# Patient Record
Sex: FEMALE | Race: WHITE | NOT HISPANIC OR LATINO | Employment: OTHER | URBAN - NONMETROPOLITAN AREA
[De-identification: names, ages, dates, MRNs, and addresses within clinical notes are randomized per-mention and may not be internally consistent; named-entity substitution may affect disease eponyms.]

---

## 2020-08-20 DIAGNOSIS — M54.16 LUMBAR RADICULOPATHY: Primary | ICD-10-CM

## 2020-08-25 ENCOUNTER — HOSPITAL ENCOUNTER (OUTPATIENT)
Dept: RADIOLOGY | Facility: HOSPITAL | Age: 85
Discharge: HOME | End: 2020-08-25
Attending: PAIN MEDICINE
Payer: MEDICARE

## 2020-08-25 DIAGNOSIS — M54.16 LUMBAR RADICULOPATHY: ICD-10-CM

## 2020-08-25 PROCEDURE — A9503 TC99M MEDRONATE: HCPCS

## 2022-03-01 ENCOUNTER — HOSPITAL ENCOUNTER (OUTPATIENT)
Facility: HOSPITAL | Age: 87
Discharge: NURSING, REHAB OR LONG-TERM CARE | End: 2022-03-04
Attending: STUDENT IN AN ORGANIZED HEALTH CARE EDUCATION/TRAINING PROGRAM | Admitting: EMERGENCY MEDICINE
Payer: MEDICARE

## 2022-03-01 DIAGNOSIS — R79.89 ELEVATED TROPONIN: ICD-10-CM

## 2022-03-01 DIAGNOSIS — R52 PAIN: ICD-10-CM

## 2022-03-01 DIAGNOSIS — I16.0 HYPERTENSIVE URGENCY: Primary | ICD-10-CM

## 2022-03-01 DIAGNOSIS — R00.0 TACHYCARDIA: ICD-10-CM

## 2022-03-01 LAB
ALBUMIN SERPL BCP-MCNC: 3.1 G/DL (ref 3.5–5.2)
ALP SERPL-CCNC: 114 U/L (ref 55–135)
ALT SERPL W/O P-5'-P-CCNC: 21 U/L (ref 10–44)
ANION GAP SERPL CALC-SCNC: 6 MMOL/L (ref 8–16)
AST SERPL-CCNC: 18 U/L (ref 10–40)
BACTERIA #/AREA URNS HPF: ABNORMAL /HPF
BASOPHILS # BLD AUTO: 0.05 K/UL (ref 0–0.2)
BASOPHILS NFR BLD: 0.3 % (ref 0–1.9)
BILIRUB SERPL-MCNC: 1.1 MG/DL (ref 0.1–1)
BILIRUB UR QL STRIP: NEGATIVE
BUN SERPL-MCNC: 28 MG/DL (ref 8–23)
CALCIUM SERPL-MCNC: 10.1 MG/DL (ref 8.7–10.5)
CHLORIDE SERPL-SCNC: 101 MMOL/L (ref 95–110)
CLARITY UR: ABNORMAL
CO2 SERPL-SCNC: 32 MMOL/L (ref 23–29)
COLOR UR: YELLOW
CREAT SERPL-MCNC: 1.2 MG/DL (ref 0.5–1.4)
CTP QC/QA: YES
DIFFERENTIAL METHOD: ABNORMAL
EOSINOPHIL # BLD AUTO: 0 K/UL (ref 0–0.5)
EOSINOPHIL NFR BLD: 0.1 % (ref 0–8)
ERYTHROCYTE [DISTWIDTH] IN BLOOD BY AUTOMATED COUNT: 12.9 % (ref 11.5–14.5)
EST. GFR  (AFRICAN AMERICAN): 46 ML/MIN/1.73 M^2
EST. GFR  (NON AFRICAN AMERICAN): 39.9 ML/MIN/1.73 M^2
GLUCOSE SERPL-MCNC: 133 MG/DL (ref 70–110)
GLUCOSE UR QL STRIP: NEGATIVE
HCT VFR BLD AUTO: 46 % (ref 37–48.5)
HGB BLD-MCNC: 14.8 G/DL (ref 12–16)
HGB UR QL STRIP: NEGATIVE
HYALINE CASTS #/AREA URNS LPF: 2 /LPF
IMM GRANULOCYTES # BLD AUTO: 0.08 K/UL (ref 0–0.04)
IMM GRANULOCYTES NFR BLD AUTO: 0.5 % (ref 0–0.5)
KETONES UR QL STRIP: NEGATIVE
LEUKOCYTE ESTERASE UR QL STRIP: NEGATIVE
LYMPHOCYTES # BLD AUTO: 1 K/UL (ref 1–4.8)
LYMPHOCYTES NFR BLD: 6.6 % (ref 18–48)
MCH RBC QN AUTO: 29.6 PG (ref 27–31)
MCHC RBC AUTO-ENTMCNC: 32.2 G/DL (ref 32–36)
MCV RBC AUTO: 92 FL (ref 82–98)
MICROSCOPIC COMMENT: ABNORMAL
MONOCYTES # BLD AUTO: 0.9 K/UL (ref 0.3–1)
MONOCYTES NFR BLD: 5.8 % (ref 4–15)
NEUTROPHILS # BLD AUTO: 12.9 K/UL (ref 1.8–7.7)
NEUTROPHILS NFR BLD: 86.7 % (ref 38–73)
NITRITE UR QL STRIP: POSITIVE
NRBC BLD-RTO: 0 /100 WBC
NT-PROBNP SERPL-MCNC: 1830 PG/ML (ref 5–1800)
NT-PROBNP SERPL-MCNC: 2142 PG/ML (ref 5–1800)
PH UR STRIP: 7 [PH] (ref 5–8)
PLATELET # BLD AUTO: 427 K/UL (ref 150–450)
PMV BLD AUTO: 9.4 FL (ref 9.2–12.9)
POTASSIUM SERPL-SCNC: 3.7 MMOL/L (ref 3.5–5.1)
PROT SERPL-MCNC: 7.9 G/DL (ref 6–8.4)
PROT UR QL STRIP: ABNORMAL
RBC # BLD AUTO: 5 M/UL (ref 4–5.4)
RBC #/AREA URNS HPF: 1 /HPF (ref 0–4)
SARS-COV-2 RDRP RESP QL NAA+PROBE: NEGATIVE
SODIUM SERPL-SCNC: 139 MMOL/L (ref 136–145)
SP GR UR STRIP: 1.01 (ref 1–1.03)
SQUAMOUS #/AREA URNS HPF: 2 /HPF
TROPONIN I SERPL DL<=0.01 NG/ML-MCNC: 117.3 PG/ML (ref 0–60)
TROPONIN I SERPL DL<=0.01 NG/ML-MCNC: 120.7 PG/ML (ref 0–60)
TSH SERPL DL<=0.005 MIU/L-ACNC: 1.78 UIU/ML (ref 0.4–4)
URN SPEC COLLECT METH UR: ABNORMAL
UROBILINOGEN UR STRIP-ACNC: 1 EU/DL
WBC # BLD AUTO: 14.86 K/UL (ref 3.9–12.7)
WBC #/AREA URNS HPF: 3 /HPF (ref 0–5)

## 2022-03-01 PROCEDURE — 36415 COLL VENOUS BLD VENIPUNCTURE: CPT | Performed by: STUDENT IN AN ORGANIZED HEALTH CARE EDUCATION/TRAINING PROGRAM

## 2022-03-01 PROCEDURE — 25000003 PHARM REV CODE 250: Performed by: STUDENT IN AN ORGANIZED HEALTH CARE EDUCATION/TRAINING PROGRAM

## 2022-03-01 PROCEDURE — 80053 COMPREHEN METABOLIC PANEL: CPT | Performed by: STUDENT IN AN ORGANIZED HEALTH CARE EDUCATION/TRAINING PROGRAM

## 2022-03-01 PROCEDURE — P9612 CATHETERIZE FOR URINE SPEC: HCPCS

## 2022-03-01 PROCEDURE — 93010 EKG 12-LEAD: ICD-10-PCS | Mod: 76,,, | Performed by: INTERNAL MEDICINE

## 2022-03-01 PROCEDURE — 83880 ASSAY OF NATRIURETIC PEPTIDE: CPT | Mod: 91 | Performed by: STUDENT IN AN ORGANIZED HEALTH CARE EDUCATION/TRAINING PROGRAM

## 2022-03-01 PROCEDURE — 84443 ASSAY THYROID STIM HORMONE: CPT | Performed by: STUDENT IN AN ORGANIZED HEALTH CARE EDUCATION/TRAINING PROGRAM

## 2022-03-01 PROCEDURE — 96361 HYDRATE IV INFUSION ADD-ON: CPT

## 2022-03-01 PROCEDURE — 93005 ELECTROCARDIOGRAM TRACING: CPT

## 2022-03-01 PROCEDURE — 25000003 PHARM REV CODE 250: Performed by: INTERNAL MEDICINE

## 2022-03-01 PROCEDURE — G0378 HOSPITAL OBSERVATION PER HR: HCPCS

## 2022-03-01 PROCEDURE — 81000 URINALYSIS NONAUTO W/SCOPE: CPT | Performed by: STUDENT IN AN ORGANIZED HEALTH CARE EDUCATION/TRAINING PROGRAM

## 2022-03-01 PROCEDURE — 93010 ELECTROCARDIOGRAM REPORT: CPT | Mod: ,,, | Performed by: INTERNAL MEDICINE

## 2022-03-01 PROCEDURE — 85025 COMPLETE CBC W/AUTO DIFF WBC: CPT | Performed by: STUDENT IN AN ORGANIZED HEALTH CARE EDUCATION/TRAINING PROGRAM

## 2022-03-01 PROCEDURE — 96365 THER/PROPH/DIAG IV INF INIT: CPT

## 2022-03-01 PROCEDURE — 96375 TX/PRO/DX INJ NEW DRUG ADDON: CPT

## 2022-03-01 PROCEDURE — U0002 COVID-19 LAB TEST NON-CDC: HCPCS | Performed by: STUDENT IN AN ORGANIZED HEALTH CARE EDUCATION/TRAINING PROGRAM

## 2022-03-01 PROCEDURE — 63600175 PHARM REV CODE 636 W HCPCS: Performed by: STUDENT IN AN ORGANIZED HEALTH CARE EDUCATION/TRAINING PROGRAM

## 2022-03-01 PROCEDURE — 96376 TX/PRO/DX INJ SAME DRUG ADON: CPT

## 2022-03-01 PROCEDURE — 99291 CRITICAL CARE FIRST HOUR: CPT | Mod: 25

## 2022-03-01 PROCEDURE — 84484 ASSAY OF TROPONIN QUANT: CPT | Mod: 91 | Performed by: STUDENT IN AN ORGANIZED HEALTH CARE EDUCATION/TRAINING PROGRAM

## 2022-03-01 RX ORDER — LABETALOL HCL 20 MG/4 ML
20 SYRINGE (ML) INTRAVENOUS EVERY 4 HOURS PRN
Status: DISCONTINUED | OUTPATIENT
Start: 2022-03-01 | End: 2022-03-02

## 2022-03-01 RX ORDER — ACETAMINOPHEN 325 MG/1
650 TABLET ORAL EVERY 8 HOURS PRN
Status: DISCONTINUED | OUTPATIENT
Start: 2022-03-01 | End: 2022-03-04 | Stop reason: HOSPADM

## 2022-03-01 RX ORDER — ALPRAZOLAM 0.5 MG/1
0.75 TABLET ORAL NIGHTLY
Status: ON HOLD | COMMUNITY
End: 2022-07-05 | Stop reason: HOSPADM

## 2022-03-01 RX ORDER — ASPIRIN 325 MG
325 TABLET ORAL
Status: COMPLETED | OUTPATIENT
Start: 2022-03-01 | End: 2022-03-01

## 2022-03-01 RX ORDER — LABETALOL HCL 20 MG/4 ML
20 SYRINGE (ML) INTRAVENOUS
Status: COMPLETED | OUTPATIENT
Start: 2022-03-01 | End: 2022-03-01

## 2022-03-01 RX ORDER — MORPHINE SULFATE 2 MG/ML
1 INJECTION, SOLUTION INTRAMUSCULAR; INTRAVENOUS EVERY 4 HOURS PRN
Status: DISCONTINUED | OUTPATIENT
Start: 2022-03-01 | End: 2022-03-02

## 2022-03-01 RX ORDER — MORPHINE SULFATE 2 MG/ML
1 INJECTION, SOLUTION INTRAMUSCULAR; INTRAVENOUS
Status: COMPLETED | OUTPATIENT
Start: 2022-03-01 | End: 2022-03-01

## 2022-03-01 RX ORDER — ONDANSETRON 2 MG/ML
4 INJECTION INTRAMUSCULAR; INTRAVENOUS EVERY 8 HOURS PRN
Status: DISCONTINUED | OUTPATIENT
Start: 2022-03-01 | End: 2022-03-04 | Stop reason: HOSPADM

## 2022-03-01 RX ORDER — KETOROLAC TROMETHAMINE 30 MG/ML
15 INJECTION, SOLUTION INTRAMUSCULAR; INTRAVENOUS
Status: COMPLETED | OUTPATIENT
Start: 2022-03-01 | End: 2022-03-01

## 2022-03-01 RX ORDER — FAMOTIDINE 10 MG/ML
20 INJECTION INTRAVENOUS DAILY
Status: DISCONTINUED | OUTPATIENT
Start: 2022-03-02 | End: 2022-03-02

## 2022-03-01 RX ORDER — TALC
6 POWDER (GRAM) TOPICAL NIGHTLY PRN
Status: DISCONTINUED | OUTPATIENT
Start: 2022-03-01 | End: 2022-03-04 | Stop reason: HOSPADM

## 2022-03-01 RX ORDER — EZETIMIBE 10 MG/1
10 TABLET ORAL DAILY
COMMUNITY
End: 2022-07-25 | Stop reason: SDUPTHER

## 2022-03-01 RX ORDER — SODIUM CHLORIDE 0.9 % (FLUSH) 0.9 %
10 SYRINGE (ML) INJECTION
Status: DISCONTINUED | OUTPATIENT
Start: 2022-03-01 | End: 2022-03-04 | Stop reason: HOSPADM

## 2022-03-01 RX ORDER — ACETAMINOPHEN 325 MG/1
650 TABLET ORAL
Status: COMPLETED | OUTPATIENT
Start: 2022-03-01 | End: 2022-03-01

## 2022-03-01 RX ORDER — HYDROCHLOROTHIAZIDE 25 MG/1
50 TABLET ORAL DAILY
COMMUNITY
End: 2022-03-15

## 2022-03-01 RX ADMIN — ASPIRIN 325 MG ORAL TABLET 325 MG: 325 PILL ORAL at 01:03

## 2022-03-01 RX ADMIN — MORPHINE SULFATE 1 MG: 2 INJECTION, SOLUTION INTRAMUSCULAR; INTRAVENOUS at 05:03

## 2022-03-01 RX ADMIN — ACETAMINOPHEN 650 MG: 325 TABLET ORAL at 12:03

## 2022-03-01 RX ADMIN — SODIUM CHLORIDE, SODIUM LACTATE, POTASSIUM CHLORIDE, AND CALCIUM CHLORIDE 1000 ML: .6; .31; .03; .02 INJECTION, SOLUTION INTRAVENOUS at 12:03

## 2022-03-01 RX ADMIN — MORPHINE SULFATE 1 MG: 2 INJECTION, SOLUTION INTRAMUSCULAR; INTRAVENOUS at 01:03

## 2022-03-01 RX ADMIN — CEFTRIAXONE 1 G: 1 INJECTION, SOLUTION INTRAVENOUS at 04:03

## 2022-03-01 RX ADMIN — LABETALOL HYDROCHLORIDE 20 MG: 5 INJECTION, SOLUTION INTRAVENOUS at 01:03

## 2022-03-01 RX ADMIN — ALPRAZOLAM 0.75 MG: 0.5 TABLET ORAL at 08:03

## 2022-03-01 RX ADMIN — LABETALOL HYDROCHLORIDE 20 MG: 5 INJECTION, SOLUTION INTRAVENOUS at 05:03

## 2022-03-01 RX ADMIN — KETOROLAC TROMETHAMINE 15 MG: 30 INJECTION, SOLUTION INTRAMUSCULAR at 12:03

## 2022-03-01 NOTE — ED PROVIDER NOTES
Encounter Date: 3/1/2022       History     Chief Complaint   Patient presents with    Back Pain     Pt fell last week.  Pt sat on toilet this morning and started with right buttocks pain.  Pt states pain does NOT radiate.     89-year-old female with history of dvt on xerelto and full dose aspirin, hypertension and high cholesterol presents with right buttocks pain.  Patient said that she fell last week on to her that right side and has been having episodic pain in that same area.  Patient said however, pain was much worse this morning when patient was trying to get off the toilet.  Denies any new falls, chest pain, fever, vomiting, diarrhea, shortness of breath        Review of patient's allergies indicates:  No Known Allergies  Past Medical History:   Diagnosis Date    High cholesterol      Past Surgical History:   Procedure Laterality Date    HYSTERECTOMY       History reviewed. No pertinent family history.  Social History     Tobacco Use    Smoking status: Never Smoker    Smokeless tobacco: Never Used   Substance Use Topics    Alcohol use: Never     Review of Systems   Constitutional: Negative.    HENT: Negative.    Respiratory: Negative.    Cardiovascular: Negative.    Gastrointestinal: Negative.    Genitourinary: Negative.    Musculoskeletal: Positive for back pain and gait problem.   Skin: Negative.    Psychiatric/Behavioral: Negative.    All other systems reviewed and are negative.      Physical Exam     Initial Vitals [03/01/22 1107]   BP Pulse Resp Temp SpO2   (!) 199/96 (!) 122 20 98.1 °F (36.7 °C) (!) 92 %      MAP       --         Physical Exam    Nursing note and vitals reviewed.  Constitutional: Vital signs are normal. She appears well-developed and well-nourished.   HENT:   Head: Normocephalic and atraumatic.   Eyes: Conjunctivae and lids are normal.   Neck: Trachea normal. Neck supple.   Cardiovascular: Regular rhythm, normal heart sounds, intact distal pulses and normal pulses.   No murmur  heard.  Pulmonary/Chest: Breath sounds normal. No respiratory distress.   Abdominal: Abdomen is soft. Bowel sounds are normal.   Musculoskeletal:      Cervical back: Neck supple.      Comments: Tenderness to palpation of right sacral area without any obvious deformity or bruising.  Full range of motion of hip and knee bilaterally     Neurological: She is alert and oriented to person, place, and time. She has normal strength. No cranial nerve deficit or sensory deficit.   Skin: Skin is warm. Capillary refill takes less than 2 seconds.   Psychiatric: She has a normal mood and affect. Her speech is normal. Thought content normal.         ED Course   Critical Care    Date/Time: 3/1/2022 3:15 PM  Performed by: Benjamin Phillips MD  Authorized by: Benjamin Phillips MD   Direct patient critical care time: 10 minutes  Additional history critical care time: 5 minutes  Ordering / reviewing critical care time: 5 minutes  Documentation critical care time: 5 minutes  Other critical care time: 5 minutes  Total critical care time (exclusive of procedural time) : 30 minutes  Critical care time was exclusive of separately billable procedures and treating other patients.  Critical care was necessary to treat or prevent imminent or life-threatening deterioration of the following conditions: cardiac failure.  Critical care was time spent personally by me on the following activities: examination of patient, evaluation of patient's response to treatment, obtaining history from patient or surrogate, ordering and performing treatments and interventions, ordering and review of laboratory studies, ordering and review of radiographic studies, pulse oximetry and re-evaluation of patient's condition.        Labs Reviewed   CBC W/ AUTO DIFFERENTIAL - Abnormal; Notable for the following components:       Result Value    WBC 14.86 (*)     Gran # (ANC) 12.9 (*)     Immature Grans (Abs) 0.08 (*)     Gran % 86.7 (*)     Lymph % 6.6 (*)     All other  components within normal limits   COMPREHENSIVE METABOLIC PANEL - Abnormal; Notable for the following components:    CO2 32 (*)     Glucose 133 (*)     BUN 28 (*)     Albumin 3.1 (*)     Total Bilirubin 1.1 (*)     Anion Gap 6 (*)     eGFR if  46.0 (*)     eGFR if non  39.9 (*)     All other components within normal limits   TROPONIN I HIGH SENSITIVITY - Abnormal; Notable for the following components:    Troponin I High Sensitivity 117.3 (*)     All other components within normal limits   NT-PRO NATRIURETIC PEPTIDE - Abnormal; Notable for the following components:    NT-proBNP 2142 (*)     All other components within normal limits   URINALYSIS, REFLEX TO URINE CULTURE - Abnormal; Notable for the following components:    Appearance, UA Cloudy (*)     Protein, UA Trace (*)     Nitrite, UA Positive (*)     All other components within normal limits    Narrative:     Preferred Collection Type->Urine, Clean Catch  Specimen Source->Urine   URINALYSIS MICROSCOPIC - Abnormal; Notable for the following components:    Bacteria Few (*)     Hyaline Casts, UA 2 (*)     All other components within normal limits    Narrative:     Preferred Collection Type->Urine, Clean Catch  Specimen Source->Urine   TROPONIN I HIGH SENSITIVITY - Abnormal; Notable for the following components:    Troponin I High Sensitivity 120.7 (*)     All other components within normal limits   NT-PRO NATRIURETIC PEPTIDE - Abnormal; Notable for the following components:    NT-proBNP 1830 (*)     All other components within normal limits   TSH   TROPONIN I HIGH SENSITIVITY   SARS-COV-2 RDRP GENE    Narrative:     This test utilizes isothermal nucleic acid amplification   technology to detect the SARS-CoV-2 RdRp nucleic acid segment.   The analytical sensitivity (limit of detection) is 125 genome   equivalents/mL.   A POSITIVE result implies infection with the SARS-CoV-2 virus;   the patient is presumed to be contagious.     A  NEGATIVE result means that SARS-CoV-2 nucleic acids are not   present above the limit of detection. A NEGATIVE result should be   treated as presumptive. It does not rule out the possibility of   COVID-19 and should not be the sole basis for treatment decisions.   If COVID-19 is strongly suspected based on clinical and exposure   history, re-testing using an alternate molecular assay should be   considered.   This test is only for use under the Food and Drug   Administration s Emergency Use Authorization (EUA).   Commercial kits are provided by [employer].   Performance characteristics of the EUA have been independently   verified by [facility] Department of   Pathology and Laboratory Medicine.   _________________________________________________________________   The authorized Fact Sheet for Healthcare Providers and the authorized Fact   Sheet for Patients of the ID NOW COVID-19 are available on the FDA   website:     https://www.fda.gov/media/916380/download  https://www.fda.gov/media/002368/download             EKG Readings: (Independently Interpreted)   Initial Reading: No STEMI. Rhythm: Sinus Tachycardia. Ectopy: No Ectopy. Axis: Normal.   Other EKG Interpretations: Nsr. Normal axis no stemi       Imaging Results          CT Pelvis Without Contrast (Final result)  Result time 03/01/22 14:58:41    Final result by Mario Worthington MD (03/01/22 14:58:41)                 Impression:      Nondisplaced fracture lower sacrum.      Electronically signed by: Mario Worthington MD  Date:    03/01/2022  Time:    14:58             Narrative:    EXAMINATION:  CT PELVIS WITHOUT CONTRAST    CLINICAL HISTORY:  Pelvis pain, stress fracture suspected, neg xray;    TECHNIQUE:  Axial CT images were obtained. Iterative reconstruction technique was used.  CT/cardiac nuclear exam/s in prior 12 months: 0.    FINDINGS:  Nondisplaced fracture of the S4 segment of the sacrum.    No other fractures.                                X-Ray Sacrum And Coccyx (Final result)  Result time 03/01/22 12:05:22    Final result by Mario Worthington MD (03/01/22 12:05:22)                 Impression:      No fracture.      Electronically signed by: Mario Worthington MD  Date:    03/01/2022  Time:    12:05             Narrative:    EXAMINATION:  XR SACRUM AND COCCYX    CLINICAL HISTORY:  Trauma, right buttock pain    COMPARISON:  None.    FINDINGS:  Three views of sacrum and coccyx demonstrate no fracture or dislocation.  No focal soft tissue abnormality.                               X-Ray Chest 1 View (Final result)  Result time 03/01/22 12:04:03    Final result by Mario Worthington MD (03/01/22 12:04:03)                 Impression:      No evidence of cardiopulmonary disease.      Electronically signed by: Mario Worthington MD  Date:    03/01/2022  Time:    12:04             Narrative:    EXAMINATION:  XR CHEST 1 VIEW    CLINICAL HISTORY:  Pain, unspecified    COMPARISON:  Portable chest 01/17/2020.    FINDINGS:  Frontal chest radiograph demonstrates clear lungs.  Mild chronic elevation right hemidiaphragm.  No pleural fluid.  Cardiomediastinal silhouette is unremarkable.  No osseous abnormality.                                 Medications   iohexoL (OMNIPAQUE 350) injection 100 mL (has no administration in time range)   sodium chloride 0.9% flush 10 mL (has no administration in time range)   melatonin tablet 6 mg (has no administration in time range)   acetaminophen tablet 650 mg (has no administration in time range)   morphine injection 1 mg (has no administration in time range)   famotidine (PF) injection 20 mg (has no administration in time range)   ondansetron injection 4 mg (has no administration in time range)   acetaminophen tablet 650 mg (has no administration in time range)   labetalol 20 mg/4 mL (5 mg/mL) IV syring (has no administration in time range)   cefTRIAXone (ROCEPHIN) 1 g/50 mL D5W IVPB (has no administration in time  range)   acetaminophen tablet 650 mg (650 mg Oral Given 3/1/22 1214)   ketorolac injection 15 mg (15 mg Intramuscular Given 3/1/22 1214)   lactated ringers bolus 1,000 mL (0 mLs Intravenous Stopped 3/1/22 1320)   labetalol 20 mg/4 mL (5 mg/mL) IV syring (20 mg Intravenous Given 3/1/22 1304)   aspirin tablet 325 mg (325 mg Oral Given 3/1/22 1329)   morphine injection 1 mg (1 mg Intravenous Given 3/1/22 1329)     Medical Decision Making:   Initial Assessment:   89-year-old female with history of hypertension and high cholesterol presents with right buttocks pain.  Patient tachycardic and saturating 92% on room air hypertensive.  Will treat pain and re-evaluate vitals.  Patient denies any shortness of breath or other symptoms.  Will get screening labs. reeval   Clinical Tests:   Lab Tests: Ordered and Reviewed  The following lab test(s) were unremarkable: CBC, CMP, Troponin, BNP and Urinalysis  Radiological Study: Ordered and Reviewed  Medical Tests: Reviewed and Ordered             ED Course as of 03/01/22 1646   Tue Mar 01, 2022   1319 Oxygen fluctuates from %.  Patient already on blood thinner and aspirin full dose daily.  Patient denies any chest pain or shortness of breath.  Elevated troponin and BNP.  Demand?  Will repeat in 2 hours [HD]   1515 Patient feels better.  Blood pressure and heart rate improved.  Will admit patient for better control blood pressure.  Troponin chronic versus demand.  Aspirin given will repeat in 6 hours.  Cardiac monitor [HD]      ED Course User Index  [HD] Benjamin Phillips MD             Clinical Impression:   Final diagnoses:  [R52] Pain  [R00.0] Tachycardia  [I16.0] Hypertensive urgency (Primary)  [R77.8] Elevated troponin          ED Disposition Condition    Observation               Benjamin Phillips MD  03/01/22 1646

## 2022-03-01 NOTE — ED NOTES
Client states the medication we gave her for pain has not helped much. MD was notified ands has ordered more imaging to include pelvis.

## 2022-03-01 NOTE — Clinical Note
Diagnosis: Hypertensive urgency [534787]   Admitting Provider:: LUIGI SON [[id number]]   Future Attending Provider: MASSIEL BOWEN III [[id number]]   Reason for IP Medical Treatment  (Clinical interventions that can only be accomplished in the IP setting? ) :: hypertensive urgency   Estimated Length of Stay:: 2 midnights   I certify that Inpatient services for greater than or equal to 2 midnights are medically necessary:: Yes   Plans for Post-Acute care--if anticipated (pick the single best option):: A. No post acute care anticipated at this time

## 2022-03-02 PROBLEM — I16.0 HYPERTENSIVE URGENCY: Status: ACTIVE | Noted: 2022-03-02

## 2022-03-02 PROBLEM — N39.0 UTI (URINARY TRACT INFECTION): Status: ACTIVE | Noted: 2022-03-02
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
## 2022-03-02 PROBLEM — R00.0 TACHYCARDIA: Status: ACTIVE | Noted: 2022-03-02

## 2022-03-02 PROBLEM — S32.9XXA PELVIC FRACTURE: Status: ACTIVE | Noted: 2022-03-02

## 2022-03-02 LAB
ALBUMIN SERPL BCP-MCNC: 2.5 G/DL (ref 3.5–5.2)
ALP SERPL-CCNC: 96 U/L (ref 55–135)
ALT SERPL W/O P-5'-P-CCNC: 17 U/L (ref 10–44)
ANION GAP SERPL CALC-SCNC: 4 MMOL/L (ref 8–16)
AST SERPL-CCNC: 18 U/L (ref 10–40)
BASOPHILS # BLD AUTO: 0.05 K/UL (ref 0–0.2)
BASOPHILS NFR BLD: 0.5 % (ref 0–1.9)
BILIRUB SERPL-MCNC: 0.9 MG/DL (ref 0.1–1)
BUN SERPL-MCNC: 24 MG/DL (ref 8–23)
CALCIUM SERPL-MCNC: 9.2 MG/DL (ref 8.7–10.5)
CHLORIDE SERPL-SCNC: 104 MMOL/L (ref 95–110)
CO2 SERPL-SCNC: 32 MMOL/L (ref 23–29)
CREAT SERPL-MCNC: 1.1 MG/DL (ref 0.5–1.4)
DIFFERENTIAL METHOD: ABNORMAL
EOSINOPHIL # BLD AUTO: 0.2 K/UL (ref 0–0.5)
EOSINOPHIL NFR BLD: 1.5 % (ref 0–8)
ERYTHROCYTE [DISTWIDTH] IN BLOOD BY AUTOMATED COUNT: 12.9 % (ref 11.5–14.5)
EST. GFR  (AFRICAN AMERICAN): 51.1 ML/MIN/1.73 M^2
EST. GFR  (NON AFRICAN AMERICAN): 44.3 ML/MIN/1.73 M^2
GLUCOSE SERPL-MCNC: 106 MG/DL (ref 70–110)
HCT VFR BLD AUTO: 40.6 % (ref 37–48.5)
HGB BLD-MCNC: 13.1 G/DL (ref 12–16)
IMM GRANULOCYTES # BLD AUTO: 0.05 K/UL (ref 0–0.04)
IMM GRANULOCYTES NFR BLD AUTO: 0.5 % (ref 0–0.5)
LYMPHOCYTES # BLD AUTO: 1.6 K/UL (ref 1–4.8)
LYMPHOCYTES NFR BLD: 15.8 % (ref 18–48)
MCH RBC QN AUTO: 29.2 PG (ref 27–31)
MCHC RBC AUTO-ENTMCNC: 32.3 G/DL (ref 32–36)
MCV RBC AUTO: 91 FL (ref 82–98)
MONOCYTES # BLD AUTO: 1.2 K/UL (ref 0.3–1)
MONOCYTES NFR BLD: 11.7 % (ref 4–15)
NEUTROPHILS # BLD AUTO: 7.2 K/UL (ref 1.8–7.7)
NEUTROPHILS NFR BLD: 70 % (ref 38–73)
NRBC BLD-RTO: 0 /100 WBC
PLATELET # BLD AUTO: 377 K/UL (ref 150–450)
PMV BLD AUTO: 9.5 FL (ref 9.2–12.9)
POTASSIUM SERPL-SCNC: 3.7 MMOL/L (ref 3.5–5.1)
PROT SERPL-MCNC: 6.6 G/DL (ref 6–8.4)
RBC # BLD AUTO: 4.48 M/UL (ref 4–5.4)
SODIUM SERPL-SCNC: 140 MMOL/L (ref 136–145)
TROPONIN I SERPL DL<=0.01 NG/ML-MCNC: 153.1 PG/ML (ref 0–60)
TROPONIN I SERPL DL<=0.01 NG/ML-MCNC: 182.9 PG/ML (ref 0–60)
WBC # BLD AUTO: 10.32 K/UL (ref 3.9–12.7)

## 2022-03-02 PROCEDURE — 63600175 PHARM REV CODE 636 W HCPCS: Performed by: INTERNAL MEDICINE

## 2022-03-02 PROCEDURE — 96375 TX/PRO/DX INJ NEW DRUG ADDON: CPT

## 2022-03-02 PROCEDURE — 96372 THER/PROPH/DIAG INJ SC/IM: CPT | Performed by: INTERNAL MEDICINE

## 2022-03-02 PROCEDURE — 25000003 PHARM REV CODE 250: Performed by: INTERNAL MEDICINE

## 2022-03-02 PROCEDURE — 84484 ASSAY OF TROPONIN QUANT: CPT | Mod: 91 | Performed by: STUDENT IN AN ORGANIZED HEALTH CARE EDUCATION/TRAINING PROGRAM

## 2022-03-02 PROCEDURE — 96366 THER/PROPH/DIAG IV INF ADDON: CPT

## 2022-03-02 PROCEDURE — 84484 ASSAY OF TROPONIN QUANT: CPT | Performed by: STUDENT IN AN ORGANIZED HEALTH CARE EDUCATION/TRAINING PROGRAM

## 2022-03-02 PROCEDURE — 97166 OT EVAL MOD COMPLEX 45 MIN: CPT

## 2022-03-02 PROCEDURE — 80053 COMPREHEN METABOLIC PANEL: CPT | Performed by: STUDENT IN AN ORGANIZED HEALTH CARE EDUCATION/TRAINING PROGRAM

## 2022-03-02 PROCEDURE — 85025 COMPLETE CBC W/AUTO DIFF WBC: CPT | Performed by: STUDENT IN AN ORGANIZED HEALTH CARE EDUCATION/TRAINING PROGRAM

## 2022-03-02 PROCEDURE — 97161 PT EVAL LOW COMPLEX 20 MIN: CPT

## 2022-03-02 PROCEDURE — G0378 HOSPITAL OBSERVATION PER HR: HCPCS

## 2022-03-02 PROCEDURE — 36415 COLL VENOUS BLD VENIPUNCTURE: CPT | Performed by: STUDENT IN AN ORGANIZED HEALTH CARE EDUCATION/TRAINING PROGRAM

## 2022-03-02 PROCEDURE — 25000003 PHARM REV CODE 250: Performed by: STUDENT IN AN ORGANIZED HEALTH CARE EDUCATION/TRAINING PROGRAM

## 2022-03-02 RX ORDER — MORPHINE SULFATE 2 MG/ML
1 INJECTION, SOLUTION INTRAMUSCULAR; INTRAVENOUS EVERY 4 HOURS PRN
Status: DISCONTINUED | OUTPATIENT
Start: 2022-03-02 | End: 2022-03-04 | Stop reason: HOSPADM

## 2022-03-02 RX ORDER — EZETIMIBE 10 MG/1
10 TABLET ORAL DAILY
Status: DISCONTINUED | OUTPATIENT
Start: 2022-03-02 | End: 2022-03-04 | Stop reason: HOSPADM

## 2022-03-02 RX ORDER — ENOXAPARIN SODIUM 100 MG/ML
40 INJECTION SUBCUTANEOUS EVERY 24 HOURS
Status: DISCONTINUED | OUTPATIENT
Start: 2022-03-02 | End: 2022-03-04 | Stop reason: DRUGHIGH

## 2022-03-02 RX ORDER — CARVEDILOL 12.5 MG/1
12.5 TABLET ORAL 2 TIMES DAILY
Status: DISCONTINUED | OUTPATIENT
Start: 2022-03-02 | End: 2022-03-04 | Stop reason: HOSPADM

## 2022-03-02 RX ORDER — TRAMADOL HYDROCHLORIDE 50 MG/1
50 TABLET ORAL EVERY 6 HOURS PRN
Status: DISCONTINUED | OUTPATIENT
Start: 2022-03-02 | End: 2022-03-04 | Stop reason: HOSPADM

## 2022-03-02 RX ADMIN — CEFTRIAXONE 1 G: 1 INJECTION, SOLUTION INTRAVENOUS at 04:03

## 2022-03-02 RX ADMIN — CARVEDILOL 12.5 MG: 12.5 TABLET, FILM COATED ORAL at 08:03

## 2022-03-02 RX ADMIN — ALPRAZOLAM 0.75 MG: 0.5 TABLET ORAL at 08:03

## 2022-03-02 RX ADMIN — Medication 6 MG: at 08:03

## 2022-03-02 RX ADMIN — FAMOTIDINE 20 MG: 10 INJECTION INTRAVENOUS at 09:03

## 2022-03-02 RX ADMIN — CARVEDILOL 12.5 MG: 12.5 TABLET, FILM COATED ORAL at 09:03

## 2022-03-02 RX ADMIN — ONDANSETRON 4 MG: 2 INJECTION INTRAMUSCULAR; INTRAVENOUS at 03:03

## 2022-03-02 RX ADMIN — EZETIMIBE 10 MG: 10 TABLET ORAL at 09:03

## 2022-03-02 RX ADMIN — ENOXAPARIN SODIUM 40 MG: 40 INJECTION SUBCUTANEOUS at 04:03

## 2022-03-02 NOTE — H&P
[facility] Medicine  History & Physical    Patient Name: Codi Black  MRN: [id number]  Patient Class: OP- Observation  Admission Date: 3/1/2022  Attending Physician: Rafael Quan III, MD   Primary Care Provider: Delfino Rowe MD         Patient information was obtained from patient, caregiver / friend and ER records.     Subjective:     Principal Problem:Pelvic fracture    Chief Complaint:   Chief Complaint   Patient presents with    Back Pain     Pt fell last week.  Pt sat on toilet this morning and started with right buttocks pain.  Pt states pain does NOT radiate.        HPI: Patient is a 89-year-old female with a history of hypertension who had a slip and fall in her restroom while attempting to get to the toilet.  She landed on her buttocks.  Over the next day or 2 she had excruciating pain difficulty with transferring toileting and completing other ADLs.  She ultimately was admitted.  X-rays showed no pelvic fractures but a CT scan and did follow-up with a nondisplaced fracture of the pelvis.  Patient is complaining of severe pain she is having difficulty turning and transferring she states she cannot get to the bedside commode or get out of the bed without any help.  She states the medications are not helping much for pain.  She has also had very fluctuating blood pressures and tachycardia.      Past Medical History:   Diagnosis Date    High cholesterol        Past Surgical History:   Procedure Laterality Date    HYSTERECTOMY         Review of patient's allergies indicates:  No Known Allergies    No current facility-administered medications on file prior to encounter.     Current Outpatient Medications on File Prior to Encounter   Medication Sig    ALPRAZolam (XANAX) 0.5 MG tablet Take 0.75 mg by mouth every evening.    ezetimibe (ZETIA) 10 mg tablet Take 10 mg by mouth once daily.    hydroCHLOROthiazide (HYDRODIURIL) 25 MG tablet Take 50 mg by mouth once daily.     Family  History    None       Tobacco Use    Smoking status: Never Smoker    Smokeless tobacco: Never Used   Substance and Sexual Activity    Alcohol use: Never    Drug use: Not on file    Sexual activity: Not on file     Review of Systems   Constitutional:  Positive for activity change and appetite change. Negative for chills and fever.   HENT:  Negative for ear pain, mouth sores, nosebleeds and sore throat.    Eyes:  Negative for visual disturbance.   Respiratory:  Negative for cough, shortness of breath and wheezing.    Cardiovascular:  Negative for chest pain, palpitations and leg swelling.   Gastrointestinal:  Negative for abdominal distention, abdominal pain, blood in stool, constipation, diarrhea, nausea and vomiting.   Endocrine: Negative for polyphagia.   Genitourinary:  Negative for difficulty urinating, dysuria, flank pain and frequency.   Musculoskeletal:  Positive for arthralgias and back pain. Negative for myalgias.   Skin:  Negative for rash.   Neurological:  Positive for weakness. Negative for dizziness, tremors, seizures, syncope, facial asymmetry, speech difficulty and headaches.   Hematological:  Negative for adenopathy.   Psychiatric/Behavioral:  Negative for agitation, confusion and hallucinations. The patient is nervous/anxious.    Objective:     Vital Signs (Most Recent):  Temp: 98.4 °F (36.9 °C) (03/02/22 0724)  Pulse: 90 (03/02/22 0724)  Resp: 18 (03/02/22 0724)  BP: (!) 147/70 (03/02/22 0724)  SpO2: (!) 91 % (03/02/22 0724)   Vital Signs (24h Range):  Temp:  [97.5 °F (36.4 °C)-98.5 °F (36.9 °C)] 98.4 °F (36.9 °C)  Pulse:  [] 90  Resp:  [2-20] 18  SpO2:  [91 %-94 %] 91 %  BP: (118-227)/() 147/70     Weight: 62.6 kg (138 lb)  Body mass index is 23.69 kg/m².    Physical Exam  Vitals and nursing note reviewed.   Constitutional:       General: She is awake. She is in acute distress.      Appearance: Normal appearance. She is not ill-appearing, toxic-appearing or diaphoretic.   HENT:       Head: Normocephalic and atraumatic.      Nose: Nose normal. No congestion or rhinorrhea.      Mouth/Throat:      Mouth: Mucous membranes are moist.      Pharynx: Oropharynx is clear. No oropharyngeal exudate or posterior oropharyngeal erythema.   Eyes:      General: No scleral icterus.        Right eye: No discharge.         Left eye: No discharge.      Extraocular Movements: Extraocular movements intact.      Pupils: Pupils are equal, round, and reactive to light.   Neck:      Thyroid: No thyroid mass or thyromegaly.      Vascular: No carotid bruit.      Meningeal: Brudzinski's sign and Kernig's sign absent.   Cardiovascular:      Rate and Rhythm: Normal rate and regular rhythm.      Chest Wall: PMI is not displaced. No thrill.      Pulses: Normal pulses.      Heart sounds: Normal heart sounds. No murmur heard.    No friction rub. No gallop.   Pulmonary:      Effort: Pulmonary effort is normal. No tachypnea, accessory muscle usage, prolonged expiration or respiratory distress.      Breath sounds: Normal breath sounds. No stridor or decreased air movement. No wheezing, rhonchi or rales.   Chest:      Chest wall: No tenderness.   Abdominal:      General: Bowel sounds are normal. There is no distension.      Palpations: Abdomen is soft. There is no hepatomegaly, splenomegaly or mass.      Tenderness: There is no abdominal tenderness. There is no right CVA tenderness, left CVA tenderness, guarding or rebound.      Hernia: No hernia is present.   Musculoskeletal:         General: Tenderness present. No swelling, deformity or signs of injury. Normal range of motion.      Cervical back: Normal range of motion and neck supple. No rigidity. No muscular tenderness.      Right lower leg: No edema.      Left lower leg: No edema.   Lymphadenopathy:      Cervical: No cervical adenopathy.   Skin:     General: Skin is warm.      Capillary Refill: Capillary refill takes less than 2 seconds.      Coloration: Skin is not cyanotic,  jaundiced or pale.      Findings: No bruising, erythema, lesion, petechiae or rash.   Neurological:      Mental Status: She is oriented to person, place, and time.      Cranial Nerves: No cranial nerve deficit, dysarthria or facial asymmetry.      Sensory: No sensory deficit.      Motor: Weakness present. No tremor.      Coordination: Coordination normal.   Psychiatric:         Mood and Affect: Mood normal. Mood is not anxious or depressed. Affect is not flat.         Speech: Speech is not rapid and pressured or slurred.         Behavior: Behavior normal. Behavior is not agitated, aggressive or combative.         Thought Content: Thought content normal. Thought content is not paranoid or delusional.         Cognition and Memory: Cognition is not impaired. Memory is not impaired.         Judgment: Judgment normal.         CRANIAL NERVES     CN III, IV, VI   Pupils are equal, round, and reactive to light.     Significant Labs: All pertinent labs within the past 24 hours have been reviewed.    Significant Imaging: I have reviewed all pertinent imaging results/findings within the past 24 hours.    Assessment/Plan:     * Pelvic fracture        UTI (urinary tract infection)        Fall        Tachycardia        Hypertensive urgency          VTE Risk Mitigation (From admission, onward)         Ordered     enoxaparin injection 40 mg  Daily         03/02/22 0916     IP VTE HIGH RISK PATIENT  Once         03/01/22 1637     Place sequential compression device  Until discontinued         03/01/22 1637                   Rafael Quan III, MD  Department of Hospital Medicine   [facility]

## 2022-03-02 NOTE — NURSING
Care plan reviewed. Pt resting in bed c/o slight nausea. Recently received medication for nausea. PT requested connie. Will kvng to monitor.

## 2022-03-02 NOTE — HPI
Patient is a 89-year-old female with a history of hypertension who had a slip and fall in her restroom while attempting to get to the toilet.  She landed on her buttocks.  Over the next day or 2 she had excruciating pain difficulty with transferring toileting and completing other ADLs.  She ultimately was admitted.  X-rays showed no pelvic fractures but a CT scan and did follow-up with a nondisplaced fracture of the pelvis.  Patient is complaining of severe pain she is having difficulty turning and transferring she states she cannot get to the bedside commode or get out of the bed without any help.  She states the medications are not helping much for pain.  She has also had very fluctuating blood pressures and tachycardia.

## 2022-03-02 NOTE — PLAN OF CARE
03/02/22 1102   Post-Acute Status   Post-Acute Authorization Placement   Post-Acute Placement Status Referrals Sent   Discharge Delays None known at this time   Discharge Plan   Discharge Plan A Rehab   Discharge Plan B Home with family;Home Health     The patient's choice form was signed for IPR placement. DEE Hughes was notified of new referral.     FOSTER will continue to monitor.

## 2022-03-02 NOTE — PT/OT/SLP EVAL
Occupational Therapy   Evaluation    Name: Codi Black  MRN: [id number]  Admitting Diagnosis:  Pelvic fracture  Recent Surgery: * No surgery found *      Recommendations:     Discharge Recommendations: rehabilitation facility  Discharge Equipment Recommendations:  none  Barriers to discharge:  Other (Comment) (medical and functional status)    Assessment:     Codi Black is a 89 y.o. female with a medical diagnosis of Pelvic fracture. She resents with functional deficits impacting independence with ADL's including functional mobility. Performance deficits affecting function: weakness, impaired endurance, impaired self care skills, impaired functional mobilty, impaired balance, decreased safety awareness, pain, impaired cardiopulmonary response to activity.      Rehab Prognosis: Good; patient would benefit from acute skilled OT services to address these deficits and reach maximum level of function.       Plan:     Patient to be seen 6 x/week to address the above listed problems via self-care/home management, therapeutic activities, therapeutic exercises  · Plan of Care Expires: 03/09/22  · Plan of Care Reviewed with: patient    Subjective     Chief Complaint: pain and weakness  Patient/Family Comments/goals: Pt would like to regain independence in order to do for herself decreasing burden of care to safely return home with spouse.    Occupational Profile:  Living Environment: Pt lives with spouse in a H with 3-4 steps and handrail on (R) to enter back.  Previous level of function: Independent   Roles and Routines: ADL's and light IADL's  Equipment Used at Home:  bedside commode, wheelchair, rollator, shower chair, grab bar, raised toilet  Assistance upon Discharge: Pt's spouse able to assist, but unable to provide much physical assistance.    Pain/Comfort:  · Pain Rating 1: 4/10  · Location - Side 1: Right  · Location 1: leg  · Pain Addressed 1: Reposition, Cessation of Activity  · Pain Rating Post-Intervention  1: 2/10    Patients cultural, spiritual, Rastafari conflicts given the current situation: no    Objective:     Communicated with: nurse prior to session.  Patient found left sidelying with telemetry, pulse ox (continuous), peripheral IV, PureWick upon OT entry to room.    General Precautions: Standard, fall   Orthopedic Precautions:Full weight bearing   Braces: N/A  Respiratory Status: Room air    Occupational Performance:    Bed Mobility:    · Patient completed Rolling/Turning to Left with  moderate assistance  · Patient completed Rolling/Turning to Right with moderate assistance  · Patient completed Scooting/Bridging with moderate assistance  · Patient completed Supine to Sit with moderate assistance  · Patient completed Sit to Supine with moderate assistance    Functional Mobility/Transfers:  · Patient completed Sit <> Stand Transfer with moderate assistance  with  rolling walker   · Patient completed Bed <> Chair Transfer using Step Transfer technique with moderate assistance with rolling walker  · Patient completed Toilet Transfer Step Transfer technique with moderate assistance with  rolling walker and grab bars  · Functional Mobility: Pt ambulated 16' requiring steadying assist utilizing RW.    Activities of Daily Living:  · Feeding:  setup assistance    · Grooming: setup assistance    · Bathing: maximal assistance    · Upper Body Dressing: minimum assistance    · Lower Body Dressing: maximal assistance    · Toileting: maximal assistance      Cognitive/Visual Perceptual:  Cognitive/Psychosocial Skills:  -       Oriented to: Person, Place and Situation   -       Follows Commands/attention:Follows multistep  commands  -       Communication: clear/fluent  -       Memory: No Deficits noted  -       Safety awareness/insight to disability: impaired   -       Mood/Affect/Coping skills/emotional control: Appropriate to situation    Physical Exam:  Postural examination/scapula alignment: -       Rounded shoulders  -        Forward head  Sensation: -       Intact  light/touch bilateral UE's and proprioception bilateral UE's  Upper Extremity Range of Motion:  -       Right Upper Extremity: WFL  -       Left Upper Extremity: WFL  Upper Extremity Strength: -       Right Upper Extremity: Deficits: 3+ / 5  -       Left Upper Extremity: Deficits: 3+ / 5   Strength: -       Right Upper Extremity: 4 / 5  -       Left Upper Extremity: 4 / 5  Fine Motor Coordination: -       Intact  Left hand, finger to nose, Right hand, finger to nose, Left hand, manipulation of objects and Right hand, manipulation of objects  Gross motor coordination: WFL    AMPAC 6 Click ADL:  AMPAC Total Score: 17    Treatment & Education:  Pt was provided education / instruction regarding role of OT and established OT POC.  Education:    Patient left left sidelying with all lines intact, call button in reach and nurse notified    GOALS:   Goals to be met by: 03/09/22     Patient will increase functional independence with ADLs by performing:    Feeding with [place].  UE Dressing with Set-up Assistance.  LE Dressing with Minimal Assistance.  Grooming while seated at sink with Set-up Assistance.  Toileting from toilet with Minimal Assistance for hygiene and clothing management.   Bathing from  shower chair/bench with Minimal Assistance.  Step transfer with Contact Guard Assistance  Toilet transfer to toilet with Contact Guard Assistance.  Increased functional strength to 4/5 for bilateral UE's.      History:     Past Medical History:   Diagnosis Date    High cholesterol        Past Surgical History:   Procedure Laterality Date    HYSTERECTOMY         Time Tracking:     OT Date of Treatment: 03/02/22  OT Start Time: 1515  OT Stop Time: 1600  OT Total Time (min): 45 min    Billable Minutes:Evaluation 45    3/2/2022

## 2022-03-02 NOTE — PLAN OF CARE
[place] [facility]  Initial Discharge Assessment       Primary Care Provider: Delfino Rowe MD    Admission Diagnosis: Tachycardia [R00.0]  Pain [R52]  Elevated troponin [R77.8]  Hypertensive urgency [I16.0]    Admission Date: 3/1/2022  Expected Discharge Date:          Payor: HU[employer] MANAGED MEDICARE / Plan: HU[employer] MEDICARE PPO / Product Type: Medicare Advantage /     Extended Emergency Contact Information  Primary Emergency Contact: Major Terrazas  Mobile Phone: [phone]  Relation: Spouse  Secondary Emergency Contact: Ofelia Sethi  Mobile Phone: [phone]  Relation: Relative    Discharge Plan A: Rehab  Discharge Plan B: Home with family, Home Health    No Pharmacies Listed    Initial Assessment (most recent)     Adult Discharge Assessment - 03/02/22 1014        Discharge Assessment    Assessment Type Discharge Planning Assessment     Confirmed/corrected address, phone number and insurance Yes     Confirmed Demographics Correct on Facesheet     Source of Information patient;family     When was your last doctors appointment? --   The patient stated that her last appointment was about a week ago.    Reason For Admission Pelvic fracture     Lives With spouse     Do you expect to return to your current living situation? Other (see comments)   TBD    Do you have help at home or someone to help you manage your care at home? Yes     Who are your caregiver(s) and their phone number(s)? Major (spouse) [phone]     Prior to hospitilization cognitive status: Alert/Oriented     Current cognitive status: Alert/Oriented     Walking or Climbing Stairs Difficulty ambulation difficulty, requires equipment;ambulation difficulty, assistance 1 person     Mobility Management rolling walker     Dressing/Bathing Difficulty bathing difficulty, requires equipment;bathing difficulty, assistance 1 person     Dressing/Bathing Management shower chair     Home Accessibility other (see comments)   The patient stated that there  are three to four steps leading towards the back entrance of the home.    Home Layout Able to live on 1st floor     Equipment Currently Used at Home bedside commode;walker, rolling;shower chair;grab bar;raised toilet     Readmission within 30 days? No     Do you currently have service(s) that help you manage your care at home? Yes     Name and Contact number of agency [facility]-[phone]     Is the pt/caregiver preference to resume services with current agency --   TBD    Do you take prescription medications? Yes     Do you have prescription coverage? Yes     Do you have any problems affording any of your prescribed medications? No     Is the patient taking medications as prescribed? yes     Who is going to help you get home at discharge? Major (spouse)     How do you get to doctors appointments? family or friend will provide     Are you on dialysis? No     Do you take coumadin? No     Discharge Plan A Rehab     Discharge Plan B Home with family;Home Health     Discharge Plan discussed with: Patient;Spouse/sig other     Name(s) and Number(s) Major (spouse) [phone]             Initial discharge assessment is completed. SW went to see the patient in her room. She was alert and oriented to the questions being asked. The patient's , Lula, was present at the bedside during the assessment. The patient lives with her  in a single story home. The patient stated that there are three/four steps leading towards the back entrance of the home. There is also a rail on the ride side of the steps.     The patient has a rolling walker, shower chair, bedside commode, grab bars, and a raised toilet seat. She stated that she only bathes once a week in the winter time and twice in the summer; however, she washes up daily. The patient is unable to cook, but her niece, Ofelia, is able to provide the patient and her  with meals.    The patient stated that a representative from [place] [facility]  comes to see her every Friday to assist her with her care. She also has home health care with [facility].    The physician was able to speak to the patient regarding possible IPR placement. The patient agreed to the physician's recommendation, and she signed the patient choice form for IPR at [facility].     DEE Hughes was notified of new referral .    FOSTER will continue to monitor.                (2) more than 100 beats/min

## 2022-03-02 NOTE — PT/OT/SLP PROGRESS
"Physical Therapy  Evaluation    Codi Black   MRN: [id number]   Admitting Diagnosis: Pelvic fracture                          Billable Minutes:  Evaluation 20 minutes    Diagnosis: Pelvic fracture  Sacrum S4 non displaced    Past Medical History:   Diagnosis Date    High cholesterol       Past Surgical History:   Procedure Laterality Date    HYSTERECTOMY         Referring physician: Kadeem  Date referred to PT: 3/2/22    General Precautions: Standard,  fall  Orthopedic Precautions:   sacral fx non displaced S4 segment  Braces:              Patient History:     DME owned (not currently used): bedside commode, shower chair, wheelchair and rollator; grab bar near shower    Previous Level of Function:   pt states she "got the virus" about a week and 1/2 ago then became weak but prior to that; Independent using rollator; able to shower with  supervision and assist 1 x week and other days sponge bath. If she goes out in community they use a WC. She has 3 steps with rail to enter home    Subjective:  Communicated with nurse prior to session.    Pain: 8/10 sacrum with sitting/movement. At rest 0/10  Chief Complaint: pain  Patient goals: to return to PLOF/ mod I function           Objective:         Cognitive Exam:  Oriented to: Person, Place, Time and Situation    Follows Commands/attention: Follows multistep  commands  Communication: clear/fluent  Safety awareness/insight to disability: intact    Physical Exam:  Postural examination/scapula alignment:    -       Rounded shoulders  -       Forward head    Skin integrity: Visible skin intact  Edema: None noted      Sensation:      -       Intact light touch B LE's        Lower Extremity Range of Motion:  Right Lower Extremity: WFL  Left Lower Extremity: WFL    Lower Extremity Strength:  Right Lower Extremity: 4-/5 knee/ankle; 3+/5 hip  Left Lower Extremity: 4-/5 knee/ankle; 3+/5 hip     Fine motor coordination:     -       Intact    Gross motor coordination: " WFL    Functional Mobility:  Bed Mobility:   -rolling mod assist; very painful but good pt effort  -supine to/from sit mod assist due to pain; lifting assist for trunk and for returning LE's to bed    Transfers:   -min to mod assist sit to stand from bed; slow and tasking effort due to pain  -min assist turn and step back to bed using RW    Gait:    -performed gait training using RW 5-6' min assist, slower, step to pattern, guarded posture due to pain    Stairs:  NA    Balance:   Static Sit: FAIR+: Able to take MINIMAL challenges from all directions  Dynamic Sit: FAIR+: Maintains balance through MINIMAL excursions of active trunk motion  Static Stand: POOR+: Needs MINIMAL assist to maintain  Dynamic stand: POOR+: Needs MIN (minimal ) assist during gait      Patient left HOB elevated with all lines intact, call button in reach and  present.    Assessment:   Codi Black is a 89 y.o. female with a medical diagnosis of Pelvic fracture and presents with increased pain limiting independent function; pt is very motivated to improve overall function and will benefit from skilled therapy to address LE strengthening, bed mobility , transfer and gait ability.    Rehab identified problem list/impairments:      Rehab potential is good.    Activity tolerance: Good    Discharge recommendations:   IPR    Barriers to discharge:      Equipment recommendations:       GOALS:   Multidisciplinary Problems     Physical Therapy Goals        Problem: Physical Therapy Goal    Goal Priority Disciplines Outcome Goal Variances Interventions   Physical Therapy Goal     PT, PT/OT      Description: Goals to be met by: 3/16/22    Patient will increase functional independence with mobility by performin. Supine to sit with Contact Guard Assistance  2. Sit to supine with Contact Guard Assistance  3. Bed to chair transfer with Contact Guard Assistance using Rolling Walker  4. Gait  x 50' x 2 feet with Contact Guard Assistance using  Rolling Walker.                      PLAN:    Patient to be seen 5-6 days/week   to address the above listed problems via  strengthening and progressive mobilization gait and transfer training.  Plan of Care expires:  3/16/22  Plan of Care reviewed with:  patient          03/02/2022

## 2022-03-02 NOTE — SUBJECTIVE & OBJECTIVE
Past Medical History:   Diagnosis Date    High cholesterol        Past Surgical History:   Procedure Laterality Date    HYSTERECTOMY         Review of patient's allergies indicates:  No Known Allergies    No current facility-administered medications on file prior to encounter.     Current Outpatient Medications on File Prior to Encounter   Medication Sig    ALPRAZolam (XANAX) 0.5 MG tablet Take 0.75 mg by mouth every evening.    ezetimibe (ZETIA) 10 mg tablet Take 10 mg by mouth once daily.    hydroCHLOROthiazide (HYDRODIURIL) 25 MG tablet Take 50 mg by mouth once daily.     Family History    None       Tobacco Use    Smoking status: Never Smoker    Smokeless tobacco: Never Used   Substance and Sexual Activity    Alcohol use: Never    Drug use: Not on file    Sexual activity: Not on file     Review of Systems   Constitutional:  Positive for activity change and appetite change. Negative for chills and fever.   HENT:  Negative for ear pain, mouth sores, nosebleeds and sore throat.    Eyes:  Negative for visual disturbance.   Respiratory:  Negative for cough, shortness of breath and wheezing.    Cardiovascular:  Negative for chest pain, palpitations and leg swelling.   Gastrointestinal:  Negative for abdominal distention, abdominal pain, blood in stool, constipation, diarrhea, nausea and vomiting.   Endocrine: Negative for polyphagia.   Genitourinary:  Negative for difficulty urinating, dysuria, flank pain and frequency.   Musculoskeletal:  Positive for arthralgias and back pain. Negative for myalgias.   Skin:  Negative for rash.   Neurological:  Positive for weakness. Negative for dizziness, tremors, seizures, syncope, facial asymmetry, speech difficulty and headaches.   Hematological:  Negative for adenopathy.   Psychiatric/Behavioral:  Negative for agitation, confusion and hallucinations. The patient is nervous/anxious.    Objective:     Vital Signs (Most Recent):  Temp: 98.4 °F (36.9 °C) (03/02/22 0724)  Pulse: 90  (03/02/22 0724)  Resp: 18 (03/02/22 0724)  BP: (!) 147/70 (03/02/22 0724)  SpO2: (!) 91 % (03/02/22 0724)   Vital Signs (24h Range):  Temp:  [97.5 °F (36.4 °C)-98.5 °F (36.9 °C)] 98.4 °F (36.9 °C)  Pulse:  [] 90  Resp:  [2-20] 18  SpO2:  [91 %-94 %] 91 %  BP: (118-227)/() 147/70     Weight: 62.6 kg (138 lb)  Body mass index is 23.69 kg/m².    Physical Exam  Vitals and nursing note reviewed.   Constitutional:       General: She is awake. She is in acute distress.      Appearance: Normal appearance. She is not ill-appearing, toxic-appearing or diaphoretic.   HENT:      Head: Normocephalic and atraumatic.      Nose: Nose normal. No congestion or rhinorrhea.      Mouth/Throat:      Mouth: Mucous membranes are moist.      Pharynx: Oropharynx is clear. No oropharyngeal exudate or posterior oropharyngeal erythema.   Eyes:      General: No scleral icterus.        Right eye: No discharge.         Left eye: No discharge.      Extraocular Movements: Extraocular movements intact.      Pupils: Pupils are equal, round, and reactive to light.   Neck:      Thyroid: No thyroid mass or thyromegaly.      Vascular: No carotid bruit.      Meningeal: Brudzinski's sign and Kernig's sign absent.   Cardiovascular:      Rate and Rhythm: Normal rate and regular rhythm.      Chest Wall: PMI is not displaced. No thrill.      Pulses: Normal pulses.      Heart sounds: Normal heart sounds. No murmur heard.    No friction rub. No gallop.   Pulmonary:      Effort: Pulmonary effort is normal. No tachypnea, accessory muscle usage, prolonged expiration or respiratory distress.      Breath sounds: Normal breath sounds. No stridor or decreased air movement. No wheezing, rhonchi or rales.   Chest:      Chest wall: No tenderness.   Abdominal:      General: Bowel sounds are normal. There is no distension.      Palpations: Abdomen is soft. There is no hepatomegaly, splenomegaly or mass.      Tenderness: There is no abdominal tenderness. There is  no right CVA tenderness, left CVA tenderness, guarding or rebound.      Hernia: No hernia is present.   Musculoskeletal:         General: Tenderness present. No swelling, deformity or signs of injury. Normal range of motion.      Cervical back: Normal range of motion and neck supple. No rigidity. No muscular tenderness.      Right lower leg: No edema.      Left lower leg: No edema.   Lymphadenopathy:      Cervical: No cervical adenopathy.   Skin:     General: Skin is warm.      Capillary Refill: Capillary refill takes less than 2 seconds.      Coloration: Skin is not cyanotic, jaundiced or pale.      Findings: No bruising, erythema, lesion, petechiae or rash.   Neurological:      Mental Status: She is oriented to person, place, and time.      Cranial Nerves: No cranial nerve deficit, dysarthria or facial asymmetry.      Sensory: No sensory deficit.      Motor: Weakness present. No tremor.      Coordination: Coordination normal.   Psychiatric:         Mood and Affect: Mood normal. Mood is not anxious or depressed. Affect is not flat.         Speech: Speech is not rapid and pressured or slurred.         Behavior: Behavior normal. Behavior is not agitated, aggressive or combative.         Thought Content: Thought content normal. Thought content is not paranoid or delusional.         Cognition and Memory: Cognition is not impaired. Memory is not impaired.         Judgment: Judgment normal.         CRANIAL NERVES     CN III, IV, VI   Pupils are equal, round, and reactive to light.     Significant Labs: All pertinent labs within the past 24 hours have been reviewed.    Significant Imaging: I have reviewed all pertinent imaging results/findings within the past 24 hours.

## 2022-03-02 NOTE — PLAN OF CARE
03/02/22 0934   RADER Message   Medicare Outpatient and Observation Notification regarding financial responsibility Given to patient/caregiver;Explained to patient/caregiver;Signed/date by patient/caregiver   Date RADER was signed 03/02/22   Time RADER was signed 0925

## 2022-03-03 LAB
ALBUMIN SERPL BCP-MCNC: 2.4 G/DL (ref 3.5–5.2)
ALP SERPL-CCNC: 93 U/L (ref 55–135)
ALT SERPL W/O P-5'-P-CCNC: 21 U/L (ref 10–44)
ANION GAP SERPL CALC-SCNC: 4 MMOL/L (ref 8–16)
AST SERPL-CCNC: 17 U/L (ref 10–40)
BASOPHILS # BLD AUTO: 0.05 K/UL (ref 0–0.2)
BASOPHILS NFR BLD: 0.5 % (ref 0–1.9)
BILIRUB SERPL-MCNC: 0.8 MG/DL (ref 0.1–1)
BUN SERPL-MCNC: 31 MG/DL (ref 8–23)
CALCIUM SERPL-MCNC: 8.8 MG/DL (ref 8.7–10.5)
CHLORIDE SERPL-SCNC: 101 MMOL/L (ref 95–110)
CO2 SERPL-SCNC: 33 MMOL/L (ref 23–29)
CREAT SERPL-MCNC: 1.5 MG/DL (ref 0.5–1.4)
DIFFERENTIAL METHOD: ABNORMAL
EOSINOPHIL # BLD AUTO: 0.1 K/UL (ref 0–0.5)
EOSINOPHIL NFR BLD: 0.8 % (ref 0–8)
ERYTHROCYTE [DISTWIDTH] IN BLOOD BY AUTOMATED COUNT: 13 % (ref 11.5–14.5)
EST. GFR  (AFRICAN AMERICAN): 35.1 ML/MIN/1.73 M^2
EST. GFR  (NON AFRICAN AMERICAN): 30.5 ML/MIN/1.73 M^2
GLUCOSE SERPL-MCNC: 119 MG/DL (ref 70–110)
HCT VFR BLD AUTO: 40.3 % (ref 37–48.5)
HGB BLD-MCNC: 13.2 G/DL (ref 12–16)
IMM GRANULOCYTES # BLD AUTO: 0.02 K/UL (ref 0–0.04)
IMM GRANULOCYTES NFR BLD AUTO: 0.2 % (ref 0–0.5)
LYMPHOCYTES # BLD AUTO: 1.8 K/UL (ref 1–4.8)
LYMPHOCYTES NFR BLD: 18.4 % (ref 18–48)
MAGNESIUM SERPL-MCNC: 1.7 MG/DL (ref 1.6–2.6)
MCH RBC QN AUTO: 29.9 PG (ref 27–31)
MCHC RBC AUTO-ENTMCNC: 32.8 G/DL (ref 32–36)
MCV RBC AUTO: 91 FL (ref 82–98)
MONOCYTES # BLD AUTO: 1.1 K/UL (ref 0.3–1)
MONOCYTES NFR BLD: 11.3 % (ref 4–15)
NEUTROPHILS # BLD AUTO: 6.7 K/UL (ref 1.8–7.7)
NEUTROPHILS NFR BLD: 68.8 % (ref 38–73)
NRBC BLD-RTO: 0 /100 WBC
PLATELET # BLD AUTO: 362 K/UL (ref 150–450)
PMV BLD AUTO: 9.3 FL (ref 9.2–12.9)
POTASSIUM SERPL-SCNC: 4 MMOL/L (ref 3.5–5.1)
PROT SERPL-MCNC: 6.6 G/DL (ref 6–8.4)
RBC # BLD AUTO: 4.42 M/UL (ref 4–5.4)
SODIUM SERPL-SCNC: 138 MMOL/L (ref 136–145)
WBC # BLD AUTO: 9.71 K/UL (ref 3.9–12.7)

## 2022-03-03 PROCEDURE — 83735 ASSAY OF MAGNESIUM: CPT | Performed by: NURSE PRACTITIONER

## 2022-03-03 PROCEDURE — 27000221 HC OXYGEN, UP TO 24 HOURS

## 2022-03-03 PROCEDURE — 99900035 HC TECH TIME PER 15 MIN (STAT)

## 2022-03-03 PROCEDURE — 63600175 PHARM REV CODE 636 W HCPCS: Performed by: INTERNAL MEDICINE

## 2022-03-03 PROCEDURE — 97116 GAIT TRAINING THERAPY: CPT

## 2022-03-03 PROCEDURE — 36415 COLL VENOUS BLD VENIPUNCTURE: CPT | Performed by: NURSE PRACTITIONER

## 2022-03-03 PROCEDURE — 97530 THERAPEUTIC ACTIVITIES: CPT

## 2022-03-03 PROCEDURE — 94761 N-INVAS EAR/PLS OXIMETRY MLT: CPT

## 2022-03-03 PROCEDURE — 96372 THER/PROPH/DIAG INJ SC/IM: CPT | Performed by: INTERNAL MEDICINE

## 2022-03-03 PROCEDURE — 96376 TX/PRO/DX INJ SAME DRUG ADON: CPT

## 2022-03-03 PROCEDURE — 25000003 PHARM REV CODE 250: Performed by: INTERNAL MEDICINE

## 2022-03-03 PROCEDURE — 96366 THER/PROPH/DIAG IV INF ADDON: CPT

## 2022-03-03 PROCEDURE — 85025 COMPLETE CBC W/AUTO DIFF WBC: CPT | Performed by: NURSE PRACTITIONER

## 2022-03-03 PROCEDURE — G0378 HOSPITAL OBSERVATION PER HR: HCPCS

## 2022-03-03 PROCEDURE — 99900031 HC PATIENT EDUCATION (STAT)

## 2022-03-03 PROCEDURE — 80053 COMPREHEN METABOLIC PANEL: CPT | Performed by: NURSE PRACTITIONER

## 2022-03-03 RX ADMIN — ALPRAZOLAM 0.75 MG: 0.5 TABLET ORAL at 08:03

## 2022-03-03 RX ADMIN — ONDANSETRON 4 MG: 2 INJECTION INTRAMUSCULAR; INTRAVENOUS at 08:03

## 2022-03-03 RX ADMIN — EZETIMIBE 10 MG: 10 TABLET ORAL at 08:03

## 2022-03-03 RX ADMIN — CEFTRIAXONE 1 G: 1 INJECTION, SOLUTION INTRAVENOUS at 06:03

## 2022-03-03 RX ADMIN — CARVEDILOL 12.5 MG: 12.5 TABLET, FILM COATED ORAL at 08:03

## 2022-03-03 RX ADMIN — TRAMADOL HYDROCHLORIDE 50 MG: 50 TABLET ORAL at 02:03

## 2022-03-03 RX ADMIN — ENOXAPARIN SODIUM 40 MG: 40 INJECTION SUBCUTANEOUS at 06:03

## 2022-03-03 NOTE — PROGRESS NOTES
[facility] Medicine  Progress Note    Patient Name: Codi Black  MRN: [id number]  Patient Class: OP- Observation   Admission Date: 3/1/2022  Length of Stay: 0 days  Attending Physician: Rafael Quan III, MD  Primary Care Provider: Delfino Rowe MD        Subjective:     Principal Problem:Pelvic fracture        HPI:  Patient is a 89-year-old female with a history of hypertension who had a slip and fall in her restroom while attempting to get to the toilet.  She landed on her buttocks.  Over the next day or 2 she had excruciating pain difficulty with transferring toileting and completing other ADLs.  She ultimately was admitted.  X-rays showed no pelvic fractures but a CT scan and did follow-up with a nondisplaced fracture of the pelvis.  Patient is complaining of severe pain she is having difficulty turning and transferring she states she cannot get to the bedside commode or get out of the bed without any help.  She states the medications are not helping much for pain.  She has also had very fluctuating blood pressures and tachycardia.      Overview/Hospital Course:  3/3: Pt participating in therapy, tolerating well. Awaiting approval for IPR.      Interval History: Pt seen and evaluated    Review of Systems   Constitutional:  Positive for activity change and appetite change. Negative for chills and fever.   Respiratory:  Negative for cough, shortness of breath and wheezing.    Cardiovascular:  Negative for chest pain, palpitations and leg swelling.   Gastrointestinal:  Negative for abdominal pain.   Genitourinary:  Negative for difficulty urinating.   Musculoskeletal:  Positive for arthralgias and back pain. Negative for myalgias.        Right hip pain   Neurological:  Positive for weakness. Negative for dizziness, tremors, seizures, syncope, facial asymmetry, speech difficulty and headaches.   Hematological:  Negative for adenopathy.   Psychiatric/Behavioral:  Negative for agitation,  confusion and hallucinations. The patient is nervous/anxious.    Objective:     Vital Signs (Most Recent):  Temp: 98.4 °F (36.9 °C) (03/03/22 0739)  Pulse: 69 (03/03/22 0833)  Resp: 18 (03/03/22 0739)  BP: 135/62 (03/03/22 0833)  SpO2: (!) 89 % (03/03/22 0739)   Vital Signs (24h Range):  Temp:  [98 °F (36.7 °C)-98.4 °F (36.9 °C)] 98.4 °F (36.9 °C)  Pulse:  [69-79] 69  Resp:  [17-18] 18  SpO2:  [89 %-92 %] 89 %  BP: (116-188)/(56-81) 135/62     Weight: 62.6 kg (138 lb)  Body mass index is 23.69 kg/m².    Intake/Output Summary (Last 24 hours) at 3/3/2022 0916  Last data filed at 3/3/2022 0600  Gross per 24 hour   Intake 1560 ml   Output 600 ml   Net 960 ml      Physical Exam  Vitals and nursing note reviewed.   Constitutional:       General: She is awake. She is not in acute distress.     Appearance: Normal appearance. She is not ill-appearing, toxic-appearing or diaphoretic.   HENT:      Head: Normocephalic and atraumatic.      Nose: Nose normal. No congestion or rhinorrhea.      Mouth/Throat:      Mouth: Mucous membranes are moist.      Pharynx: Oropharynx is clear. No oropharyngeal exudate or posterior oropharyngeal erythema.   Eyes:      General: No scleral icterus.        Right eye: No discharge.         Left eye: No discharge.      Extraocular Movements: Extraocular movements intact.      Pupils: Pupils are equal, round, and reactive to light.   Neck:      Thyroid: No thyroid mass or thyromegaly.      Vascular: No carotid bruit.      Meningeal: Brudzinski's sign and Kernig's sign absent.   Cardiovascular:      Rate and Rhythm: Normal rate and regular rhythm.      Chest Wall: PMI is not displaced. No thrill.      Pulses: Normal pulses.      Heart sounds: Normal heart sounds. No murmur heard.    No friction rub. No gallop.   Pulmonary:      Effort: Pulmonary effort is normal. No tachypnea, accessory muscle usage, prolonged expiration or respiratory distress.      Breath sounds: Normal breath sounds. No stridor or  decreased air movement. No wheezing, rhonchi or rales.   Chest:      Chest wall: No tenderness.   Abdominal:      General: Bowel sounds are normal. There is no distension.      Palpations: Abdomen is soft. There is no hepatomegaly, splenomegaly or mass.      Tenderness: There is no abdominal tenderness. There is no right CVA tenderness, left CVA tenderness, guarding or rebound.      Hernia: No hernia is present.   Musculoskeletal:         General: Tenderness present. No swelling, deformity or signs of injury. Normal range of motion.      Cervical back: Normal range of motion and neck supple. No rigidity. No muscular tenderness.      Right lower leg: No edema.      Left lower leg: No edema.   Lymphadenopathy:      Cervical: No cervical adenopathy.   Skin:     General: Skin is warm.      Capillary Refill: Capillary refill takes less than 2 seconds.      Coloration: Skin is not cyanotic, jaundiced or pale.      Findings: No bruising, erythema, lesion, petechiae or rash.   Neurological:      Mental Status: She is oriented to person, place, and time.      Cranial Nerves: No cranial nerve deficit, dysarthria or facial asymmetry.      Sensory: No sensory deficit.      Motor: Weakness present. No tremor.      Coordination: Coordination normal.   Psychiatric:         Mood and Affect: Mood normal. Mood is not anxious or depressed. Affect is not flat.         Speech: Speech is not rapid and pressured or slurred.         Behavior: Behavior normal. Behavior is not agitated, aggressive or combative.         Thought Content: Thought content normal. Thought content is not paranoid or delusional.         Cognition and Memory: Cognition is not impaired. Memory is not impaired.         Judgment: Judgment normal.       Significant Labs: All pertinent labs within the past 24 hours have been reviewed.    CBC:   Recent Labs   Lab 03/01/22  1206 03/02/22  0629   WBC 14.86* 10.32   HGB 14.8 13.1   HCT 46.0 40.6    377     CMP:    Recent Labs   Lab 03/01/22  1206 03/02/22  0629    140   K 3.7 3.7    104   CO2 32* 32*   * 106   BUN 28* 24*   CREATININE 1.2 1.1   CALCIUM 10.1 9.2   PROT 7.9 6.6   ALBUMIN 3.1* 2.5*   BILITOT 1.1* 0.9   ALKPHOS 114 96   AST 18 18   ALT 21 17   ANIONGAP 6* 4*   EGFRNONAA 39.9* 44.3*     Magnesium: No results for input(s): MG in the last 48 hours.          Significant Imaging:     CT Pelvis Without Contrast  Narrative: EXAMINATION:  CT PELVIS WITHOUT CONTRAST    CLINICAL HISTORY:  Pelvis pain, stress fracture suspected, neg xray;    TECHNIQUE:  Axial CT images were obtained. Iterative reconstruction technique was used.  CT/cardiac nuclear exam/s in prior 12 months: 0.    FINDINGS:  Nondisplaced fracture of the S4 segment of the sacrum.    No other fractures.  Impression: Nondisplaced fracture lower sacrum.    Electronically signed by: Mario Worthington MD  Date:    03/01/2022  Time:    14:58  X-Ray Sacrum And Coccyx  Narrative: EXAMINATION:  XR SACRUM AND COCCYX    CLINICAL HISTORY:  Trauma, right buttock pain    COMPARISON:  None.    FINDINGS:  Three views of sacrum and coccyx demonstrate no fracture or dislocation.  No focal soft tissue abnormality.  Impression: No fracture.    Electronically signed by: Mario Worthington MD  Date:    03/01/2022  Time:    12:05  X-Ray Chest 1 View  Narrative: EXAMINATION:  XR CHEST 1 VIEW    CLINICAL HISTORY:  Pain, unspecified    COMPARISON:  Portable chest 01/17/2020.    FINDINGS:  Frontal chest radiograph demonstrates clear lungs.  Mild chronic elevation right hemidiaphragm.  No pleural fluid.  Cardiomediastinal silhouette is unremarkable.  No osseous abnormality.  Impression: No evidence of cardiopulmonary disease.    Electronically signed by: Mario Worthington MD  Date:    03/01/2022  Time:    12:04      Assessment/Plan:      * Pelvic fracture  Pain controlled with Tramadol PRN. Continue physical therapy. Plan for inpatient rehab at [facility] following discharge  - awaiting approval.      UTI (urinary tract infection)  IV antibiotic therapy with Rocephin      Fall  Pelvic fracture. Plan for inpatient rehab at [facility] following discharge - awaiting approval.      Tachycardia  Improved      Hypertensive urgency  Continue Coreg. Monitor BP and adjust meds PRN.        VTE Risk Mitigation (From admission, onward)         Ordered     enoxaparin injection 40 mg  Daily         03/02/22 0916     IP VTE HIGH RISK PATIENT  Once         03/01/22 1637     Place sequential compression device  Until discontinued         03/01/22 1637                Discharge Planning   KIMANI:      Code Status: Full Code   Is the patient medically ready for discharge?:     Reason for patient still in hospital (select all that apply): Patient trending condition, Laboratory test and Treatment  Discharge Plan A: Rehab   Discharge Delays: None known at this time              Stephy Young NP  Department of Hospital Medicine   [facility]

## 2022-03-03 NOTE — PROGRESS NOTES
Physical Therapy Treatment    Patient Name:  Codi Black   MRN:  [id number]    Recommendations:     Discharge Recommendations:      Discharge Equipment Recommendations:     Barriers to discharge: decreased fxnl mobility    Assessment:     Codi Black is a 89 y.o. female admitted with a medical diagnosis of Pelvic fracture.  She presents with the following impairments/functional limitations:    Pt stil with difficulty getting out of bed. However , her gait endurance has improved    Rehab Prognosis: Good; patient would benefit from acute skilled PT services to address these deficits and reach maximum level of function.    Recent Surgery: * No surgery found *      Plan:     During this hospitalization, patient to be seen   to address the identified rehab impairments via   and progress toward the following goals:    · Plan of Care Expires:       Subjective     Chief Complaint:   Patient/Family Comments/goals:   Pain/Comfort:  ·        Objective:     Communicated with nurse prior to session.  Patient found supine with   upon PT entry to room.     General Precautions: Standard,     Orthopedic Precautions:    Braces:    Respiratory Status: Nasal cannula, flow   L/min     Functional Mobility:  · Bed Mobility:  Rolling Left:  stand by assistance  · Rolling Right: stand by assistance  · Scooting: minimum assistance  · Supine to Sit: minimum assistance  · Sit to Supine: stand by assistance  · Transfers:     · Sit to Stand:  minimum assistance with rolling walker  · Bed to Chair: minimum assistance with  rolling walker  using  Step Transfer  · Gait: ambulated 60ft with a rw with jesica      AM-PAC 6 CLICK MOBILITY          Therapeutic Activities and Exercises:       Patient left supine with call button in reach..    GOALS:   Multidisciplinary Problems     Physical Therapy Goals        Problem: Physical Therapy Goal    Goal Priority Disciplines Outcome Goal Variances Interventions   Physical Therapy Goal     PT, PT/OT       Description: Goals to be met by: 3/16/22    Patient will increase functional independence with mobility by performin. Supine to sit with Contact Guard Assistance  2. Sit to supine with Contact Guard Assistance  3. Bed to chair transfer with Contact Guard Assistance using Rolling Walker  4. Gait  x 50' x 2 feet with Contact Guard Assistance using Rolling Walker.                      Time Tracking:     PT Received On:    PT Start Time: 1152     PT Stop Time: 1213  PT Total Time (min): 21 min     Billable Minutes: Gait Training 2022

## 2022-03-03 NOTE — SUBJECTIVE & OBJECTIVE
Interval History: Pt seen and evaluated    Review of Systems   Constitutional:  Positive for activity change and appetite change. Negative for chills and fever.   Respiratory:  Negative for cough, shortness of breath and wheezing.    Cardiovascular:  Negative for chest pain, palpitations and leg swelling.   Gastrointestinal:  Negative for abdominal pain.   Genitourinary:  Negative for difficulty urinating.   Musculoskeletal:  Positive for arthralgias and back pain. Negative for myalgias.        Right hip pain   Neurological:  Positive for weakness. Negative for dizziness, tremors, seizures, syncope, facial asymmetry, speech difficulty and headaches.   Hematological:  Negative for adenopathy.   Psychiatric/Behavioral:  Negative for agitation, confusion and hallucinations. The patient is nervous/anxious.    Objective:     Vital Signs (Most Recent):  Temp: 98.4 °F (36.9 °C) (03/03/22 0739)  Pulse: 69 (03/03/22 0833)  Resp: 18 (03/03/22 0739)  BP: 135/62 (03/03/22 0833)  SpO2: (!) 89 % (03/03/22 0739)   Vital Signs (24h Range):  Temp:  [98 °F (36.7 °C)-98.4 °F (36.9 °C)] 98.4 °F (36.9 °C)  Pulse:  [69-79] 69  Resp:  [17-18] 18  SpO2:  [89 %-92 %] 89 %  BP: (116-188)/(56-81) 135/62     Weight: 62.6 kg (138 lb)  Body mass index is 23.69 kg/m².    Intake/Output Summary (Last 24 hours) at 3/3/2022 0916  Last data filed at 3/3/2022 0600  Gross per 24 hour   Intake 1560 ml   Output 600 ml   Net 960 ml      Physical Exam  Vitals and nursing note reviewed.   Constitutional:       General: She is awake. She is not in acute distress.     Appearance: Normal appearance. She is not ill-appearing, toxic-appearing or diaphoretic.   HENT:      Head: Normocephalic and atraumatic.      Nose: Nose normal. No congestion or rhinorrhea.      Mouth/Throat:      Mouth: Mucous membranes are moist.      Pharynx: Oropharynx is clear. No oropharyngeal exudate or posterior oropharyngeal erythema.   Eyes:      General: No scleral icterus.         Right eye: No discharge.         Left eye: No discharge.      Extraocular Movements: Extraocular movements intact.      Pupils: Pupils are equal, round, and reactive to light.   Neck:      Thyroid: No thyroid mass or thyromegaly.      Vascular: No carotid bruit.      Meningeal: Brudzinski's sign and Kernig's sign absent.   Cardiovascular:      Rate and Rhythm: Normal rate and regular rhythm.      Chest Wall: PMI is not displaced. No thrill.      Pulses: Normal pulses.      Heart sounds: Normal heart sounds. No murmur heard.    No friction rub. No gallop.   Pulmonary:      Effort: Pulmonary effort is normal. No tachypnea, accessory muscle usage, prolonged expiration or respiratory distress.      Breath sounds: Normal breath sounds. No stridor or decreased air movement. No wheezing, rhonchi or rales.   Chest:      Chest wall: No tenderness.   Abdominal:      General: Bowel sounds are normal. There is no distension.      Palpations: Abdomen is soft. There is no hepatomegaly, splenomegaly or mass.      Tenderness: There is no abdominal tenderness. There is no right CVA tenderness, left CVA tenderness, guarding or rebound.      Hernia: No hernia is present.   Musculoskeletal:         General: Tenderness present. No swelling, deformity or signs of injury. Normal range of motion.      Cervical back: Normal range of motion and neck supple. No rigidity. No muscular tenderness.      Right lower leg: No edema.      Left lower leg: No edema.   Lymphadenopathy:      Cervical: No cervical adenopathy.   Skin:     General: Skin is warm.      Capillary Refill: Capillary refill takes less than 2 seconds.      Coloration: Skin is not cyanotic, jaundiced or pale.      Findings: No bruising, erythema, lesion, petechiae or rash.   Neurological:      Mental Status: She is oriented to person, place, and time.      Cranial Nerves: No cranial nerve deficit, dysarthria or facial asymmetry.      Sensory: No sensory deficit.      Motor:  Weakness present. No tremor.      Coordination: Coordination normal.   Psychiatric:         Mood and Affect: Mood normal. Mood is not anxious or depressed. Affect is not flat.         Speech: Speech is not rapid and pressured or slurred.         Behavior: Behavior normal. Behavior is not agitated, aggressive or combative.         Thought Content: Thought content normal. Thought content is not paranoid or delusional.         Cognition and Memory: Cognition is not impaired. Memory is not impaired.         Judgment: Judgment normal.       Significant Labs: All pertinent labs within the past 24 hours have been reviewed.    CBC:   Recent Labs   Lab 03/01/22  1206 03/02/22  0629   WBC 14.86* 10.32   HGB 14.8 13.1   HCT 46.0 40.6    377     CMP:   Recent Labs   Lab 03/01/22  1206 03/02/22  0629    140   K 3.7 3.7    104   CO2 32* 32*   * 106   BUN 28* 24*   CREATININE 1.2 1.1   CALCIUM 10.1 9.2   PROT 7.9 6.6   ALBUMIN 3.1* 2.5*   BILITOT 1.1* 0.9   ALKPHOS 114 96   AST 18 18   ALT 21 17   ANIONGAP 6* 4*   EGFRNONAA 39.9* 44.3*     Magnesium: No results for input(s): MG in the last 48 hours.          Significant Imaging:     CT Pelvis Without Contrast  Narrative: EXAMINATION:  CT PELVIS WITHOUT CONTRAST    CLINICAL HISTORY:  Pelvis pain, stress fracture suspected, neg xray;    TECHNIQUE:  Axial CT images were obtained. Iterative reconstruction technique was used.  CT/cardiac nuclear exam/s in prior 12 months: 0.    FINDINGS:  Nondisplaced fracture of the S4 segment of the sacrum.    No other fractures.  Impression: Nondisplaced fracture lower sacrum.    Electronically signed by: Mario Worthington MD  Date:    03/01/2022  Time:    14:58  X-Ray Sacrum And Coccyx  Narrative: EXAMINATION:  XR SACRUM AND COCCYX    CLINICAL HISTORY:  Trauma, right buttock pain    COMPARISON:  None.    FINDINGS:  Three views of sacrum and coccyx demonstrate no fracture or dislocation.  No focal soft tissue  abnormality.  Impression: No fracture.    Electronically signed by: Mario Worthington MD  Date:    03/01/2022  Time:    12:05  X-Ray Chest 1 View  Narrative: EXAMINATION:  XR CHEST 1 VIEW    CLINICAL HISTORY:  Pain, unspecified    COMPARISON:  Portable chest 01/17/2020.    FINDINGS:  Frontal chest radiograph demonstrates clear lungs.  Mild chronic elevation right hemidiaphragm.  No pleural fluid.  Cardiomediastinal silhouette is unremarkable.  No osseous abnormality.  Impression: No evidence of cardiopulmonary disease.    Electronically signed by: Mario Worthington MD  Date:    03/01/2022  Time:    12:04

## 2022-03-03 NOTE — PLAN OF CARE
DEE Hughes was able to submit for authorization for IPR, and the patient was denied by her insurance provider, per Talib, patient advocate with [employer].     NP was notified of possible [id number].     Addendum: [id number] scheduled for tomorrow morning at 10:30 AM. NP notified.     Addendum: 3/0-[id number]-peer was approved, per BEVERLY Keyes. The plan is for the patient to be discharged today to IPR located on the seventh floor.     DEE Samuels was notified.

## 2022-03-03 NOTE — PT/OT/SLP PROGRESS
Occupational Therapy   Treatment    Name: Codi Black  MRN: [id number]  Admitting Diagnosis:  Pelvic fracture       Recommendations:     Discharge Recommendations: rehabilitation facility  Discharge Equipment Recommendations:  none  Barriers to discharge:  Other (Comment) (medical and functional status)    Assessment:     Codi Black is a 89 y.o. female with a medical diagnosis of Pelvic fracture. Performance deficits affecting function are weakness, impaired endurance, impaired self care skills, impaired functional mobilty, impaired balance, decreased safety awareness, pain, impaired cardiopulmonary response to activity.     Rehab Prognosis:  Good; patient would benefit from acute skilled OT services to address these deficits and reach maximum level of function.       Plan:     Patient to be seen 6 x/week to address the above listed problems via self-care/home management, therapeutic activities, therapeutic exercises  · Plan of Care Expires: 03/09/22  · Plan of Care Reviewed with: patient    Subjective     Pain/Comfort:  · Pain Rating 1: 3/10  · Location 1: sacral spine  · Pain Addressed 1: Cessation of Activity, Reposition  · Pain Rating Post-Intervention 1: 2/10    Objective:     Communicated with: nurse prior to session.  Patient found supine with telemetry, pulse ox (continuous), PureWick upon OT entry to room.    General Precautions: Standard, fall   Orthopedic Precautions:Full weight bearing   Braces:    Respiratory Status: Room air     Occupational Performance:     Bed Mobility:    · Patient completed Rolling/Turning to Left with  minimum assistance  · Patient completed Rolling/Turning to Right with minimum assistance  · Patient completed Scooting/Bridging with minimum assistance  · Patient completed Supine to Sit with minimum assistance  · Patient completed Sit to Supine with minimum assistance     Functional Mobility/Transfers:  · Patient completed Sit <> Stand Transfer with minimum assistance  with   rolling walker   · Patient completed Bed <> Chair Transfer using Step Transfer technique with minimum assistance with rolling walker  · Patient completed Toilet Transfer Step Transfer technique with minimum assistance with  grab bars  · Functional Mobility: Pt ambulated 64' requiring steadying assist utilizing RW.     Treatment & Education:  Pt was cooperative and highly motivated without verbal encouragement while exhibiting positive affect despite pain. She performed bed mobility requiring min assist secondary to steadying assist or tactile cueing for technique utilizing bedrails in order to limit pain. Pt then participated in extensive functional transfer retraining to / from bed, chair, and toilet emphasizing fall prevention providing extra time requiring min assist secondary to steadying assist with mod verbal and tactile cueing for posture, safety awareness, and technique. She ambulated 64' between surfaces requiring steadying assist utilizing RW.    Patient left supine with call button in reach and nurse notifiedEducation:      GOALS:   Multidisciplinary Problems     Occupational Therapy Goals        Problem: Occupational Therapy Goal    Goal Priority Disciplines Outcome Interventions   Occupational Therapy Goal     OT, PT/OT     Description: Goals to be met by: 03/09/22     Patient will increase functional independence with ADLs by performing:    Feeding with [place].  UE Dressing with Set-up Assistance.  LE Dressing with Minimal Assistance.  Grooming while seated at sink with Set-up Assistance.  Toileting from toilet with Minimal Assistance for hygiene and clothing management.   Bathing from  shower chair/bench with Minimal Assistance.  Step transfer with Contact Guard Assistance  Toilet transfer to toilet with Contact Guard Assistance.  Increased functional strength to 4/5 for bilateral UE's.                     Time Tracking:     OT Date of Treatment: 03/03/22  OT Start Time: 1710  OT Stop Time:  1741  OT Total Time (min): 31 min    Billable Minutes:Therapeutic Activity 31    OT/REJI: OT          3/3/2022

## 2022-03-03 NOTE — ASSESSMENT & PLAN NOTE
Pelvic fracture. Plan for inpatient rehab at [facility] following discharge - awaiting approval.

## 2022-03-03 NOTE — ASSESSMENT & PLAN NOTE
Pain controlled with Tramadol PRN. Continue physical therapy. Plan for inpatient rehab at [facility] following discharge - awaiting approval.

## 2022-03-04 ENCOUNTER — HOSPITAL ENCOUNTER (INPATIENT)
Facility: HOSPITAL | Age: 87
LOS: 11 days | Discharge: HOME HEALTH | DRG: 560 | End: 2022-03-15
Attending: INTERNAL MEDICINE | Admitting: INTERNAL MEDICINE
Payer: MEDICARE

## 2022-03-04 VITALS
HEIGHT: 64 IN | HEART RATE: 60 BPM | OXYGEN SATURATION: 94 % | TEMPERATURE: 98 F | SYSTOLIC BLOOD PRESSURE: 128 MMHG | BODY MASS INDEX: 23.56 KG/M2 | WEIGHT: 138 LBS | RESPIRATION RATE: 18 BRPM | DIASTOLIC BLOOD PRESSURE: 61 MMHG

## 2022-03-04 DIAGNOSIS — I16.0 HYPERTENSIVE URGENCY: Primary | ICD-10-CM

## 2022-03-04 DIAGNOSIS — S32.9XXA PELVIC FRACTURE: ICD-10-CM

## 2022-03-04 PROBLEM — N17.9 ACUTE RENAL INJURY: Status: ACTIVE | Noted: 2022-03-04

## 2022-03-04 LAB
ALBUMIN SERPL BCP-MCNC: 2.3 G/DL (ref 3.5–5.2)
ALP SERPL-CCNC: 85 U/L (ref 55–135)
ALT SERPL W/O P-5'-P-CCNC: 16 U/L (ref 10–44)
ANION GAP SERPL CALC-SCNC: 4 MMOL/L (ref 8–16)
AST SERPL-CCNC: 15 U/L (ref 10–40)
BASOPHILS # BLD AUTO: 0.03 K/UL (ref 0–0.2)
BASOPHILS NFR BLD: 0.4 % (ref 0–1.9)
BILIRUB SERPL-MCNC: 0.7 MG/DL (ref 0.1–1)
BUN SERPL-MCNC: 38 MG/DL (ref 8–23)
CALCIUM SERPL-MCNC: 8.7 MG/DL (ref 8.7–10.5)
CHLORIDE SERPL-SCNC: 103 MMOL/L (ref 95–110)
CO2 SERPL-SCNC: 33 MMOL/L (ref 23–29)
CREAT SERPL-MCNC: 1.5 MG/DL (ref 0.5–1.4)
DIFFERENTIAL METHOD: NORMAL
EOSINOPHIL # BLD AUTO: 0.1 K/UL (ref 0–0.5)
EOSINOPHIL NFR BLD: 1.4 % (ref 0–8)
ERYTHROCYTE [DISTWIDTH] IN BLOOD BY AUTOMATED COUNT: 12.7 % (ref 11.5–14.5)
EST. GFR  (AFRICAN AMERICAN): 35.1 ML/MIN/1.73 M^2
EST. GFR  (NON AFRICAN AMERICAN): 30.5 ML/MIN/1.73 M^2
GLUCOSE SERPL-MCNC: 96 MG/DL (ref 70–110)
HCT VFR BLD AUTO: 39.1 % (ref 37–48.5)
HGB BLD-MCNC: 12.5 G/DL (ref 12–16)
IMM GRANULOCYTES # BLD AUTO: 0.02 K/UL (ref 0–0.04)
IMM GRANULOCYTES NFR BLD AUTO: 0.2 % (ref 0–0.5)
LYMPHOCYTES # BLD AUTO: 2.2 K/UL (ref 1–4.8)
LYMPHOCYTES NFR BLD: 27.1 % (ref 18–48)
MAGNESIUM SERPL-MCNC: 1.8 MG/DL (ref 1.6–2.6)
MCH RBC QN AUTO: 29.5 PG (ref 27–31)
MCHC RBC AUTO-ENTMCNC: 32 G/DL (ref 32–36)
MCV RBC AUTO: 92 FL (ref 82–98)
MONOCYTES # BLD AUTO: 1 K/UL (ref 0.3–1)
MONOCYTES NFR BLD: 12.7 % (ref 4–15)
NEUTROPHILS # BLD AUTO: 4.7 K/UL (ref 1.8–7.7)
NEUTROPHILS NFR BLD: 58.2 % (ref 38–73)
NRBC BLD-RTO: 0 /100 WBC
PLATELET # BLD AUTO: 334 K/UL (ref 150–450)
PMV BLD AUTO: 9.8 FL (ref 9.2–12.9)
POTASSIUM SERPL-SCNC: 3.9 MMOL/L (ref 3.5–5.1)
PROT SERPL-MCNC: 6.3 G/DL (ref 6–8.4)
RBC # BLD AUTO: 4.24 M/UL (ref 4–5.4)
SODIUM SERPL-SCNC: 140 MMOL/L (ref 136–145)
WBC # BLD AUTO: 8.13 K/UL (ref 3.9–12.7)

## 2022-03-04 PROCEDURE — 97166 OT EVAL MOD COMPLEX 45 MIN: CPT

## 2022-03-04 PROCEDURE — 25000003 PHARM REV CODE 250: Performed by: NURSE PRACTITIONER

## 2022-03-04 PROCEDURE — 92523 SPEECH SOUND LANG COMPREHEN: CPT | Performed by: SPEECH-LANGUAGE PATHOLOGIST

## 2022-03-04 PROCEDURE — 94761 N-INVAS EAR/PLS OXIMETRY MLT: CPT

## 2022-03-04 PROCEDURE — 99900035 HC TECH TIME PER 15 MIN (STAT)

## 2022-03-04 PROCEDURE — 85025 COMPLETE CBC W/AUTO DIFF WBC: CPT | Performed by: NURSE PRACTITIONER

## 2022-03-04 PROCEDURE — 27000221 HC OXYGEN, UP TO 24 HOURS

## 2022-03-04 PROCEDURE — 36415 COLL VENOUS BLD VENIPUNCTURE: CPT | Performed by: NURSE PRACTITIONER

## 2022-03-04 PROCEDURE — 97161 PT EVAL LOW COMPLEX 20 MIN: CPT

## 2022-03-04 PROCEDURE — 99900031 HC PATIENT EDUCATION (STAT)

## 2022-03-04 PROCEDURE — 83735 ASSAY OF MAGNESIUM: CPT | Performed by: NURSE PRACTITIONER

## 2022-03-04 PROCEDURE — 25000003 PHARM REV CODE 250: Performed by: INTERNAL MEDICINE

## 2022-03-04 PROCEDURE — 11000001 HC ACUTE MED/SURG PRIVATE ROOM

## 2022-03-04 PROCEDURE — 63600175 PHARM REV CODE 636 W HCPCS: Performed by: NURSE PRACTITIONER

## 2022-03-04 PROCEDURE — 80053 COMPREHEN METABOLIC PANEL: CPT | Performed by: NURSE PRACTITIONER

## 2022-03-04 PROCEDURE — G0378 HOSPITAL OBSERVATION PER HR: HCPCS

## 2022-03-04 RX ORDER — TALC
6 POWDER (GRAM) TOPICAL NIGHTLY PRN
Status: DISCONTINUED | OUTPATIENT
Start: 2022-03-04 | End: 2022-03-15 | Stop reason: HOSPADM

## 2022-03-04 RX ORDER — ENOXAPARIN SODIUM 100 MG/ML
30 INJECTION SUBCUTANEOUS EVERY 24 HOURS
Status: DISCONTINUED | OUTPATIENT
Start: 2022-03-04 | End: 2022-03-04 | Stop reason: HOSPADM

## 2022-03-04 RX ORDER — ONDANSETRON 2 MG/ML
4 INJECTION INTRAMUSCULAR; INTRAVENOUS EVERY 8 HOURS PRN
Status: DISCONTINUED | OUTPATIENT
Start: 2022-03-04 | End: 2022-03-15 | Stop reason: HOSPADM

## 2022-03-04 RX ORDER — SODIUM CHLORIDE 0.9 % (FLUSH) 0.9 %
10 SYRINGE (ML) INJECTION
Status: CANCELLED | OUTPATIENT
Start: 2022-03-04

## 2022-03-04 RX ORDER — SODIUM CHLORIDE 0.9 % (FLUSH) 0.9 %
10 SYRINGE (ML) INJECTION
Status: DISCONTINUED | OUTPATIENT
Start: 2022-03-04 | End: 2022-03-15 | Stop reason: HOSPADM

## 2022-03-04 RX ORDER — TRAMADOL HYDROCHLORIDE 50 MG/1
50 TABLET ORAL EVERY 8 HOURS PRN
Status: CANCELLED | OUTPATIENT
Start: 2022-03-04

## 2022-03-04 RX ORDER — ACETAMINOPHEN 325 MG/1
650 TABLET ORAL EVERY 8 HOURS PRN
Status: DISCONTINUED | OUTPATIENT
Start: 2022-03-04 | End: 2022-03-15 | Stop reason: HOSPADM

## 2022-03-04 RX ORDER — MORPHINE SULFATE 2 MG/ML
1 INJECTION, SOLUTION INTRAMUSCULAR; INTRAVENOUS EVERY 4 HOURS PRN
Status: DISCONTINUED | OUTPATIENT
Start: 2022-03-04 | End: 2022-03-15 | Stop reason: HOSPADM

## 2022-03-04 RX ORDER — CARVEDILOL 12.5 MG/1
12.5 TABLET ORAL 2 TIMES DAILY
Status: CANCELLED | OUTPATIENT
Start: 2022-03-04

## 2022-03-04 RX ORDER — EZETIMIBE 10 MG/1
10 TABLET ORAL DAILY
Status: CANCELLED | OUTPATIENT
Start: 2022-03-05

## 2022-03-04 RX ORDER — ACETAMINOPHEN 325 MG/1
650 TABLET ORAL EVERY 8 HOURS PRN
Status: CANCELLED | OUTPATIENT
Start: 2022-03-04

## 2022-03-04 RX ORDER — MORPHINE SULFATE 2 MG/ML
1 INJECTION, SOLUTION INTRAMUSCULAR; INTRAVENOUS EVERY 4 HOURS PRN
Status: CANCELLED | OUTPATIENT
Start: 2022-03-04

## 2022-03-04 RX ORDER — TALC
6 POWDER (GRAM) TOPICAL NIGHTLY PRN
Status: CANCELLED | OUTPATIENT
Start: 2022-03-04

## 2022-03-04 RX ORDER — ENOXAPARIN SODIUM 100 MG/ML
30 INJECTION SUBCUTANEOUS EVERY 24 HOURS
Status: CANCELLED | OUTPATIENT
Start: 2022-03-04

## 2022-03-04 RX ORDER — LACTULOSE 10 G/15ML
20 SOLUTION ORAL DAILY PRN
Status: CANCELLED | OUTPATIENT
Start: 2022-03-04

## 2022-03-04 RX ORDER — ONDANSETRON 2 MG/ML
4 INJECTION INTRAMUSCULAR; INTRAVENOUS EVERY 8 HOURS PRN
Status: CANCELLED | OUTPATIENT
Start: 2022-03-04

## 2022-03-04 RX ORDER — CARVEDILOL 12.5 MG/1
12.5 TABLET ORAL 2 TIMES DAILY
Status: DISCONTINUED | OUTPATIENT
Start: 2022-03-04 | End: 2022-03-15 | Stop reason: HOSPADM

## 2022-03-04 RX ORDER — LACTULOSE 10 G/15ML
20 SOLUTION ORAL DAILY PRN
Status: DISCONTINUED | OUTPATIENT
Start: 2022-03-04 | End: 2022-03-15 | Stop reason: HOSPADM

## 2022-03-04 RX ORDER — DOCUSATE SODIUM 100 MG/1
100 CAPSULE, LIQUID FILLED ORAL 2 TIMES DAILY
Status: DISCONTINUED | OUTPATIENT
Start: 2022-03-04 | End: 2022-03-04 | Stop reason: HOSPADM

## 2022-03-04 RX ORDER — LACTULOSE 10 G/15ML
20 SOLUTION ORAL DAILY PRN
Status: DISCONTINUED | OUTPATIENT
Start: 2022-03-04 | End: 2022-03-04 | Stop reason: HOSPADM

## 2022-03-04 RX ORDER — DOCUSATE SODIUM 100 MG/1
100 CAPSULE, LIQUID FILLED ORAL 2 TIMES DAILY
Status: CANCELLED | OUTPATIENT
Start: 2022-03-04

## 2022-03-04 RX ORDER — EZETIMIBE 10 MG/1
10 TABLET ORAL DAILY
Status: DISCONTINUED | OUTPATIENT
Start: 2022-03-05 | End: 2022-03-15 | Stop reason: HOSPADM

## 2022-03-04 RX ORDER — TRAMADOL HYDROCHLORIDE 50 MG/1
50 TABLET ORAL EVERY 8 HOURS PRN
Status: DISCONTINUED | OUTPATIENT
Start: 2022-03-04 | End: 2022-03-15 | Stop reason: HOSPADM

## 2022-03-04 RX ORDER — ENOXAPARIN SODIUM 100 MG/ML
30 INJECTION SUBCUTANEOUS EVERY 24 HOURS
Status: DISCONTINUED | OUTPATIENT
Start: 2022-03-04 | End: 2022-03-05

## 2022-03-04 RX ORDER — DOCUSATE SODIUM 100 MG/1
100 CAPSULE, LIQUID FILLED ORAL 2 TIMES DAILY
Status: DISCONTINUED | OUTPATIENT
Start: 2022-03-04 | End: 2022-03-15 | Stop reason: HOSPADM

## 2022-03-04 RX ADMIN — CEFTRIAXONE 1 G: 1 INJECTION, SOLUTION INTRAVENOUS at 04:03

## 2022-03-04 RX ADMIN — CARVEDILOL 12.5 MG: 12.5 TABLET, FILM COATED ORAL at 09:03

## 2022-03-04 RX ADMIN — DOCUSATE SODIUM 100 MG: 100 CAPSULE, LIQUID FILLED ORAL at 10:03

## 2022-03-04 RX ADMIN — DOCUSATE SODIUM 100 MG: 100 CAPSULE, LIQUID FILLED ORAL at 09:03

## 2022-03-04 RX ADMIN — Medication 6 MG: at 09:03

## 2022-03-04 RX ADMIN — ALPRAZOLAM 0.75 MG: 0.25 TABLET ORAL at 09:03

## 2022-03-04 RX ADMIN — EZETIMIBE 10 MG: 10 TABLET ORAL at 08:03

## 2022-03-04 RX ADMIN — CARVEDILOL 12.5 MG: 12.5 TABLET, FILM COATED ORAL at 08:03

## 2022-03-04 RX ADMIN — ACETAMINOPHEN 650 MG: 325 TABLET ORAL at 09:03

## 2022-03-04 RX ADMIN — ENOXAPARIN SODIUM 30 MG: 30 INJECTION SUBCUTANEOUS at 04:03

## 2022-03-04 RX ADMIN — TRAMADOL HYDROCHLORIDE 50 MG: 50 TABLET ORAL at 04:03

## 2022-03-04 NOTE — ASSESSMENT & PLAN NOTE
Pain management. Continue PT/OT. Plan for inpatient rehab at [facility] following discharge - awaiting approval.

## 2022-03-04 NOTE — PROGRESS NOTES
[facility] Medicine  Progress Note    Patient Name: Codi Black  MRN: [id number]  Patient Class: OP- Observation   Admission Date: 3/1/2022  Length of Stay: 0 days  Attending Physician: Rafael Quan III, MD  Primary Care Provider: Delfino Rowe MD        Subjective:     Principal Problem:Pelvic fracture        HPI:  Patient is a 89-year-old female with a history of hypertension who had a slip and fall in her restroom while attempting to get to the toilet.  She landed on her buttocks.  Over the next day or 2 she had excruciating pain difficulty with transferring toileting and completing other ADLs.  She ultimately was admitted.  X-rays showed no pelvic fractures but a CT scan and did follow-up with a nondisplaced fracture of the pelvis.  Patient is complaining of severe pain she is having difficulty turning and transferring she states she cannot get to the bedside commode or get out of the bed without any help.  She states the medications are not helping much for pain.  She has also had very fluctuating blood pressures and tachycardia.      Overview/Hospital Course:  3/3 SD: Pt participating in therapy, tolerating well. Awaiting approval for IPR.    3/4 SD: Monitoring kidney function and other electrolytes, pain management, and blood pressure fluctuation from extreme high to extreme low. IPR denied by insurance - [id number] scheduled for today.      Interval History: Pt seen and evaluated    Review of Systems   Constitutional:  Positive for activity change and appetite change. Negative for chills and fever.   Respiratory:  Negative for cough, shortness of breath and wheezing.    Cardiovascular:  Negative for chest pain, palpitations and leg swelling.   Gastrointestinal:  Negative for abdominal pain.   Genitourinary:  Negative for difficulty urinating.   Musculoskeletal:  Positive for arthralgias and back pain. Negative for myalgias.        Right hip pain   Neurological:  Positive for  weakness. Negative for dizziness, tremors, seizures, syncope, facial asymmetry, speech difficulty and headaches.   Hematological:  Negative for adenopathy.   Psychiatric/Behavioral:  Negative for agitation, confusion and hallucinations. The patient is nervous/anxious.    Objective:     Vital Signs (Most Recent):  Temp: 97.9 °F (36.6 °C) (03/04/22 0721)  Pulse: 63 (03/04/22 0721)  Resp: 18 (03/04/22 0721)  BP: 135/65 (03/04/22 0721)  SpO2: (!) 93 % (03/04/22 0815)   Vital Signs (24h Range):  Temp:  [97.9 °F (36.6 °C)-98.8 °F (37.1 °C)] 97.9 °F (36.6 °C)  Pulse:  [33-78] 33  Resp:  [17-20] 18  SpO2:  [87 %-95 %] 93 %  BP: (106-171)/(56-77) 135/65     Weight: 62.6 kg (138 lb)  Body mass index is 23.69 kg/m².    Intake/Output Summary (Last 24 hours) at 3/4/2022 0933  Last data filed at 3/4/2022 0600  Gross per 24 hour   Intake 1800 ml   Output 300 ml   Net 1500 ml        Physical Exam  Vitals and nursing note reviewed.   Constitutional:       General: She is awake. She is not in acute distress.     Appearance: Normal appearance. She is not ill-appearing, toxic-appearing or diaphoretic.   HENT:      Head: Normocephalic and atraumatic.      Nose: Nose normal. No congestion or rhinorrhea.      Mouth/Throat:      Mouth: Mucous membranes are moist.      Pharynx: Oropharynx is clear. No oropharyngeal exudate or posterior oropharyngeal erythema.   Eyes:      General: No scleral icterus.        Right eye: No discharge.         Left eye: No discharge.      Extraocular Movements: Extraocular movements intact.      Pupils: Pupils are equal, round, and reactive to light.   Neck:      Thyroid: No thyroid mass or thyromegaly.      Vascular: No carotid bruit.      Meningeal: Brudzinski's sign and Kernig's sign absent.   Cardiovascular:      Rate and Rhythm: Normal rate and regular rhythm.      Chest Wall: PMI is not displaced. No thrill.      Pulses: Normal pulses.      Heart sounds: Normal heart sounds. No murmur heard.    No  friction rub. No gallop.   Pulmonary:      Effort: Pulmonary effort is normal. No tachypnea, accessory muscle usage, prolonged expiration or respiratory distress.      Breath sounds: Normal breath sounds. No stridor or decreased air movement. No wheezing, rhonchi or rales.   Chest:      Chest wall: No tenderness.   Abdominal:      General: Bowel sounds are normal. There is no distension.      Palpations: Abdomen is soft. There is no hepatomegaly, splenomegaly or mass.      Tenderness: There is no abdominal tenderness. There is no right CVA tenderness, left CVA tenderness, guarding or rebound.      Hernia: No hernia is present.   Musculoskeletal:         General: Tenderness present. No swelling, deformity or signs of injury. Normal range of motion.      Cervical back: Normal range of motion and neck supple. No rigidity. No muscular tenderness.      Right lower leg: No edema.      Left lower leg: No edema.   Lymphadenopathy:      Cervical: No cervical adenopathy.   Skin:     General: Skin is warm.      Capillary Refill: Capillary refill takes less than 2 seconds.      Coloration: Skin is not cyanotic, jaundiced or pale.      Findings: No bruising, erythema, lesion, petechiae or rash.   Neurological:      Mental Status: She is oriented to person, place, and time.      Cranial Nerves: No cranial nerve deficit, dysarthria or facial asymmetry.      Sensory: No sensory deficit.      Motor: Weakness present. No tremor.      Coordination: Coordination normal.   Psychiatric:         Mood and Affect: Mood normal. Mood is not anxious or depressed. Affect is not flat.         Speech: Speech is not rapid and pressured or slurred.         Behavior: Behavior normal. Behavior is not agitated, aggressive or combative.         Thought Content: Thought content normal. Thought content is not paranoid or delusional.         Cognition and Memory: Cognition is not impaired. Memory is not impaired.         Judgment: Judgment normal.        Significant Labs: All pertinent labs within the past 24 hours have been reviewed.    CBC:   Recent Labs   Lab 03/03/22  0933 03/04/22  0436   WBC 9.71 8.13   HGB 13.2 12.5   HCT 40.3 39.1    334       CMP:   Recent Labs   Lab 03/03/22  0933 03/04/22 0436    140   K 4.0 3.9    103   CO2 33* 33*   * 96   BUN 31* 38*   CREATININE 1.5* 1.5*   CALCIUM 8.8 8.7   PROT 6.6 6.3   ALBUMIN 2.4* 2.3*   BILITOT 0.8 0.7   ALKPHOS 93 85   AST 17 15   ALT 21 16   ANIONGAP 4* 4*   EGFRNONAA 30.5* 30.5*       Magnesium:   Recent Labs   Lab 03/03/22 0933 03/04/22 0436   MG 1.7 1.8             Significant Imaging:     CT Pelvis Without Contrast  Narrative: EXAMINATION:  CT PELVIS WITHOUT CONTRAST    CLINICAL HISTORY:  Pelvis pain, stress fracture suspected, neg xray;    TECHNIQUE:  Axial CT images were obtained. Iterative reconstruction technique was used.  CT/cardiac nuclear exam/s in prior 12 months: 0.    FINDINGS:  Nondisplaced fracture of the S4 segment of the sacrum.    No other fractures.  Impression: Nondisplaced fracture lower sacrum.    Electronically signed by: Mario Worthington MD  Date:    03/01/2022  Time:    14:58  X-Ray Sacrum And Coccyx  Narrative: EXAMINATION:  XR SACRUM AND COCCYX    CLINICAL HISTORY:  Trauma, right buttock pain    COMPARISON:  None.    FINDINGS:  Three views of sacrum and coccyx demonstrate no fracture or dislocation.  No focal soft tissue abnormality.  Impression: No fracture.    Electronically signed by: Mario Worthington MD  Date:    03/01/2022  Time:    12:05  X-Ray Chest 1 View  Narrative: EXAMINATION:  XR CHEST 1 VIEW    CLINICAL HISTORY:  Pain, unspecified    COMPARISON:  Portable chest 01/17/2020.    FINDINGS:  Frontal chest radiograph demonstrates clear lungs.  Mild chronic elevation right hemidiaphragm.  No pleural fluid.  Cardiomediastinal silhouette is unremarkable.  No osseous abnormality.  Impression: No evidence of cardiopulmonary  disease.    Electronically signed by: Mario Worthington MD  Date:    03/01/2022  Time:    12:04      Assessment/Plan:      * Pelvic fracture  Pain management. Continue PT/OT. Plan for inpatient rehab at [facility] following discharge - awaiting approval.      Acute renal injury  Patient with acute kidney injury likely d/t IVVD/Dehydration Which is currently worsening. Labs reviewed- Renal function/electrolytes with Estimated Creatinine Clearance: 21.5 mL/min (A) (based on SCr of 1.5 mg/dL (H)). according to latest data. Monitor urine output and serial BMP and adjust therapy as needed. Avoid nephrotoxins and renally dose meds for GFR listed above.       UTI (urinary tract infection)  IV antibiotic therapy with Rocephin      Fall  Pelvic fracture. Continue PT/OT. Plan for inpatient rehab at [facility] following discharge - awaiting approval.      Tachycardia  Resolved    Hypertensive urgency  Continue Coreg. Monitor BP and adjust meds PRN.        VTE Risk Mitigation (From admission, onward)         Ordered     enoxaparin injection 30 mg  Daily         03/04/22 0818     IP VTE HIGH RISK PATIENT  Once         03/01/22 1637     Place sequential compression device  Until discontinued         03/01/22 1637                Discharge Planning   KIMANI:      Code Status: Full Code   Is the patient medically ready for discharge?:     Reason for patient still in hospital (select all that apply): Patient trending condition, Laboratory test and Treatment  Discharge Plan A: Rehab   Discharge Delays: None known at this time              Stephy Young NP  Department of Hospital Medicine   [facility]

## 2022-03-04 NOTE — PROGRESS NOTES
Pharmacist Renal Dose Adjustment Note    Codi Black is a 89 y.o. female being treated with the medication enoxaparin    Patient Data:    Vital Signs (Most Recent):  Temp: 97.9 °F (36.6 °C) (03/04/22 0721)  Pulse: 63 (03/04/22 0721)  Resp: 18 (03/04/22 0721)  BP: 135/65 (03/04/22 0721)  SpO2: (!) 90 % (03/04/22 0721)   Vital Signs (72h Range):  Temp:  [97.5 °F (36.4 °C)-98.8 °F (37.1 °C)]   Pulse:  []   Resp:  [2-20]   BP: (106-227)/()   SpO2:  [87 %-96 %]      Recent Labs   Lab 03/02/22  0629 03/03/22  0933 03/04/22  0436   CREATININE 1.1 1.5* 1.5*     Serum creatinine: 1.5 mg/dL (H) 03/04/22 0436  Estimated creatinine clearance: 21.5 mL/min (A)    Medication:enoxaparin dose: 40mg frequency daily will be changed to medication:enoxaparin dose:30mg frequency:daily    Pharmacist's Name: Libertad Camilo  Pharmacist's Extension: [phone]

## 2022-03-04 NOTE — ASSESSMENT & PLAN NOTE
Patient with acute kidney injury likely d/t IVVD/Dehydration Which is currently worsening. Labs reviewed- Renal function/electrolytes with Estimated Creatinine Clearance: 21.5 mL/min (A) (based on SCr of 1.5 mg/dL (H)). according to latest data. Monitor urine output and serial BMP and adjust therapy as needed. Avoid nephrotoxins and renally dose meds for GFR listed above.

## 2022-03-04 NOTE — PLAN OF CARE
[facility]  Discharge Final Note    Primary Care Provider: Delfino Rowe MD    Expected Discharge Date: 3/4/2022    Final Discharge Note (most recent)     Final Note - 03/04/22 1331        Final Note    Assessment Type Final Discharge Note     Anticipated Discharge Disposition Rehab Facility        Post-Acute Status    Post-Acute Authorization Placement   [facility] located on the seventh floor.    Post-Acute Placement Status Set-up Complete/Auth obtained     Discharge Delays None known at this time             Final note is completed. The patient will be discharge to inpatient rehab located on the seventh floor. She does not require any social service needs at this time.

## 2022-03-04 NOTE — PT/OT/SLP EVAL
Occupational Therapy  Evaluation    Codi Black   MRN: [id number]   Admitting Diagnosis: Nondisplaced fracture of the S4 segment of the sacrum     OT Date of Treatment: 03/04/22   OT Start Time: 1630  OT Stop Time: 1745  OT Total Time (min): 75 min    Billable Minutes:  Evaluation 75  Total Minutes: 75    Diagnosis: Nondisplaced fracture of the S4 segment of the sacrum       Past Medical History:   Diagnosis Date    Arthritis     Encounter for blood transfusion     High cholesterol     Hypertension       Past Surgical History:   Procedure Laterality Date    HYSTERECTOMY         Referring physician: Dr. Rafael Quan  Date referred to OT: 03/04/22    General Precautions: Standard, fall  Orthopedic Precautions: Full weight bearing  Braces: N/A    Spiritual, Cultural Beliefs, Congregation Practices, Values that Affect Care: no     Patient History:  Living Environment  Lives With: spouse  Living Arrangements: house  Home Accessibility: stairs to enter home (4 steps)  Stair Railings at Home: outside, present on right side  Transportation Anticipated: family or friend will provide  Equipment Currently Used at Home: bedside commode, wheelchair, rollator, shower chair, grab bar, raised toilet, walker, rolling    Prior level of function:    Bed Mobility/Transfers: independent  Grooming: independent  Bathing: independent  Upper Body Dressing: independent  Lower Body Dressing: independent  Toileting: independent  Homemaking Responsibilities: Yes  Meal Prep Responsibility: Secondary  Laundry Responsibility: Primary  Cleaning Responsibility: Secondary  Bill Paying/Finance Responsibility: Primary  Shopping Responsibility: Secondary   Responsibility: No  Driving License: No  Mode of Transportation: Family  Education: 6th grade  Occupation: Retired  Leisure and Hobbies: watching TV     Dominant hand: right    Subjective:  Communicated with nurse prior to session.    Chief Complaint: pain and weakness  Patient/Family  stated goals: Pt would like to regain independence in order to do for herself decreasing burden of care to safely return home with spouse.    Pain/Comfort  Pain Rating 1: 8/10  Location - Side 1: Right  Location 1: hip  Pain Addressed 1: Reposition, Cessation of Activity, Nurse notified  Pain Rating Post-Intervention 1: 6/10    Objective:      Cognitive Exam:  Oriented to: Person, Place and Situation  Follows Commands/attention: Follows multistep  commands  Communication: clear/fluent  Memory:  No Deficits noted  Safety awareness/insight to disability: impaired  Coping skills/emotional control: Appropriate to situation    Visual/perceptual:  Impaired  acuity    Physical Exam:  Postural examination/scapula alignment: -       Rounded shoulders  -       Forward head  Skin integrity: Bruising  Edema: None noted      Sensation:   -       Intact  light/touch bilateral UE's and proprioception bilateral UE's    Upper Extremity Range of Motion:  Right Upper Extremity: WFL  Left Upper Extremity: WFL    Upper Extremity Strength:  Right Upper Extremity: Deficits: 3+ to 4- / 5  Left Upper Extremity: Deficits: 3+ to 4- / 5   Strength: (R) 29 lbs; 27 lbs    Fine motor coordination:   -       Intact  Left hand, finger to nose, Right hand, finger to nose, Left hand, manipulation of objects and Right hand, manipulation of objects    Gross motor coordination: WFL    Functional Mobility:  Bed Mobility:   Supine to sit: Minimal Assistance   Sit to supine: Minimal Assistance   Rolling: Minimal Assistance   Scooting: Minimal Assistance    Transfers:   Sit to stand:Minimal Assistance with Rolling Walker .  Bed <> Chair:  Step Transfer with Moderate Assistance with Rolling Walker .  Toilet Transfer:  Pt Step Transfer with Moderate Assistance with Rolling Walker and Grab bars .  Patient performed shower transfer Step Transfer with Moderate Assistance with Grab bars and shower chair.    Feeding:  Patient performed feeding with Supervision  or Set-up Assistance with extra time.    Grooming:  Patient peformed hand washing with Supervision or Set-up Assistance at sitting at sink.  Patient performed face washing with Supervision or Set-up Assistance at sitting at sink.  Patient performed oral hygeine with Supervision or Set-up Assistance at sitting at sink.  Patient performe hair grooming with Supervision or Set-up Assistance at sitting at sink.    Bathing:  Patient performed bathing with Maximum Assistance with grab bar at Shower.    UE Dressing:  Patient performed UE Dressing with Minimal Assistance with extra time at Edge of bed.    LE Dressing:  Patient don/doffed socks with Total Assistance, Patient performed don/doffed adult brief with Maximum Assistance and Patient performed don/doffed pants with Maximum Assistance    Toileting:  Pt performed toileting with Maximum Assistance with Grab  bar at Commode.    Balance:   Static Sit: GOOD-: Takes MODERATE challenges from all directions but inconsistently  Dynamic Sit:  FAIR+: Maintains balance through MINIMAL excursions of active trunk motion  Static Stand: FAIR: Maintains without assist but unable to take challenges  Dynamic stand: FAIR: Needs CONTACT GUARD during gait      Patient left left sidelying with call button in reach and nurse notified    Assessment:  Codi Black is a 89 y.o. female with a medical diagnosis of Nondisplaced fracture of the S4 segment of the sacrum and presents with weakness, impaired endurance, impaired self care skills, impaired functional mobilty, impaired balance, decreased safety awareness, pain, and impaired cardiopulmonary response to activity.    Rehab potential is good    Activity tolerance: Fair    Discharge recommendations: home with home health     Equipment recommendations: none     GOALS:   Short Term Goals to be met by: 03/11/22     Patient will increase functional independence with ADLs by performing:    UE Dressing with Supervision.  LE Dressing with Moderate  Assistance.  Toileting from toilet with Moderate Assistance for hygiene and clothing management.   Bathing from  shower chair/bench with Moderate Assistance.  Step transfer with Stand-by Assistance  Toilet transfer to toilet with Contact Guard Assistance.  Increased functional strength to 4-/5 for bilateral UE's.    Long Term Goals to be met by: 03/18/22     Patient will increase functional independence with ADLs by performing:    Feeding with Cross.  UE Dressing with Modified Cross.  LE Dressing with Minimal Assistance.  Grooming while seated at sink with Modified Cross.  Toileting from toilet with Supervision for hygiene and clothing management.   Bathing from  shower chair/bench with Supervision.  Step transfer with Modified Cross  Toilet transfer to toilet with Modified Cross.  Increased functional strength to 4 to 4+/5 for bilateral UE's.    PLAN: Patient to be seen 5 x/week (90 min per day, for 14 days) to address the above listed problems via self-care/home management, community/work re-entry, therapeutic activities, therapeutic exercises  Plan of Care expires: 03/18/22  Plan of Care reviewed with: patient         03/04/2022

## 2022-03-04 NOTE — PT/OT/SLP EVAL
PhysicalTherapy   Evaluation    Codi Black   MRN: [id number]     PT Received On: 03/04/22  PT Start Time: 1530     PT Stop Time: 1630    PT Total Time (min): 60 min       Billable Minutes:  Evaluation 60  Total Minutes: 60    Diagnosis: <principal problem not specified>  Past Medical History:   Diagnosis Date    Arthritis     Encounter for blood transfusion     High cholesterol     Hypertension       Past Surgical History:   Procedure Laterality Date    HYSTERECTOMY         Referring physician: Dr Quan  Date referred to PT: 03-04-22    General Precautions: Standard,    Orthopedic Precautions:     Braces:            Patient History:  Lives With: spouse  Living Arrangements: house  Home Accessibility: stairs to enter home  Home Layout: Able to live on 1st floor  Stair Railings at Home: outside, present on right side  Transportation Anticipated: family or friend will provide  Equipment Currently Used at Home: wheelchair, rollator    DME owned (not currently used):     Previous Level of Function:    pt was mod ind with rollator at home       Subjective:  Communicated with nurse prior to session.    Chief Complaint: right hip pain  Patient goals: to be ind at home again  Family goals: same    Pain/Comfort  Pain Rating 1: 8/10  Location - Side 1: Right  Location 1: hip    Objective:  Patient found in wc, with      Cognitive Exam:  Oriented to: Person, Place, Time and Situation  Follows Commands/attention: Follows multistep  commands  Communication: clear/fluent  Safety awareness/insight to disability: intact    Physical Exam:  Postural examination/scapula alignment:    -       No postural abnormalities identified    Skin integrity: Visible skin intact  Edema: None noted     Sensation:      -       Intact    Upper Extremity Range of Motion:  Refer to OT evaluation for UE ROM.    Upper Extremity Strength:  Refer to OT evaluation for UE strength.    Lower Extremity Range of Motion:  Right Lower Extremity: WFL  Left  Lower Extremity: WFL    Lower Extremity Strength:  Right Lower Extremity: 3+/5  Left Lower Extremity: 3+/5     Fine motor coordination:     -       Intact    Gross motor coordination: WFL    Functional Mobility:    Bed Mobility :   Supine to sit: Minimal Assistance   Sit to supine: Minimal Assistance   Rolling: Standby Assistance   Scooting: Minimal Assistance    Transfers:  Sit to stand and wc to be with jesica with a rw    Wheelchair:  Pt propelled wc 100ft with sba and verbal cues    Gait:  Pt ambulated with a rw 100ft with a stooped posture and trunk lean slighty left. She also has a right antalgic gait pattern    Stairs:  Went up and down 1 step with moda. Right hip hurting too much   Balance:   Static Sit: GOOD: Takes MODERATE challenges from all directions  Dynamic Sit:  FAIR+: Maintains balance through MINIMAL excursions of active trunk motion  Static Stand: FAIR: Maintains without assist but unable to take challenges  Dynamic stand: FAIR: Needs CONTACT GUARD during gait    Endurance deficit:  fair    Special Tests:    Additional Treatment:    Patient left supine with call button in reach.    Assessment:  Codi Black is a 89 y.o. female with a medical diagnosis of <principal problem not specified>. She presents with BLE weakness, and difficulty with fxn'l mobility which is related to weakness and pan the right hip.    Rehab potential is good.    Activity tolerance: Good    Plan: Pt will be seen 90 min 5-7 days a week for 2 weks    Discharge recommendations:       Equipment recommendations:      GOALS:   STGS  1. Pt will be sba with all bed mobility  2. Pt will be sba with transfers w/RW  3. Pt will ambulate 150ft with a rw with sba  4. Pt will go up and down 12 steps with rail sba  5. Pt will be ind with wc mobility 150ft  LTGs  1. Pt will be ind w/bed mobility  2. Pt will be ind with transfers with a rw  3. Pt will be ind with gait with rw 200ft  4. Pt will be ind with going up and down 12 steps with rail  .    PLAN:    Patient to be seen 5 x/week to address the above listed problems via gait training, therapeutic activities, therapeutic exercises, therapeutic groups, neuromuscular re-education, wheelchair management/training  Plan of Care expires: 03/18/22  Plan of Care reviewed with: patient, daughter          3/4/2022

## 2022-03-04 NOTE — DISCHARGE SUMMARY
[facility] Medicine  Discharge Summary      Patient Name: Codi Black  MRN: [id number]  Patient Class: OP- Observation  Admission Date: 3/1/2022  Hospital Length of Stay: 0 days  Discharge Date and Time:  03/04/2022 12:27 PM  Attending Physician: Massiel Quan III, MD   Discharging Provider: Stephy Young NP  Primary Care Provider: Delfino Rowe MD      HPI:   Patient is a 89-year-old female with a history of hypertension who had a slip and fall in her restroom while attempting to get to the toilet.  She landed on her buttocks.  Over the next day or 2 she had excruciating pain difficulty with transferring toileting and completing other ADLs.  She ultimately was admitted.  X-rays showed no pelvic fractures but a CT scan and did follow-up with a nondisplaced fracture of the pelvis.  Patient is complaining of severe pain she is having difficulty turning and transferring she states she cannot get to the bedside commode or get out of the bed without any help.  She states the medications are not helping much for pain.  She has also had very fluctuating blood pressures and tachycardia.      * No surgery found *      Hospital Course:   3/3 SD: Pt participating in therapy, tolerating well. Awaiting approval for IPR.    3/4 SD: Monitoring kidney function and other electrolytes, pain management, and blood pressure fluctuation from extreme high to extreme low. IPR denied by insurance - [id number] scheduled for today.  UPDATE: Pt accepted into inpatient rehab       Goals of Care Treatment Preferences:  Code Status: Full Code      Consults:   Consults (From admission, onward)        Status Ordering Provider     Inpatient consult to Social Work/Case Management  Once        Provider:  (Not yet assigned)    Acknowledged MASSIEL QUAN III          * Pelvic fracture  Pain management. Discharge/Readmit to inpatient rehab.    Acute renal injury  Patient with acute kidney injury likely d/t  IVVD/Dehydration Which is currently stable. Labs reviewed- Renal function/electrolytes with Estimated Creatinine Clearance: 21.5 mL/min (A) (based on SCr of 1.5 mg/dL (H)). according to latest data. Monitor urine output and serial BMP and adjust therapy as needed. Avoid nephrotoxins and renally dose meds for GFR listed above.       UTI (urinary tract infection)  IV antibiotic therapy with Rocephin      Fall  Pelvic fracture. Discharge/Readmit to inpatient rehab.    Tachycardia  Resolved    Hypertensive urgency  Continue Coreg. Monitor BP and adjust meds PRN.        Final Active Diagnoses:    Diagnosis Date Noted POA    PRINCIPAL PROBLEM:  Pelvic fracture [S32.9XXA] 03/02/2022 Yes    Acute renal injury [N17.9] 03/04/2022 Yes    Hypertensive urgency [I16.0] 03/02/2022 Yes    Tachycardia [R00.0] 03/02/2022 Yes    Fall [W19.XXXA] 03/02/2022 Yes    UTI (urinary tract infection) [N39.0] 03/02/2022 Yes      Problems Resolved During this Admission:       Discharged Condition: fair    Disposition: Rehab Facility    Follow Up:    Patient Instructions:   No discharge procedures on file.    Significant Diagnostic Studies: Labs: All labs within the past 24 hours have been reviewed    Pending Diagnostic Studies:     None         Medications:  Transfer Medications (for Discharge Readmit only):   Current Facility-Administered Medications   Medication Dose Route Frequency Provider Last Rate Last Admin    acetaminophen tablet 650 mg  650 mg Oral Q8H PRN Rafael Quan III, MD        acetaminophen tablet 650 mg  650 mg Oral Q8H PRN Rafael Quan III, MD        ALPRAZolam tablet 0.75 mg  0.75 mg Oral QHS Rafael Jefferson Quan III, MD   0.75 mg at 03/03/22 2031    carvediloL tablet 12.5 mg  12.5 mg Oral BID Rafael Quan III, MD   12.5 mg at 03/04/22 0855    cefTRIAXone (ROCEPHIN) 1 g/50 mL D5W IVPB  1 g Intravenous Q24H Rafael Quan III, MD   Stopped at 03/03/22 1833    docusate sodium capsule 100 mg  100 mg Oral BID  Stephy Young NP   100 mg at 03/04/22 1016    enoxaparin injection 30 mg  30 mg Subcutaneous Daily Francis H. Kadeem CHO MD        ezetimibe tablet 10 mg  10 mg Oral Daily Francis H. Kadeem CHO MD   10 mg at 03/04/22 0855    lactulose 20 gram/30 mL solution Soln 20 g  20 g Oral Daily PRN Stephy Young NP        melatonin tablet 6 mg  6 mg Oral Nightly PRN Rafael H. Kadeem CHO MD   6 mg at 03/02/22 2041    morphine injection 1 mg  1 mg Intravenous Q4H PRN [facility]. Kadeem CHO MD        ondansetron injection 4 mg  4 mg Intravenous Q8H PRN Rafael H. Kadeem CHO MD   4 mg at 03/03/22 2030    sodium chloride 0.9% flush 10 mL  10 mL Intravenous PRN [facility]. Kadeem CHO MD        traMADoL tablet 50 mg  50 mg Oral Q6H PRN Rafael H. Kadeem CHO MD   50 mg at 03/03/22 1437       Indwelling Lines/Drains at time of discharge:   Lines/Drains/Airways     None                 Time spent on the discharge of patient: 30 minutes         Stephy Young NP  Department of Hospital Medicine  [facility]

## 2022-03-04 NOTE — SUBJECTIVE & OBJECTIVE
Interval History: Pt seen and evaluated    Review of Systems   Constitutional:  Positive for activity change and appetite change. Negative for chills and fever.   Respiratory:  Negative for cough, shortness of breath and wheezing.    Cardiovascular:  Negative for chest pain, palpitations and leg swelling.   Gastrointestinal:  Negative for abdominal pain.   Genitourinary:  Negative for difficulty urinating.   Musculoskeletal:  Positive for arthralgias and back pain. Negative for myalgias.        Right hip pain   Neurological:  Positive for weakness. Negative for dizziness, tremors, seizures, syncope, facial asymmetry, speech difficulty and headaches.   Hematological:  Negative for adenopathy.   Psychiatric/Behavioral:  Negative for agitation, confusion and hallucinations. The patient is nervous/anxious.    Objective:     Vital Signs (Most Recent):  Temp: 97.9 °F (36.6 °C) (03/04/22 0721)  Pulse: 63 (03/04/22 0721)  Resp: 18 (03/04/22 0721)  BP: 135/65 (03/04/22 0721)  SpO2: (!) 93 % (03/04/22 0815)   Vital Signs (24h Range):  Temp:  [97.9 °F (36.6 °C)-98.8 °F (37.1 °C)] 97.9 °F (36.6 °C)  Pulse:  [33-78] 33  Resp:  [17-20] 18  SpO2:  [87 %-95 %] 93 %  BP: (106-171)/(56-77) 135/65     Weight: 62.6 kg (138 lb)  Body mass index is 23.69 kg/m².    Intake/Output Summary (Last 24 hours) at 3/4/2022 0933  Last data filed at 3/4/2022 0600  Gross per 24 hour   Intake 1800 ml   Output 300 ml   Net 1500 ml        Physical Exam  Vitals and nursing note reviewed.   Constitutional:       General: She is awake. She is not in acute distress.     Appearance: Normal appearance. She is not ill-appearing, toxic-appearing or diaphoretic.   HENT:      Head: Normocephalic and atraumatic.      Nose: Nose normal. No congestion or rhinorrhea.      Mouth/Throat:      Mouth: Mucous membranes are moist.      Pharynx: Oropharynx is clear. No oropharyngeal exudate or posterior oropharyngeal erythema.   Eyes:      General: No scleral icterus.         Right eye: No discharge.         Left eye: No discharge.      Extraocular Movements: Extraocular movements intact.      Pupils: Pupils are equal, round, and reactive to light.   Neck:      Thyroid: No thyroid mass or thyromegaly.      Vascular: No carotid bruit.      Meningeal: Brudzinski's sign and Kernig's sign absent.   Cardiovascular:      Rate and Rhythm: Normal rate and regular rhythm.      Chest Wall: PMI is not displaced. No thrill.      Pulses: Normal pulses.      Heart sounds: Normal heart sounds. No murmur heard.    No friction rub. No gallop.   Pulmonary:      Effort: Pulmonary effort is normal. No tachypnea, accessory muscle usage, prolonged expiration or respiratory distress.      Breath sounds: Normal breath sounds. No stridor or decreased air movement. No wheezing, rhonchi or rales.   Chest:      Chest wall: No tenderness.   Abdominal:      General: Bowel sounds are normal. There is no distension.      Palpations: Abdomen is soft. There is no hepatomegaly, splenomegaly or mass.      Tenderness: There is no abdominal tenderness. There is no right CVA tenderness, left CVA tenderness, guarding or rebound.      Hernia: No hernia is present.   Musculoskeletal:         General: Tenderness present. No swelling, deformity or signs of injury. Normal range of motion.      Cervical back: Normal range of motion and neck supple. No rigidity. No muscular tenderness.      Right lower leg: No edema.      Left lower leg: No edema.   Lymphadenopathy:      Cervical: No cervical adenopathy.   Skin:     General: Skin is warm.      Capillary Refill: Capillary refill takes less than 2 seconds.      Coloration: Skin is not cyanotic, jaundiced or pale.      Findings: No bruising, erythema, lesion, petechiae or rash.   Neurological:      Mental Status: She is oriented to person, place, and time.      Cranial Nerves: No cranial nerve deficit, dysarthria or facial asymmetry.      Sensory: No sensory deficit.      Motor:  Weakness present. No tremor.      Coordination: Coordination normal.   Psychiatric:         Mood and Affect: Mood normal. Mood is not anxious or depressed. Affect is not flat.         Speech: Speech is not rapid and pressured or slurred.         Behavior: Behavior normal. Behavior is not agitated, aggressive or combative.         Thought Content: Thought content normal. Thought content is not paranoid or delusional.         Cognition and Memory: Cognition is not impaired. Memory is not impaired.         Judgment: Judgment normal.       Significant Labs: All pertinent labs within the past 24 hours have been reviewed.    CBC:   Recent Labs   Lab 03/03/22  0933 03/04/22  0436   WBC 9.71 8.13   HGB 13.2 12.5   HCT 40.3 39.1    334       CMP:   Recent Labs   Lab 03/03/22 0933 03/04/22 0436    140   K 4.0 3.9    103   CO2 33* 33*   * 96   BUN 31* 38*   CREATININE 1.5* 1.5*   CALCIUM 8.8 8.7   PROT 6.6 6.3   ALBUMIN 2.4* 2.3*   BILITOT 0.8 0.7   ALKPHOS 93 85   AST 17 15   ALT 21 16   ANIONGAP 4* 4*   EGFRNONAA 30.5* 30.5*       Magnesium:   Recent Labs   Lab 03/03/22 0933 03/04/22  0436   MG 1.7 1.8             Significant Imaging:     CT Pelvis Without Contrast  Narrative: EXAMINATION:  CT PELVIS WITHOUT CONTRAST    CLINICAL HISTORY:  Pelvis pain, stress fracture suspected, neg xray;    TECHNIQUE:  Axial CT images were obtained. Iterative reconstruction technique was used.  CT/cardiac nuclear exam/s in prior 12 months: 0.    FINDINGS:  Nondisplaced fracture of the S4 segment of the sacrum.    No other fractures.  Impression: Nondisplaced fracture lower sacrum.    Electronically signed by: Mario Worthington MD  Date:    03/01/2022  Time:    14:58  X-Ray Sacrum And Coccyx  Narrative: EXAMINATION:  XR SACRUM AND COCCYX    CLINICAL HISTORY:  Trauma, right buttock pain    COMPARISON:  None.    FINDINGS:  Three views of sacrum and coccyx demonstrate no fracture or dislocation.  No focal soft tissue  abnormality.  Impression: No fracture.    Electronically signed by: Mario Worthington MD  Date:    03/01/2022  Time:    12:05  X-Ray Chest 1 View  Narrative: EXAMINATION:  XR CHEST 1 VIEW    CLINICAL HISTORY:  Pain, unspecified    COMPARISON:  Portable chest 01/17/2020.    FINDINGS:  Frontal chest radiograph demonstrates clear lungs.  Mild chronic elevation right hemidiaphragm.  No pleural fluid.  Cardiomediastinal silhouette is unremarkable.  No osseous abnormality.  Impression: No evidence of cardiopulmonary disease.    Electronically signed by: Mario Worthington MD  Date:    03/01/2022  Time:    12:04

## 2022-03-04 NOTE — ASSESSMENT & PLAN NOTE
Patient with acute kidney injury likely d/t IVVD/Dehydration Which is currently stable. Labs reviewed- Renal function/electrolytes with Estimated Creatinine Clearance: 21.5 mL/min (A) (based on SCr of 1.5 mg/dL (H)). according to latest data. Monitor urine output and serial BMP and adjust therapy as needed. Avoid nephrotoxins and renally dose meds for GFR listed above.

## 2022-03-05 LAB
ALBUMIN SERPL BCP-MCNC: 2.3 G/DL (ref 3.5–5.2)
ALP SERPL-CCNC: 90 U/L (ref 55–135)
ALT SERPL W/O P-5'-P-CCNC: 17 U/L (ref 10–44)
ANION GAP SERPL CALC-SCNC: 3 MMOL/L (ref 8–16)
AST SERPL-CCNC: 14 U/L (ref 10–40)
BASOPHILS # BLD AUTO: 0.05 K/UL (ref 0–0.2)
BASOPHILS NFR BLD: 0.6 % (ref 0–1.9)
BILIRUB SERPL-MCNC: 0.7 MG/DL (ref 0.1–1)
BUN SERPL-MCNC: 40 MG/DL (ref 8–23)
CALCIUM SERPL-MCNC: 8.8 MG/DL (ref 8.7–10.5)
CHLORIDE SERPL-SCNC: 101 MMOL/L (ref 95–110)
CO2 SERPL-SCNC: 35 MMOL/L (ref 23–29)
CREAT SERPL-MCNC: 1.3 MG/DL (ref 0.5–1.4)
DIFFERENTIAL METHOD: ABNORMAL
EOSINOPHIL # BLD AUTO: 0.2 K/UL (ref 0–0.5)
EOSINOPHIL NFR BLD: 1.9 % (ref 0–8)
ERYTHROCYTE [DISTWIDTH] IN BLOOD BY AUTOMATED COUNT: 12.7 % (ref 11.5–14.5)
EST. GFR  (AFRICAN AMERICAN): 41.7 ML/MIN/1.73 M^2
EST. GFR  (NON AFRICAN AMERICAN): 36.2 ML/MIN/1.73 M^2
GLUCOSE SERPL-MCNC: 94 MG/DL (ref 70–110)
HCT VFR BLD AUTO: 39.9 % (ref 37–48.5)
HGB BLD-MCNC: 12.6 G/DL (ref 12–16)
IMM GRANULOCYTES # BLD AUTO: 0.04 K/UL (ref 0–0.04)
IMM GRANULOCYTES NFR BLD AUTO: 0.5 % (ref 0–0.5)
LYMPHOCYTES # BLD AUTO: 2.4 K/UL (ref 1–4.8)
LYMPHOCYTES NFR BLD: 27.8 % (ref 18–48)
MAGNESIUM SERPL-MCNC: 1.8 MG/DL (ref 1.6–2.6)
MCH RBC QN AUTO: 29 PG (ref 27–31)
MCHC RBC AUTO-ENTMCNC: 31.6 G/DL (ref 32–36)
MCV RBC AUTO: 92 FL (ref 82–98)
MONOCYTES # BLD AUTO: 1.2 K/UL (ref 0.3–1)
MONOCYTES NFR BLD: 13.9 % (ref 4–15)
NEUTROPHILS # BLD AUTO: 4.7 K/UL (ref 1.8–7.7)
NEUTROPHILS NFR BLD: 55.3 % (ref 38–73)
NRBC BLD-RTO: 0 /100 WBC
PLATELET # BLD AUTO: 336 K/UL (ref 150–450)
PMV BLD AUTO: 9.4 FL (ref 9.2–12.9)
POTASSIUM SERPL-SCNC: 4 MMOL/L (ref 3.5–5.1)
PROT SERPL-MCNC: 6.3 G/DL (ref 6–8.4)
RBC # BLD AUTO: 4.34 M/UL (ref 4–5.4)
SODIUM SERPL-SCNC: 139 MMOL/L (ref 136–145)
WBC # BLD AUTO: 8.49 K/UL (ref 3.9–12.7)

## 2022-03-05 PROCEDURE — 83735 ASSAY OF MAGNESIUM: CPT | Performed by: NURSE PRACTITIONER

## 2022-03-05 PROCEDURE — 25000003 PHARM REV CODE 250: Performed by: NURSE PRACTITIONER

## 2022-03-05 PROCEDURE — 36415 COLL VENOUS BLD VENIPUNCTURE: CPT | Performed by: NURSE PRACTITIONER

## 2022-03-05 PROCEDURE — 94761 N-INVAS EAR/PLS OXIMETRY MLT: CPT

## 2022-03-05 PROCEDURE — 99900035 HC TECH TIME PER 15 MIN (STAT)

## 2022-03-05 PROCEDURE — 85025 COMPLETE CBC W/AUTO DIFF WBC: CPT | Performed by: NURSE PRACTITIONER

## 2022-03-05 PROCEDURE — 80053 COMPREHEN METABOLIC PANEL: CPT | Performed by: NURSE PRACTITIONER

## 2022-03-05 PROCEDURE — 11000001 HC ACUTE MED/SURG PRIVATE ROOM

## 2022-03-05 PROCEDURE — 99900031 HC PATIENT EDUCATION (STAT)

## 2022-03-05 PROCEDURE — 25000003 PHARM REV CODE 250: Performed by: INTERNAL MEDICINE

## 2022-03-05 RX ADMIN — APIXABAN 2.5 MG: 2.5 TABLET, FILM COATED ORAL at 08:03

## 2022-03-05 RX ADMIN — CARVEDILOL 12.5 MG: 12.5 TABLET, FILM COATED ORAL at 08:03

## 2022-03-05 RX ADMIN — TRAMADOL HYDROCHLORIDE 50 MG: 50 TABLET ORAL at 01:03

## 2022-03-05 RX ADMIN — DOCUSATE SODIUM 100 MG: 100 CAPSULE, LIQUID FILLED ORAL at 09:03

## 2022-03-05 RX ADMIN — LACTULOSE 20 G: 20 SOLUTION ORAL at 09:03

## 2022-03-05 RX ADMIN — DOCUSATE SODIUM 100 MG: 100 CAPSULE, LIQUID FILLED ORAL at 08:03

## 2022-03-05 RX ADMIN — Medication 6 MG: at 09:03

## 2022-03-05 RX ADMIN — TRAMADOL HYDROCHLORIDE 50 MG: 50 TABLET ORAL at 09:03

## 2022-03-05 RX ADMIN — APIXABAN 2.5 MG: 2.5 TABLET, FILM COATED ORAL at 09:03

## 2022-03-05 RX ADMIN — CARVEDILOL 12.5 MG: 12.5 TABLET, FILM COATED ORAL at 09:03

## 2022-03-05 RX ADMIN — ALPRAZOLAM 0.75 MG: 0.25 TABLET ORAL at 09:03

## 2022-03-05 RX ADMIN — EZETIMIBE 10 MG: 10 TABLET ORAL at 08:03

## 2022-03-05 NOTE — H&P
[facility] [address] [facility]  History & Physical    Patient Name: Codi Black  MRN: [id number]  Patient Class: IP- Rehab  Admission Date: 3/4/2022  Attending Physician: Rafael Quan III, MD   Primary Care Provider: Delfino Rowe MD         Patient information was obtained from patient and ER records.     Subjective:     Principal Problem:<principal problem not specified>    Chief Complaint:   Chief Complaint   Patient presents with    Pelvic Pain        HPI: POST ADMISSION PHYSICIAN ASSESSMENT AND   REHAB HISTORICAL/PHYSICAL        CHIEF COMPLAINT: Pain in Groin    PRIMARY REHAB IMPAIRMENT GROUP: Orthopedic / Other    ETIOLOGIC DIAGNOSIS: Pelvic Fracture, Sacrum Fracture    SECONDARY AND RELATED DIAGNOSES:  Decrease in mobility, decrease in physical functioning, difficulty walking, falls, pain, tachycardia, urinary tract infection, safety    CO-MORBIDITIES PRESENT ON ADMISSION:  Pelvic fracture hypertensive urgency acute renal failure    Risk:  Patient is at high risk of progressive decline in function progressive weakness with muscle atrophy joint stiffness and contractures.  Patient is at risk for recurrent falls that could lead to more serious injury.  Patient is at risk for imbalance worsening pain fractures skin soft tissue injury head injury worsening organ dysfunction renal failure infection PE DVT pneumonia and death.    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  The patient had a fall about a week prior.  She has been having intermittent pain in that area and having more difficulty ambulating and getting to the toilet.  Patient denied any chest pain angina fever chills diarrhea or shortness of breath.  Upon admission to the emergency department she was found to be significantly hypertensive and severe pain in the right sacral area.  She had  no definitive bruising or area of hematoma.  An x-ray initially was unremarkable and a CT then showed a fracture of the S4 segment of the sacrum.  Patient was having intermittent hypoxemia a mildly elevated troponin and was admitted.  Patient was started on pain control several medications were adjusted for her blood pressure as well as her other comorbidities.  Patient was seen by therapy her therapeutic notes are in the prescreen.  Ultimately the patient was unable to return home at a safe functional level and we recommended inpatient rehab.  After initial denial from her insurance company we overturned this with a verbal conversation.  Patient is eager to improve and to have better pain control.        Interval History: Pt seen and evaluated    Review of Systems   Constitutional:  Positive for activity change and appetite change. Negative for chills and fever.   Respiratory:  Negative for cough, shortness of breath and wheezing.    Cardiovascular:  Negative for chest pain, palpitations and leg swelling.   Gastrointestinal:  Negative for abdominal pain.   Genitourinary:  Negative for difficulty urinating.   Musculoskeletal:  Positive for arthralgias and back pain. Negative for myalgias.        Right hip pain   Neurological:  Positive for weakness. Negative for dizziness, tremors, seizures, syncope, facial asymmetry, speech difficulty and headaches.   Hematological:  Negative for adenopathy.   Psychiatric/Behavioral:  Negative for agitation, confusion and hallucinations. The patient is nervous/anxious.    Objective:     Vital Signs (Most Recent):  Temp: 97.9 °F (36.6 °C) (03/04/22 0721)  Pulse: 63 (03/04/22 0721)  Resp: 18 (03/04/22 0721)  BP: 135/65 (03/04/22 0721)  SpO2: (!) 93 % (03/04/22 0815)   Vital Signs (24h Range):  Temp:  [97.9 °F (36.6 °C)-98.4 °F (36.9 °C)] 98.4 °F (36.9 °C)  Pulse:  [33-69] 69  Resp:  [18] 18  SpO2:  [90 %-94 %] 91 %  BP: (128-145)/(61-69) 145/69        There is no height or weight on  file to calculate BMI.  No intake or output data in the 24 hours ending 03/05/22 0547     Physical Exam  Vitals and nursing note reviewed.   Constitutional:       General: She is awake. She is not in acute distress.     Appearance: Normal appearance. She is not ill-appearing, toxic-appearing or diaphoretic.   HENT:      Head: Normocephalic and atraumatic.      Nose: Nose normal. No congestion or rhinorrhea.      Mouth/Throat:      Mouth: Mucous membranes are moist.      Pharynx: Oropharynx is clear. No oropharyngeal exudate or posterior oropharyngeal erythema.   Eyes:      General: No scleral icterus.        Right eye: No discharge.         Left eye: No discharge.      Extraocular Movements: Extraocular movements intact.      Pupils: Pupils are equal, round, and reactive to light.   Neck:      Thyroid: No thyroid mass or thyromegaly.      Vascular: No carotid bruit.      Meningeal: Brudzinski's sign and Kernig's sign absent.   Cardiovascular:      Rate and Rhythm: Normal rate and regular rhythm.      Chest Wall: PMI is not displaced. No thrill.      Pulses: Normal pulses.      Heart sounds: Normal heart sounds. No murmur heard.    No friction rub. No gallop.   Pulmonary:      Effort: Pulmonary effort is normal. No tachypnea, accessory muscle usage, prolonged expiration or respiratory distress.      Breath sounds: Normal breath sounds. No stridor or decreased air movement. No wheezing, rhonchi or rales.   Chest:      Chest wall: No tenderness.   Abdominal:      General: Bowel sounds are normal. There is no distension.      Palpations: Abdomen is soft. There is no hepatomegaly, splenomegaly or mass.      Tenderness: There is no abdominal tenderness. There is no right CVA tenderness, left CVA tenderness, guarding or rebound.      Hernia: No hernia is present.   Musculoskeletal:         General: Tenderness present. No swelling, deformity or signs of injury. Normal range of motion.      Cervical back: Normal range of  motion and neck supple. No rigidity. No muscular tenderness.      Right lower leg: No edema.      Left lower leg: No edema.   Lymphadenopathy:      Cervical: No cervical adenopathy.   Skin:     General: Skin is warm.      Capillary Refill: Capillary refill takes less than 2 seconds.      Coloration: Skin is not cyanotic, jaundiced or pale.      Findings: No bruising, erythema, lesion, petechiae or rash.   Neurological:      Mental Status: She is oriented to person, place, and time.      Cranial Nerves: No cranial nerve deficit, dysarthria or facial asymmetry.      Sensory: No sensory deficit.      Motor: Weakness present. No tremor.      Coordination: Coordination normal.   Psychiatric:         Mood and Affect: Mood normal. Mood is not anxious or depressed. Affect is not flat.         Speech: Speech is not rapid and pressured or slurred.         Behavior: Behavior normal. Behavior is not agitated, aggressive or combative.         Thought Content: Thought content normal. Thought content is not paranoid or delusional.         Cognition and Memory: Cognition is not impaired. Memory is not impaired.         Judgment: Judgment normal.       Significant Labs: All pertinent labs within the past 24 hours have been reviewed.    CBC:   Recent Labs   Lab 03/03/22  0933 03/04/22  0436   WBC 9.71 8.13   HGB 13.2 12.5   HCT 40.3 39.1    334       CMP:   Recent Labs   Lab 03/03/22 0933 03/04/22  0436    140   K 4.0 3.9    103   CO2 33* 33*   * 96   BUN 31* 38*   CREATININE 1.5* 1.5*   CALCIUM 8.8 8.7   PROT 6.6 6.3   ALBUMIN 2.4* 2.3*   BILITOT 0.8 0.7   ALKPHOS 93 85   AST 17 15   ALT 21 16   ANIONGAP 4* 4*   EGFRNONAA 30.5* 30.5*       Magnesium:   Recent Labs   Lab 03/03/22 0933 03/04/22  0436   MG 1.7 1.8               Significant Imaging:     CT Pelvis Without Contrast  Narrative: EXAMINATION:  CT PELVIS WITHOUT CONTRAST    CLINICAL HISTORY:  Pelvis pain, stress fracture suspected, neg  xray;    TECHNIQUE:  Axial CT images were obtained. Iterative reconstruction technique was used.  CT/cardiac nuclear exam/s in prior 12 months: 0.    FINDINGS:  Nondisplaced fracture of the S4 segment of the sacrum.    No other fractures.  Impression: Nondisplaced fracture lower sacrum.    Electronically signed by: Mairo Worthington MD  Date:    03/01/2022  Time:    14:58  X-Ray Sacrum And Coccyx  Narrative: EXAMINATION:  XR SACRUM AND COCCYX    CLINICAL HISTORY:  Trauma, right buttock pain    COMPARISON:  None.    FINDINGS:  Three views of sacrum and coccyx demonstrate no fracture or dislocation.  No focal soft tissue abnormality.  Impression: No fracture.    Electronically signed by: Mario Worthington MD  Date:    03/01/2022  Time:    12:05  X-Ray Chest 1 View  Narrative: EXAMINATION:  XR CHEST 1 VIEW    CLINICAL HISTORY:  Pain, unspecified    COMPARISON:  Portable chest 01/17/2020.    FINDINGS:  Frontal chest radiograph demonstrates clear lungs.  Mild chronic elevation right hemidiaphragm.  No pleural fluid.  Cardiomediastinal silhouette is unremarkable.  No osseous abnormality.  Impression: No evidence of cardiopulmonary disease.    Electronically signed by: Mario Worthington MD  Date:    03/01/2022  Time:    12:04      CRANIAL NERVES     CN III, IV, VI   Pupils are equal, round, and reactive to light.    Assessment/Plan:     Acute renal injury  Patient with acute kidney injury likely d/t Pre-renal azotemia Which is currently stable. Labs reviewed- Renal function/electrolytes with CrCl cannot be calculated (Unknown ideal weight.). according to latest data. Monitor urine output and serial BMP and adjust therapy as needed. Avoid nephrotoxins and renally dose meds for GFR listed above.       UTI (urinary tract infection)        Fall        Hypertensive urgency        Pelvic fracture  Patient has had a significant change in her functional ability since her fall and fracture.  She is in a significant amount of pain  requiring opiate treatment.  The patient is making some progress with therapy but pain is a barrier.  Her age is also a barrier and her other medical complications need close monitoring.  Patient should have routine monitoring of her renal function encouragement of good hydration as well as continued treatment for her pulmonary embolism and infection.  The patient is eager to return home and I think she is an excellent candidate for a unit and her prognosis is good.    ESTIMATED LENGTH OF STAY:  The patient's estimated length of stay is 10 days and she is expected to be able to be discharged to home with family .    REHABILITATION PLAN/GOALS  The patient is being admitted to a comprehensive acute inpatient rehabilitation program consisting of at least 3 hours of combined physical ( 1.5hrs./day, 5 days a week), and occupational therapy (1.5 hrs. A day, 5 days a week),speech therapy services if necessary, 24-hour skilled rehabilitation nursing care, and close supervision of a physician with special training and experience in rehabilitation medicine. Patient's prognosis for significant practical improvement within a reasonable period of time appears good . Given the patient's complex medical condition and risk of further medical complications, rehabilitation services could not be safely provided at a lower level of care such as a skilled nursing facility.                    1. Physical Therapy to Evaluate and Treat, Work on bed mobility, Assist with transfers, Strength, Gait, Fall Prevention, Balance and Modalities as Needed   2. Occupational Therapy to ADL Retraining, Functional Transfer Training, Therapeutic Activity and Exercises, Neuromuscular Re-education, Manual Therapy, Use of Adaptive Equipment and Retraining, Safety Awareness and Fall Prevention and Home Modification   3. Speech Pathology to Make recommendations for a safe diet   4. Specialized 24-hour Rehabilitation Nursing Care to Protection of Skin and Soft  Tissue, Assisting with continence, Bowel and bladder training, Neurological checks, Medicine administration and medicine and management, Wound care and Fall protection and general encouragement   5. Dietary to optimize diet and educate.   6. Social Work/Case management to assist with discharge planning activities, acquisition of durable medical equipment, and ordering of follow up services as necessary.    The services provided in the acute medical rehab setting are under my supervision 24 hours a day. These services are necessary for this patient to achieve the most appropriate functional outcome and to determine the most appropriate discharge disposition.  I have reviewed the treatment plan with the patient and answered all of her questions, discussed goals and expectation.    REVIEW OF PRE-SCREEN ASSESSMENT  I have reviewed the patient's preadmission screen including her medical and functional status and compared it with her status upon arrival to the rehab unit and see no change . Acute rehab services are still necessary and appropriate for this patient to achieve the best functional outcome while determining the most appropriate discharge disposition and services. The patient will require close medical management of multiple medical co-morbidities including as noted above .      VTE Risk Mitigation (From admission, onward)         Ordered     enoxaparin injection 30 mg  Daily         03/04/22 1449     IP VTE HIGH RISK PATIENT  Once         03/04/22 1449     Place sequential compression device  Until discontinued         03/04/22 1449                   Rafael Quan III, MD  Department of Hospital Medicine   [facility] ([facility])

## 2022-03-05 NOTE — PLAN OF CARE
Problem: Adult Inpatient Plan of Care  Goal: Plan of Care Review  Outcome: Ongoing, Progressing  Goal: Patient-Specific Goal (Individualized)  Outcome: Ongoing, Progressing  Goal: Absence of Hospital-Acquired Illness or Injury  Outcome: Ongoing, Progressing  Goal: Optimal Comfort and Wellbeing  Outcome: Ongoing, Progressing  Goal: Readiness for Transition of Care  Outcome: Ongoing, Progressing     Problem: Fluid and Electrolyte Imbalance (Acute Kidney Injury/Impairment)  Goal: Fluid and Electrolyte Balance  Outcome: Ongoing, Progressing     Problem: Oral Intake Inadequate (Acute Kidney Injury/Impairment)  Goal: Optimal Nutrition Intake  Outcome: Ongoing, Progressing   Will continue to monitor and will maintain a safe environment

## 2022-03-05 NOTE — HPI
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  The patient had a fall about a week prior.  She has been having intermittent pain in that area and having more difficulty ambulating and getting to the toilet.  Patient denied any chest pain angina fever chills diarrhea or shortness of breath.  Upon admission to the emergency department she was found to be significantly hypertensive and severe pain in the right sacral area.  She had no definitive bruising or area of hematoma.  An x-ray initially was unremarkable and a CT then showed a fracture of the S4 segment of the sacrum.  Patient was having intermittent hypoxemia a mildly elevated troponin and was admitted.  Patient was started on pain control several medications were adjusted for her blood pressure as well as her other comorbidities.  Patient was seen by therapy her therapeutic notes are in the prescreen.  Ultimately the patient was unable to return home at a safe functional level and we recommended inpatient rehab.  After initial denial from her insurance company we overturned this with a verbal conversation.  Patient is eager to improve and to have better pain control.

## 2022-03-05 NOTE — SUBJECTIVE & OBJECTIVE
Interval History: Pt seen and evaluated    Review of Systems   Constitutional:  Positive for activity change and appetite change. Negative for chills and fever.   Respiratory:  Negative for cough, shortness of breath and wheezing.    Cardiovascular:  Negative for chest pain, palpitations and leg swelling.   Gastrointestinal:  Negative for abdominal pain.   Genitourinary:  Negative for difficulty urinating.   Musculoskeletal:  Positive for arthralgias and back pain. Negative for myalgias.        Right hip pain   Neurological:  Positive for weakness. Negative for dizziness, tremors, seizures, syncope, facial asymmetry, speech difficulty and headaches.   Hematological:  Negative for adenopathy.   Psychiatric/Behavioral:  Negative for agitation, confusion and hallucinations. The patient is nervous/anxious.    Objective:     Vital Signs (Most Recent):  Temp: 97.9 °F (36.6 °C) (03/04/22 0721)  Pulse: 63 (03/04/22 0721)  Resp: 18 (03/04/22 0721)  BP: 135/65 (03/04/22 0721)  SpO2: (!) 93 % (03/04/22 0815)   Vital Signs (24h Range):  Temp:  [97.9 °F (36.6 °C)-98.4 °F (36.9 °C)] 98.4 °F (36.9 °C)  Pulse:  [33-69] 69  Resp:  [18] 18  SpO2:  [90 %-94 %] 91 %  BP: (128-145)/(61-69) 145/69        There is no height or weight on file to calculate BMI.  No intake or output data in the 24 hours ending 03/05/22 0543     Physical Exam  Vitals and nursing note reviewed.   Constitutional:       General: She is awake. She is not in acute distress.     Appearance: Normal appearance. She is not ill-appearing, toxic-appearing or diaphoretic.   HENT:      Head: Normocephalic and atraumatic.      Nose: Nose normal. No congestion or rhinorrhea.      Mouth/Throat:      Mouth: Mucous membranes are moist.      Pharynx: Oropharynx is clear. No oropharyngeal exudate or posterior oropharyngeal erythema.   Eyes:      General: No scleral icterus.        Right eye: No discharge.         Left eye: No discharge.      Extraocular Movements: Extraocular  movements intact.      Pupils: Pupils are equal, round, and reactive to light.   Neck:      Thyroid: No thyroid mass or thyromegaly.      Vascular: No carotid bruit.      Meningeal: Brudzinski's sign and Kernig's sign absent.   Cardiovascular:      Rate and Rhythm: Normal rate and regular rhythm.      Chest Wall: PMI is not displaced. No thrill.      Pulses: Normal pulses.      Heart sounds: Normal heart sounds. No murmur heard.    No friction rub. No gallop.   Pulmonary:      Effort: Pulmonary effort is normal. No tachypnea, accessory muscle usage, prolonged expiration or respiratory distress.      Breath sounds: Normal breath sounds. No stridor or decreased air movement. No wheezing, rhonchi or rales.   Chest:      Chest wall: No tenderness.   Abdominal:      General: Bowel sounds are normal. There is no distension.      Palpations: Abdomen is soft. There is no hepatomegaly, splenomegaly or mass.      Tenderness: There is no abdominal tenderness. There is no right CVA tenderness, left CVA tenderness, guarding or rebound.      Hernia: No hernia is present.   Musculoskeletal:         General: Tenderness present. No swelling, deformity or signs of injury. Normal range of motion.      Cervical back: Normal range of motion and neck supple. No rigidity. No muscular tenderness.      Right lower leg: No edema.      Left lower leg: No edema.   Lymphadenopathy:      Cervical: No cervical adenopathy.   Skin:     General: Skin is warm.      Capillary Refill: Capillary refill takes less than 2 seconds.      Coloration: Skin is not cyanotic, jaundiced or pale.      Findings: No bruising, erythema, lesion, petechiae or rash.   Neurological:      Mental Status: She is oriented to person, place, and time.      Cranial Nerves: No cranial nerve deficit, dysarthria or facial asymmetry.      Sensory: No sensory deficit.      Motor: Weakness present. No tremor.      Coordination: Coordination normal.   Psychiatric:         Mood and  Affect: Mood normal. Mood is not anxious or depressed. Affect is not flat.         Speech: Speech is not rapid and pressured or slurred.         Behavior: Behavior normal. Behavior is not agitated, aggressive or combative.         Thought Content: Thought content normal. Thought content is not paranoid or delusional.         Cognition and Memory: Cognition is not impaired. Memory is not impaired.         Judgment: Judgment normal.       Significant Labs: All pertinent labs within the past 24 hours have been reviewed.    CBC:   Recent Labs   Lab 03/03/22  0933 03/04/22  0436   WBC 9.71 8.13   HGB 13.2 12.5   HCT 40.3 39.1    334       CMP:   Recent Labs   Lab 03/03/22  0933 03/04/22  0436    140   K 4.0 3.9    103   CO2 33* 33*   * 96   BUN 31* 38*   CREATININE 1.5* 1.5*   CALCIUM 8.8 8.7   PROT 6.6 6.3   ALBUMIN 2.4* 2.3*   BILITOT 0.8 0.7   ALKPHOS 93 85   AST 17 15   ALT 21 16   ANIONGAP 4* 4*   EGFRNONAA 30.5* 30.5*       Magnesium:   Recent Labs   Lab 03/03/22  0933 03/04/22  0436   MG 1.7 1.8               Significant Imaging:     CT Pelvis Without Contrast  Narrative: EXAMINATION:  CT PELVIS WITHOUT CONTRAST    CLINICAL HISTORY:  Pelvis pain, stress fracture suspected, neg xray;    TECHNIQUE:  Axial CT images were obtained. Iterative reconstruction technique was used.  CT/cardiac nuclear exam/s in prior 12 months: 0.    FINDINGS:  Nondisplaced fracture of the S4 segment of the sacrum.    No other fractures.  Impression: Nondisplaced fracture lower sacrum.    Electronically signed by: Mario Worthington MD  Date:    03/01/2022  Time:    14:58  X-Ray Sacrum And Coccyx  Narrative: EXAMINATION:  XR SACRUM AND COCCYX    CLINICAL HISTORY:  Trauma, right buttock pain    COMPARISON:  None.    FINDINGS:  Three views of sacrum and coccyx demonstrate no fracture or dislocation.  No focal soft tissue abnormality.  Impression: No fracture.    Electronically signed by: Mario Worthington  MD  Date:    03/01/2022  Time:    12:05  X-Ray Chest 1 View  Narrative: EXAMINATION:  XR CHEST 1 VIEW    CLINICAL HISTORY:  Pain, unspecified    COMPARISON:  Portable chest 01/17/2020.    FINDINGS:  Frontal chest radiograph demonstrates clear lungs.  Mild chronic elevation right hemidiaphragm.  No pleural fluid.  Cardiomediastinal silhouette is unremarkable.  No osseous abnormality.  Impression: No evidence of cardiopulmonary disease.    Electronically signed by: Mario Worthingtno MD  Date:    03/01/2022  Time:    12:04      CRANIAL NERVES     CN III, IV, VI   Pupils are equal, round, and reactive to light.

## 2022-03-05 NOTE — ASSESSMENT & PLAN NOTE
Patient has had a significant change in her functional ability since her fall and fracture.  She is in a significant amount of pain requiring opiate treatment.  The patient is making some progress with therapy but pain is a barrier.  Her age is also a barrier and her other medical complications need close monitoring.  Patient should have routine monitoring of her renal function encouragement of good hydration as well as continued treatment for her pulmonary embolism and infection.  The patient is eager to return home and I think she is an excellent candidate for a unit and her prognosis is good.    ESTIMATED LENGTH OF STAY:  The patient's estimated length of stay is 10 days and she is expected to be able to be discharged to home with family .    REHABILITATION PLAN/GOALS  The patient is being admitted to a comprehensive acute inpatient rehabilitation program consisting of at least 3 hours of combined physical ( 1.5hrs./day, 5 days a week), and occupational therapy (1.5 hrs. A day, 5 days a week),speech therapy services if necessary, 24-hour skilled rehabilitation nursing care, and close supervision of a physician with special training and experience in rehabilitation medicine. Patient's prognosis for significant practical improvement within a reasonable period of time appears good . Given the patient's complex medical condition and risk of further medical complications, rehabilitation services could not be safely provided at a lower level of care such as a skilled nursing facility.                    1. Physical Therapy to Evaluate and Treat, Work on bed mobility, Assist with transfers, Strength, Gait, Fall Prevention, Balance and Modalities as Needed   2. Occupational Therapy to ADL Retraining, Functional Transfer Training, Therapeutic Activity and Exercises, Neuromuscular Re-education, Manual Therapy, Use of Adaptive Equipment and Retraining, Safety Awareness and Fall Prevention and Home Modification   3. Speech  Pathology to Make recommendations for a safe diet   4. Specialized 24-hour Rehabilitation Nursing Care to Protection of Skin and Soft Tissue, Assisting with continence, Bowel and bladder training, Neurological checks, Medicine administration and medicine and management, Wound care and Fall protection and general encouragement   5. Dietary to optimize diet and educate.   6. Social Work/Case management to assist with discharge planning activities, acquisition of durable medical equipment, and ordering of follow up services as necessary.    The services provided in the acute medical rehab setting are under my supervision 24 hours a day. These services are necessary for this patient to achieve the most appropriate functional outcome and to determine the most appropriate discharge disposition.  I have reviewed the treatment plan with the patient and answered all of her questions, discussed goals and expectation.    REVIEW OF PRE-SCREEN ASSESSMENT  I have reviewed the patient's preadmission screen including her medical and functional status and compared it with her status upon arrival to the rehab unit and see no change . Acute rehab services are still necessary and appropriate for this patient to achieve the best functional outcome while determining the most appropriate discharge disposition and services. The patient will require close medical management of multiple medical co-morbidities including as noted above .

## 2022-03-05 NOTE — PLAN OF CARE
"Pt unable to clarify home medications other than "I take two little pills at night". Contact [facility]. Home medications confirmed to include Hydrochlorothiazide 50mg PO once daily, Zetia 10mg PO once daily, and Xanax 0.75mg PO HS. Pt does not have any blood thinners for aspirin prescribed.  "

## 2022-03-05 NOTE — ASSESSMENT & PLAN NOTE
Patient with acute kidney injury likely d/t Pre-renal azotemia Which is currently stable. Labs reviewed- Renal function/electrolytes with CrCl cannot be calculated (Unknown ideal weight.). according to latest data. Monitor urine output and serial BMP and adjust therapy as needed. Avoid nephrotoxins and renally dose meds for GFR listed above.

## 2022-03-06 PROCEDURE — 94761 N-INVAS EAR/PLS OXIMETRY MLT: CPT

## 2022-03-06 PROCEDURE — 99900035 HC TECH TIME PER 15 MIN (STAT)

## 2022-03-06 PROCEDURE — 99900031 HC PATIENT EDUCATION (STAT)

## 2022-03-06 PROCEDURE — 11000001 HC ACUTE MED/SURG PRIVATE ROOM

## 2022-03-06 PROCEDURE — 25000003 PHARM REV CODE 250: Performed by: NURSE PRACTITIONER

## 2022-03-06 PROCEDURE — 25000003 PHARM REV CODE 250: Performed by: INTERNAL MEDICINE

## 2022-03-06 RX ADMIN — Medication 6 MG: at 09:03

## 2022-03-06 RX ADMIN — CARVEDILOL 12.5 MG: 12.5 TABLET, FILM COATED ORAL at 08:03

## 2022-03-06 RX ADMIN — ALPRAZOLAM 0.75 MG: 0.25 TABLET ORAL at 09:03

## 2022-03-06 RX ADMIN — APIXABAN 2.5 MG: 2.5 TABLET, FILM COATED ORAL at 09:03

## 2022-03-06 RX ADMIN — CARVEDILOL 12.5 MG: 12.5 TABLET, FILM COATED ORAL at 09:03

## 2022-03-06 RX ADMIN — DOCUSATE SODIUM 100 MG: 100 CAPSULE, LIQUID FILLED ORAL at 09:03

## 2022-03-06 RX ADMIN — APIXABAN 2.5 MG: 2.5 TABLET, FILM COATED ORAL at 08:03

## 2022-03-06 RX ADMIN — TRAMADOL HYDROCHLORIDE 50 MG: 50 TABLET ORAL at 04:03

## 2022-03-06 RX ADMIN — EZETIMIBE 10 MG: 10 TABLET ORAL at 08:03

## 2022-03-06 RX ADMIN — DOCUSATE SODIUM 100 MG: 100 CAPSULE, LIQUID FILLED ORAL at 08:03

## 2022-03-06 NOTE — PLAN OF CARE
Problem: Skin Injury Risk Increased  Goal: Skin Health and Integrity  Outcome: Ongoing, Progressing     Problem: Fall Injury Risk  Goal: Absence of Fall and Fall-Related Injury  Outcome: Ongoing, Progressing     Problem: Sleep Impairment (Anxiety Signs/Symptoms)  Goal: Improved Sleep (Anxiety Signs/Symptoms)  Outcome: Ongoing, Progressing     Problem: Somatic Disturbance (Anxiety Signs/Symptoms)  Goal: Improved Somatic Symptoms (Anxiety Signs/Symptoms)  Outcome: Ongoing, Progressing     Problem: Constipation  Goal: Effective Bowel Elimination  Outcome: Ongoing, Progressing

## 2022-03-06 NOTE — PT/OT/SLP EVAL
Speech Language Pathology   Evaluation Combo     Patient Name:  Codi Black  MRN: [id number]   Admitting Diagnosis: Pelvic Fractire Sacri, Fractire    Diet recommendations:   Solid Diet Level: Regular    Liquid Diet Level: Thin      SLP Treatment Date:3/4/2022  Speech Start Time: 1800    Speech Stop Time: 1845    Speech Total (min): 45 min        TREATMENT BILLABLE MINUTES:  Eval 45 minutes      Past Medical History:   Diagnosis Date    Arthritis      Encounter for blood transfusion      High cholesterol      Hypertension              Past Surgical History:   Procedure Laterality Date    HYSTERECTOMY          Social Hx: Patient lives with her  who is 89 yrs old w/ [place]. Pt has quit cooking, s/p her 5 year use of a RW, and dgtr provides meals.      Prior diet: Regular/ thin liquids      Subjective:  Patient was awake, alert, and conversant.      Patient goals:   to return to her PLOF.     Pain/Comfort    Pain Rating 1: 0/10     Objective:   Oral Musculature Evaluation  Oral Musculature: WFL for speech & swallow.   Dentition: WFL      Cognitive Status:  Behavioral Observations: alert ; cooperative.  Memory and Orientation:   Orientation  Orientation Yes No  Inconsistent Comments   Person x         Place x         Situation x         City x         State x         Time x         Month x         Date x         Day of Week x         Year x          x         Age x         Family Members x         Biographical Information x            She was able to immediately recall 3/3 words and 3/3 words after a delay. She was able to recall her last meal and able to recall other recent information and events from her hospitalization.     Attention:  WFL   Problem Solving/basic abstract reasoning: [facility]       Executive Function:   WFL     Auditory Comprehension: able to identify objects, answers yes/no questions and able to follow 3-step commands. commands     Verbal Expression:     naming and automatic speech - WFL    Divergent naming, she was able to name 17 foods in 60 seconds     Motor Speech:  WFL    Voice:      WFL     Bedside Swallow Eval:  Consistencies Assessed:    Thin liquids via straw sips, solids per current diet w/ no overt s/s.      Oral Phase: WFL   Pharyngeal Phase: no overt clinical signs/symptoms of pharyngeal dysphagia     Additional Treatment:       Assessment:   Patient is a 89-year-old female with a history of DVT;  The patient had a fall about a week prior to admit;An x-ray initially was unremarkable and a CT then showed a fracture of the S4 segment of the sacrum.   She presents w/ intact cognition/ communication/ swallow skills;     Patient to be seen Therapy Frequency:   No ST services warranted at this time    Plan of Care reviewed with: patient/ spouse  SLP Follow-up?: No    Miranda Low CCC-SLP   3/4/2022

## 2022-03-07 PROCEDURE — 99900035 HC TECH TIME PER 15 MIN (STAT)

## 2022-03-07 PROCEDURE — 97110 THERAPEUTIC EXERCISES: CPT

## 2022-03-07 PROCEDURE — 94761 N-INVAS EAR/PLS OXIMETRY MLT: CPT

## 2022-03-07 PROCEDURE — 11000001 HC ACUTE MED/SURG PRIVATE ROOM

## 2022-03-07 PROCEDURE — 25000003 PHARM REV CODE 250: Performed by: NURSE PRACTITIONER

## 2022-03-07 PROCEDURE — 97530 THERAPEUTIC ACTIVITIES: CPT

## 2022-03-07 PROCEDURE — 97116 GAIT TRAINING THERAPY: CPT

## 2022-03-07 PROCEDURE — 99900031 HC PATIENT EDUCATION (STAT)

## 2022-03-07 PROCEDURE — 25000003 PHARM REV CODE 250: Performed by: INTERNAL MEDICINE

## 2022-03-07 RX ADMIN — CARVEDILOL 12.5 MG: 12.5 TABLET, FILM COATED ORAL at 08:03

## 2022-03-07 RX ADMIN — TRAMADOL HYDROCHLORIDE 50 MG: 50 TABLET ORAL at 02:03

## 2022-03-07 RX ADMIN — ACETAMINOPHEN 650 MG: 325 TABLET ORAL at 08:03

## 2022-03-07 RX ADMIN — EZETIMIBE 10 MG: 10 TABLET ORAL at 08:03

## 2022-03-07 RX ADMIN — APIXABAN 2.5 MG: 2.5 TABLET, FILM COATED ORAL at 08:03

## 2022-03-07 RX ADMIN — TRAMADOL HYDROCHLORIDE 50 MG: 50 TABLET ORAL at 04:03

## 2022-03-07 RX ADMIN — DOCUSATE SODIUM 100 MG: 100 CAPSULE, LIQUID FILLED ORAL at 08:03

## 2022-03-07 RX ADMIN — ALPRAZOLAM 0.75 MG: 0.25 TABLET ORAL at 08:03

## 2022-03-07 RX ADMIN — Medication 6 MG: at 08:03

## 2022-03-07 NOTE — PLAN OF CARE
[facility] ([facility]ospital)  Initial Discharge Assessment       Primary Care Provider: Delfino Rowe MD    Admission Diagnosis: Pelvic fracture [S32.9XXA]    Admission Date: 3/4/2022  Expected Discharge Date:     Discharge Barriers Identified: None    Payor: [employer] MANAGED MEDICARE / Plan: [employer] MEDICARE PPO / Product Type: Medicare Advantage /     Extended Emergency Contact Information  Primary Emergency Contact: Major Terrazas  Mobile Phone: [phone]  Relation: Spouse  Secondary Emergency Contact: Ofelia Sethi  Mobile Phone: [phone]  Relation: Relative    Discharge Plan A: Home Health  Discharge Plan B: Home Health      [facility] - [place], [place] - [address]  [address]  [facility] [address]  Phone: [phone] Fax: [phone]      Initial Assessment (most recent)     Adult Discharge Assessment - 03/07/22 0828        Discharge Assessment    Assessment Type Discharge Planning Assessment     Confirmed/corrected address, phone number and insurance Yes     Confirmed Demographics Correct on Facesheet     Source of Information patient     When was your last doctors appointment? --   No value- sometime 1 week prior to hospital admission for COVID 19.    Communicated KIMANI with patient/caregiver Date not available/Unable to determine     Lives With spouse   Major Terrazas    Facility Arrived From: [facility]     Do you expect to return to your current living situation? Yes     Do you have help at home or someone to help you manage your care at home? Yes   Pt does not have a caregiver but has assistance from spouse if needed    Who are your caregiver(s) and their phone number(s)? Major Terrazas [phone]     Prior to hospitilization cognitive status: Alert/Oriented     Current cognitive status: Alert/Oriented     Walking or Climbing Stairs Difficulty ambulation difficulty, requires equipment     Mobility Management Rollator     Dressing/Bathing Difficulty  "bathing difficulty, requires equipment;bathing difficulty, assistance 1 person     Dressing/Bathing Management Shower Chair     Do you have any problems with: Errands/Grocery   Pt's spouse runs all errands    Home Accessibility not wheelchair accessible     Home Layout Able to live on 1st floor     Equipment Currently Used at Home rollator;shower chair;grab bar;bedside commode;wheelchair     Readmission within 30 days? No     Patient currently being followed by outpatient case management? Yes     If yes, name of outpatient case management following: other (comments)   [facility]    Do you currently have service(s) that help you manage your care at home? Yes     Name and Contact number of agency [facility] [phone]     Do you take prescription medications? Yes     Is the patient taking medications as prescribed? yes     Who is going to help you get home at discharge? Major Terrazas     How do you get to doctors appointments? family or friend will provide     Are you on dialysis? No     Do you take coumadin? No     Discharge Plan A Home Health     Discharge Plan B Home Health     DME Needed Upon Discharge  none     Discharge Plan discussed with: Patient     Discharge Barriers Identified None        Relationship/Environment    Name(s) of Who Lives With Patient Major Terrazas                discharge assessment complete. Pt awake, alert and oriented during the assessment. Prior to hospital admission pt utilized a rollator and she "washed up" nightly. Spouse assisted her with shower's on Fridays and she utilized a shower chair and grab bars. Pt's spouse ran all the errands and brought her to doctors appointments. Pt noted that her  is in the process of getting her neighbor to sit with her for a few hours a day and assist as needed. Pt currently has services with [facility] and  [place] [facility]. Pt would like to resume services with [facility] at discharge and patient choice form was " signed. CM will continue to follow pt and assist as needed.

## 2022-03-07 NOTE — PROGRESS NOTES
[facility] Medicine  Progress Note    Patient Name: Codi Black  MRN: [id number]  Patient Class: IP- Rehab   Admission Date: 3/4/2022  Length of Stay: 3 days  Attending Physician: Rafael Quan III, MD  Primary Care Provider: Delfino Rowe MD        Subjective:     Principal Problem:<principal problem not specified>        HPI:  POST ADMISSION PHYSICIAN ASSESSMENT AND   REHAB HISTORICAL/PHYSICAL        CHIEF COMPLAINT: Pain in Groin    PRIMARY REHAB IMPAIRMENT GROUP: Orthopedic / Other    ETIOLOGIC DIAGNOSIS: Pelvic Fracture, Sacrum Fracture    SECONDARY AND RELATED DIAGNOSES:  Decrease in mobility, decrease in physical functioning, difficulty walking, falls, pain, tachycardia, urinary tract infection, safety    CO-MORBIDITIES PRESENT ON ADMISSION:  Pelvic fracture hypertensive urgency acute renal failure    Risk:  Patient is at high risk of progressive decline in function progressive weakness with muscle atrophy joint stiffness and contractures.  Patient is at risk for recurrent falls that could lead to more serious injury.  Patient is at risk for imbalance worsening pain fractures skin soft tissue injury head injury worsening organ dysfunction renal failure infection PE DVT pneumonia and death.    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  The patient had a fall about a week prior.  She has been having intermittent pain in that area and having more difficulty ambulating and getting to the toilet.  Patient denied any chest pain angina fever chills diarrhea or shortness of breath.  Upon admission to the emergency department she was found to be significantly hypertensive and severe pain in the right sacral area.  She had no definitive bruising or area of hematoma.  An x-ray initially was unremarkable and a CT then showed a fracture of the S4  segment of the sacrum.  Patient was having intermittent hypoxemia a mildly elevated troponin and was admitted.  Patient was started on pain control several medications were adjusted for her blood pressure as well as her other comorbidities.  Patient was seen by therapy her therapeutic notes are in the prescreen.  Ultimately the patient was unable to return home at a safe functional level and we recommended inpatient rehab.  After initial denial from her insurance company we overturned this with a verbal conversation.  Patient is eager to improve and to have better pain control.        Overview/Hospital Course:  3/7 SD: Pt actively participating in physical therapy - tolerating well.      Interval History: Pt seen and evaluated    Review of Systems   Constitutional:  Positive for activity change and appetite change. Negative for chills and fever.   Respiratory:  Negative for cough, shortness of breath and wheezing.    Cardiovascular:  Negative for chest pain, palpitations and leg swelling.   Gastrointestinal:  Negative for abdominal pain.   Genitourinary:  Negative for difficulty urinating.   Musculoskeletal:  Positive for arthralgias and back pain. Negative for myalgias.        Right hip pain   Neurological:  Positive for weakness. Negative for dizziness, tremors, seizures, syncope, facial asymmetry, speech difficulty and headaches.   Hematological:  Negative for adenopathy.   Psychiatric/Behavioral:  Negative for agitation, confusion and hallucinations. The patient is nervous/anxious.    Objective:     Vital Signs (Most Recent):  Temp: 97.7 °F (36.5 °C) (03/07/22 0559)  Pulse: 78 (03/07/22 0701)  Resp: 20 (03/07/22 0701)  BP: (!) 183/92 (03/07/22 0559)  SpO2: (!) 93 % (03/07/22 0701)   Vital Signs (24h Range):  Temp:  [97.7 °F (36.5 °C)-98.1 °F (36.7 °C)] 97.7 °F (36.5 °C)  Pulse:  [72-81] 78  Resp:  [18-20] 20  SpO2:  [91 %-96 %] 93 %  BP: (150-183)/(60-92) 183/92     Weight: (!) 138 kg (304 lb 3.8 oz)  Body mass  index is 52.22 kg/m².    Intake/Output Summary (Last 24 hours) at 3/7/2022 1411  Last data filed at 3/7/2022 1357  Gross per 24 hour   Intake 2340 ml   Output 1200 ml   Net 1140 ml      Physical Exam  Vitals and nursing note reviewed.   Constitutional:       General: She is awake. She is not in acute distress.     Appearance: Normal appearance. She is not ill-appearing, toxic-appearing or diaphoretic.   HENT:      Head: Normocephalic and atraumatic.      Nose: Nose normal. No congestion or rhinorrhea.      Mouth/Throat:      Mouth: Mucous membranes are moist.      Pharynx: Oropharynx is clear. No oropharyngeal exudate or posterior oropharyngeal erythema.   Eyes:      General: No scleral icterus.        Right eye: No discharge.         Left eye: No discharge.      Extraocular Movements: Extraocular movements intact.      Pupils: Pupils are equal, round, and reactive to light.   Neck:      Thyroid: No thyroid mass or thyromegaly.      Vascular: No carotid bruit.      Meningeal: Brudzinski's sign and Kernig's sign absent.   Cardiovascular:      Rate and Rhythm: Normal rate and regular rhythm.      Chest Wall: PMI is not displaced. No thrill.      Pulses: Normal pulses.      Heart sounds: Normal heart sounds. No murmur heard.    No friction rub. No gallop.   Pulmonary:      Effort: Pulmonary effort is normal. No tachypnea, accessory muscle usage, prolonged expiration or respiratory distress.      Breath sounds: Normal breath sounds. No stridor or decreased air movement. No wheezing, rhonchi or rales.   Chest:      Chest wall: No tenderness.   Abdominal:      General: Bowel sounds are normal. There is no distension.      Palpations: Abdomen is soft. There is no hepatomegaly, splenomegaly or mass.      Tenderness: There is no abdominal tenderness. There is no right CVA tenderness, left CVA tenderness, guarding or rebound.      Hernia: No hernia is present.   Musculoskeletal:         General: Tenderness present. No  swelling, deformity or signs of injury. Normal range of motion.      Cervical back: Normal range of motion and neck supple. No rigidity. No muscular tenderness.      Right lower leg: No edema.      Left lower leg: No edema.   Lymphadenopathy:      Cervical: No cervical adenopathy.   Skin:     General: Skin is warm.      Capillary Refill: Capillary refill takes less than 2 seconds.      Coloration: Skin is not cyanotic, jaundiced or pale.      Findings: No bruising, erythema, lesion, petechiae or rash.   Neurological:      Mental Status: She is oriented to person, place, and time.      Cranial Nerves: No cranial nerve deficit, dysarthria or facial asymmetry.      Sensory: No sensory deficit.      Motor: Weakness present. No tremor.      Coordination: Coordination normal.   Psychiatric:         Mood and Affect: Mood normal. Mood is not anxious or depressed. Affect is not flat.         Speech: Speech is not rapid and pressured or slurred.         Behavior: Behavior normal. Behavior is not agitated, aggressive or combative.         Thought Content: Thought content normal. Thought content is not paranoid or delusional.         Cognition and Memory: Cognition is not impaired. Memory is not impaired.         Judgment: Judgment normal.       Significant Labs: All pertinent labs within the past 24 hours have been reviewed.  Recent Lab Results       None            Significant Imaging: I have reviewed all pertinent imaging results/findings within the past 24 hours.      Assessment/Plan:      Acute renal injury  Patient with acute kidney injury likely d/t Pre-renal azotemia Which is currently stable. Labs reviewed- Renal function/electrolytes with Estimated Creatinine Clearance: 40 mL/min (based on SCr of 1.3 mg/dL). according to latest data. Monitor urine output and serial BMP and adjust therapy as needed. Avoid nephrotoxins and renally dose meds for GFR listed above.       UTI (urinary tract infection)  IV abx therapy  complete while on med/surg floor.      Fall  Continue inpatient rehab efforts      Hypertensive urgency  Blood pressure stabilized with medication. Monitor closely.      Pelvic fracture  Continue inpatient rehab efforts      VTE Risk Mitigation (From admission, onward)         Ordered     apixaban tablet 2.5 mg  2 times daily         03/05/22 0549     IP VTE HIGH RISK PATIENT  Once         03/04/22 1449     Place sequential compression device  Until discontinued         03/04/22 1449                Discharge Planning   KIMANI:      Code Status: Full Code   Is the patient medically ready for discharge?:     Reason for patient still in hospital (select all that apply): Patient trending condition, Laboratory test, Treatment and PT / OT recommendations  Discharge Plan A: Home Health                  Stephy Young NP  Department of Hospital Medicine   [facility])

## 2022-03-07 NOTE — PT/OT/SLP PROGRESS
Occupational Therapy  Treatment    Codi Black   MRN: [id number]   Admitting Diagnosis: Nondisplaced fracture of the S4 segment of the sacrum     OT Date of Treatment: 03/07/22   AM Start Time: 0900  AM End Time: 1030  PM Start Time: na  PM End Time: na  Treatment Type: Individual 60 and Concurrent 30  Total Time (min): 90 min      Billable Minutes:  Therapeutic Activity 45 and Therapeutic Exercise 45  Total Minutes: 90    General Precautions: Standard, fall  Orthopedic Precautions: Full weight bearing  Braces:      Spiritual, Cultural Beliefs, Nondenominational Practices, Values that Affect Care: no    Subjective:  Communicated with nurse prior to session.    Pain/Comfort  Pain Rating 1: 2/10  Location - Side 1: Right  Location 1: leg  Pain Addressed 1: Pre-medicate for activity, Reposition, Cessation of Activity, Nurse notified  Pain Rating Post-Intervention 1: 2/10    Objective:  Pt was cooperative and motivated with minimal verbal encouragement while exhibiting positive affect. She participated in functional transfer retraining throughout session to / from bed, wheelchair, chair, and toilet emphasizing fall prevention utilizing RW providing extra time with repetition requiring min assist secondary to steadying assist with mod verbal and tactile cueing for safety awareness and technique. Pt then participated in therapeutic activity addressing trunk mobility, trunk control, dynamic sitting balance, and trunk strength utilizing large stability ball challenging her to perform trunk flexion, extension, and lateral flexion with bilateral hands stabilized on ball requiring verbal and tactile cueing to facilitate optimal movement patterns and increased range per trial in order to improve active ROM impacting performance with functional tasks below waist. Next, she participated in therapeutic exercise performing 5x8 seated pushups requiring intermittent lifting assist with verbal and tactile cueing for technique challenging  her to lift buttocks off seated surface to end range elbow extension in order to strengthen triceps brachii to improve performance with sit <> stand transitions particularly from lower surfaces. Pt then participated in therapeutic exercise performing 3x10 bilateral UE proximal strengthening exercises utilizing moderate resistance theraband with scapular retraction, shoulder extension, shoulder adduction, horizontal abduction, shoulder flexion in plane of scaption, internal rotation, and external rotation while seated in chair unsupported requiring continuous verbal and tactile cueing for technique.    Functional Mobility:  Bed Mobility:   Supine to sit: Minimal Assistance   Sit to supine: Minimal Assistance   Rolling: Standby Assistance   Scooting: Standby Assistance    Transfer Training:   Sit to stand:Minimal Assistance with Rolling Walker .  Bed <> Chair:  Step Transfer with Minimal Assistance with Rolling Walker .  Toilet Transfer:  Pt Step Transfer with Minimal Assistance with Rolling Walker and Grab bars .      Balance:   Static Sit: GOOD-: Takes MODERATE challenges from all directions but inconsistently  Dynamic Sit:  FAIR+: Maintains balance through MINIMAL excursions of active trunk motion  Static Stand: FAIR: Maintains without assist but unable to take challenges  Dynamic stand: FAIR: Needs CONTACT GUARD during gait      Patient left up in chair with nurse notified    ASSESSMENT:  Pt demonstrated improved functional performance with transfers as noted by min assist in which she did not require lifting or lowering assist, but mostly steadying assist; however, she needs to exhibit more consistently in which she fatigues quickly with moderate activity.      Rehab potential is good    Activity tolerance: Fair    Discharge recommendations: home with home health     Equipment recommendations: none     GOALS:   Multidisciplinary Problems     Occupational Therapy Goals        Problem: Occupational Therapy Goal     Goal Priority Disciplines Outcome Interventions   Occupational Therapy Goal     OT, PT/OT     Description: Long Term Goals to be met by: 03/18/22     Patient will increase functional independence with ADLs by performing:    Feeding with [place].  UE Dressing with Modified [place].  LE Dressing with Minimal Assistance.  Grooming while seated at sink with Modified [place].  Toileting from toilet with Supervision for hygiene and clothing management.   Bathing from  shower chair/bench with Supervision.  Step transfer with Modified [place]  Toilet transfer to toilet with Modified [place].  Increased functional strength to 4 to 4+/5 for bilateral UE's.                     Plan:  Patient to be seen 5 x/week (90 min per day, for 14 days) to address the above listed problems via self-care/home management, community/work re-entry, therapeutic activities, therapeutic exercises  Plan of Care expires: 03/18/22  Plan of Care reviewed with: patient         03/07/2022

## 2022-03-07 NOTE — HOSPITAL COURSE
3/7 SD: Pt actively participating in physical therapy - tolerating well.    3/9 SD: Pt participating in physical therapy. C/O pain to right leg other than at location of fracture site. X-rays indicate:  1. Mild osteoarthrosis right hip.  2. Moderate osteoarthrosis right knee.    3/10 SD: Pt is sitting in chair in room with legs elevated. She reports having a bowel movement this morning. She reports pain is improved today.    3/11 SD: Pt participating in physical therapy, tolerating well.    3/12 WC:  Participating well with therapy thus far.     3/13 SD: Sitting in chair near window in room. Continue IPR efforts.    Discharge Note:  Patient tolerated therapy and was able to meet her time requirements.  Patient's labs remained stable she had no other injuries with falls and her pain was much improved.  Patient's renal function was back to baseline.  Patient had a excellent functional improvement and is being discharged home at a modified independent to independent functional state.

## 2022-03-07 NOTE — ASSESSMENT & PLAN NOTE
Patient with acute kidney injury likely d/t Pre-renal azotemia Which is currently stable. Labs reviewed- Renal function/electrolytes with Estimated Creatinine Clearance: 40 mL/min (based on SCr of 1.3 mg/dL). according to latest data. Monitor urine output and serial BMP and adjust therapy as needed. Avoid nephrotoxins and renally dose meds for GFR listed above.

## 2022-03-07 NOTE — PT/OT/SLP PROGRESS
Physical Therapy         Treatment        Codi Black   MRN: [id number]     PT Received On: 03/07/22  Total Time (min): 90        Billable Minutes:  Gait [id number], Therapeutic Activity 30 and Therapeutic Exercise 30  Total Minutes: 90  AM Start Time:   AM End Time:   PM Start Time: 1345  PM End Time: 1515  Treatment Type: Individual 45 and Concurrent 45    Treatment Type: Treatment  PT/PTA: PT             General Precautions: Standard,    Orthopedic Precautions:     Braces:           Subjective:  Communicated with nurse prior to session.    Pain/Comfort  Pain Rating 1: 0/10    Objective:  Patient found  In wc, with      Functional Mobility:  Bed Mobility:   Supine to sit: Minimal Assistance   Sit to supine: Minimal Assistance   Rolling: Minimal Assistance   Scooting: Minimal Assistance    Balance:   Static Sit: GOOD: Takes MODERATE challenges from all directions  Dynamic Sit:  FAIR+: Maintains balance through MINIMAL excursions of active trunk motion  Static Stand: FAIR: Maintains without assist but unable to take challenges  Dynamic stand: FAIR: Needs CONTACT GUARD during gait    Transfer Training:  Sit to stand:Contact Guard Assistance with Rolling Walker    Bed <> Chair:  Step Transfer with Contact Guard Assistance with Rolling Walker      Wheelchair Training:  Pt propelled wc 150ft sba    Gait Training:  Ambulated 4 times with  arw 100ft with sba and less of antalgic gait pattern    Stair Training:  Pt went up and down 48 steps with cga    Additional Treatment:  She performed 3 sets 10 reps sit to stand cga and nustep x 15 min with no resistance    Activity Tolerance:  Patient tolerated treatment well    Patient left supine with call button in reach.    Assessment:  Codi Black is a 89 y.o. female with a medical diagnosis of <principal problem not specified>. She presents with being able to need less assistance with some of her fxn'l mobility and she was in less pain today which probably contributed to her  improvement.    Rehab potential is good.    Activity tolerance: Good    Discharge recommendations:       Equipment recommendations:       GOALS:   Multidisciplinary Problems     Physical Therapy Goals     Not on file                PLAN:    Patient to be seen 5 x/week  to address the above listed problems via therapeutic exercises, therapeutic activities, gait training, wheelchair management/training, therapeutic groups, neuromuscular re-education  Plan of Care expires: 03/18/22  Plan of Care reviewed with: patient         3/7/2022

## 2022-03-07 NOTE — PLAN OF CARE
Problem: Somatic Disturbance (Anxiety Signs/Symptoms)  Goal: Improved Somatic Symptoms (Anxiety Signs/Symptoms)  Outcome: Ongoing, Not Progressing     Problem: Constipation  Goal: Effective Bowel Elimination  Outcome: Ongoing, Progressing     Problem: Self-Care Deficit  Goal: Improved Ability to Complete Activities of Daily Living  Outcome: Ongoing, Not Progressing

## 2022-03-07 NOTE — SUBJECTIVE & OBJECTIVE
Interval History: Pt seen and evaluated    Review of Systems   Constitutional:  Positive for activity change and appetite change. Negative for chills and fever.   Respiratory:  Negative for cough, shortness of breath and wheezing.    Cardiovascular:  Negative for chest pain, palpitations and leg swelling.   Gastrointestinal:  Negative for abdominal pain.   Genitourinary:  Negative for difficulty urinating.   Musculoskeletal:  Positive for arthralgias and back pain. Negative for myalgias.        Right hip pain   Neurological:  Positive for weakness. Negative for dizziness, tremors, seizures, syncope, facial asymmetry, speech difficulty and headaches.   Hematological:  Negative for adenopathy.   Psychiatric/Behavioral:  Negative for agitation, confusion and hallucinations. The patient is nervous/anxious.    Objective:     Vital Signs (Most Recent):  Temp: 97.7 °F (36.5 °C) (03/07/22 0559)  Pulse: 78 (03/07/22 0701)  Resp: 20 (03/07/22 0701)  BP: (!) 183/92 (03/07/22 0559)  SpO2: (!) 93 % (03/07/22 0701)   Vital Signs (24h Range):  Temp:  [97.7 °F (36.5 °C)-98.1 °F (36.7 °C)] 97.7 °F (36.5 °C)  Pulse:  [72-81] 78  Resp:  [18-20] 20  SpO2:  [91 %-96 %] 93 %  BP: (150-183)/(60-92) 183/92     Weight: (!) 138 kg (304 lb 3.8 oz)  Body mass index is 52.22 kg/m².    Intake/Output Summary (Last 24 hours) at 3/7/2022 1411  Last data filed at 3/7/2022 1357  Gross per 24 hour   Intake 2340 ml   Output 1200 ml   Net 1140 ml      Physical Exam  Vitals and nursing note reviewed.   Constitutional:       General: She is awake. She is not in acute distress.     Appearance: Normal appearance. She is not ill-appearing, toxic-appearing or diaphoretic.   HENT:      Head: Normocephalic and atraumatic.      Nose: Nose normal. No congestion or rhinorrhea.      Mouth/Throat:      Mouth: Mucous membranes are moist.      Pharynx: Oropharynx is clear. No oropharyngeal exudate or posterior oropharyngeal erythema.   Eyes:      General: No scleral  icterus.        Right eye: No discharge.         Left eye: No discharge.      Extraocular Movements: Extraocular movements intact.      Pupils: Pupils are equal, round, and reactive to light.   Neck:      Thyroid: No thyroid mass or thyromegaly.      Vascular: No carotid bruit.      Meningeal: Brudzinski's sign and Kernig's sign absent.   Cardiovascular:      Rate and Rhythm: Normal rate and regular rhythm.      Chest Wall: PMI is not displaced. No thrill.      Pulses: Normal pulses.      Heart sounds: Normal heart sounds. No murmur heard.    No friction rub. No gallop.   Pulmonary:      Effort: Pulmonary effort is normal. No tachypnea, accessory muscle usage, prolonged expiration or respiratory distress.      Breath sounds: Normal breath sounds. No stridor or decreased air movement. No wheezing, rhonchi or rales.   Chest:      Chest wall: No tenderness.   Abdominal:      General: Bowel sounds are normal. There is no distension.      Palpations: Abdomen is soft. There is no hepatomegaly, splenomegaly or mass.      Tenderness: There is no abdominal tenderness. There is no right CVA tenderness, left CVA tenderness, guarding or rebound.      Hernia: No hernia is present.   Musculoskeletal:         General: Tenderness present. No swelling, deformity or signs of injury. Normal range of motion.      Cervical back: Normal range of motion and neck supple. No rigidity. No muscular tenderness.      Right lower leg: No edema.      Left lower leg: No edema.   Lymphadenopathy:      Cervical: No cervical adenopathy.   Skin:     General: Skin is warm.      Capillary Refill: Capillary refill takes less than 2 seconds.      Coloration: Skin is not cyanotic, jaundiced or pale.      Findings: No bruising, erythema, lesion, petechiae or rash.   Neurological:      Mental Status: She is oriented to person, place, and time.      Cranial Nerves: No cranial nerve deficit, dysarthria or facial asymmetry.      Sensory: No sensory deficit.       Motor: Weakness present. No tremor.      Coordination: Coordination normal.   Psychiatric:         Mood and Affect: Mood normal. Mood is not anxious or depressed. Affect is not flat.         Speech: Speech is not rapid and pressured or slurred.         Behavior: Behavior normal. Behavior is not agitated, aggressive or combative.         Thought Content: Thought content normal. Thought content is not paranoid or delusional.         Cognition and Memory: Cognition is not impaired. Memory is not impaired.         Judgment: Judgment normal.       Significant Labs: All pertinent labs within the past 24 hours have been reviewed.  Recent Lab Results       None            Significant Imaging: I have reviewed all pertinent imaging results/findings within the past 24 hours.

## 2022-03-08 LAB
ALBUMIN SERPL BCP-MCNC: 2.6 G/DL (ref 3.5–5.2)
ALP SERPL-CCNC: 100 U/L (ref 55–135)
ALT SERPL W/O P-5'-P-CCNC: 18 U/L (ref 10–44)
ANION GAP SERPL CALC-SCNC: 5 MMOL/L (ref 8–16)
AST SERPL-CCNC: 14 U/L (ref 10–40)
BASOPHILS # BLD AUTO: 0.05 K/UL (ref 0–0.2)
BASOPHILS NFR BLD: 0.5 % (ref 0–1.9)
BILIRUB SERPL-MCNC: 1 MG/DL (ref 0.1–1)
BUN SERPL-MCNC: 41 MG/DL (ref 8–23)
CALCIUM SERPL-MCNC: 9.7 MG/DL (ref 8.7–10.5)
CHLORIDE SERPL-SCNC: 99 MMOL/L (ref 95–110)
CO2 SERPL-SCNC: 32 MMOL/L (ref 23–29)
CREAT SERPL-MCNC: 1.3 MG/DL (ref 0.5–1.4)
DIFFERENTIAL METHOD: ABNORMAL
EOSINOPHIL # BLD AUTO: 0.2 K/UL (ref 0–0.5)
EOSINOPHIL NFR BLD: 2.1 % (ref 0–8)
ERYTHROCYTE [DISTWIDTH] IN BLOOD BY AUTOMATED COUNT: 13.2 % (ref 11.5–14.5)
EST. GFR  (AFRICAN AMERICAN): 41.7 ML/MIN/1.73 M^2
EST. GFR  (NON AFRICAN AMERICAN): 36.2 ML/MIN/1.73 M^2
GLUCOSE SERPL-MCNC: 102 MG/DL (ref 70–110)
HCT VFR BLD AUTO: 42.1 % (ref 37–48.5)
HGB BLD-MCNC: 13.4 G/DL (ref 12–16)
IMM GRANULOCYTES # BLD AUTO: 0.06 K/UL (ref 0–0.04)
IMM GRANULOCYTES NFR BLD AUTO: 0.6 % (ref 0–0.5)
LYMPHOCYTES # BLD AUTO: 2.2 K/UL (ref 1–4.8)
LYMPHOCYTES NFR BLD: 20.9 % (ref 18–48)
MAGNESIUM SERPL-MCNC: 2.1 MG/DL (ref 1.6–2.6)
MCH RBC QN AUTO: 29.7 PG (ref 27–31)
MCHC RBC AUTO-ENTMCNC: 31.8 G/DL (ref 32–36)
MCV RBC AUTO: 93 FL (ref 82–98)
MONOCYTES # BLD AUTO: 1.6 K/UL (ref 0.3–1)
MONOCYTES NFR BLD: 15.1 % (ref 4–15)
NEUTROPHILS # BLD AUTO: 6.3 K/UL (ref 1.8–7.7)
NEUTROPHILS NFR BLD: 60.8 % (ref 38–73)
NRBC BLD-RTO: 0 /100 WBC
PLATELET # BLD AUTO: 360 K/UL (ref 150–450)
PMV BLD AUTO: 9.8 FL (ref 9.2–12.9)
POTASSIUM SERPL-SCNC: 4 MMOL/L (ref 3.5–5.1)
PROT SERPL-MCNC: 7.1 G/DL (ref 6–8.4)
RBC # BLD AUTO: 4.51 M/UL (ref 4–5.4)
SODIUM SERPL-SCNC: 136 MMOL/L (ref 136–145)
WBC # BLD AUTO: 10.3 K/UL (ref 3.9–12.7)

## 2022-03-08 PROCEDURE — 97110 THERAPEUTIC EXERCISES: CPT

## 2022-03-08 PROCEDURE — 99900035 HC TECH TIME PER 15 MIN (STAT)

## 2022-03-08 PROCEDURE — 25000003 PHARM REV CODE 250: Performed by: NURSE PRACTITIONER

## 2022-03-08 PROCEDURE — 97530 THERAPEUTIC ACTIVITIES: CPT

## 2022-03-08 PROCEDURE — 99900031 HC PATIENT EDUCATION (STAT)

## 2022-03-08 PROCEDURE — 97116 GAIT TRAINING THERAPY: CPT

## 2022-03-08 PROCEDURE — 11000001 HC ACUTE MED/SURG PRIVATE ROOM

## 2022-03-08 PROCEDURE — 80053 COMPREHEN METABOLIC PANEL: CPT | Performed by: NURSE PRACTITIONER

## 2022-03-08 PROCEDURE — 94761 N-INVAS EAR/PLS OXIMETRY MLT: CPT

## 2022-03-08 PROCEDURE — 36415 COLL VENOUS BLD VENIPUNCTURE: CPT | Performed by: NURSE PRACTITIONER

## 2022-03-08 PROCEDURE — 85025 COMPLETE CBC W/AUTO DIFF WBC: CPT | Performed by: NURSE PRACTITIONER

## 2022-03-08 PROCEDURE — 97535 SELF CARE MNGMENT TRAINING: CPT

## 2022-03-08 PROCEDURE — 25000003 PHARM REV CODE 250: Performed by: INTERNAL MEDICINE

## 2022-03-08 PROCEDURE — 83735 ASSAY OF MAGNESIUM: CPT | Performed by: NURSE PRACTITIONER

## 2022-03-08 RX ADMIN — TRAMADOL HYDROCHLORIDE 50 MG: 50 TABLET ORAL at 08:03

## 2022-03-08 RX ADMIN — ALPRAZOLAM 0.75 MG: 0.25 TABLET ORAL at 08:03

## 2022-03-08 RX ADMIN — Medication 6 MG: at 08:03

## 2022-03-08 RX ADMIN — APIXABAN 2.5 MG: 2.5 TABLET, FILM COATED ORAL at 08:03

## 2022-03-08 RX ADMIN — ACETAMINOPHEN 650 MG: 325 TABLET ORAL at 08:03

## 2022-03-08 RX ADMIN — DOCUSATE SODIUM 100 MG: 100 CAPSULE, LIQUID FILLED ORAL at 08:03

## 2022-03-08 RX ADMIN — TRAMADOL HYDROCHLORIDE 50 MG: 50 TABLET ORAL at 09:03

## 2022-03-08 RX ADMIN — EZETIMIBE 10 MG: 10 TABLET ORAL at 08:03

## 2022-03-08 RX ADMIN — CARVEDILOL 12.5 MG: 12.5 TABLET, FILM COATED ORAL at 08:03

## 2022-03-08 NOTE — PT/OT/SLP PROGRESS
Occupational Therapy  Treatment    Codi Black   MRN: [id number]   Admitting Diagnosis: Nondisplaced fracture of the S4 segment of the sacrum    OT Date of Treatment: 03/08/22   AM Start Time: 0930  AM End Time: 1130  PM Start Time: na  PM End Time: na  Treatment Type: Individual 120  Total Time (min): 120 min      Billable Minutes:  Self Care/Home Management 30, Therapeutic Activity 45 and Therapeutic Exercise 45  Total Minutes: 120    General Precautions: Standard, fall  Orthopedic Precautions: Full weight bearing  Braces:      Spiritual, Cultural Beliefs, Anglican Practices, Values that Affect Care: no    Subjective:  Communicated with nurse prior to session.    Pain/Comfort  Pain Rating 1: 5/10  Location - Side 1: Right  Location 1: leg  Pain Addressed 1: Reposition, Cessation of Activity, Other (see comments), Nurse notified (Pt provided pain medication during tx session.)  Pain Rating Post-Intervention 1: 0/10    Objective:  Pt was cooperative and motivated with minimal verbal encouragement while exhibiting positive affect. She participated in functional transfer retraining throughout session to / from bed, wheelchair, chair, and toilet emphasizing fall prevention utilizing RW providing extra time with repetition requiring min assist secondary to steadying assist with mod verbal and tactile cueing for safety awareness and technique particularly quality of movement with (R) LE. Pt then participated in ADL retraining regarding toileting emphasizing fall prevention providing extra time requiring mod assist secondary to assist to pull / adjust clothing back to waist with additional steadying assist during clothing management with mod verbal and tactile cueing. Next, she participated in therapeutic activity addressing trunk mobility, trunk control, dynamic sitting balance, and trunk strength utilizing large stability ball challenging her to perform trunk flexion, extension, and lateral flexion with bilateral  hands stabilized on ball requiring verbal and tactile cueing to facilitate optimal movement patterns and increased range per trial in order to improve active ROM impacting performance with functional tasks below waist. Pt then participated in therapeutic exercise performing 5x10 seated pushups requiring intermittent lifting assist with verbal and tactile cueing for technique challenging her to lift buttocks off seated surface to end range elbow extension in order to strengthen triceps brachii to improve performance with sit <> stand transitions particularly from lower surfaces. Also, she participated in therapeutic exercise performing 3x12 bilateral UE proximal strengthening exercises utilizing moderate resistance theraband with scapular retraction, shoulder extension, shoulder adduction, horizontal abduction, shoulder flexion in plane of scaption, internal rotation, and external rotation while seated in chair unsupported requiring continuous verbal and tactile cueing for technique.    Functional Mobility:  Bed Mobility:   Supine to sit: Standby Assistance   Sit to supine: Standby Assistance   Rolling: Standby Assistance   Scooting: Standby Assistance    Transfer Training:   Sit to stand:Minimal Assistance with Rolling Walker .  Bed <> Chair:  Step Transfer with Minimal Assistance with Rolling Walker .  Toilet Transfer:  Pt Step Transfer with Minimal Assistance with Rolling Walker and Grab bars .    Toilet Training:  Pt performed toileting with Moderate Assistance with Grab  bar at [employer].    Balance:   Static Sit: GOOD-: Takes MODERATE challenges from all directions but inconsistently  Dynamic Sit:  FAIR+: Maintains balance through MINIMAL excursions of active trunk motion  Static Stand: FAIR: Maintains without assist but unable to take challenges  Dynamic stand: FAIR: Needs CONTACT GUARD during gait      Patient left up in chair with nurse notified    ASSESSMENT:  Pt demonstrated improved functional performance  with toileting as noted by mod assist in which patient able to pull / adjust clothing from waist and perform perineal hygiene with steadying assist, and mod verbal and tactile cueing for safety awareness and technique with emphasis on fall prevention.       Rehab potential is good    Activity tolerance: Fair    Discharge recommendations: home with home health     Equipment recommendations: none     GOALS:   Multidisciplinary Problems     Occupational Therapy Goals        Problem: Occupational Therapy Goal    Goal Priority Disciplines Outcome Interventions   Occupational Therapy Goal     OT, PT/OT     Description: Long Term Goals to be met by: 03/18/22     Patient will increase functional independence with ADLs by performing:    Feeding with [place].  UE Dressing with Modified [place].  LE Dressing with Minimal Assistance.  Grooming while seated at sink with Modified [place].  Toileting from toilet with Supervision for hygiene and clothing management.   Bathing from  shower chair/bench with Supervision.  Step transfer with Modified [place]  Toilet transfer to toilet with Modified [place].  Increased functional strength to 4 to 4+/5 for bilateral UE's.                     Plan:  Patient to be seen 5 x/week (90 min per day, for 14 days) to address the above listed problems via self-care/home management, community/work re-entry, therapeutic activities, therapeutic exercises  Plan of Care expires: 03/18/22  Plan of Care reviewed with: patient         03/08/2022

## 2022-03-08 NOTE — PT/OT/SLP PROGRESS
Physical Therapy         Treatment        Codi Black   MRN: [id number]     PT Received On: 03/08/22  Total Time (min): 90        Billable Minutes:  Gait [id number], Therapeutic Activity 30 and Therapeutic Exercise 30  Total Minutes: 90  AM Start Time:   AM End Time:   PM Start Time: 1215  PM End Time: 1345  Treatment Type: Individual 90  Treatment Type: Treatment  PT/PTA: PT             General Precautions: Standard,    Orthopedic Precautions:     Braces:           Subjective:  Communicated with nurse prior to session.         Objective:  Patient found in bed, with      Functional Mobility:  Bed Mobility:   Supine to sit: Standby Assistance   Sit to supine: Standby Assistance   Rolling: Standby Assistance   Scooting: Standby Assistance    Balance:   Static Sit: FAIR+: Able to take MINIMAL challenges from all directions  Dynamic Sit:  FAIR+: Maintains balance through MINIMAL excursions of active trunk motion  Static Stand: FAIR+: Takes MINIMAL challenges from all directions  Dynamic stand: FAIR+: Needs CLOSE SUPERVISION during gait and is able to right self with minor LOB    Transfer Training:  Sit to stand:Stand-by Assistance with No Assistive Device and Rolling Walker    Bed <> Chair:  Step Transfer with Supervision or Set-up Assistance with Rolling Walker      Wheelchair Training:  wc mobility 150ft sba    Gait Training:  Ambulated 4 times 120ft with sba    Stair Training:  Up and down 4 steps with rail with cga      Additional Treatment: nustep x 15 min at level 2 followed by 3 sets 10 reps sit to stand with sba    Activity Tolerance:  Patient tolerated treatment well    Patient left supine with call button in reach.    Assessment:  Codi Black is a 89 y.o. female with a medical diagnosis of <principal problem not specified>. She presents with sba with most activities. Close to progressing to even less assistance.    Rehab potential is good.    Activity tolerance: Good    Discharge recommendations:        Equipment recommendations:       GOALS:   Multidisciplinary Problems     Physical Therapy Goals     Not on file                PLAN:    Patient to be seen 5 x/week  to address the above listed problems via therapeutic exercises, therapeutic activities, gait training, wheelchair management/training, therapeutic groups, neuromuscular re-education  Plan of Care expires: 03/18/22  Plan of Care reviewed with: patient         3/8/2022

## 2022-03-09 PROCEDURE — 99900031 HC PATIENT EDUCATION (STAT)

## 2022-03-09 PROCEDURE — 25000003 PHARM REV CODE 250: Performed by: INTERNAL MEDICINE

## 2022-03-09 PROCEDURE — 97110 THERAPEUTIC EXERCISES: CPT

## 2022-03-09 PROCEDURE — 25000003 PHARM REV CODE 250: Performed by: NURSE PRACTITIONER

## 2022-03-09 PROCEDURE — 99900035 HC TECH TIME PER 15 MIN (STAT)

## 2022-03-09 PROCEDURE — 97535 SELF CARE MNGMENT TRAINING: CPT

## 2022-03-09 PROCEDURE — 94761 N-INVAS EAR/PLS OXIMETRY MLT: CPT

## 2022-03-09 PROCEDURE — 97530 THERAPEUTIC ACTIVITIES: CPT

## 2022-03-09 PROCEDURE — 11000001 HC ACUTE MED/SURG PRIVATE ROOM

## 2022-03-09 PROCEDURE — 97116 GAIT TRAINING THERAPY: CPT

## 2022-03-09 RX ADMIN — APIXABAN 2.5 MG: 2.5 TABLET, FILM COATED ORAL at 08:03

## 2022-03-09 RX ADMIN — CARVEDILOL 12.5 MG: 12.5 TABLET, FILM COATED ORAL at 08:03

## 2022-03-09 RX ADMIN — TRAMADOL HYDROCHLORIDE 50 MG: 50 TABLET ORAL at 09:03

## 2022-03-09 RX ADMIN — Medication 6 MG: at 08:03

## 2022-03-09 RX ADMIN — EZETIMIBE 10 MG: 10 TABLET ORAL at 08:03

## 2022-03-09 RX ADMIN — DOCUSATE SODIUM 100 MG: 100 CAPSULE, LIQUID FILLED ORAL at 08:03

## 2022-03-09 RX ADMIN — ALPRAZOLAM 0.75 MG: 0.25 TABLET ORAL at 08:03

## 2022-03-09 NOTE — ASSESSMENT & PLAN NOTE
Patient with acute kidney injury likely d/t Pre-renal azotemia Which is currently stable. Labs reviewed- Renal function/electrolytes with Estimated Creatinine Clearance: 24.8 mL/min (based on SCr of 1.3 mg/dL). according to latest data. Monitor urine output and serial BMP and adjust therapy as needed. Avoid nephrotoxins and renally dose meds for GFR listed above.

## 2022-03-09 NOTE — SUBJECTIVE & OBJECTIVE
Interval History: Pt seen and evaluated    Review of Systems   Constitutional:  Positive for activity change and appetite change. Negative for chills and fever.   Respiratory:  Negative for cough, shortness of breath and wheezing.    Cardiovascular:  Negative for chest pain, palpitations and leg swelling.   Gastrointestinal:  Negative for abdominal pain.   Genitourinary:  Negative for difficulty urinating.   Musculoskeletal:  Positive for arthralgias and back pain. Negative for myalgias.        Right hip pain   Neurological:  Positive for weakness. Negative for dizziness, tremors, seizures, syncope, facial asymmetry, speech difficulty and headaches.   Hematological:  Negative for adenopathy.   Psychiatric/Behavioral:  Negative for agitation, confusion and hallucinations. The patient is nervous/anxious.    Objective:     Vital Signs (Most Recent):  Temp: 97.2 °F (36.2 °C) (03/09/22 0400)  Pulse: 79 (03/09/22 0805)  Resp: 18 (03/09/22 0740)  BP: 138/64 (03/09/22 0805)  SpO2: 99 % (03/09/22 0740)   Vital Signs (24h Range):  Temp:  [97.2 °F (36.2 °C)-97.9 °F (36.6 °C)] 97.2 °F (36.2 °C)  Pulse:  [72-88] 79  Resp:  [18] 18  SpO2:  [93 %-99 %] 99 %  BP: (138-143)/(64-84) 138/64     Weight: 62.8 kg (138 lb 6.4 oz)  Body mass index is 23.76 kg/m².    Intake/Output Summary (Last 24 hours) at 3/9/2022 0922  Last data filed at 3/9/2022 0859  Gross per 24 hour   Intake 1840 ml   Output --   Net 1840 ml        Physical Exam  Vitals and nursing note reviewed.   Constitutional:       General: She is awake. She is not in acute distress.     Appearance: Normal appearance. She is not ill-appearing, toxic-appearing or diaphoretic.   HENT:      Head: Normocephalic and atraumatic.      Nose: Nose normal. No congestion or rhinorrhea.      Mouth/Throat:      Mouth: Mucous membranes are moist.      Pharynx: Oropharynx is clear. No oropharyngeal exudate or posterior oropharyngeal erythema.   Eyes:      General: No scleral icterus.         Right eye: No discharge.         Left eye: No discharge.      Extraocular Movements: Extraocular movements intact.      Pupils: Pupils are equal, round, and reactive to light.   Neck:      Thyroid: No thyroid mass or thyromegaly.      Vascular: No carotid bruit.      Meningeal: Brudzinski's sign and Kernig's sign absent.   Cardiovascular:      Rate and Rhythm: Normal rate and regular rhythm.      Chest Wall: PMI is not displaced. No thrill.      Pulses: Normal pulses.      Heart sounds: Normal heart sounds. No murmur heard.    No friction rub. No gallop.   Pulmonary:      Effort: Pulmonary effort is normal. No tachypnea, accessory muscle usage, prolonged expiration or respiratory distress.      Breath sounds: Normal breath sounds. No stridor or decreased air movement. No wheezing, rhonchi or rales.   Chest:      Chest wall: No tenderness.   Abdominal:      General: Bowel sounds are normal. There is no distension.      Palpations: Abdomen is soft. There is no hepatomegaly, splenomegaly or mass.      Tenderness: There is no abdominal tenderness. There is no right CVA tenderness, left CVA tenderness, guarding or rebound.      Hernia: No hernia is present.   Musculoskeletal:         General: Tenderness present. No swelling, deformity or signs of injury. Normal range of motion.      Cervical back: Normal range of motion and neck supple. No rigidity. No muscular tenderness.      Right lower leg: No edema.      Left lower leg: No edema.   Lymphadenopathy:      Cervical: No cervical adenopathy.   Skin:     General: Skin is warm.      Capillary Refill: Capillary refill takes less than 2 seconds.      Coloration: Skin is not cyanotic, jaundiced or pale.      Findings: No bruising, erythema, lesion, petechiae or rash.   Neurological:      Mental Status: She is oriented to person, place, and time.      Cranial Nerves: No cranial nerve deficit, dysarthria or facial asymmetry.      Sensory: No sensory deficit.      Motor:  Weakness present. No tremor.      Coordination: Coordination normal.   Psychiatric:         Mood and Affect: Mood normal. Mood is not anxious or depressed. Affect is not flat.         Speech: Speech is not rapid and pressured or slurred.         Behavior: Behavior normal. Behavior is not agitated, aggressive or combative.         Thought Content: Thought content normal. Thought content is not paranoid or delusional.         Cognition and Memory: Cognition is not impaired. Memory is not impaired.         Judgment: Judgment normal.       Significant Labs: All pertinent labs within the past 24 hours have been reviewed.  Recent Lab Results       None            Significant Imaging: I have reviewed all pertinent imaging results/findings within the past 24 hours.

## 2022-03-09 NOTE — PT/OT/SLP PROGRESS
Physical Therapy         Treatment        Codi Black   MRN: [id number]     PT Received On: 03/09/22  Total Time (min): 90        Billable Minutes:  Gait [id number], Therapeutic Activity 30 and Therapeutic Exercise 30  Total Minutes: 90  AM Start Time:   AM End Time:   PM Start Time: 1515  PM End Time: 1645  Treatment Type: Individual 90  Treatment Type: Treatment  PT/PTA: PT             General Precautions: Standard,    Orthopedic Precautions:     Braces:           Subjective:  Communicated with nurse prior to session.         Objective:  Patient found in chair, with      Functional Mobility:  Bed Mobility:   Supine to sit: Standby Assistance   Sit to supine: Standby Assistance   Rolling: Standby Assistance   Scooting: Minimal Assistance    Balance:   Static Sit: GOOD: Takes MODERATE challenges from all directions  Dynamic Sit:  FAIR+: Maintains balance through MINIMAL excursions of active trunk motion  Static Stand: FAIR: Maintains without assist but unable to take challenges  Dynamic stand: FAIR: Needs CONTACT GUARD during gait    Transfer Training:  Sit to stand:Contact Guard Assistance with Rolling Walker    Bed <> Chair:  Step Transfer with Contact Guard Assistance with Rolling Walker      Wheelchair Training:  wc mobility sba 150ft    Gait Training:  Ambulated 100ft with rw 4 times sba    Stair Training:  No steps rtoday      Additional Treatment: nu step at level 2 15 min and pt performed sit and standing BLE exs with 2lb ankle wts 3 sets 20 reps    Activity Tolerance:  Patient tolerated treatment well    Patient left right sidelying with call button in reach.    Assessment:  Codi Black is a 89 y.o. female with a medical diagnosis of <principal problem not specified>. She presents with better walking quaility in that she is limping on R side less, and doing better with standing.    Rehab potential is good.    Activity tolerance: Good    Discharge recommendations:       Equipment recommendations:        GOALS:   Multidisciplinary Problems     Physical Therapy Goals     Not on file                PLAN:    Patient to be seen 5 x/week  to address the above listed problems via therapeutic exercises, therapeutic activities, gait training, wheelchair management/training, therapeutic groups, neuromuscular re-education  Plan of Care expires: 03/18/22  Plan of Care reviewed with: patient         3/9/2022

## 2022-03-09 NOTE — NURSING
Patient complaining of constipation stating she has not had a BM since earlier Sunday morning. I offered lactulose this morning, but patient refused because of therapy. I offered medication after therapy this afternoon, but patient refused again. I will get the night nurse to offer tonight. Will continue to monitor.

## 2022-03-09 NOTE — NURSING
Patient having issues urinating at times. I tried to do a bladder scan on patient, but patient refused. I will continue to monitor. I educated patient on increasing fluid intake. Patient voiced understanding.

## 2022-03-09 NOTE — PT/OT/SLP PROGRESS
Occupational Therapy  Treatment    Codi Black   MRN: [id number]   Admitting Diagnosis: Nondisplaced fracture of the S4 segment of the sacrum    OT Date of Treatment: 03/09/22   AM Start Time: 0930  AM End Time: 1130  PM Start Time: na  PM End Time: na  Treatment Type: Individual 120  Total Time (min): 120 min      Billable Minutes:  Self Care/Home Management 30, Therapeutic Activity 45 and Therapeutic Exercise 45  Total Minutes: 120    General Precautions: Standard, fall  Orthopedic Precautions: Full weight bearing  Braces:      Spiritual, Cultural Beliefs, [employer]al Practices, Values that Affect Care: no    Subjective:  Communicated with nurse prior to session.    Pain/Comfort  Pain Rating 1: 0/10  Pain Addressed 1: Pre-medicate for activity  Pain Rating Post-Intervention 1: 0/10    Objective:  Pt was cooperative and motivated without verbal encouragement while exhibiting positive affect. She participated in continued functional transfer retraining throughout session to / from bed, wheelchair, chair, and toilet emphasizing fall prevention utilizing RW providing extra time with repetition requiring min assist secondary to steadying assist with mod verbal and tactile cueing for safety awareness and technique particularly during stand to sit transition on this date. Pt then participated in therapeutic activity addressing trunk mobility, trunk control, dynamic sitting balance, and trunk strength utilizing large stability ball challenging her to perform trunk flexion, extension, and lateral flexion with bilateral hands stabilized on ball requiring verbal and tactile cueing to facilitate optimal movement patterns and increased range per trial in order to improve active ROM impacting performance with functional tasks below waist. Next, she participated in therapeutic exercise performing 5x12 seated pushups WITHOUT lifting assist with verbal and tactile cueing for technique challenging her to lift buttocks off seated  surface to end range elbow extension in order to strengthen triceps brachii to improve performance with sit <> stand transitions particularly from lower surfaces. Pt then participated in ADL retraining regarding toileting emphasizing fall prevention providing extra time requiring min assist secondary to steadying assist during clothing management and perineal hygiene with verbal and tactile cueing. Lastly, she participated in therapeutic exercise performing 3x12-15 bilateral UE proximal strengthening exercises utilizing moderate resistance theraband with scapular retraction, shoulder extension, shoulder adduction, horizontal abduction, shoulder flexion in plane of scaption, internal rotation, and external rotation while seated in chair unsupported requiring continuous verbal and tactile cueing for technique.    Functional Mobility:  Bed Mobility:   Supine to sit: Supervision or Set-up Assistance   Sit to supine: Supervision or Set-up Assistance   Rolling: Supervision or Set-up Assistance   Scooting: Supervision or Set-up Assistance    Transfer Training:   Sit to stand:Minimal Assistance with Rolling Walker .  Bed <> Chair:  Step Transfer with Minimal Assistance with Rolling Walker .  Toilet Transfer:  Pt Step Transfer with Minimal Assistance with Rolling Walker and Grab bars .    Toilet Training:  Pt performed toileting with Minimal Assistance with Grab  bar at [employer].    Balance:   Static Sit: GOOD: Takes MODERATE challenges from all directions  Dynamic Sit:  FAIR+: Maintains balance through MINIMAL excursions of active trunk motion  Static Stand: FAIR+: Takes MINIMAL challenges from all directions  Dynamic stand: FAIR+: Needs CLOSE SUPERVISION during gait and is able to right self with minor LOB      Patient left up in chair with nurse notified    ASSESSMENT:  Pt continues to demonstrate improved functional performance with toileting as noted by min assist in which patient able to perform clothing management and  perineal hygiene with steadying assist and cueing for safety and optimal technique utilizing AD for stability.    Rehab potential is good    Activity tolerance: Fair    Discharge recommendations: home with home health     Equipment recommendations: none     GOALS:   Multidisciplinary Problems     Occupational Therapy Goals        Problem: Occupational Therapy Goal    Goal Priority Disciplines Outcome Interventions   Occupational Therapy Goal     OT, PT/OT     Description: Long Term Goals to be met by: 03/18/22     Patient will increase functional independence with ADLs by performing:    Feeding with [place].  UE Dressing with Modified [place].  LE Dressing with Minimal Assistance.  Grooming while seated at sink with Modified [place].  Toileting from toilet with Supervision for hygiene and clothing management.   Bathing from  shower chair/bench with Supervision.  Step transfer with Modified [place]  Toilet transfer to toilet with Modified [place].  Increased functional strength to 4 to 4+/5 for bilateral UE's.                     Plan:  Patient to be seen 5 x/week (90 min per day, for 14 days) to address the above listed problems via self-care/home management, community/work re-entry, therapeutic activities, therapeutic exercises  Plan of Care expires: 03/18/22  Plan of Care reviewed with: patient         03/09/2022

## 2022-03-09 NOTE — PROGRESS NOTES
[facility] Medicine  Progress Note    Patient Name: Codi Black  MRN: [id number]  Patient Class: IP- Rehab   Admission Date: 3/4/2022  Length of Stay: 5 days  Attending Physician: Rafael Quan III, MD  Primary Care Provider: Delfino Rowe MD        Subjective:     Principal Problem:<principal problem not specified>        HPI:  POST ADMISSION PHYSICIAN ASSESSMENT AND   REHAB HISTORICAL/PHYSICAL        CHIEF COMPLAINT: Pain in Groin    PRIMARY REHAB IMPAIRMENT GROUP: Orthopedic / Other    ETIOLOGIC DIAGNOSIS: Pelvic Fracture, Sacrum Fracture    SECONDARY AND RELATED DIAGNOSES:  Decrease in mobility, decrease in physical functioning, difficulty walking, falls, pain, tachycardia, urinary tract infection, safety    CO-MORBIDITIES PRESENT ON ADMISSION:  Pelvic fracture hypertensive urgency acute renal failure    Risk:  Patient is at high risk of progressive decline in function progressive weakness with muscle atrophy joint stiffness and contractures.  Patient is at risk for recurrent falls that could lead to more serious injury.  Patient is at risk for imbalance worsening pain fractures skin soft tissue injury head injury worsening organ dysfunction renal failure infection PE DVT pneumonia and death.    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  The patient had a fall about a week prior.  She has been having intermittent pain in that area and having more difficulty ambulating and getting to the toilet.  Patient denied any chest pain angina fever chills diarrhea or shortness of breath.  Upon admission to the emergency department she was found to be significantly hypertensive and severe pain in the right sacral area.  She had no definitive bruising or area of hematoma.  An x-ray initially was unremarkable and a CT then showed a fracture of the S4  segment of the sacrum.  Patient was having intermittent hypoxemia a mildly elevated troponin and was admitted.  Patient was started on pain control several medications were adjusted for her blood pressure as well as her other comorbidities.  Patient was seen by therapy her therapeutic notes are in the prescreen.  Ultimately the patient was unable to return home at a safe functional level and we recommended inpatient rehab.  After initial denial from her insurance company we overturned this with a verbal conversation.  Patient is eager to improve and to have better pain control.        Overview/Hospital Course:  3/7 SD: Pt actively participating in physical therapy - tolerating well.    3/9 SD: Pt participating in physical therapy. C/O pain to right leg other than at location of fracture site. X-rays indicate:  1. Mild osteoarthrosis right hip.  2. Moderate osteoarthrosis right knee.      Interval History: Pt seen and evaluated    Review of Systems   Constitutional:  Positive for activity change and appetite change. Negative for chills and fever.   Respiratory:  Negative for cough, shortness of breath and wheezing.    Cardiovascular:  Negative for chest pain, palpitations and leg swelling.   Gastrointestinal:  Negative for abdominal pain.   Genitourinary:  Negative for difficulty urinating.   Musculoskeletal:  Positive for arthralgias and back pain. Negative for myalgias.        Right hip pain   Neurological:  Positive for weakness. Negative for dizziness, tremors, seizures, syncope, facial asymmetry, speech difficulty and headaches.   Hematological:  Negative for adenopathy.   Psychiatric/Behavioral:  Negative for agitation, confusion and hallucinations. The patient is nervous/anxious.    Objective:     Vital Signs (Most Recent):  Temp: 97.2 °F (36.2 °C) (03/09/22 0400)  Pulse: 79 (03/09/22 0805)  Resp: 18 (03/09/22 0740)  BP: 138/64 (03/09/22 0805)  SpO2: 99 % (03/09/22 0740)   Vital Signs (24h Range):  Temp:   [97.2 °F (36.2 °C)-97.9 °F (36.6 °C)] 97.2 °F (36.2 °C)  Pulse:  [72-88] 79  Resp:  [18] 18  SpO2:  [93 %-99 %] 99 %  BP: (138-143)/(64-84) 138/64     Weight: 62.8 kg (138 lb 6.4 oz)  Body mass index is 23.76 kg/m².    Intake/Output Summary (Last 24 hours) at 3/9/2022 0922  Last data filed at 3/9/2022 0859  Gross per 24 hour   Intake 1840 ml   Output --   Net 1840 ml        Physical Exam  Vitals and nursing note reviewed.   Constitutional:       General: She is awake. She is not in acute distress.     Appearance: Normal appearance. She is not ill-appearing, toxic-appearing or diaphoretic.   HENT:      Head: Normocephalic and atraumatic.      Nose: Nose normal. No congestion or rhinorrhea.      Mouth/Throat:      Mouth: Mucous membranes are moist.      Pharynx: Oropharynx is clear. No oropharyngeal exudate or posterior oropharyngeal erythema.   Eyes:      General: No scleral icterus.        Right eye: No discharge.         Left eye: No discharge.      Extraocular Movements: Extraocular movements intact.      Pupils: Pupils are equal, round, and reactive to light.   Neck:      Thyroid: No thyroid mass or thyromegaly.      Vascular: No carotid bruit.      Meningeal: Brudzinski's sign and Kernig's sign absent.   Cardiovascular:      Rate and Rhythm: Normal rate and regular rhythm.      Chest Wall: PMI is not displaced. No thrill.      Pulses: Normal pulses.      Heart sounds: Normal heart sounds. No murmur heard.    No friction rub. No gallop.   Pulmonary:      Effort: Pulmonary effort is normal. No tachypnea, accessory muscle usage, prolonged expiration or respiratory distress.      Breath sounds: Normal breath sounds. No stridor or decreased air movement. No wheezing, rhonchi or rales.   Chest:      Chest wall: No tenderness.   Abdominal:      General: Bowel sounds are normal. There is no distension.      Palpations: Abdomen is soft. There is no hepatomegaly, splenomegaly or mass.      Tenderness: There is no  abdominal tenderness. There is no right CVA tenderness, left CVA tenderness, guarding or rebound.      Hernia: No hernia is present.   Musculoskeletal:         General: Tenderness present. No swelling, deformity or signs of injury. Normal range of motion.      Cervical back: Normal range of motion and neck supple. No rigidity. No muscular tenderness.      Right lower leg: No edema.      Left lower leg: No edema.   Lymphadenopathy:      Cervical: No cervical adenopathy.   Skin:     General: Skin is warm.      Capillary Refill: Capillary refill takes less than 2 seconds.      Coloration: Skin is not cyanotic, jaundiced or pale.      Findings: No bruising, erythema, lesion, petechiae or rash.   Neurological:      Mental Status: She is oriented to person, place, and time.      Cranial Nerves: No cranial nerve deficit, dysarthria or facial asymmetry.      Sensory: No sensory deficit.      Motor: Weakness present. No tremor.      Coordination: Coordination normal.   Psychiatric:         Mood and Affect: Mood normal. Mood is not anxious or depressed. Affect is not flat.         Speech: Speech is not rapid and pressured or slurred.         Behavior: Behavior normal. Behavior is not agitated, aggressive or combative.         Thought Content: Thought content normal. Thought content is not paranoid or delusional.         Cognition and Memory: Cognition is not impaired. Memory is not impaired.         Judgment: Judgment normal.       Significant Labs: All pertinent labs within the past 24 hours have been reviewed.  Recent Lab Results       None            Significant Imaging: I have reviewed all pertinent imaging results/findings within the past 24 hours.      Assessment/Plan:      Acute renal injury  Patient with acute kidney injury likely d/t Pre-renal azotemia Which is currently stable. Labs reviewed- Renal function/electrolytes with Estimated Creatinine Clearance: 24.8 mL/min (based on SCr of 1.3 mg/dL). according to latest  data. Monitor urine output and serial BMP and adjust therapy as needed. Avoid nephrotoxins and renally dose meds for GFR listed above.       UTI (urinary tract infection)  IV abx therapy complete while on med/surg floor.      Fall  Continue inpatient rehab efforts      Hypertensive urgency  Blood pressure stabilized with medication. Monitor closely.      Pelvic fracture  Continue inpatient rehab efforts      VTE Risk Mitigation (From admission, onward)         Ordered     apixaban tablet 2.5 mg  2 times daily         03/05/22 0549     IP VTE HIGH RISK PATIENT  Once         03/04/22 1449     Place sequential compression device  Until discontinued         03/04/22 1449                Discharge Planning   KIMANI:      Code Status: Full Code   Is the patient medically ready for discharge?:     Reason for patient still in hospital (select all that apply): Patient new problem, Patient trending condition and Laboratory test  Discharge Plan A: Home Health                  Stephy Young NP  Department of Hospital Medicine   [place] [facility] ([facility])

## 2022-03-10 PROCEDURE — 97530 THERAPEUTIC ACTIVITIES: CPT

## 2022-03-10 PROCEDURE — 25000003 PHARM REV CODE 250: Performed by: INTERNAL MEDICINE

## 2022-03-10 PROCEDURE — 25000003 PHARM REV CODE 250: Performed by: NURSE PRACTITIONER

## 2022-03-10 PROCEDURE — 97110 THERAPEUTIC EXERCISES: CPT

## 2022-03-10 PROCEDURE — 94761 N-INVAS EAR/PLS OXIMETRY MLT: CPT

## 2022-03-10 PROCEDURE — 97535 SELF CARE MNGMENT TRAINING: CPT

## 2022-03-10 PROCEDURE — 99900031 HC PATIENT EDUCATION (STAT)

## 2022-03-10 PROCEDURE — 99900035 HC TECH TIME PER 15 MIN (STAT)

## 2022-03-10 PROCEDURE — 97116 GAIT TRAINING THERAPY: CPT

## 2022-03-10 PROCEDURE — 11000001 HC ACUTE MED/SURG PRIVATE ROOM

## 2022-03-10 RX ORDER — BENZONATATE 100 MG/1
100 CAPSULE ORAL 3 TIMES DAILY PRN
Status: DISCONTINUED | OUTPATIENT
Start: 2022-03-10 | End: 2022-03-15 | Stop reason: HOSPADM

## 2022-03-10 RX ADMIN — DOCUSATE SODIUM 100 MG: 100 CAPSULE, LIQUID FILLED ORAL at 08:03

## 2022-03-10 RX ADMIN — TRAMADOL HYDROCHLORIDE 50 MG: 50 TABLET ORAL at 08:03

## 2022-03-10 RX ADMIN — BENZONATATE 100 MG: 100 CAPSULE ORAL at 10:03

## 2022-03-10 RX ADMIN — APIXABAN 2.5 MG: 2.5 TABLET, FILM COATED ORAL at 08:03

## 2022-03-10 RX ADMIN — Medication 6 MG: at 08:03

## 2022-03-10 RX ADMIN — CARVEDILOL 12.5 MG: 12.5 TABLET, FILM COATED ORAL at 08:03

## 2022-03-10 RX ADMIN — ALPRAZOLAM 0.75 MG: 0.25 TABLET ORAL at 08:03

## 2022-03-10 RX ADMIN — EZETIMIBE 10 MG: 10 TABLET ORAL at 08:03

## 2022-03-10 NOTE — PT/OT/SLP PROGRESS
Occupational Therapy  Weekly Progress Note    Codi Black   MRN: [id number]   Admitting Diagnosis: Nondisplaced fracture of the S4 segment of the sacrum    OT Date of Treatment: 03/10/22   AM Start Time: 1000  AM End Time: 1200  PM Start Time: na  PM End Time: na  Treatment Type: Individual 90 and Concurrent 30  Total Time (min): 120 min      Billable Minutes:  Self Care/Home Management 60, Therapeutic Activity 15 and Therapeutic Exercise 45  Total Minutes: 120    General Precautions: Standard, fall  Orthopedic Precautions: Full weight bearing  Braces:      Spiritual, Cultural Beliefs, Shinto Practices, Values that Affect Care: no    Subjective:  Communicated with nurse prior to session.    Pain/Comfort  Pain Rating 1: 0/10  Pain Rating Post-Intervention 1: 0/10    Objective:  Pt was cooperative and motivated without verbal encouragement while exhibiting positive affect. She participated in extensive ADL retraining as further noted below emphasizing fall prevention and use of compensatory strategies and assistive devices providing extra time with repetition requiring min-mod verbal and tactile cueing for safety awareness and technique. Pt then participated in therapeutic activity addressing trunk mobility, trunk control, dynamic sitting balance, and trunk strength utilizing large stability ball challenging her to perform trunk flexion, extension, and lateral flexion with bilateral hands stabilized on ball requiring verbal and tactile cueing to facilitate optimal movement patterns and increased range per trial in order to improve active ROM impacting performance with functional tasks below waist. Next, she participated in therapeutic exercise performing 5x13 seated pushups requiring verbal and tactile cueing for technique challenging her to lift buttocks off seated surface to end range elbow extension in order to strengthen triceps brachii to improve performance with sit <> stand transitions particularly from  lower surfaces. Pt then participated in therapeutic exercise performing 3x12-15 bilateral UE proximal strengthening exercises utilizing moderate resistance theraband with scapular retraction, shoulder extension, shoulder adduction, horizontal abduction, shoulder flexion in plane of scaption, internal rotation, and external rotation while seated in chair unsupported requiring mod verbal and tactile cueing for technique.    Functional Mobility:  Bed Mobility:   Supine to sit: Supervision or Set-up Assistance   Sit to supine: Supervision or Set-up Assistance   Rolling: Supervision or Set-up Assistance   Scooting: Supervision or Set-up Assistance    Transfer Training:   Sit to stand:Contact Guard Assistance with Rolling Walker .  Bed <> Chair:  Step Transfer with Contact Guard Assistance with Rolling Walker .  Toilet Transfer:  Pt Step Transfer with Contact Guard Assistance with Grab bars .  Patient performed shower transfer Step Transfer with Contact Guard Assistance with Grab bars and shower chair.    Feeding:  Patient performed feeding with Independent.    Grooming:  Patient peformed hand washing with Stand-by Assistance at standing at sink.  Patient performed face washing with Stand-by Assistance at standing at sink.  Patient performed oral hygeine with Stand-by Assistance at standing at sink.  Patient performe hair grooming with Stand-by Assistance at standing at sink.    Bathing:  Patient performed bathing with Minimal Assistance with grab bar, Handheld shower head and shower chair at Shower.    UE Dressing:  Patient performed UE Dressing with Supervision or Set-up Assistance with extra time at Edge of bed.    LE Dressing:  Patient don/doffed socks with Moderate Assistance, Patient performed don/doffed adult brief with Minimal Assistance and Patient performed don/doffed pants with Minimal Assistance    Toilet Training:  Pt performed toileting with Minimal Assistance with Grab  bar at Commode.    Balance:   Static  Sit: GOOD: Takes MODERATE challenges from all directions  Dynamic Sit:  GOOD-: Incosistently Maintains balance through MODERATE excursions of active trunk movement,     Static Stand: FAIR+: Takes MINIMAL challenges from all directions  Dynamic stand: FAIR+: Needs CLOSE SUPERVISION during gait and is able to right self with minor LOB      Patient left up in chair with nurse notified    ASSESSMENT:  Pt demonstrated progress with functional short term goals as noted by changes in functional scores in which patient able to achieve 5/7 STG's and 1/9 LTG's. Pt now min assist to supervision with ADL's including functional transfers with extra time and use of assistive devices. However, she requires mod assist to josh bilateral socks, but without use of sock aide in which patient progressing towards completing task without adaptive equipment.     Rehab potential is good    Activity tolerance: Fair    Discharge recommendations: home with home health     Equipment recommendations: none     GOALS:   Short Term Goals to be met by: 03/11/22      Patient will increase functional independence with ADLs by performing:     UE Dressing with Supervision. MET  LE Dressing with Moderate Assistance. MET  Toileting from toilet with Moderate Assistance for hygiene and clothing management. MET  Bathing from  shower chair/bench with Moderate Assistance. MET  Step transfer with Stand-by Assistance. ONGOING - CGA  Toilet transfer to toilet with Contact Guard Assistance. MET  Increased functional strength to 4-/5 for bilateral UE's. MET     Long Term Goals to be met by: 03/18/22      Patient will increase functional independence with ADLs by performing:     Feeding with Patrick. MET  UE Dressing with Modified Patrick. ONGOING  LE Dressing with Minimal Assistance. ONGOING  Grooming while seated at sink with Modified Patrick. ONGOING  Toileting from toilet with Supervision for hygiene and clothing management. ONGOING  Bathing from   shower chair/bench with Supervision. ONGOING  Step transfer with Modified [place]. ONGOING  Toilet transfer to toilet with Modified [place]. ONGOING  Increased functional strength to 4 to 4+/5 for bilateral UE's. ONGOING    Plan:  Patient to be seen 5 x/week (90 min per day, for 14 days) to address the above listed problems via self-care/home management, community/work re-entry, therapeutic activities, therapeutic exercises  Plan of Care expires: 03/18/22  Plan of Care reviewed with: patient         03/10/2022

## 2022-03-10 NOTE — NURSING
Pt has a dry cough. She has been coughing all morning. Called [facility] to order something for pt. See new orders.

## 2022-03-10 NOTE — PT/OT/SLP PROGRESS
Physical Therapy    Weekly Treatment Note        Codi Black   MRN: [id number]     PT Received On: 03/10/22  Total Time (min): 90        Billable Minutes:  Gait [id number], Therapeutic Activity 30 and Therapeutic Exercise 30  Total Minutes:  AM Start Time:   AM End Time:   PM Start Time: 1215  PM End Time:1345  Treatment Type: Individual 90  Treatment Type: Treatment  PT/PTA: PT             General Precautions: Standard,    Orthopedic Precautions:     Braces:           Subjective:  Communicated with nurse prior to session.         Objective:  Patient found in chair, with      Functional Mobility:  Bed Mobility:   Supine to sit: Standby Assistance   Sit to supine: Standby Assistance   Rolling: Standby Assistance   Scooting: Minimal Assistance    Balance:   Static Sit: GOOD: Takes MODERATE challenges from all directions  Dynamic Sit:  FAIR+: Maintains balance through MINIMAL excursions of active trunk motion  Static Stand: FAIR: Maintains without assist but unable to take challenges  Dynamic stand: FAIR+: Needs CLOSE SUPERVISION during gait and is able to right self with minor LOB    Transfer Training:  Sit to stand:Stand-by Assistance with Rolling Walker    Bed <> Chair:  Step Transfer with Stand-by Assistance with Rolling Walker      Wheelchair Training:    Pt propelled wc 150ft with sba  Gait Training:  Pt ambulated 3 times for 125ft,125ft,and 100ft with a rw sba with cues to stand taller while walking and get herself inside walker more .    Stair Training:  No steps today      Additional Treatment:  Pt worked on sit to stand 3 sets 10 reps with sba. Pt rode nustep 20 min at level 2. She then performed 2 sets 20 reps of sitting BLE exs with 2.5lb wts    Activity Tolerance:  Patient tolerated treatment well    Patient left supine with call button in reach.    Assessment:  Codi Black is a 89 y.o. female with a medical diagnosis of <principal problem not specified>. She presents with being able to transfers  without assistance from therapist, and she ambulated greater distance today as well. She also has progressed with her tolerating increased wts with exercise    Rehab potential is good    Activity tolerance: Good    Discharge recommendations:       Equipment recommendations:       GOALS:   STGS  1. Pt will be sba with all bed mobility- ongoing  2. Pt will be sba with transfers w/RW-met  3. Pt will ambulate 150ft with a rw with sba-ongoing  4. Pt will go up and down 12 steps with rail sba-ongoing  5. Pt will be ind with wc mobility 150ft-ongoing  LTGs  1. Pt will be ind w/bed mobility-ongoing  2. Pt will be ind with transfers with a rw-ongoing  3. Pt will be ind with gait with rw 200ft-ongoing  4. Pt will be ind with going up and down 12 steps with rail .-ongoing       PLAN:    Patient to be seen 5 x/week  to address the above listed problems via gait training, wheelchair management/training, therapeutic activities, therapeutic exercises, therapeutic groups, neuromuscular re-education  Plan of Care expires: 03/18/22  Plan of Care reviewed with: patient         3/10/2022

## 2022-03-10 NOTE — NURSING
Patient has been complaining of being constipated. She said that she had not had a BM since early Sunday morning. I offered patient lactulose but she refused . She said that she did not want it because she was going to therapy. I told her after therapy I would so if she want to take it. Later that afternoon after therapy I offered her lactulose again but once again she refused. She said that she might take it tonight.

## 2022-03-10 NOTE — PROGRESS NOTES
[facility] Medicine  Progress Note    Patient Name: Codi Black  MRN: [id number]  Patient Class: IP- Rehab   Admission Date: 3/4/2022  Length of Stay: 6 days  Attending Physician: Rafael Quan III, MD  Primary Care Provider: Delfino Rowe MD        Subjective:     Principal Problem:<principal problem not specified>        HPI:  POST ADMISSION PHYSICIAN ASSESSMENT AND   REHAB HISTORICAL/PHYSICAL        CHIEF COMPLAINT: Pain in Groin    PRIMARY REHAB IMPAIRMENT GROUP: Orthopedic / Other    ETIOLOGIC DIAGNOSIS: Pelvic Fracture, Sacrum Fracture    SECONDARY AND RELATED DIAGNOSES:  Decrease in mobility, decrease in physical functioning, difficulty walking, falls, pain, tachycardia, urinary tract infection, safety    CO-MORBIDITIES PRESENT ON ADMISSION:  Pelvic fracture hypertensive urgency acute renal failure    Risk:  Patient is at high risk of progressive decline in function progressive weakness with muscle atrophy joint stiffness and contractures.  Patient is at risk for recurrent falls that could lead to more serious injury.  Patient is at risk for imbalance worsening pain fractures skin soft tissue injury head injury worsening organ dysfunction renal failure infection PE DVT pneumonia and death.    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  The patient had a fall about a week prior.  She has been having intermittent pain in that area and having more difficulty ambulating and getting to the toilet.  Patient denied any chest pain angina fever chills diarrhea or shortness of breath.  Upon admission to the emergency department she was found to be significantly hypertensive and severe pain in the right sacral area.  She had no definitive bruising or area of hematoma.  An x-ray initially was unremarkable and a CT then showed a fracture of the S4  segment of the sacrum.  Patient was having intermittent hypoxemia a mildly elevated troponin and was admitted.  Patient was started on pain control several medications were adjusted for her blood pressure as well as her other comorbidities.  Patient was seen by therapy her therapeutic notes are in the prescreen.  Ultimately the patient was unable to return home at a safe functional level and we recommended inpatient rehab.  After initial denial from her insurance company we overturned this with a verbal conversation.  Patient is eager to improve and to have better pain control.        Overview/Hospital Course:  3/7 SD: Pt actively participating in physical therapy - tolerating well.    3/9 SD: Pt participating in physical therapy. C/O pain to right leg other than at location of fracture site. X-rays indicate:  1. Mild osteoarthrosis right hip.  2. Moderate osteoarthrosis right knee.    3/10 SD: Pt is sitting in chair in room with legs elevated. She reports having a bowel movement this morning. She reports pain is improved today.      Interval History: Pt seen and evaluated    Review of Systems   Constitutional:  Positive for activity change and appetite change. Negative for chills and fever.   Respiratory:  Negative for cough, shortness of breath and wheezing.    Cardiovascular:  Negative for chest pain, palpitations and leg swelling.   Gastrointestinal:  Negative for abdominal pain.   Genitourinary:  Negative for difficulty urinating.   Musculoskeletal:  Positive for arthralgias and back pain. Negative for myalgias.        Right hip pain   Neurological:  Positive for weakness. Negative for dizziness, tremors, seizures, syncope, facial asymmetry, speech difficulty and headaches.   Hematological:  Negative for adenopathy.   Psychiatric/Behavioral:  Negative for agitation, confusion and hallucinations. The patient is nervous/anxious.    Objective:     Vital Signs (Most Recent):  Temp: 97.7 °F (36.5 °C) (03/10/22  0551)  Pulse: 81 (03/10/22 0830)  Resp: 18 (03/10/22 0830)  BP: (!) 147/67 (03/10/22 0551)  SpO2: (!) 92 % (03/10/22 0830)   Vital Signs (24h Range):  Temp:  [97.7 °F (36.5 °C)-98.5 °F (36.9 °C)] 97.7 °F (36.5 °C)  Pulse:  [77-84] 81  Resp:  [18-20] 18  SpO2:  [92 %-95 %] 92 %  BP: (147-178)/(67-89) 147/67     Weight: 62.8 kg (138 lb 6.4 oz)  Body mass index is 23.76 kg/m².    Intake/Output Summary (Last 24 hours) at 3/10/2022 0919  Last data filed at 3/10/2022 0800  Gross per 24 hour   Intake 1440 ml   Output --   Net 1440 ml        Physical Exam  Vitals and nursing note reviewed.   Constitutional:       General: She is awake. She is not in acute distress.     Appearance: Normal appearance. She is not ill-appearing, toxic-appearing or diaphoretic.   HENT:      Head: Normocephalic and atraumatic.      Nose: Nose normal. No congestion or rhinorrhea.      Mouth/Throat:      Mouth: Mucous membranes are moist.      Pharynx: Oropharynx is clear. No oropharyngeal exudate or posterior oropharyngeal erythema.   Eyes:      General: No scleral icterus.        Right eye: No discharge.         Left eye: No discharge.      Extraocular Movements: Extraocular movements intact.      Pupils: Pupils are equal, round, and reactive to light.   Neck:      Thyroid: No thyroid mass or thyromegaly.      Vascular: No carotid bruit.      Meningeal: Brudzinski's sign and Kernig's sign absent.   Cardiovascular:      Rate and Rhythm: Normal rate and regular rhythm.      Chest Wall: PMI is not displaced. No thrill.      Pulses: Normal pulses.      Heart sounds: Normal heart sounds. No murmur heard.    No friction rub. No gallop.   Pulmonary:      Effort: Pulmonary effort is normal. No tachypnea, accessory muscle usage, prolonged expiration or respiratory distress.      Breath sounds: Normal breath sounds. No stridor or decreased air movement. No wheezing, rhonchi or rales.   Chest:      Chest wall: No tenderness.   Abdominal:      General: Bowel  sounds are normal. There is no distension.      Palpations: Abdomen is soft. There is no hepatomegaly, splenomegaly or mass.      Tenderness: There is no abdominal tenderness. There is no right CVA tenderness, left CVA tenderness, guarding or rebound.      Hernia: No hernia is present.   Musculoskeletal:         General: Tenderness present. No swelling, deformity or signs of injury. Normal range of motion.      Cervical back: Normal range of motion and neck supple. No rigidity. No muscular tenderness.      Right lower leg: No edema.      Left lower leg: No edema.   Lymphadenopathy:      Cervical: No cervical adenopathy.   Skin:     General: Skin is warm.      Capillary Refill: Capillary refill takes less than 2 seconds.      Coloration: Skin is not cyanotic, jaundiced or pale.      Findings: No bruising, erythema, lesion, petechiae or rash.   Neurological:      Mental Status: She is oriented to person, place, and time.      Cranial Nerves: No cranial nerve deficit, dysarthria or facial asymmetry.      Sensory: No sensory deficit.      Motor: Weakness present. No tremor.      Coordination: Coordination normal.   Psychiatric:         Mood and Affect: Mood normal. Mood is not anxious or depressed. Affect is not flat.         Speech: Speech is not rapid and pressured or slurred.         Behavior: Behavior normal. Behavior is not agitated, aggressive or combative.         Thought Content: Thought content normal. Thought content is not paranoid or delusional.         Cognition and Memory: Cognition is not impaired. Memory is not impaired.         Judgment: Judgment normal.       Significant Labs: All pertinent labs within the past 24 hours have been reviewed.  Recent Lab Results       None            Significant Imaging: I have reviewed all pertinent imaging results/findings within the past 24 hours.      Assessment/Plan:      Acute renal injury  Patient with acute kidney injury likely d/t Pre-renal azotemia Which is  currently stable. Labs reviewed- Renal function/electrolytes with Estimated Creatinine Clearance: 24.8 mL/min (based on SCr of 1.3 mg/dL). according to latest data. Monitor urine output and serial BMP and adjust therapy as needed. Avoid nephrotoxins and renally dose meds for GFR listed above.       UTI (urinary tract infection)  IV abx therapy complete while on med/surg floor.      Fall  Continue inpatient rehab efforts      Hypertensive urgency  Blood pressure stabilized with medication. Monitor closely.      Pelvic fracture  Continue inpatient rehab efforts      VTE Risk Mitigation (From admission, onward)         Ordered     apixaban tablet 2.5 mg  2 times daily         03/05/22 0549     IP VTE HIGH RISK PATIENT  Once         03/04/22 1449     Place sequential compression device  Until discontinued         03/04/22 1449                Discharge Planning   KIMANI:      Code Status: Full Code   Is the patient medically ready for discharge?:     Reason for patient still in hospital (select all that apply): Patient trending condition, Laboratory test and Treatment  Discharge Plan A: Home Health                  Stephy Young NP  Department of Hospital Medicine   [employer] [facility] ([facility])

## 2022-03-10 NOTE — PROGRESS NOTES
"[place] [facility] ([facility])  Adult Nutrition  Progress Note    SUMMARY       Recommendations    Recommendation/Intervention:   1. Continue current diet order: Cardiac, Smaller Portions per pt request     2. RD to monitor and make recs accordingly.    Goals: % po intake by RD f/u.  Nutrition Goal Status: new    Assessment and Plan    Reason for Assessment    Reason For Assessment: identified at risk by screening criteria  Diagnosis: other (see comments) (pelvic Fx)  Relevant Medical History: high chol, HTN, DVT  General Information Comments: RD triggered for new rehab admission. Avg po @ 25%- patient reports this is her baseline intake. She states she is not a big eater and we are serving her way too much food here and meals are too close together. No recent chaning in appetite or weight loss. She said she has been maintaining her current weight for a while now. Requesting smaller portions. At home she eats small portioned meal for breakfast and late lunch, then bowl of cereal for dinner every night.  Nutrition Discharge Planning: Cardiac    Nutrition Risk Screen    Nutrition Risk Screen: no indicators present    Nutrition/Diet History    Patient Reported Diet/Restrictions/Preferences: low salt  Spiritual, Cultural Beliefs, Hinduism Practices, Values that Affect Care: no  Food Allergies: NKFA  Factors Affecting Nutritional Intake: early satiety    Anthropometrics    Temp: 97.7 °F (36.5 °C)  Height: 5' 4" (162.6 cm)  Height (inches): 64 in  Weight: 62.6 kg (138 lb)  Weight (lb): 138 lb  Ideal Body Weight (IBW), Female: 120 lb  % Ideal Body Weight, Female (lb): 115 %  BMI (Calculated): 23.7  BMI Grade: 18.5-24.9 - normal       Lab/Procedures/Meds    Pertinent Labs Reviewed: reviewed  Pertinent Medications Reviewed: reviewed    Physical Findings/Assessment         Estimated/Assessed Needs    Weight Used For Calorie Calculations: 63 kg (138 lb 14.2 oz)  Energy Calorie Requirements (kcal): 8737-2746 (25-30 kcal/kg " CBW)  Energy Need Method: Kcal/kg  Protein Requirements: 63 gm (1 gm/gk CBW)  Weight Used For Protein Calculations: 62.6 kg (138 lb)     Estimated Fluid Requirement Method: RDA Method  RDA Method (mL): 1575         Nutrition Prescription Ordered    Current Diet Order: Cardiac, smaller portions    Evaluation of Received Nutrient/Fluid Intake       % Intake of Estimated Energy Needs: 50 - 75 %  % Meal Intake: 25 - 50 %    Nutrition Risk    Level of Risk/Frequency of Follow-up: low - moderate     Monitor and Evaluation    Food and Nutrient Intake: food and beverage intake  Food and Nutrient Adminstration: diet order  Knowledge/Beliefs/Attitudes: food and nutrition knowledge/skill  Physical Activity and Function: nutrition-related ADLs and IADLs  Anthropometric Measurements: weight change, weight  Biochemical Data, Medical Tests and Procedures: lipid profile, electrolyte and renal panel, gastrointestinal profile, glucose/endocrine profile, inflammatory profile  Nutrition-Focused Physical Findings: overall appearance     Nutrition Follow-Up    RD Follow-up?: Yes

## 2022-03-10 NOTE — SUBJECTIVE & OBJECTIVE
Interval History: Pt seen and evaluated    Review of Systems   Constitutional:  Positive for activity change and appetite change. Negative for chills and fever.   Respiratory:  Negative for cough, shortness of breath and wheezing.    Cardiovascular:  Negative for chest pain, palpitations and leg swelling.   Gastrointestinal:  Negative for abdominal pain.   Genitourinary:  Negative for difficulty urinating.   Musculoskeletal:  Positive for arthralgias and back pain. Negative for myalgias.        Right hip pain   Neurological:  Positive for weakness. Negative for dizziness, tremors, seizures, syncope, facial asymmetry, speech difficulty and headaches.   Hematological:  Negative for adenopathy.   Psychiatric/Behavioral:  Negative for agitation, confusion and hallucinations. The patient is nervous/anxious.    Objective:     Vital Signs (Most Recent):  Temp: 97.7 °F (36.5 °C) (03/10/22 0551)  Pulse: 81 (03/10/22 0830)  Resp: 18 (03/10/22 0830)  BP: (!) 147/67 (03/10/22 0551)  SpO2: (!) 92 % (03/10/22 0830)   Vital Signs (24h Range):  Temp:  [97.7 °F (36.5 °C)-98.5 °F (36.9 °C)] 97.7 °F (36.5 °C)  Pulse:  [77-84] 81  Resp:  [18-20] 18  SpO2:  [92 %-95 %] 92 %  BP: (147-178)/(67-89) 147/67     Weight: 62.8 kg (138 lb 6.4 oz)  Body mass index is 23.76 kg/m².    Intake/Output Summary (Last 24 hours) at 3/10/2022 0919  Last data filed at 3/10/2022 0800  Gross per 24 hour   Intake 1440 ml   Output --   Net 1440 ml        Physical Exam  Vitals and nursing note reviewed.   Constitutional:       General: She is awake. She is not in acute distress.     Appearance: Normal appearance. She is not ill-appearing, toxic-appearing or diaphoretic.   HENT:      Head: Normocephalic and atraumatic.      Nose: Nose normal. No congestion or rhinorrhea.      Mouth/Throat:      Mouth: Mucous membranes are moist.      Pharynx: Oropharynx is clear. No oropharyngeal exudate or posterior oropharyngeal erythema.   Eyes:      General: No scleral  icterus.        Right eye: No discharge.         Left eye: No discharge.      Extraocular Movements: Extraocular movements intact.      Pupils: Pupils are equal, round, and reactive to light.   Neck:      Thyroid: No thyroid mass or thyromegaly.      Vascular: No carotid bruit.      Meningeal: Brudzinski's sign and Kernig's sign absent.   Cardiovascular:      Rate and Rhythm: Normal rate and regular rhythm.      Chest Wall: PMI is not displaced. No thrill.      Pulses: Normal pulses.      Heart sounds: Normal heart sounds. No murmur heard.    No friction rub. No gallop.   Pulmonary:      Effort: Pulmonary effort is normal. No tachypnea, accessory muscle usage, prolonged expiration or respiratory distress.      Breath sounds: Normal breath sounds. No stridor or decreased air movement. No wheezing, rhonchi or rales.   Chest:      Chest wall: No tenderness.   Abdominal:      General: Bowel sounds are normal. There is no distension.      Palpations: Abdomen is soft. There is no hepatomegaly, splenomegaly or mass.      Tenderness: There is no abdominal tenderness. There is no right CVA tenderness, left CVA tenderness, guarding or rebound.      Hernia: No hernia is present.   Musculoskeletal:         General: Tenderness present. No swelling, deformity or signs of injury. Normal range of motion.      Cervical back: Normal range of motion and neck supple. No rigidity. No muscular tenderness.      Right lower leg: No edema.      Left lower leg: No edema.   Lymphadenopathy:      Cervical: No cervical adenopathy.   Skin:     General: Skin is warm.      Capillary Refill: Capillary refill takes less than 2 seconds.      Coloration: Skin is not cyanotic, jaundiced or pale.      Findings: No bruising, erythema, lesion, petechiae or rash.   Neurological:      Mental Status: She is oriented to person, place, and time.      Cranial Nerves: No cranial nerve deficit, dysarthria or facial asymmetry.      Sensory: No sensory deficit.       Motor: Weakness present. No tremor.      Coordination: Coordination normal.   Psychiatric:         Mood and Affect: Mood normal. Mood is not anxious or depressed. Affect is not flat.         Speech: Speech is not rapid and pressured or slurred.         Behavior: Behavior normal. Behavior is not agitated, aggressive or combative.         Thought Content: Thought content normal. Thought content is not paranoid or delusional.         Cognition and Memory: Cognition is not impaired. Memory is not impaired.         Judgment: Judgment normal.       Significant Labs: All pertinent labs within the past 24 hours have been reviewed.  Recent Lab Results       None            Significant Imaging: I have reviewed all pertinent imaging results/findings within the past 24 hours.

## 2022-03-10 NOTE — PLAN OF CARE
Problem: Sleep Impairment (Anxiety Signs/Symptoms)  Goal: Improved Sleep (Anxiety Signs/Symptoms)  Outcome: Ongoing, Progressing     Problem: Constipation  Goal: Effective Bowel Elimination  Outcome: Ongoing, Progressing     Problem: Constipation  Goal: Effective Bowel Elimination  Outcome: Ongoing, Progressing     Problem: Pain Acute  Goal: Acceptable Pain Control and Functional Ability  Outcome: Ongoing, Progressing     Problem: Mobility Impairment  Goal: Optimal Mobility  Outcome: Ongoing, Progressing

## 2022-03-11 LAB
ALBUMIN SERPL BCP-MCNC: 2.2 G/DL (ref 3.5–5.2)
ALP SERPL-CCNC: 88 U/L (ref 55–135)
ALT SERPL W/O P-5'-P-CCNC: 17 U/L (ref 10–44)
ANION GAP SERPL CALC-SCNC: 2 MMOL/L (ref 8–16)
AST SERPL-CCNC: 15 U/L (ref 10–40)
BASOPHILS # BLD AUTO: 0.04 K/UL (ref 0–0.2)
BASOPHILS NFR BLD: 0.5 % (ref 0–1.9)
BILIRUB SERPL-MCNC: 0.6 MG/DL (ref 0.1–1)
BUN SERPL-MCNC: 35 MG/DL (ref 8–23)
CALCIUM SERPL-MCNC: 9.2 MG/DL (ref 8.7–10.5)
CHLORIDE SERPL-SCNC: 102 MMOL/L (ref 95–110)
CO2 SERPL-SCNC: 33 MMOL/L (ref 23–29)
CREAT SERPL-MCNC: 1.1 MG/DL (ref 0.5–1.4)
DIFFERENTIAL METHOD: ABNORMAL
EOSINOPHIL # BLD AUTO: 0.2 K/UL (ref 0–0.5)
EOSINOPHIL NFR BLD: 2.5 % (ref 0–8)
ERYTHROCYTE [DISTWIDTH] IN BLOOD BY AUTOMATED COUNT: 13.2 % (ref 11.5–14.5)
EST. GFR  (AFRICAN AMERICAN): 51.1 ML/MIN/1.73 M^2
EST. GFR  (NON AFRICAN AMERICAN): 44.3 ML/MIN/1.73 M^2
GLUCOSE SERPL-MCNC: 103 MG/DL (ref 70–110)
HCT VFR BLD AUTO: 36.7 % (ref 37–48.5)
HGB BLD-MCNC: 11.9 G/DL (ref 12–16)
IMM GRANULOCYTES # BLD AUTO: 0.04 K/UL (ref 0–0.04)
IMM GRANULOCYTES NFR BLD AUTO: 0.5 % (ref 0–0.5)
LYMPHOCYTES # BLD AUTO: 2.1 K/UL (ref 1–4.8)
LYMPHOCYTES NFR BLD: 25.2 % (ref 18–48)
MAGNESIUM SERPL-MCNC: 1.7 MG/DL (ref 1.6–2.6)
MCH RBC QN AUTO: 29.5 PG (ref 27–31)
MCHC RBC AUTO-ENTMCNC: 32.4 G/DL (ref 32–36)
MCV RBC AUTO: 91 FL (ref 82–98)
MONOCYTES # BLD AUTO: 1.5 K/UL (ref 0.3–1)
MONOCYTES NFR BLD: 18.4 % (ref 4–15)
NEUTROPHILS # BLD AUTO: 4.4 K/UL (ref 1.8–7.7)
NEUTROPHILS NFR BLD: 52.9 % (ref 38–73)
NRBC BLD-RTO: 0 /100 WBC
PLATELET # BLD AUTO: 319 K/UL (ref 150–450)
PMV BLD AUTO: 9.4 FL (ref 9.2–12.9)
POTASSIUM SERPL-SCNC: 3.9 MMOL/L (ref 3.5–5.1)
PROT SERPL-MCNC: 6.3 G/DL (ref 6–8.4)
RBC # BLD AUTO: 4.03 M/UL (ref 4–5.4)
SODIUM SERPL-SCNC: 137 MMOL/L (ref 136–145)
WBC # BLD AUTO: 8.38 K/UL (ref 3.9–12.7)

## 2022-03-11 PROCEDURE — 25000003 PHARM REV CODE 250: Performed by: NURSE PRACTITIONER

## 2022-03-11 PROCEDURE — 80053 COMPREHEN METABOLIC PANEL: CPT | Performed by: NURSE PRACTITIONER

## 2022-03-11 PROCEDURE — 36415 COLL VENOUS BLD VENIPUNCTURE: CPT | Performed by: NURSE PRACTITIONER

## 2022-03-11 PROCEDURE — 25000003 PHARM REV CODE 250: Performed by: INTERNAL MEDICINE

## 2022-03-11 PROCEDURE — 94761 N-INVAS EAR/PLS OXIMETRY MLT: CPT

## 2022-03-11 PROCEDURE — 97116 GAIT TRAINING THERAPY: CPT

## 2022-03-11 PROCEDURE — 97530 THERAPEUTIC ACTIVITIES: CPT

## 2022-03-11 PROCEDURE — 97110 THERAPEUTIC EXERCISES: CPT

## 2022-03-11 PROCEDURE — 11000001 HC ACUTE MED/SURG PRIVATE ROOM

## 2022-03-11 PROCEDURE — 83735 ASSAY OF MAGNESIUM: CPT | Performed by: NURSE PRACTITIONER

## 2022-03-11 PROCEDURE — 85025 COMPLETE CBC W/AUTO DIFF WBC: CPT | Performed by: NURSE PRACTITIONER

## 2022-03-11 PROCEDURE — 99900035 HC TECH TIME PER 15 MIN (STAT)

## 2022-03-11 RX ADMIN — APIXABAN 2.5 MG: 2.5 TABLET, FILM COATED ORAL at 09:03

## 2022-03-11 RX ADMIN — DOCUSATE SODIUM 100 MG: 100 CAPSULE, LIQUID FILLED ORAL at 08:03

## 2022-03-11 RX ADMIN — EZETIMIBE 10 MG: 10 TABLET ORAL at 09:03

## 2022-03-11 RX ADMIN — DOCUSATE SODIUM 100 MG: 100 CAPSULE, LIQUID FILLED ORAL at 09:03

## 2022-03-11 RX ADMIN — ALPRAZOLAM 0.75 MG: 0.25 TABLET ORAL at 08:03

## 2022-03-11 RX ADMIN — CARVEDILOL 12.5 MG: 12.5 TABLET, FILM COATED ORAL at 09:03

## 2022-03-11 RX ADMIN — Medication 6 MG: at 08:03

## 2022-03-11 RX ADMIN — TRAMADOL HYDROCHLORIDE 50 MG: 50 TABLET ORAL at 08:03

## 2022-03-11 RX ADMIN — APIXABAN 2.5 MG: 2.5 TABLET, FILM COATED ORAL at 08:03

## 2022-03-11 RX ADMIN — ACETAMINOPHEN 650 MG: 325 TABLET ORAL at 09:03

## 2022-03-11 RX ADMIN — CARVEDILOL 12.5 MG: 12.5 TABLET, FILM COATED ORAL at 08:03

## 2022-03-11 NOTE — PT/OT/SLP PROGRESS
Physical Therapy         Treatment        Codi Black   MRN: [id number]                 Billable Minutes:  Gait Olmovnrh29 minutes, Therapeutic Activity 30 minutes and Therapeutic Exercise 30 minutes  Total Minutes: 90 minutes individual session                     General Precautions: Standard,  fall  Orthopedic Precautions:     Braces:           Subjective:  Communicated with nurse prior to session.     pain: 0/10 but reports in evening after therapy radiating     Objective:  Patient found sitting up in WC in room, ready to participate      Functional Mobility:  Bed Mobility:   Supine to sit: Standby Assistance   Sit to supine: Standby Assistance   Rolling: Standby Assistance   Scooting: Minimal Assistance    Balance:   Static Sit: GOOD: Takes MODERATE challenges from all directions  Dynamic Sit:  FAIR+: Maintains balance through MINIMAL excursions of active trunk motion  Static Stand: FAIR+: Takes MINIMAL challenges from all directions  Dynamic stand: FAIR+: Needs CLOSE SUPERVISION during gait and is able to right self with minor LOB    Transfer Training:  Sit to stand:Stand-by Assistance with Rolling Walker to contact guard due to instability upon rising and greater than 1 attempt to rise  Bed <> Chair:  Stand Pivot and Step Transfer with Contact Guard Assistance with Rolling Walker    Toilet Transfer:  Pt Stand Pivot and Step Transfer with Contact Guard Assistance with Grab bars      Wheelchair Training:  Pt propelled WC using B UE's x 150' SBA; tasking effort    Gait Training:  Performed gait training using RW x 3 trials 125', 100' x 2 trials, pt with very forward posture; vc's for stepping closer into RW; close SBA; limited by SOB/decreased endurance.    Stair Training:  Pt ascended/descended 3 steps using R side handrail for step to pattern contact guard assist.      Additional Treatment:  Toileting; pt required contact guard assist to lower and raise pants for postural stability due to posterior lean in  standing; Personal hygiene and hand hygiene performed with SBA.  Performed B LE there ex in sitting 2.5# cuff wt for LAQ's 15 reps x 3; seated marching/ankle pumps, no weights due to reproduction of pain in pelvis, 15 reps x 3. Performed sit to/from stand 10 reps with contact guard to SBA to practice technique as well as provide strengthening of LE's in functional manner. Pt rode nustep at level 2 x 15 minutes to promote functional strengthening and endurance of B LE's.    Activity Tolerance:  Patient tolerated treatment well    Patient left up in chair with call button in reach and nurse notified.    Assessment:  Codi Black is a 89 y.o. female with a medical diagnosis of <principal problem not specified>. She presents with transfers fluctuating today with contact guard required for some stability due to posterior lean during toileting transfers..    Rehab potential is excellent.    Activity tolerance: Good    Discharge recommendations:       Equipment recommendations:       GOALS:   Multidisciplinary Problems     Physical Therapy Goals     Not on file                PLAN:    Patient to be seen 5 x/week  to address the above listed problems via gait training, wheelchair management/training, therapeutic activities, therapeutic exercises, therapeutic groups, neuromuscular re-education  Plan of Care expires: 03/18/22  Plan of Care reviewed with: patient         3/11/2022

## 2022-03-11 NOTE — PT/OT/SLP PROGRESS
Occupational Therapy  Treatment    Codi Black   MRN: [id number]   Admitting Diagnosis: Nondisplaced fracture of the S4 segment of the sacrum    OT Date of Treatment: 03/11/22   AM Start Time: 1030  AM End Time: 1200  PM Start Time: na  PM End Time: na  Treatment Type: Individual 60 and Concurrent 30  Total Time (min): 90 min      Billable Minutes:  Therapeutic Activity 45 and Therapeutic Exercise 45  Total Minutes: 90    General Precautions: Standard, fall  Orthopedic Precautions: Full weight bearing  Braces:      Spiritual, Cultural Beliefs, Uatsdin Practices, Values that Affect Care: no    Subjective:  Communicated with nurse prior to session.    Pain/Comfort  Pain Rating 1: 0/10  Pain Rating Post-Intervention 1: 0/10    Objective:  Pt was cooperative and motivated without verbal encouragement while exhibiting positive affect. She participated in continued functional transfer retraining throughout session to / from bed, wheelchair, chair, and recliner emphasizing fall prevention utilizing RW providing extra time with repetition requiring SBA secondary to safety concerns with min-mod verbal cueing for safety awareness and technique particularly during stand to sit transition. Pt then participated in therapeutic activity addressing trunk mobility, trunk control, dynamic sitting balance, and trunk strength utilizing large stability ball challenging her to perform trunk flexion, extension, and lateral flexion with bilateral hands stabilized on ball requiring min verbal and tactile cueing to facilitate optimal movement patterns and increased range per trial in order to improve active ROM impacting performance with functional tasks below waist. Next, she participated in therapeutic exercise performing 5x14 seated pushups requiring verbal and tactile cueing for technique challenging her to lift buttocks off seated surface to end range elbow extension in order to strengthen triceps brachii to improve performance with  sit <> stand transitions particularly from lower surfaces. Pt then participated in therapeutic exercise performing 3x15 bilateral UE proximal strengthening exercises utilizing moderate resistance theraband with scapular retraction, shoulder extension, shoulder adduction, horizontal abduction, shoulder flexion in plane of scaption, internal rotation, and external rotation while seated in chair unsupported requiring mod verbal and tactile cueing for technique.    Functional Mobility:  Bed Mobility:   Supine to sit: Supervision or Set-up Assistance   Sit to supine: Supervision or Set-up Assistance   Rolling: Supervision or Set-up Assistance   Scooting: Supervision or Set-up Assistance    Transfer Training:   Sit to stand:Stand-by Assistance with Rolling Walker .  Bed <> Chair:  Step Transfer with Stand-by Assistance with Rolling Walker .      Balance:   Static Sit: GOOD: Takes MODERATE challenges from all directions  Dynamic Sit:  GOOD-: Incosistently Maintains balance through MODERATE excursions of active trunk movement,     Static Stand: FAIR+: Takes MINIMAL challenges from all directions  Dynamic stand: FAIR+: Needs CLOSE SUPERVISION during gait and is able to right self with minor LOB      Patient left up in chair with nurse notified    ASSESSMENT:  Pt demonstrated improved functional performance with transfers as noted by SBA in which patient did not require steadying assist on this date, but min-mod verbal and tactile cueing for safety awareness and technique.     Rehab potential is good    Activity tolerance: Good    Discharge recommendations: home with home health     Equipment recommendations: none     GOALS:   Multidisciplinary Problems     Occupational Therapy Goals        Problem: Occupational Therapy Goal    Goal Priority Disciplines Outcome Interventions   Occupational Therapy Goal     OT, PT/OT     Description: Long Term Goals to be met by: 03/18/22     Patient will increase functional independence with  ADLs by performing:    Feeding with [place].  UE Dressing with Modified [place].  LE Dressing with Minimal Assistance.  Grooming while seated at sink with Modified [place].  Toileting from toilet with Supervision for hygiene and clothing management.   Bathing from  shower chair/bench with Supervision.  Step transfer with Modified [place]  Toilet transfer to toilet with Modified [place].  Increased functional strength to 4 to 4+/5 for bilateral UE's.                     Plan:  Patient to be seen 5 x/week (90 min per day, for 14 days) to address the above listed problems via self-care/home management, community/work re-entry, therapeutic activities, therapeutic exercises  Plan of Care expires: 03/18/22  Plan of Care reviewed with: patient         03/11/2022

## 2022-03-11 NOTE — PROGRESS NOTES
[facility] Medicine  Progress Note    Patient Name: Codi Black  MRN: [id number]  Patient Class: IP- Rehab   Admission Date: 3/4/2022  Length of Stay: 7 days  Attending Physician: Rafael Quan III, MD  Primary Care Provider: Delfino Rowe MD        Subjective:     Principal Problem:<principal problem not specified>        HPI:  POST ADMISSION PHYSICIAN ASSESSMENT AND   REHAB HISTORICAL/PHYSICAL        CHIEF COMPLAINT: Pain in Groin    PRIMARY REHAB IMPAIRMENT GROUP: Orthopedic / Other    ETIOLOGIC DIAGNOSIS: Pelvic Fracture, Sacrum Fracture    SECONDARY AND RELATED DIAGNOSES:  Decrease in mobility, decrease in physical functioning, difficulty walking, falls, pain, tachycardia, urinary tract infection, safety    CO-MORBIDITIES PRESENT ON ADMISSION:  Pelvic fracture hypertensive urgency acute renal failure    Risk:  Patient is at high risk of progressive decline in function progressive weakness with muscle atrophy joint stiffness and contractures.  Patient is at risk for recurrent falls that could lead to more serious injury.  Patient is at risk for imbalance worsening pain fractures skin soft tissue injury head injury worsening organ dysfunction renal failure infection PE DVT pneumonia and death.    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  The patient had a fall about a week prior.  She has been having intermittent pain in that area and having more difficulty ambulating and getting to the toilet.  Patient denied any chest pain angina fever chills diarrhea or shortness of breath.  Upon admission to the emergency department she was found to be significantly hypertensive and severe pain in the right sacral area.  She had no definitive bruising or area of hematoma.  An x-ray initially was unremarkable and a CT then showed a fracture of the S4  segment of the sacrum.  Patient was having intermittent hypoxemia a mildly elevated troponin and was admitted.  Patient was started on pain control several medications were adjusted for her blood pressure as well as her other comorbidities.  Patient was seen by therapy her therapeutic notes are in the prescreen.  Ultimately the patient was unable to return home at a safe functional level and we recommended inpatient rehab.  After initial denial from her insurance company we overturned this with a verbal conversation.  Patient is eager to improve and to have better pain control.        Overview/Hospital Course:  3/7 SD: Pt actively participating in physical therapy - tolerating well.    3/9 SD: Pt participating in physical therapy. C/O pain to right leg other than at location of fracture site. X-rays indicate:  1. Mild osteoarthrosis right hip.  2. Moderate osteoarthrosis right knee.    3/10 SD: Pt is sitting in chair in room with legs elevated. She reports having a bowel movement this morning. She reports pain is improved today.    3/11 SD: Pt participating in physical therapy, tolerating well.      Interval History: Pt seen and evaluated    Review of Systems   Constitutional:  Positive for activity change and appetite change. Negative for chills and fever.   Respiratory:  Negative for cough, shortness of breath and wheezing.    Cardiovascular:  Negative for chest pain, palpitations and leg swelling.   Gastrointestinal:  Negative for abdominal pain.   Genitourinary:  Negative for difficulty urinating.   Musculoskeletal:  Positive for arthralgias and back pain. Negative for myalgias.        Right hip pain   Neurological:  Positive for weakness. Negative for dizziness, tremors, seizures, syncope, facial asymmetry, speech difficulty and headaches.   Hematological:  Negative for adenopathy.   Psychiatric/Behavioral:  Negative for agitation, confusion and hallucinations. The patient is nervous/anxious.    Objective:      Vital Signs (Most Recent):  Temp: 97.6 °F (36.4 °C) (03/11/22 0549)  Pulse: 62 (03/11/22 1207)  Resp: 18 (03/11/22 0815)  BP: 102/69 (03/11/22 1207)  SpO2: 95 % (03/11/22 0815)   Vital Signs (24h Range):  Temp:  [97.6 °F (36.4 °C)-98.1 °F (36.7 °C)] 97.6 °F (36.4 °C)  Pulse:  [62-83] 62  Resp:  [18-20] 18  SpO2:  [94 %-96 %] 95 %  BP: (102-184)/() 102/69     Weight: 62.6 kg (138 lb)  Body mass index is 23.69 kg/m².    Intake/Output Summary (Last 24 hours) at 3/11/2022 1357  Last data filed at 3/11/2022 1339  Gross per 24 hour   Intake 1140 ml   Output --   Net 1140 ml        Physical Exam  Vitals and nursing note reviewed.   Constitutional:       General: She is awake. She is not in acute distress.     Appearance: Normal appearance. She is not ill-appearing, toxic-appearing or diaphoretic.   HENT:      Head: Normocephalic and atraumatic.      Nose: Nose normal. No congestion or rhinorrhea.      Mouth/Throat:      Mouth: Mucous membranes are moist.      Pharynx: Oropharynx is clear. No oropharyngeal exudate or posterior oropharyngeal erythema.   Eyes:      General: No scleral icterus.        Right eye: No discharge.         Left eye: No discharge.      Extraocular Movements: Extraocular movements intact.      Pupils: Pupils are equal, round, and reactive to light.   Neck:      Thyroid: No thyroid mass or thyromegaly.      Vascular: No carotid bruit.      Meningeal: Brudzinski's sign and Kernig's sign absent.   Cardiovascular:      Rate and Rhythm: Normal rate and regular rhythm.      Chest Wall: PMI is not displaced. No thrill.      Pulses: Normal pulses.      Heart sounds: Normal heart sounds. No murmur heard.    No friction rub. No gallop.   Pulmonary:      Effort: Pulmonary effort is normal. No tachypnea, accessory muscle usage, prolonged expiration or respiratory distress.      Breath sounds: Normal breath sounds. No stridor or decreased air movement. No wheezing, rhonchi or rales.   Chest:      Chest  wall: No tenderness.   Abdominal:      General: Bowel sounds are normal. There is no distension.      Palpations: Abdomen is soft. There is no hepatomegaly, splenomegaly or mass.      Tenderness: There is no abdominal tenderness. There is no right CVA tenderness, left CVA tenderness, guarding or rebound.      Hernia: No hernia is present.   Musculoskeletal:         General: Tenderness present. No swelling, deformity or signs of injury. Normal range of motion.      Cervical back: Normal range of motion and neck supple. No rigidity. No muscular tenderness.      Right lower leg: No edema.      Left lower leg: No edema.   Lymphadenopathy:      Cervical: No cervical adenopathy.   Skin:     General: Skin is warm.      Capillary Refill: Capillary refill takes less than 2 seconds.      Coloration: Skin is not cyanotic, jaundiced or pale.      Findings: No bruising, erythema, lesion, petechiae or rash.   Neurological:      Mental Status: She is oriented to person, place, and time.      Cranial Nerves: No cranial nerve deficit, dysarthria or facial asymmetry.      Sensory: No sensory deficit.      Motor: Weakness present. No tremor.      Coordination: Coordination normal.   Psychiatric:         Mood and Affect: Mood normal. Mood is not anxious or depressed. Affect is not flat.         Speech: Speech is not rapid and pressured or slurred.         Behavior: Behavior normal. Behavior is not agitated, aggressive or combative.         Thought Content: Thought content normal. Thought content is not paranoid or delusional.         Cognition and Memory: Cognition is not impaired. Memory is not impaired.         Judgment: Judgment normal.       Significant Labs: All pertinent labs within the past 24 hours have been reviewed.  Recent Lab Results         03/11/22  0630        Albumin 2.2       Alkaline Phosphatase 88       ALT 17       Anion Gap 2       AST 15       Baso # 0.04       Basophil % 0.5       BILIRUBIN TOTAL 0.6  Comment:  For infants and newborns, interpretation of results should be based  on gestational age, weight and in agreement with clinical  observations.    Premature Infant recommended reference ranges:  Up to 24 hours.............<8.0 mg/dL  Up to 48 hours............<12.0 mg/dL  3-5 days..................<15.0 mg/dL  6-29 days.................<15.0 mg/dL    For patients on Eltrombopag therapy, use of Dimension [place] TBIL is   not   recommended.         BUN 35       Calcium 9.2       Chloride 102       CO2 33       Creatinine 1.1       Differential Method Automated       eGFR if  51.1       eGFR if non  44.3  Comment: Calculation used to obtain the estimated glomerular filtration  rate (eGFR) is the CKD-EPI equation.          Eos # 0.2       Eosinophil % 2.5       Glucose 103       Gran # (ANC) 4.4       Gran % 52.9       Hematocrit 36.7       Hemoglobin 11.9       Immature Grans (Abs) 0.04  Comment: Mild elevation in immature granulocytes is non specific and   can be seen in a variety of conditions including stress response,   acute inflammation, trauma and pregnancy. Correlation with other   laboratory and clinical findings is essential.         Immature Granulocytes 0.5       Lymph # 2.1       Lymph % 25.2       Magnesium 1.7       MCH 29.5       MCHC 32.4       MCV 91       Mono # 1.5       Mono % 18.4       MPV 9.4       nRBC 0       Platelets 319       Potassium 3.9       PROTEIN TOTAL 6.3       RBC 4.03       RDW 13.2       Sodium 137       WBC 8.38               Significant Imaging: I have reviewed all pertinent imaging results/findings within the past 24 hours.      Assessment/Plan:      Acute renal injury  Patient with acute kidney injury likely d/t Pre-renal azotemia Which is currently stable. Labs reviewed- Renal function/electrolytes with Estimated Creatinine Clearance: 29.4 mL/min (based on SCr of 1.1 mg/dL). according to latest data. Monitor urine output and serial BMP and adjust  therapy as needed. Avoid nephrotoxins and renally dose meds for GFR listed above.       UTI (urinary tract infection)  IV abx therapy complete while on med/surg floor.      Fall  Continue inpatient rehab efforts      Hypertensive urgency  Blood pressure stabilized with medication. Monitor closely.      Pelvic fracture  Continue inpatient rehab efforts      VTE Risk Mitigation (From admission, onward)         Ordered     apixaban tablet 2.5 mg  2 times daily         03/05/22 0549     IP VTE HIGH RISK PATIENT  Once         03/04/22 1449     Place sequential compression device  Until discontinued         03/04/22 1449                Discharge Planning   KIMANI:      Code Status: Full Code   Is the patient medically ready for discharge?:     Reason for patient still in hospital (select all that apply): Patient trending condition, Laboratory test and Treatment  Discharge Plan A: Home Health                  Stephy Young NP  Department of Hospital Medicine   [facility] ([facility])

## 2022-03-11 NOTE — ASSESSMENT & PLAN NOTE
Patient with acute kidney injury likely d/t Pre-renal azotemia Which is currently stable. Labs reviewed- Renal function/electrolytes with Estimated Creatinine Clearance: 29.4 mL/min (based on SCr of 1.1 mg/dL). according to latest data. Monitor urine output and serial BMP and adjust therapy as needed. Avoid nephrotoxins and renally dose meds for GFR listed above.

## 2022-03-11 NOTE — SUBJECTIVE & OBJECTIVE
Interval History: Pt seen and evaluated    Review of Systems   Constitutional:  Positive for activity change and appetite change. Negative for chills and fever.   Respiratory:  Negative for cough, shortness of breath and wheezing.    Cardiovascular:  Negative for chest pain, palpitations and leg swelling.   Gastrointestinal:  Negative for abdominal pain.   Genitourinary:  Negative for difficulty urinating.   Musculoskeletal:  Positive for arthralgias and back pain. Negative for myalgias.        Right hip pain   Neurological:  Positive for weakness. Negative for dizziness, tremors, seizures, syncope, facial asymmetry, speech difficulty and headaches.   Hematological:  Negative for adenopathy.   Psychiatric/Behavioral:  Negative for agitation, confusion and hallucinations. The patient is nervous/anxious.    Objective:     Vital Signs (Most Recent):  Temp: 97.6 °F (36.4 °C) (03/11/22 0549)  Pulse: 62 (03/11/22 1207)  Resp: 18 (03/11/22 0815)  BP: 102/69 (03/11/22 1207)  SpO2: 95 % (03/11/22 0815)   Vital Signs (24h Range):  Temp:  [97.6 °F (36.4 °C)-98.1 °F (36.7 °C)] 97.6 °F (36.4 °C)  Pulse:  [62-83] 62  Resp:  [18-20] 18  SpO2:  [94 %-96 %] 95 %  BP: (102-184)/() 102/69     Weight: 62.6 kg (138 lb)  Body mass index is 23.69 kg/m².    Intake/Output Summary (Last 24 hours) at 3/11/2022 1357  Last data filed at 3/11/2022 1339  Gross per 24 hour   Intake 1140 ml   Output --   Net 1140 ml        Physical Exam  Vitals and nursing note reviewed.   Constitutional:       General: She is awake. She is not in acute distress.     Appearance: Normal appearance. She is not ill-appearing, toxic-appearing or diaphoretic.   HENT:      Head: Normocephalic and atraumatic.      Nose: Nose normal. No congestion or rhinorrhea.      Mouth/Throat:      Mouth: Mucous membranes are moist.      Pharynx: Oropharynx is clear. No oropharyngeal exudate or posterior oropharyngeal erythema.   Eyes:      General: No scleral icterus.         Right eye: No discharge.         Left eye: No discharge.      Extraocular Movements: Extraocular movements intact.      Pupils: Pupils are equal, round, and reactive to light.   Neck:      Thyroid: No thyroid mass or thyromegaly.      Vascular: No carotid bruit.      Meningeal: Brudzinski's sign and Kernig's sign absent.   Cardiovascular:      Rate and Rhythm: Normal rate and regular rhythm.      Chest Wall: PMI is not displaced. No thrill.      Pulses: Normal pulses.      Heart sounds: Normal heart sounds. No murmur heard.    No friction rub. No gallop.   Pulmonary:      Effort: Pulmonary effort is normal. No tachypnea, accessory muscle usage, prolonged expiration or respiratory distress.      Breath sounds: Normal breath sounds. No stridor or decreased air movement. No wheezing, rhonchi or rales.   Chest:      Chest wall: No tenderness.   Abdominal:      General: Bowel sounds are normal. There is no distension.      Palpations: Abdomen is soft. There is no hepatomegaly, splenomegaly or mass.      Tenderness: There is no abdominal tenderness. There is no right CVA tenderness, left CVA tenderness, guarding or rebound.      Hernia: No hernia is present.   Musculoskeletal:         General: Tenderness present. No swelling, deformity or signs of injury. Normal range of motion.      Cervical back: Normal range of motion and neck supple. No rigidity. No muscular tenderness.      Right lower leg: No edema.      Left lower leg: No edema.   Lymphadenopathy:      Cervical: No cervical adenopathy.   Skin:     General: Skin is warm.      Capillary Refill: Capillary refill takes less than 2 seconds.      Coloration: Skin is not cyanotic, jaundiced or pale.      Findings: No bruising, erythema, lesion, petechiae or rash.   Neurological:      Mental Status: She is oriented to person, place, and time.      Cranial Nerves: No cranial nerve deficit, dysarthria or facial asymmetry.      Sensory: No sensory deficit.      Motor:  Weakness present. No tremor.      Coordination: Coordination normal.   Psychiatric:         Mood and Affect: Mood normal. Mood is not anxious or depressed. Affect is not flat.         Speech: Speech is not rapid and pressured or slurred.         Behavior: Behavior normal. Behavior is not agitated, aggressive or combative.         Thought Content: Thought content normal. Thought content is not paranoid or delusional.         Cognition and Memory: Cognition is not impaired. Memory is not impaired.         Judgment: Judgment normal.       Significant Labs: All pertinent labs within the past 24 hours have been reviewed.  Recent Lab Results         03/11/22  0630        Albumin 2.2       Alkaline Phosphatase 88       ALT 17       Anion Gap 2       AST 15       Baso # 0.04       Basophil % 0.5       BILIRUBIN TOTAL 0.6  Comment: For infants and newborns, interpretation of results should be based  on gestational age, weight and in agreement with clinical  observations.    Premature Infant recommended reference ranges:  Up to 24 hours.............<8.0 mg/dL  Up to 48 hours............<12.0 mg/dL  3-5 days..................<15.0 mg/dL  6-29 days.................<15.0 mg/dL    For patients on Eltrombopag therapy, use of Dimension [place] TBIL is   not   recommended.         BUN 35       Calcium 9.2       Chloride 102       CO2 33       Creatinine 1.1       Differential Method Automated       eGFR if  51.1       eGFR if non  44.3  Comment: Calculation used to obtain the estimated glomerular filtration  rate (eGFR) is the CKD-EPI equation.          Eos # 0.2       Eosinophil % 2.5       Glucose 103       Gran # (ANC) 4.4       Gran % 52.9       Hematocrit 36.7       Hemoglobin 11.9       Immature Grans (Abs) 0.04  Comment: Mild elevation in immature granulocytes is non specific and   can be seen in a variety of conditions including stress response,   acute inflammation, trauma and pregnancy.  Correlation with other   laboratory and clinical findings is essential.         Immature Granulocytes 0.5       Lymph # 2.1       Lymph % 25.2       Magnesium 1.7       MCH 29.5       MCHC 32.4       MCV 91       Mono # 1.5       Mono % 18.4       MPV 9.4       nRBC 0       Platelets 319       Potassium 3.9       PROTEIN TOTAL 6.3       RBC 4.03       RDW 13.2       Sodium 137       WBC 8.38               Significant Imaging: I have reviewed all pertinent imaging results/findings within the past 24 hours.

## 2022-03-11 NOTE — PLAN OF CARE
Problem: Fall Injury Risk  Goal: Absence of Fall and Fall-Related Injury  Outcome: Ongoing, Progressing     Problem: Sleep Impairment (Anxiety Signs/Symptoms)  Goal: Improved Sleep (Anxiety Signs/Symptoms)  Outcome: Ongoing, Progressing     Problem: Mobility Impairment  Goal: Optimal Mobility  Outcome: Ongoing, Progressing

## 2022-03-12 PROCEDURE — 94761 N-INVAS EAR/PLS OXIMETRY MLT: CPT

## 2022-03-12 PROCEDURE — 25000003 PHARM REV CODE 250: Performed by: INTERNAL MEDICINE

## 2022-03-12 PROCEDURE — 99900031 HC PATIENT EDUCATION (STAT)

## 2022-03-12 PROCEDURE — 94640 AIRWAY INHALATION TREATMENT: CPT

## 2022-03-12 PROCEDURE — 99900035 HC TECH TIME PER 15 MIN (STAT)

## 2022-03-12 PROCEDURE — 25000003 PHARM REV CODE 250: Performed by: NURSE PRACTITIONER

## 2022-03-12 PROCEDURE — 11000001 HC ACUTE MED/SURG PRIVATE ROOM

## 2022-03-12 RX ORDER — IPRATROPIUM BROMIDE AND ALBUTEROL SULFATE 2.5; .5 MG/3ML; MG/3ML
3 SOLUTION RESPIRATORY (INHALATION) 4 TIMES DAILY PRN
Status: DISCONTINUED | OUTPATIENT
Start: 2022-03-12 | End: 2022-03-15 | Stop reason: HOSPADM

## 2022-03-12 RX ADMIN — TRAMADOL HYDROCHLORIDE 50 MG: 50 TABLET ORAL at 08:03

## 2022-03-12 RX ADMIN — CARVEDILOL 12.5 MG: 12.5 TABLET, FILM COATED ORAL at 08:03

## 2022-03-12 RX ADMIN — DOCUSATE SODIUM 100 MG: 100 CAPSULE, LIQUID FILLED ORAL at 08:03

## 2022-03-12 RX ADMIN — ALPRAZOLAM 0.75 MG: 0.25 TABLET ORAL at 08:03

## 2022-03-12 RX ADMIN — APIXABAN 2.5 MG: 2.5 TABLET, FILM COATED ORAL at 08:03

## 2022-03-12 RX ADMIN — EZETIMIBE 10 MG: 10 TABLET ORAL at 08:03

## 2022-03-12 NOTE — PROGRESS NOTES
[facility] Medicine  Progress Note    Patient Name: Codi Black  MRN: [id number]  Patient Class: IP- Rehab   Admission Date: 3/4/2022  Length of Stay: 8 days  Attending Physician: Rafael Quan III, MD  Primary Care Provider: Delfino Rowe MD        Subjective:     Principal Problem:<principal problem not specified>        HPI:  POST ADMISSION PHYSICIAN ASSESSMENT AND   REHAB HISTORICAL/PHYSICAL        CHIEF COMPLAINT: Pain in Groin    PRIMARY REHAB IMPAIRMENT GROUP: Orthopedic / Other    ETIOLOGIC DIAGNOSIS: Pelvic Fracture, Sacrum Fracture    SECONDARY AND RELATED DIAGNOSES:  Decrease in mobility, decrease in physical functioning, difficulty walking, falls, pain, tachycardia, urinary tract infection, safety    CO-MORBIDITIES PRESENT ON ADMISSION:  Pelvic fracture hypertensive urgency acute renal failure    Risk:  Patient is at high risk of progressive decline in function progressive weakness with muscle atrophy joint stiffness and contractures.  Patient is at risk for recurrent falls that could lead to more serious injury.  Patient is at risk for imbalance worsening pain fractures skin soft tissue injury head injury worsening organ dysfunction renal failure infection PE DVT pneumonia and death.    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  The patient had a fall about a week prior.  She has been having intermittent pain in that area and having more difficulty ambulating and getting to the toilet.  Patient denied any chest pain angina fever chills diarrhea or shortness of breath.  Upon admission to the emergency department she was found to be significantly hypertensive and severe pain in the right sacral area.  She had no definitive bruising or area of hematoma.  An x-ray initially was unremarkable and a CT then showed a fracture of the S4  segment of the sacrum.  Patient was having intermittent hypoxemia a mildly elevated troponin and was admitted.  Patient was started on pain control several medications were adjusted for her blood pressure as well as her other comorbidities.  Patient was seen by therapy her therapeutic notes are in the prescreen.  Ultimately the patient was unable to return home at a safe functional level and we recommended inpatient rehab.  After initial denial from her insurance company we overturned this with a verbal conversation.  Patient is eager to improve and to have better pain control.        Overview/Hospital Course:  3/7 SD: Pt actively participating in physical therapy - tolerating well.    3/9 SD: Pt participating in physical therapy. C/O pain to right leg other than at location of fracture site. X-rays indicate:  1. Mild osteoarthrosis right hip.  2. Moderate osteoarthrosis right knee.    3/10 SD: Pt is sitting in chair in room with legs elevated. She reports having a bowel movement this morning. She reports pain is improved today.    3/11 SD: Pt participating in physical therapy, tolerating well.  3/12:  Participating well with therapy thus far.       Interval History: Pt seen and evaluated    Review of Systems   Constitutional:  Positive for activity change and appetite change. Negative for chills and fever.   Respiratory:  Negative for cough, shortness of breath and wheezing.    Cardiovascular:  Negative for chest pain, palpitations and leg swelling.   Gastrointestinal:  Negative for abdominal pain.   Genitourinary:  Negative for difficulty urinating.   Musculoskeletal:  Positive for arthralgias and back pain. Negative for myalgias.        Right hip pain   Neurological:  Positive for weakness. Negative for dizziness, tremors, seizures, syncope, facial asymmetry, speech difficulty and headaches.   Hematological:  Negative for adenopathy.   Psychiatric/Behavioral:  Negative for agitation, confusion and hallucinations.  The patient is nervous/anxious.    Objective:     Vital Signs (Most Recent):  Temp: 98 °F (36.7 °C) (03/12/22 0545)  Pulse: 70 (03/12/22 0747)  Resp: 18 (03/12/22 0747)  BP: (!) 146/72 (03/12/22 0854)  SpO2: (!) 90 % (03/12/22 0747)   Vital Signs (24h Range):  Temp:  [97.1 °F (36.2 °C)-98 °F (36.7 °C)] 98 °F (36.7 °C)  Pulse:  [70-85] 70  Resp:  [16-20] 18  SpO2:  [90 %-95 %] 90 %  BP: (146-151)/(69-72) 146/72     Weight: 62.6 kg (138 lb)  Body mass index is 23.69 kg/m².    Intake/Output Summary (Last 24 hours) at 3/12/2022 1211  Last data filed at 3/12/2022 1023  Gross per 24 hour   Intake 1620 ml   Output --   Net 1620 ml        Physical Exam  Vitals and nursing note reviewed.   Constitutional:       General: She is awake. She is not in acute distress.     Appearance: Normal appearance. She is not ill-appearing, toxic-appearing or diaphoretic.   HENT:      Head: Normocephalic and atraumatic.      Nose: Nose normal. No congestion or rhinorrhea.      Mouth/Throat:      Mouth: Mucous membranes are moist.      Pharynx: Oropharynx is clear. No oropharyngeal exudate or posterior oropharyngeal erythema.   Eyes:      General: No scleral icterus.        Right eye: No discharge.         Left eye: No discharge.      Extraocular Movements: Extraocular movements intact.      Pupils: Pupils are equal, round, and reactive to light.   Neck:      Thyroid: No thyroid mass or thyromegaly.      Vascular: No carotid bruit.      Meningeal: Brudzinski's sign and Kernig's sign absent.   Cardiovascular:      Rate and Rhythm: Normal rate and regular rhythm.      Chest Wall: PMI is not displaced. No thrill.      Pulses: Normal pulses.      Heart sounds: Normal heart sounds. No murmur heard.    No friction rub. No gallop.   Pulmonary:      Effort: Pulmonary effort is normal. No tachypnea, accessory muscle usage, prolonged expiration or respiratory distress.      Breath sounds: Normal breath sounds. No stridor or decreased air movement. No  wheezing, rhonchi or rales.   Chest:      Chest wall: No tenderness.   Abdominal:      General: Bowel sounds are normal. There is no distension.      Palpations: Abdomen is soft. There is no hepatomegaly, splenomegaly or mass.      Tenderness: There is no abdominal tenderness. There is no right CVA tenderness, left CVA tenderness, guarding or rebound.      Hernia: No hernia is present.   Musculoskeletal:         General: Tenderness present. No swelling, deformity or signs of injury. Normal range of motion.      Cervical back: Normal range of motion and neck supple. No rigidity. No muscular tenderness.      Right lower leg: No edema.      Left lower leg: No edema.   Lymphadenopathy:      Cervical: No cervical adenopathy.   Skin:     General: Skin is warm.      Capillary Refill: Capillary refill takes less than 2 seconds.      Coloration: Skin is not cyanotic, jaundiced or pale.      Findings: No bruising, erythema, lesion, petechiae or rash.   Neurological:      Mental Status: She is oriented to person, place, and time.      Cranial Nerves: No cranial nerve deficit, dysarthria or facial asymmetry.      Sensory: No sensory deficit.      Motor: Weakness present. No tremor.      Coordination: Coordination normal.   Psychiatric:         Mood and Affect: Mood normal. Mood is not anxious or depressed. Affect is not flat.         Speech: Speech is not rapid and pressured or slurred.         Behavior: Behavior normal. Behavior is not agitated, aggressive or combative.         Thought Content: Thought content normal. Thought content is not paranoid or delusional.         Cognition and Memory: Cognition is not impaired. Memory is not impaired.         Judgment: Judgment normal.       Significant Labs: All pertinent labs within the past 24 hours have been reviewed.  Recent Lab Results       None            Significant Imaging: I have reviewed all pertinent imaging results/findings within the past 24 hours.      Assessment/Plan:       Acute renal injury  Patient with acute kidney injury likely d/t Pre-renal azotemia Which is currently stable. Labs reviewed- Renal function/electrolytes with Estimated Creatinine Clearance: 29.4 mL/min (based on SCr of 1.1 mg/dL). according to latest data. Monitor urine output and serial BMP and adjust therapy as needed. Avoid nephrotoxins and renally dose meds for GFR listed above.       UTI (urinary tract infection)  IV abx therapy complete while on med/surg floor.      Fall  Continue inpatient rehab efforts      Hypertensive urgency  Blood pressure stabilized with medication. Monitor closely.      Pelvic fracture  Continue inpatient rehab efforts      VTE Risk Mitigation (From admission, onward)         Ordered     apixaban tablet 2.5 mg  2 times daily         03/05/22 0549     IP VTE HIGH RISK PATIENT  Once         03/04/22 1449     Place sequential compression device  Until discontinued         03/04/22 1449                Discharge Planning   KIMANI:      Code Status: Full Code   Is the patient medically ready for discharge?:     Reason for patient still in hospital (select all that apply): Treatment  Discharge Plan A: Home Health                  Mario Burger Jr, MD  Department of Hospital Medicine   [facility] ([facility])

## 2022-03-12 NOTE — SUBJECTIVE & OBJECTIVE
Interval History: Pt seen and evaluated    Review of Systems   Constitutional:  Positive for activity change and appetite change. Negative for chills and fever.   Respiratory:  Negative for cough, shortness of breath and wheezing.    Cardiovascular:  Negative for chest pain, palpitations and leg swelling.   Gastrointestinal:  Negative for abdominal pain.   Genitourinary:  Negative for difficulty urinating.   Musculoskeletal:  Positive for arthralgias and back pain. Negative for myalgias.        Right hip pain   Neurological:  Positive for weakness. Negative for dizziness, tremors, seizures, syncope, facial asymmetry, speech difficulty and headaches.   Hematological:  Negative for adenopathy.   Psychiatric/Behavioral:  Negative for agitation, confusion and hallucinations. The patient is nervous/anxious.    Objective:     Vital Signs (Most Recent):  Temp: 98 °F (36.7 °C) (03/12/22 0545)  Pulse: 70 (03/12/22 0747)  Resp: 18 (03/12/22 0747)  BP: (!) 146/72 (03/12/22 0854)  SpO2: (!) 90 % (03/12/22 0747)   Vital Signs (24h Range):  Temp:  [97.1 °F (36.2 °C)-98 °F (36.7 °C)] 98 °F (36.7 °C)  Pulse:  [70-85] 70  Resp:  [16-20] 18  SpO2:  [90 %-95 %] 90 %  BP: (146-151)/(69-72) 146/72     Weight: 62.6 kg (138 lb)  Body mass index is 23.69 kg/m².    Intake/Output Summary (Last 24 hours) at 3/12/2022 1211  Last data filed at 3/12/2022 1023  Gross per 24 hour   Intake 1620 ml   Output --   Net 1620 ml        Physical Exam  Vitals and nursing note reviewed.   Constitutional:       General: She is awake. She is not in acute distress.     Appearance: Normal appearance. She is not ill-appearing, toxic-appearing or diaphoretic.   HENT:      Head: Normocephalic and atraumatic.      Nose: Nose normal. No congestion or rhinorrhea.      Mouth/Throat:      Mouth: Mucous membranes are moist.      Pharynx: Oropharynx is clear. No oropharyngeal exudate or posterior oropharyngeal erythema.   Eyes:      General: No scleral icterus.         Right eye: No discharge.         Left eye: No discharge.      Extraocular Movements: Extraocular movements intact.      Pupils: Pupils are equal, round, and reactive to light.   Neck:      Thyroid: No thyroid mass or thyromegaly.      Vascular: No carotid bruit.      Meningeal: Brudzinski's sign and Kernig's sign absent.   Cardiovascular:      Rate and Rhythm: Normal rate and regular rhythm.      Chest Wall: PMI is not displaced. No thrill.      Pulses: Normal pulses.      Heart sounds: Normal heart sounds. No murmur heard.    No friction rub. No gallop.   Pulmonary:      Effort: Pulmonary effort is normal. No tachypnea, accessory muscle usage, prolonged expiration or respiratory distress.      Breath sounds: Normal breath sounds. No stridor or decreased air movement. No wheezing, rhonchi or rales.   Chest:      Chest wall: No tenderness.   Abdominal:      General: Bowel sounds are normal. There is no distension.      Palpations: Abdomen is soft. There is no hepatomegaly, splenomegaly or mass.      Tenderness: There is no abdominal tenderness. There is no right CVA tenderness, left CVA tenderness, guarding or rebound.      Hernia: No hernia is present.   Musculoskeletal:         General: Tenderness present. No swelling, deformity or signs of injury. Normal range of motion.      Cervical back: Normal range of motion and neck supple. No rigidity. No muscular tenderness.      Right lower leg: No edema.      Left lower leg: No edema.   Lymphadenopathy:      Cervical: No cervical adenopathy.   Skin:     General: Skin is warm.      Capillary Refill: Capillary refill takes less than 2 seconds.      Coloration: Skin is not cyanotic, jaundiced or pale.      Findings: No bruising, erythema, lesion, petechiae or rash.   Neurological:      Mental Status: She is oriented to person, place, and time.      Cranial Nerves: No cranial nerve deficit, dysarthria or facial asymmetry.      Sensory: No sensory deficit.      Motor:  Weakness present. No tremor.      Coordination: Coordination normal.   Psychiatric:         Mood and Affect: Mood normal. Mood is not anxious or depressed. Affect is not flat.         Speech: Speech is not rapid and pressured or slurred.         Behavior: Behavior normal. Behavior is not agitated, aggressive or combative.         Thought Content: Thought content normal. Thought content is not paranoid or delusional.         Cognition and Memory: Cognition is not impaired. Memory is not impaired.         Judgment: Judgment normal.       Significant Labs: All pertinent labs within the past 24 hours have been reviewed.  Recent Lab Results       None            Significant Imaging: I have reviewed all pertinent imaging results/findings within the past 24 hours.

## 2022-03-13 PROCEDURE — 25000003 PHARM REV CODE 250: Performed by: INTERNAL MEDICINE

## 2022-03-13 PROCEDURE — 11000001 HC ACUTE MED/SURG PRIVATE ROOM

## 2022-03-13 PROCEDURE — 99900031 HC PATIENT EDUCATION (STAT)

## 2022-03-13 PROCEDURE — 94761 N-INVAS EAR/PLS OXIMETRY MLT: CPT

## 2022-03-13 PROCEDURE — 99900035 HC TECH TIME PER 15 MIN (STAT)

## 2022-03-13 PROCEDURE — 25000003 PHARM REV CODE 250: Performed by: NURSE PRACTITIONER

## 2022-03-13 RX ADMIN — DOCUSATE SODIUM 100 MG: 100 CAPSULE, LIQUID FILLED ORAL at 08:03

## 2022-03-13 RX ADMIN — APIXABAN 2.5 MG: 2.5 TABLET, FILM COATED ORAL at 08:03

## 2022-03-13 RX ADMIN — CARVEDILOL 12.5 MG: 12.5 TABLET, FILM COATED ORAL at 08:03

## 2022-03-13 RX ADMIN — EZETIMIBE 10 MG: 10 TABLET ORAL at 08:03

## 2022-03-13 RX ADMIN — ALPRAZOLAM 0.75 MG: 0.25 TABLET ORAL at 08:03

## 2022-03-13 RX ADMIN — TRAMADOL HYDROCHLORIDE 50 MG: 50 TABLET ORAL at 08:03

## 2022-03-13 NOTE — NURSING
Portable chest xray done as ordered for shortness of breath with exertion and wheezing per request of her daughter

## 2022-03-14 LAB
ALBUMIN SERPL BCP-MCNC: 2.3 G/DL (ref 3.5–5.2)
ALP SERPL-CCNC: 90 U/L (ref 55–135)
ALT SERPL W/O P-5'-P-CCNC: 17 U/L (ref 10–44)
ANION GAP SERPL CALC-SCNC: 3 MMOL/L (ref 8–16)
AST SERPL-CCNC: 12 U/L (ref 10–40)
BASOPHILS # BLD AUTO: 0.05 K/UL (ref 0–0.2)
BASOPHILS NFR BLD: 0.6 % (ref 0–1.9)
BILIRUB SERPL-MCNC: 0.5 MG/DL (ref 0.1–1)
BUN SERPL-MCNC: 27 MG/DL (ref 8–23)
CALCIUM SERPL-MCNC: 9.1 MG/DL (ref 8.7–10.5)
CHLORIDE SERPL-SCNC: 103 MMOL/L (ref 95–110)
CO2 SERPL-SCNC: 32 MMOL/L (ref 23–29)
CREAT SERPL-MCNC: 1 MG/DL (ref 0.5–1.4)
DIFFERENTIAL METHOD: ABNORMAL
EOSINOPHIL # BLD AUTO: 0.2 K/UL (ref 0–0.5)
EOSINOPHIL NFR BLD: 2.5 % (ref 0–8)
ERYTHROCYTE [DISTWIDTH] IN BLOOD BY AUTOMATED COUNT: 12.9 % (ref 11.5–14.5)
EST. GFR  (AFRICAN AMERICAN): 57.3 ML/MIN/1.73 M^2
EST. GFR  (NON AFRICAN AMERICAN): 49.7 ML/MIN/1.73 M^2
GLUCOSE SERPL-MCNC: 94 MG/DL (ref 70–110)
HCT VFR BLD AUTO: 38.7 % (ref 37–48.5)
HGB BLD-MCNC: 12.4 G/DL (ref 12–16)
IMM GRANULOCYTES # BLD AUTO: 0.04 K/UL (ref 0–0.04)
IMM GRANULOCYTES NFR BLD AUTO: 0.5 % (ref 0–0.5)
LYMPHOCYTES # BLD AUTO: 2.1 K/UL (ref 1–4.8)
LYMPHOCYTES NFR BLD: 24.3 % (ref 18–48)
MAGNESIUM SERPL-MCNC: 1.6 MG/DL (ref 1.6–2.6)
MCH RBC QN AUTO: 29.3 PG (ref 27–31)
MCHC RBC AUTO-ENTMCNC: 32 G/DL (ref 32–36)
MCV RBC AUTO: 92 FL (ref 82–98)
MONOCYTES # BLD AUTO: 1.5 K/UL (ref 0.3–1)
MONOCYTES NFR BLD: 17.4 % (ref 4–15)
NEUTROPHILS # BLD AUTO: 4.7 K/UL (ref 1.8–7.7)
NEUTROPHILS NFR BLD: 54.7 % (ref 38–73)
NRBC BLD-RTO: 0 /100 WBC
PLATELET # BLD AUTO: 331 K/UL (ref 150–450)
PMV BLD AUTO: 9.6 FL (ref 9.2–12.9)
POTASSIUM SERPL-SCNC: 3.9 MMOL/L (ref 3.5–5.1)
PROT SERPL-MCNC: 6.6 G/DL (ref 6–8.4)
RBC # BLD AUTO: 4.23 M/UL (ref 4–5.4)
SODIUM SERPL-SCNC: 138 MMOL/L (ref 136–145)
WBC # BLD AUTO: 8.53 K/UL (ref 3.9–12.7)

## 2022-03-14 PROCEDURE — 83735 ASSAY OF MAGNESIUM: CPT | Performed by: NURSE PRACTITIONER

## 2022-03-14 PROCEDURE — 25000003 PHARM REV CODE 250: Performed by: NURSE PRACTITIONER

## 2022-03-14 PROCEDURE — 25000003 PHARM REV CODE 250: Performed by: INTERNAL MEDICINE

## 2022-03-14 PROCEDURE — 85025 COMPLETE CBC W/AUTO DIFF WBC: CPT | Performed by: NURSE PRACTITIONER

## 2022-03-14 PROCEDURE — 11000001 HC ACUTE MED/SURG PRIVATE ROOM

## 2022-03-14 PROCEDURE — 99900035 HC TECH TIME PER 15 MIN (STAT)

## 2022-03-14 PROCEDURE — 97530 THERAPEUTIC ACTIVITIES: CPT

## 2022-03-14 PROCEDURE — 36415 COLL VENOUS BLD VENIPUNCTURE: CPT | Performed by: NURSE PRACTITIONER

## 2022-03-14 PROCEDURE — 97110 THERAPEUTIC EXERCISES: CPT

## 2022-03-14 PROCEDURE — 94761 N-INVAS EAR/PLS OXIMETRY MLT: CPT

## 2022-03-14 PROCEDURE — 80053 COMPREHEN METABOLIC PANEL: CPT | Performed by: NURSE PRACTITIONER

## 2022-03-14 PROCEDURE — 97535 SELF CARE MNGMENT TRAINING: CPT

## 2022-03-14 PROCEDURE — 99900031 HC PATIENT EDUCATION (STAT)

## 2022-03-14 RX ADMIN — EZETIMIBE 10 MG: 10 TABLET ORAL at 08:03

## 2022-03-14 RX ADMIN — DOCUSATE SODIUM 100 MG: 100 CAPSULE, LIQUID FILLED ORAL at 08:03

## 2022-03-14 RX ADMIN — APIXABAN 2.5 MG: 2.5 TABLET, FILM COATED ORAL at 08:03

## 2022-03-14 RX ADMIN — TRAMADOL HYDROCHLORIDE 50 MG: 50 TABLET ORAL at 08:03

## 2022-03-14 RX ADMIN — TRAMADOL HYDROCHLORIDE 50 MG: 50 TABLET ORAL at 10:03

## 2022-03-14 RX ADMIN — ALPRAZOLAM 0.75 MG: 0.25 TABLET ORAL at 08:03

## 2022-03-14 RX ADMIN — Medication 6 MG: at 08:03

## 2022-03-14 RX ADMIN — CARVEDILOL 12.5 MG: 12.5 TABLET, FILM COATED ORAL at 08:03

## 2022-03-14 NOTE — PLAN OF CARE
03/14/22 1335   Medicare Message   Important Message from Medicare regarding Discharge Appeal Rights Given to patient/caregiver;Explained to patient/caregiver;Signed/date by patient/caregiver   Date IMM was signed 03/14/22   Time IMM was signed [id number]     IMM discussed with patient. Signature obtained and documented. Copy given to patient.

## 2022-03-14 NOTE — PT/OT/SLP PROGRESS
Occupational Therapy  Treatment    Codi Black   MRN: [id number]   Admitting Diagnosis: Nondisplaced fracture of the S4 segment of the sacrum    OT Date of Treatment: 03/14/22   AM Start Time: 1030  AM End Time: 1200  PM Start Time: na  PM End Time: na  Treatment Type: Individual 45 and Concurrent 45  Total Time (min): 90 min      Billable Minutes:  Self Care/Home Management 45, Therapeutic Activity 15 and Therapeutic Exercise 30  Total Minutes: 90    General Precautions: Standard, fall  Orthopedic Precautions: Full weight bearing  Braces:      Spiritual, Cultural Beliefs, Worship Practices, Values that Affect Care: no    Subjective:  Communicated with nurse prior to session.    Pain/Comfort  Pain Rating 1: 8/10  Location - Side 1: Bilateral  Location - Orientation 1: lower  Location 1: back  Pain Addressed 1: Reposition, Cessation of Activity, Nurse notified ([employer] application)  Pain Rating Post-Intervention 1: 2/10    Objective:  Pt was cooperative and motivated with verbal encouragement secondary to pain in lower back as described above. She participated in extensive ADL retraining as further noted below emphasizing fall prevention and use of compensatory strategies and assistive devices providing extra time with repetition requiring min-mod verbal cueing for safety awareness and technique. Also, she participated in therapeutic activity addressing trunk mobility, trunk control, dynamic sitting balance, and trunk strength utilizing large stability ball challenging her to perform trunk flexion, extension, and lateral flexion with bilateral hands stabilized on ball requiring verbal and tactile cueing to facilitate optimal movement patterns and increased range per trial in order to improve active ROM impacting performance with functional tasks below waist. Pt then participated in therapeutic exercise performing 5x15 seated pushups requiring verbal and tactile cueing for technique challenging her to lift  buttocks off seated surface to end range elbow extension in order to strengthen triceps brachii to improve performance with sit <> stand transitions particularly from lower surfaces.     Functional Mobility:  Bed Mobility:   Supine to sit: Modified Independent   Sit to supine: Modified Independent   Rolling: Modified Independent   Scooting: Modified Independent    Transfer Training:   Sit to stand:Stand-by Assistance with Rolling Walker .  Bed <> Chair:  Step Transfer with Stand-by Assistance with Rolling Walker .  Toilet Transfer:  Pt Step Transfer with Stand-by Assistance with Grab bars .  Patient performed shower transfer Step Transfer with Stand-by Assistance with Grab bars and shower chair..    Feeding:  Patient performed feeding with Independent.    Grooming:  Patient peformed hand washing with Modified Independent at sitting at sink.  Patient performed face washing with Modified Independent at sitting at sink.  Patient performed oral hygeine with Modified Independent at sitting at sink.  Patient performe hair grooming with Modified Independent at sitting at sink.    Bathing:  Patient performed bathing with Stand-by Assistance with grab bar, Handheld shower head, long-handled sponge and shower chair at Shower.    UE Dressing:  Patient performed UE Dressing with Modified Independent with extra time at Wheelchair.    LE Dressing:  Patient don/doffed socks with Minimal Assistance, Patient don/doffed shoes with Stand-by Assistance, Patient performed don/doffed adult brief with Contact Guard Assistance and Patient performed don/doffed pants with Contact Guard Assistance    Toilet Training:  Pt performed toileting with Stand-by Assistance with Grab  bar at Commode.    Balance:   Static Sit: GOOD: Takes MODERATE challenges from all directions  Dynamic Sit:  GOOD-: Incosistently Maintains balance through MODERATE excursions of active trunk movement,     Static Stand: FAIR+: Takes MINIMAL challenges from all  directions  Dynamic stand: FAIR+: Needs CLOSE SUPERVISION during gait and is able to right self with minor LOB      Patient left up in chair with nurse notified    ASSESSMENT:  Pt now SBA / supervision to modified independent with ADL's including functional transfers with extra time, min-mod verbal cueing, and use of assistive devices. However, she requires min assist with LB dressing impacted by lower back and LE pain.     Rehab potential is good    Activity tolerance: Good    Discharge recommendations: home with home health     Equipment recommendations: none     GOALS:   Short Term Goals to be met by: 03/11/22      Patient will increase functional independence with ADLs by performing:     UE Dressing with Supervision. MET  LE Dressing with Moderate Assistance. MET  Toileting from toilet with Moderate Assistance for hygiene and clothing management. MET  Bathing from  shower chair/bench with Moderate Assistance. MET  Step transfer with Stand-by Assistance. MET  Toilet transfer to toilet with Contact Guard Assistance. MET  Increased functional strength to 4-/5 for bilateral UE's. MET     Long Term Goals to be met by: 03/18/22      Patient will increase functional independence with ADLs by performing:     Feeding with [employer]. MET  UE Dressing with Modified [place]. MET  LE Dressing with Minimal Assistance. MET  Grooming while seated at sink with Modified [place]. MET  Toileting from toilet with Supervision for hygiene and clothing management. MET  Bathing from  shower chair/bench with Supervision. MET  Step transfer with Modified [place]. NOT MET - SBA  Toilet transfer to toilet with Modified [place]. NOT MET - SBA  Increased functional strength to 4 to 4+/5 for bilateral UE's. NOT MET -  4 / 5    Plan:  Patient to be seen 5 x/week (90 min per day, for 14 days) to address the above listed problems via self-care/home management, community/work re-entry, therapeutic activities, therapeutic  exercises  Plan of Care expires: 03/18/22  Plan of Care reviewed with: patient         03/14/2022

## 2022-03-14 NOTE — CARE UPDATE
03/14/22 0822   Patient Assessment/Suction   Level of Consciousness (AVPU) alert   Respiratory Effort Normal;Unlabored   Expansion/Accessory Muscles/Retractions no use of accessory muscles;no retractions;expansion symmetric   All Lung Fields Breath Sounds clear;wheezes, expiratory  (wheezes were very mild)   Rhythm/Pattern, Respiratory pattern regular;depth regular;no shortness of breath reported   Cough Frequency no cough   PRE-TX-O2   O2 Device (Oxygen Therapy) room air   SpO2 99 %   Pulse Oximetry Type Intermittent   $ Pulse Oximetry - Multiple Charge Pulse Oximetry - Multiple   Probe Placed On (Pulse Ox) Left:;ear   Pulse 69   Resp 20   Aerosol Therapy   $ Aerosol Therapy Charges Refused   Respiratory Evaluation   $ Care Plan Tech Time 15 min   Pt stated she does not need any breathing treatments for her mild wheezing. Pt states it only happens upon exertion and resolves itself. Edmond WALSH

## 2022-03-14 NOTE — PT/OT/SLP PROGRESS
Physical Therapy         Treatment        Codi Black   MRN: [id number]     PT Received On: 03/14/22  Total Time (min): 90        Billable Minutes:  Gait [id number], Therapeutic Activity 30 and Therapeutic Exercise 30  Total Minutes: 90  AM Start Time:   AM End Time:   PM Start Time: 1430  PM End Time: 1600  Treatment Type: Individual 45 and Concurrent 45  Treatment Type: Treatment  PT/PTA: PT             General Precautions: Standard,    Orthopedic Precautions:     Braces:           Subjective:  Communicated with nurse prior to session.         Objective:  Patient found in recliner, with      Functional Mobility:  Bed Mobility:   Supine to sit: Standby Assistance   Sit to supine: Standby Assistance   Rolling: Standby Assistance   Scooting: Standby Assistance    Balance:   Static Sit: GOOD: Takes MODERATE challenges from all directions  Dynamic Sit:  FAIR+: Maintains balance through MINIMAL excursions of active trunk motion  Static Stand: FAIR+: Takes MINIMAL challenges from all directions  Dynamic stand: FAIR+: Needs CLOSE SUPERVISION during gait and is able to right self with minor LOB    Transfer Training:  Sit to stand:Supervision or Set-up Assistance with Rolling Walker    Bed <> Chair:  Step Transfer with Stand-by Assistance with Rolling Walker      Wheelchair Training:  Pt propelled wc sba 150ft    Gait Training:  Pt ambulated 2 times 125ft with rw sba    Stair Training:    Up and down 4 steps with rail and cga    Additional Treatment:  Pt with 3 lb ankle wts performed sitting BLE exs 4 sets 20 reps. Pt then performed nustep x 20 min at level 2    Activity Tolerance:  Patient tolerated treatment well    Patient left supine with call button in reach.    Assessment:  Codi Black is a 89 y.o. female with a medical diagnosis of <principal problem not specified>. She presents with better standing a better gait today. Overall, she is sba with most fxn'l acivities.    Rehab potential is good.    Activity  tolerance: Good    Discharge recommendations:       Equipment recommendations:       GOALS:   Multidisciplinary Problems     Physical Therapy Goals     Not on file                PLAN:    Patient to be seen 5 x/week  to address the above listed problems via gait training, therapeutic groups, therapeutic activities, therapeutic exercises, neuromuscular re-education  Plan of Care expires: 03/14/22  Plan of Care reviewed with: patient         3/14/2022

## 2022-03-14 NOTE — PROGRESS NOTES
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  The patient had a fall about a week prior.  She has been having intermittent pain in that area and having more difficulty ambulating and getting to the toilet.  Patient denied any chest pain angina fever chills diarrhea or shortness of breath.  Upon admission to the emergency department she was found to be significantly hypertensive and severe pain in the right sacral area.  She had no definitive bruising or area of hematoma.  An x-ray initially was unremarkable and a CT then showed a fracture of the S4 segment of the sacrum.  Patient was having intermittent hypoxemia a mildly elevated troponin and was admitted.  Patient was started on pain control several medications were adjusted for her blood pressure as well as her other comorbidities.  Patient was seen by therapy her therapeutic notes are in the prescreen.  Ultimately the patient was unable to return home at a safe functional level and we recommended inpatient rehab.  After initial denial from her insurance company we overturned this with a verbal conversation.  Patient is eager to improve and to have better pain control.        Overview/Hospital Course:  3/7 SD: Pt actively participating in physical therapy - tolerating well.    3/9 SD: Pt participating in physical therapy. C/O pain to right leg other than at location of fracture site. X-rays indicate:  1. Mild osteoarthrosis right hip.  2. Moderate osteoarthrosis  right knee.    3/10 SD: Pt is sitting in chair in room with legs elevated. She reports having a bowel movement this morning. She reports pain is improved today.    3/11 SD: Pt participating in physical therapy, tolerating well.    3/12 WC:  Participating well with therapy thus far.     3/13 SD: Sitting in chair near window in room. Continue IPR efforts.      Interval History: Pt seen and evaluated    Review of Systems   Constitutional:  Positive for activity change and appetite change. Negative for chills and fever.   Respiratory:  Negative for cough, shortness of breath and wheezing.    Cardiovascular:  Negative for chest pain, palpitations and leg swelling.   Gastrointestinal:  Negative for abdominal pain.   Genitourinary:  Negative for difficulty urinating.   Musculoskeletal:  Positive for arthralgias and back pain. Negative for myalgias.        Right hip pain   Neurological:  Positive for weakness. Negative for dizziness, tremors, seizures, syncope, facial asymmetry, speech difficulty and headaches.   Hematological:  Negative for adenopathy.   Psychiatric/Behavioral:  Negative for agitation, confusion and hallucinations. The patient is nervous/anxious.    Objective:     Vital Signs (Most Recent):  Temp: 98.7 °F (37.1 °C) (03/14/22 0726)  Pulse: 69 (03/14/22 0822)  Resp: 20 (03/14/22 1030)  BP: (!) 144/78 (03/14/22 0726)  SpO2: 99 % (03/14/22 0822)   Vital Signs (24h Range):  Temp:  [98.3 °F (36.8 °C)-98.7 °F (37.1 °C)] 98.7 °F (37.1 °C)  Pulse:  [69-80] 69  Resp:  [18-22] 20  SpO2:  [93 %-99 %] 99 %  BP: (144-180)/(77-78) 144/78     Weight: 62.6 kg (138 lb)  Body mass index is 23.69 kg/m².    Intake/Output Summary (Last 24 hours) at 3/14/2022 1444  Last data filed at 3/14/2022 1350  Gross per 24 hour   Intake 1660 ml   Output --   Net 1660 ml        Physical Exam  Vitals and nursing note reviewed.   Constitutional:       General: She is awake. She is not in acute distress.     Appearance: Normal appearance. She  is not ill-appearing, toxic-appearing or diaphoretic.   HENT:      Head: Normocephalic and atraumatic.      Nose: Nose normal. No congestion or rhinorrhea.      Mouth/Throat:      Mouth: Mucous membranes are moist.      Pharynx: Oropharynx is clear. No oropharyngeal exudate or posterior oropharyngeal erythema.   Eyes:      General: No scleral icterus.        Right eye: No discharge.         Left eye: No discharge.      Extraocular Movements: Extraocular movements intact.      Pupils: Pupils are equal, round, and reactive to light.   Neck:      Thyroid: No thyroid mass or thyromegaly.      Vascular: No carotid bruit.      Meningeal: Brudzinski's sign and Kernig's sign absent.   Cardiovascular:      Rate and Rhythm: Normal rate and regular rhythm.      Chest Wall: PMI is not displaced. No thrill.      Pulses: Normal pulses.      Heart sounds: Normal heart sounds. No murmur heard.    No friction rub. No gallop.   Pulmonary:      Effort: Pulmonary effort is normal. No tachypnea, accessory muscle usage, prolonged expiration or respiratory distress.      Breath sounds: Normal breath sounds. No stridor or decreased air movement. No wheezing, rhonchi or rales.   Chest:      Chest wall: No tenderness.   Abdominal:      General: Bowel sounds are normal. There is no distension.      Palpations: Abdomen is soft. There is no hepatomegaly, splenomegaly or mass.      Tenderness: There is no abdominal tenderness. There is no right CVA tenderness, left CVA tenderness, guarding or rebound.      Hernia: No hernia is present.   Musculoskeletal:         General: Tenderness present. No swelling, deformity or signs of injury. Normal range of motion.      Cervical back: Normal range of motion and neck supple. No rigidity. No muscular tenderness.      Right lower leg: No edema.      Left lower leg: No edema.   Lymphadenopathy:      Cervical: No cervical adenopathy.   Skin:     General: Skin is warm.      Capillary Refill: Capillary refill  takes less than 2 seconds.      Coloration: Skin is not cyanotic, jaundiced or pale.      Findings: No bruising, erythema, lesion, petechiae or rash.   Neurological:      Mental Status: She is oriented to person, place, and time.      Cranial Nerves: No cranial nerve deficit, dysarthria or facial asymmetry.      Sensory: No sensory deficit.      Motor: Weakness present. No tremor.      Coordination: Coordination normal.   Psychiatric:         Mood and Affect: Mood normal. Mood is not anxious or depressed. Affect is not flat.         Speech: Speech is not rapid and pressured or slurred.         Behavior: Behavior normal. Behavior is not agitated, aggressive or combative.         Thought Content: Thought content normal. Thought content is not paranoid or delusional.         Cognition and Memory: Cognition is not impaired. Memory is not impaired.         Judgment: Judgment normal.       Significant Labs: All pertinent labs within the past 24 hours have been reviewed.  Recent Lab Results         03/14/22  0636        Albumin 2.3       Alkaline Phosphatase 90       ALT 17       Anion Gap 3       AST 12       Baso # 0.05       Basophil % 0.6       BILIRUBIN TOTAL 0.5  Comment: For infants and newborns, interpretation of results should be based  on gestational age, weight and in agreement with clinical  observations.    Premature Infant recommended reference ranges:  Up to 24 hours.............<8.0 mg/dL  Up to 48 hours............<12.0 mg/dL  3-5 days..................<15.0 mg/dL  6-29 days.................<15.0 mg/dL    For patients on Eltrombopag therapy, use of Dimension [place] TBIL is   not   recommended.         BUN 27       Calcium 9.1       Chloride 103       CO2 32       Creatinine 1.0       Differential Method Automated       eGFR if African American 57.3       eGFR if non  49.7  Comment: Calculation used to obtain the estimated glomerular filtration  rate (eGFR) is the CKD-EPI equation.           Eos # 0.2       Eosinophil % 2.5       Glucose 94       Gran # (ANC) 4.7       Gran % 54.7       Hematocrit 38.7       Hemoglobin 12.4       Immature Grans (Abs) 0.04  Comment: Mild elevation in immature granulocytes is non specific and   can be seen in a variety of conditions including stress response,   acute inflammation, trauma and pregnancy. Correlation with other   laboratory and clinical findings is essential.         Immature Granulocytes 0.5       Lymph # 2.1       Lymph % 24.3       Magnesium 1.6       MCH 29.3       MCHC 32.0       MCV 92       Mono # 1.5       Mono % 17.4       MPV 9.6       nRBC 0       Platelets 331       Potassium 3.9       PROTEIN TOTAL 6.6       RBC 4.23       RDW 12.9       Sodium 138       WBC 8.53               Significant Imaging: I have reviewed all pertinent imaging results/findings within the past 24 hours.      Assessment/Plan:      Acute renal injury  Patient with acute kidney injury likely d/t Pre-renal azotemia Which is currently stable. Labs reviewed- Renal function/electrolytes with Estimated Creatinine Clearance: 32.3 mL/min (based on SCr of 1 mg/dL). according to latest data. Monitor urine output and serial BMP and adjust therapy as needed. Avoid nephrotoxins and renally dose meds for GFR listed above.       UTI (urinary tract infection)  IV abx therapy complete while on med/surg floor.      Fall  Continue inpatient rehab efforts      Hypertensive urgency  Blood pressure stabilized with medication. Monitor closely.      Pelvic fracture  Continue inpatient rehab efforts      VTE Risk Mitigation (From admission, onward)         Ordered     apixaban tablet 2.5 mg  2 times daily         03/05/22 0549     IP VTE HIGH RISK PATIENT  Once         03/04/22 1449     Place sequential compression device  Until discontinued         03/04/22 1449                Discharge Planning   KIMANI:      Code Status: Full Code   Is the patient medically ready for discharge?:     Reason for  patient still in hospital (select all that apply): Patient trending condition, Laboratory test, Treatment and PT / OT recommendations  Discharge Plan A: Home Health                  Stephy Young NP  Department of Hospital Medicine   [facility] ([facility])

## 2022-03-14 NOTE — PLAN OF CARE
Problem: Adult Inpatient Plan of Care  Goal: Plan of Care Review  Outcome: Ongoing, Progressing  Goal: Patient-Specific Goal (Individualized)  Outcome: Ongoing, Progressing  Goal: Absence of Hospital-Acquired Illness or Injury  Outcome: Ongoing, Progressing  Goal: Optimal Comfort and Wellbeing  Outcome: Ongoing, Progressing  Goal: Readiness for Transition of Care  Outcome: Ongoing, Progressing     Problem: Fluid and Electrolyte Imbalance (Acute Kidney Injury/Impairment)  Goal: Fluid and Electrolyte Balance  Outcome: Ongoing, Progressing     Problem: Oral Intake Inadequate (Acute Kidney Injury/Impairment)  Goal: Optimal Nutrition Intake  Outcome: Ongoing, Progressing     Problem: Renal Function Impairment (Acute Kidney Injury/Impairment)  Goal: Effective Renal Function  Outcome: Ongoing, Progressing     Problem: Skin Injury Risk Increased  Goal: Skin Health and Integrity  Outcome: Ongoing, Progressing     Problem: Fall Injury Risk  Goal: Absence of Fall and Fall-Related Injury  Outcome: Ongoing, Progressing     Problem: Bariatric Environmental Safety  Goal: Safety Maintained with Care  Outcome: Ongoing, Progressing     Problem: Sleep Impairment (Anxiety Signs/Symptoms)  Goal: Improved Sleep (Anxiety Signs/Symptoms)  Outcome: Ongoing, Progressing     Problem: Social, Occupational or Functional Impairment (Anxiety Signs/Symptoms)  Goal: Enhanced Social, Occupational or Functional Skills (Anxiety Signs/Symptoms)  Outcome: Ongoing, Progressing     Problem: Somatic Disturbance (Anxiety Signs/Symptoms)  Goal: Improved Somatic Symptoms (Anxiety Signs/Symptoms)  Outcome: Ongoing, Progressing     Problem: Constipation  Goal: Effective Bowel Elimination  Outcome: Ongoing, Progressing     Problem: Self-Care Deficit  Goal: Improved Ability to Complete Activities of Daily Living  Outcome: Ongoing, Progressing     Problem: Pain Acute  Goal: Acceptable Pain Control and Functional Ability  Outcome: Ongoing, Progressing      Problem: Mobility Impairment  Goal: Optimal Mobility  Outcome: Ongoing, Progressing       Patient educated on importance of safety awareness, especially when discharged home with family. Patient verbalized understanding.  Worked with patient to control pain, and promoted self care.  Patient verbalized eagarness to discharge home tomorrow. Education given to patient on all medications.  Patient verbalized understanding.

## 2022-03-14 NOTE — ASSESSMENT & PLAN NOTE
Patient with acute kidney injury likely d/t Pre-renal azotemia Which is currently stable. Labs reviewed- Renal function/electrolytes with Estimated Creatinine Clearance: 32.3 mL/min (based on SCr of 1 mg/dL). according to latest data. Monitor urine output and serial BMP and adjust therapy as needed. Avoid nephrotoxins and renally dose meds for GFR listed above.

## 2022-03-14 NOTE — SUBJECTIVE & OBJECTIVE
Interval History: Pt seen and evaluated    Review of Systems   Constitutional:  Positive for activity change and appetite change. Negative for chills and fever.   Respiratory:  Negative for cough, shortness of breath and wheezing.    Cardiovascular:  Negative for chest pain, palpitations and leg swelling.   Gastrointestinal:  Negative for abdominal pain.   Genitourinary:  Negative for difficulty urinating.   Musculoskeletal:  Positive for arthralgias and back pain. Negative for myalgias.        Right hip pain   Neurological:  Positive for weakness. Negative for dizziness, tremors, seizures, syncope, facial asymmetry, speech difficulty and headaches.   Hematological:  Negative for adenopathy.   Psychiatric/Behavioral:  Negative for agitation, confusion and hallucinations. The patient is nervous/anxious.    Objective:     Vital Signs (Most Recent):  Temp: 98.7 °F (37.1 °C) (03/14/22 0726)  Pulse: 69 (03/14/22 0822)  Resp: 20 (03/14/22 1030)  BP: (!) 144/78 (03/14/22 0726)  SpO2: 99 % (03/14/22 0822)   Vital Signs (24h Range):  Temp:  [98.3 °F (36.8 °C)-98.7 °F (37.1 °C)] 98.7 °F (37.1 °C)  Pulse:  [69-80] 69  Resp:  [18-22] 20  SpO2:  [93 %-99 %] 99 %  BP: (144-180)/(77-78) 144/78     Weight: 62.6 kg (138 lb)  Body mass index is 23.69 kg/m².    Intake/Output Summary (Last 24 hours) at 3/14/2022 1444  Last data filed at 3/14/2022 1350  Gross per 24 hour   Intake 1660 ml   Output --   Net 1660 ml        Physical Exam  Vitals and nursing note reviewed.   Constitutional:       General: She is awake. She is not in acute distress.     Appearance: Normal appearance. She is not ill-appearing, toxic-appearing or diaphoretic.   HENT:      Head: Normocephalic and atraumatic.      Nose: Nose normal. No congestion or rhinorrhea.      Mouth/Throat:      Mouth: Mucous membranes are moist.      Pharynx: Oropharynx is clear. No oropharyngeal exudate or posterior oropharyngeal erythema.   Eyes:      General: No scleral icterus.         Right eye: No discharge.         Left eye: No discharge.      Extraocular Movements: Extraocular movements intact.      Pupils: Pupils are equal, round, and reactive to light.   Neck:      Thyroid: No thyroid mass or thyromegaly.      Vascular: No carotid bruit.      Meningeal: Brudzinski's sign and Kernig's sign absent.   Cardiovascular:      Rate and Rhythm: Normal rate and regular rhythm.      Chest Wall: PMI is not displaced. No thrill.      Pulses: Normal pulses.      Heart sounds: Normal heart sounds. No murmur heard.    No friction rub. No gallop.   Pulmonary:      Effort: Pulmonary effort is normal. No tachypnea, accessory muscle usage, prolonged expiration or respiratory distress.      Breath sounds: Normal breath sounds. No stridor or decreased air movement. No wheezing, rhonchi or rales.   Chest:      Chest wall: No tenderness.   Abdominal:      General: Bowel sounds are normal. There is no distension.      Palpations: Abdomen is soft. There is no hepatomegaly, splenomegaly or mass.      Tenderness: There is no abdominal tenderness. There is no right CVA tenderness, left CVA tenderness, guarding or rebound.      Hernia: No hernia is present.   Musculoskeletal:         General: Tenderness present. No swelling, deformity or signs of injury. Normal range of motion.      Cervical back: Normal range of motion and neck supple. No rigidity. No muscular tenderness.      Right lower leg: No edema.      Left lower leg: No edema.   Lymphadenopathy:      Cervical: No cervical adenopathy.   Skin:     General: Skin is warm.      Capillary Refill: Capillary refill takes less than 2 seconds.      Coloration: Skin is not cyanotic, jaundiced or pale.      Findings: No bruising, erythema, lesion, petechiae or rash.   Neurological:      Mental Status: She is oriented to person, place, and time.      Cranial Nerves: No cranial nerve deficit, dysarthria or facial asymmetry.      Sensory: No sensory deficit.      Motor:  Weakness present. No tremor.      Coordination: Coordination normal.   Psychiatric:         Mood and Affect: Mood normal. Mood is not anxious or depressed. Affect is not flat.         Speech: Speech is not rapid and pressured or slurred.         Behavior: Behavior normal. Behavior is not agitated, aggressive or combative.         Thought Content: Thought content normal. Thought content is not paranoid or delusional.         Cognition and Memory: Cognition is not impaired. Memory is not impaired.         Judgment: Judgment normal.       Significant Labs: All pertinent labs within the past 24 hours have been reviewed.  Recent Lab Results         03/14/22  0636        Albumin 2.3       Alkaline Phosphatase 90       ALT 17       Anion Gap 3       AST 12       Baso # 0.05       Basophil % 0.6       BILIRUBIN TOTAL 0.5  Comment: For infants and newborns, interpretation of results should be based  on gestational age, weight and in agreement with clinical  observations.    Premature Infant recommended reference ranges:  Up to 24 hours.............<8.0 mg/dL  Up to 48 hours............<12.0 mg/dL  3-5 days..................<15.0 mg/dL  6-29 days.................<15.0 mg/dL    For patients on Eltrombopag therapy, use of Dimension [place] TBIL is   not   recommended.         BUN 27       Calcium 9.1       Chloride 103       CO2 32       Creatinine 1.0       Differential Method Automated       eGFR if African American 57.3       eGFR if non  49.7  Comment: Calculation used to obtain the estimated glomerular filtration  rate (eGFR) is the CKD-EPI equation.          Eos # 0.2       Eosinophil % 2.5       Glucose 94       Gran # (ANC) 4.7       Gran % 54.7       Hematocrit 38.7       Hemoglobin 12.4       Immature Grans (Abs) 0.04  Comment: Mild elevation in immature granulocytes is non specific and   can be seen in a variety of conditions including stress response,   acute inflammation, trauma and pregnancy.  Correlation with other   laboratory and clinical findings is essential.         Immature Granulocytes 0.5       Lymph # 2.1       Lymph % 24.3       Magnesium 1.6       MCH 29.3       MCHC 32.0       MCV 92       Mono # 1.5       Mono % 17.4       MPV 9.6       nRBC 0       Platelets 331       Potassium 3.9       PROTEIN TOTAL 6.6       RBC 4.23       RDW 12.9       Sodium 138       WBC 8.53               Significant Imaging: I have reviewed all pertinent imaging results/findings within the past 24 hours.

## 2022-03-15 VITALS
SYSTOLIC BLOOD PRESSURE: 154 MMHG | HEIGHT: 64 IN | WEIGHT: 138 LBS | HEART RATE: 72 BPM | RESPIRATION RATE: 18 BRPM | OXYGEN SATURATION: 92 % | DIASTOLIC BLOOD PRESSURE: 71 MMHG | BODY MASS INDEX: 23.56 KG/M2 | TEMPERATURE: 98 F

## 2022-03-15 PROCEDURE — 99900035 HC TECH TIME PER 15 MIN (STAT)

## 2022-03-15 PROCEDURE — 99900031 HC PATIENT EDUCATION (STAT)

## 2022-03-15 PROCEDURE — 25000003 PHARM REV CODE 250: Performed by: INTERNAL MEDICINE

## 2022-03-15 PROCEDURE — 94761 N-INVAS EAR/PLS OXIMETRY MLT: CPT

## 2022-03-15 PROCEDURE — 25000003 PHARM REV CODE 250: Performed by: NURSE PRACTITIONER

## 2022-03-15 RX ORDER — ACETAMINOPHEN 325 MG/1
650 TABLET ORAL EVERY 8 HOURS PRN
Refills: 0
Start: 2022-03-15

## 2022-03-15 RX ORDER — TRAMADOL HYDROCHLORIDE 50 MG/1
50 TABLET ORAL EVERY 8 HOURS PRN
Qty: 90 TABLET | Refills: 0 | Status: SHIPPED | OUTPATIENT
Start: 2022-03-15 | End: 2022-04-14

## 2022-03-15 RX ORDER — CARVEDILOL 12.5 MG/1
12.5 TABLET ORAL 2 TIMES DAILY
Qty: 60 TABLET | Refills: 1 | Status: SHIPPED | OUTPATIENT
Start: 2022-03-15 | End: 2022-07-13

## 2022-03-15 RX ADMIN — EZETIMIBE 10 MG: 10 TABLET ORAL at 08:03

## 2022-03-15 RX ADMIN — DOCUSATE SODIUM 100 MG: 100 CAPSULE, LIQUID FILLED ORAL at 08:03

## 2022-03-15 RX ADMIN — APIXABAN 2.5 MG: 2.5 TABLET, FILM COATED ORAL at 08:03

## 2022-03-15 RX ADMIN — CARVEDILOL 12.5 MG: 12.5 TABLET, FILM COATED ORAL at 08:03

## 2022-03-15 NOTE — NURSING
Patient dc home today with home health PT/OT and nursing  ([facility]).  I went over DC paperwork with patient/spouse and daughter with patient/daughter  voicing understanding. All patient's belongings were packed and sent home with patient. Patient was brought downstairs via  with  hospital staff and family. Report was called into RHETT Tolentino at [facility].

## 2022-03-15 NOTE — PT/OT/SLP DISCHARGE
Occupational Therapy Discharge Summary    Codi Black  MRN: [id number]   Principal Problem:  Nondisplaced fracture of the S4 segment of the sacrum    Patient Discharged from inpatient Occupational Therapy on 03/15/22.  Please refer to prior OT note dated 03/14/22 for functional status.    Assessment:     Pt was cooperative and motivated during skilled OT treatment sessions including ADL retraining, functional transfer retraining, assistive device / adaptive equipment training, manual therapy, therapeutic activity, therapeutic exercise, and patient education / instruction regarding discharge planning and home exercise program allowing her to progress with functional goals in which she achieved 7/7 STG's and 6/9 LTG's. Pt now SBA / supervision to modified independent with ADL's including functional transfers with extra time, min-mod verbal cueing, and use of assistive devices. However, she requires min assist with LB dressing impacted by lower back and LE pain.    Objective:     GOALS:   Short Term Goals to be met by: 03/11/22      Patient will increase functional independence with ADLs by performing:     UE Dressing with Supervision. MET  LE Dressing with Moderate Assistance. MET  Toileting from toilet with Moderate Assistance for hygiene and clothing management. MET  Bathing from  shower chair/bench with Moderate Assistance. MET  Step transfer with Stand-by Assistance. MET  Toilet transfer to toilet with Contact Guard Assistance. MET  Increased functional strength to 4-/5 for bilateral UE's. MET     Long Term Goals to be met by: 03/18/22      Patient will increase functional independence with ADLs by performing:     Feeding with [place]. MET  UE Dressing with Modified [place]. MET  LE Dressing with Minimal Assistance. MET  Grooming while seated at sink with Modified [place]. MET  Toileting from toilet with Supervision for hygiene and clothing management. MET  Bathing from  shower chair/bench with  Supervision. MET  Step transfer with Modified [place]. NOT MET - SBA  Toilet transfer to toilet with Modified [place]. NOT MET - SBA  Increased functional strength to 4 to 4+/5 for bilateral UE's. NOT MET -  4 / 5    Reasons for Discontinuation of Therapy Services  Satisfactory goal achievement.      Plan:     Patient Discharged to: Home with Home Health Service    3/15/2022

## 2022-03-15 NOTE — PT/OT/SLP PROGRESS
Physical Therapy Discharge Summary    Name: Codi Black  MRN: [id number]   Principal Problem: <principal problem not specified>     Patient Discharged from acute Physical Therapy on 03-15-22.  Please refer to prior PT noted date on 03-14-22 for functional status.     Assessment:     Pt was seen for gait,transfer, bed mobility, and strength training. She was participative with all therapy sessions. Pt will need further PT to get morte ind. She is currently at a sba fxn'l level  Objective:     GOALS:   STGS  1. Pt will be sba with all bed mobility- met  2. Pt will be sba with transfers w/RW-met  3. Pt will ambulate 150ft with a rw with sba-met  4. Pt will go up and down 12 steps with rail sba-met  5. Pt will be ind with wc mobility 150ft met  LTGs  1. Pt will be ind w/bed mobility-not met  2. Pt will be ind with transfers with a rw - not met  3. Pt will be ind with gait with rw 200ft-not met  4. Pt will be ind with going up and down 12 steps with rail .- not met    Reasons for Discontinuation of Therapy Services  Plateau in progress    Plan:     Patient Discharged to: home with home health      3/15/2022

## 2022-03-15 NOTE — DISCHARGE INSTRUCTIONS
-Full code, fall precautions, activity as tolerated  -Home with [facility] with PT/OT/ and nursing  -Follow up with Dr. Rowe 3/21/2022 at 1:00 pm  -Thank you for choosing [facility] inpatient rehab with your care.

## 2022-03-15 NOTE — PLAN OF CARE
[facility])  Discharge Final Note    Primary Care Provider: Delfino Rowe MD    Expected Discharge Date:     Final Discharge Note (most recent)     Final Note - 03/15/22 0801        Final Note    Assessment Type Final Discharge Note     Anticipated Discharge Disposition Home-Health Care [facility]    What phone number can be called within the next 1-3 days to see how you are doing after discharge? --   Codi Black [phone]    Hospital Resources/Appts/Education Provided Appointments scheduled and added to AVS;Post-Acute resouces added to AVS        Post-Acute Status    Post-Acute Authorization Home Health     Home Health Status Set-up Complete/Auth obtained   Faxed referral to [facility]. Primary nurse to call report.    Discharge Delays None known at this time                 Important Message from Medicare  Important Message from Medicare regarding Discharge Appeal Rights: Given to patient/caregiver, Explained to patient/caregiver, Signed/date by patient/caregiver     Date IMM was signed: 03/14/22  Time IMM was signed: 6215    Contact Info     Delfino Rowe MD   Specialty: Family Medicine   Relationship: PCP - General    [address] [place] [id number]   Phone: [phone]       Next Steps: Follow up on 3/21/2022    Instructions: Follow up appointment with Dr. Rowe on 03/21/2022 at 1:00

## 2022-03-20 NOTE — DISCHARGE SUMMARY
[facility]  Discharge Summary      Patient Name: Codi Black  MRN: [id number]  Patient Class: IP- Rehab  Admission Date: 3/4/2022  Hospital Length of Stay: 11 days  Discharge Date and Time: 3/15/2022 11:32 AM  Attending Physician: No att. 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  The patient had a fall about a week prior.  She has been having intermittent pain in that area and having more difficulty ambulating and getting to the toilet.  Patient denied any chest pain angina fever chills diarrhea or shortness of breath.  Upon admission to the emergency department she was found to be significantly hypertensive and severe pain in the right sacral area.  She had no definitive bruising or area of hematoma.  An x-ray initially was unremarkable and a CT then showed a fracture of the S4 segment of the sacrum.  Patient was having intermittent hypoxemia a mildly elevated troponin and was admitted.  Patient was started on pain control several medications were adjusted for her blood pressure as well as her other comorbidities.  Patient was seen by therapy her therapeutic notes are in the prescreen.  Ultimately the patient was unable to return home at a safe functional level and we recommended inpatient rehab.  After initial denial from her insurance company we overturned this with a verbal conversation.  Patient is eager to improve and to have better pain control.        * No surgery found *      Hospital Course:   3/7 SD: Pt actively participating in physical therapy - tolerating well.    3/9 SD: Pt participating in physical therapy. C/O pain to right leg other than at location of fracture site. X-rays indicate:  1. Mild  osteoarthrosis right hip.  2. Moderate osteoarthrosis right knee.    3/10 SD: Pt is sitting in chair in room with legs elevated. She reports having a bowel movement this morning. She reports pain is improved today.    3/11 SD: Pt participating in physical therapy, tolerating well.    3/12 WC:  Participating well with therapy thus far.     3/13 SD: Sitting in chair near window in room. Continue IPR efforts.    Discharge Note:  Patient tolerated therapy and was able to meet her time requirements.  Patient's labs remained stable she had no other injuries with falls and her pain was much improved.  Patient's renal function was back to baseline.  Patient had a excellent functional improvement and is being discharged home at a modified independent to independent functional state.       Goals of Care Treatment Preferences:  Code Status: Full Code      Consults:     Acute renal injury  Patient with acute kidney injury likely d/t Pre-renal azotemia Which is currently stable. Labs reviewed- Renal function/electrolytes with Estimated Creatinine Clearance: 32.3 mL/min (based on SCr of 1 mg/dL). according to latest data. Monitor urine output and serial BMP and adjust therapy as needed. Avoid nephrotoxins and renally dose meds for GFR listed above.       UTI (urinary tract infection)  IV abx therapy complete while on med/surg floor.      Fall  Continue rehab efforts      Hypertensive urgency  Blood pressure stabilized with medication.    Pelvic fracture  Continue therapy efforts      Final Active Diagnoses:    Diagnosis Date Noted POA    Acute renal injury [N17.9] 03/04/2022 Yes    Pelvic fracture [S32.9XXA] 03/02/2022 Yes    Hypertensive urgency [I16.0] 03/02/2022 Yes    Fall [W19.XXXA] 03/02/2022 Yes    UTI (urinary tract infection) [N39.0] 03/02/2022 Yes      Problems Resolved During this Admission:       Discharged Condition: good    Disposition: [facility]-Health Care [facility]    Follow Up:   Follow-up Information     Delfino BRYANT  MD Jae Follow up on 3/21/2022.    Specialty: Family Medicine  Why: Follow up appointment with Dr. Rowe on 03/21/2022 at 1:00  Contact information:  [address] [place] [address]  [phone]             Rafael Quan III, MD Follow up in 2 week(s).    Specialty: Internal Medicine  Contact information:  [address] [place] [address]  [phone]                       Patient Instructions:      Diet Cardiac     Activity as tolerated       Significant Diagnostic Studies: Labs: CMP No results for input(s): NA, K, CL, CO2, GLU, BUN, CREATININE, CALCIUM, PROT, ALBUMIN, BILITOT, ALKPHOS, AST, ALT, ANIONGAP, ESTGFRAFRICA, EGFRNONAA in the last 48 hours. and CBC No results for input(s): WBC, HGB, HCT, PLT in the last 48 hours.    Pending Diagnostic Studies:     None         Medications:  Reconciled Home Medications:      Medication List      START taking these medications    acetaminophen 325 MG tablet  Commonly known as: TYLENOL  Take 2 tablets (650 mg total) by mouth every 8 (eight) hours as needed.     carvediloL 12.5 MG tablet  Commonly known as: COREG  Take 1 tablet (12.5 mg total) by mouth 2 (two) times daily.     traMADoL 50 mg tablet  Commonly known as: ULTRAM  Take 1 tablet (50 mg total) by mouth every 8 (eight) hours as needed for Pain.        CONTINUE taking these medications    ALPRAZolam 0.5 MG tablet  Commonly known as: XANAX  Take 0.75 mg by mouth every evening.     ezetimibe 10 mg tablet  Commonly known as: ZETIA  Take 10 mg by mouth once daily.        STOP taking these medications    hydroCHLOROthiazide 25 MG tablet  Commonly known as: HYDRODIURIL            Indwelling Lines/Drains at time of discharge:   Lines/Drains/Airways     None                 Time spent on the discharge of patient: 45 minutes         Rafael Quan III, MD  Department of Hospital Medicine  [facility]

## 2022-04-04 ENCOUNTER — LAB VISIT (OUTPATIENT)
Dept: LAB | Facility: HOSPITAL | Age: 87
End: 2022-04-04
Attending: FAMILY MEDICINE
Payer: MEDICARE

## 2022-04-04 DIAGNOSIS — E78.00 PURE HYPERCHOLESTEROLEMIA: Primary | ICD-10-CM

## 2022-04-04 DIAGNOSIS — E55.9 AVITAMINOSIS D: ICD-10-CM

## 2022-04-04 DIAGNOSIS — R73.01 IMPAIRED FASTING GLUCOSE: ICD-10-CM

## 2022-04-04 DIAGNOSIS — Z79.899 ENCOUNTER FOR LONG-TERM (CURRENT) USE OF OTHER MEDICATIONS: ICD-10-CM

## 2022-04-04 LAB
BASOPHILS # BLD AUTO: 0.06 K/UL (ref 0–0.2)
BASOPHILS NFR BLD: 0.7 % (ref 0–1.9)
CHOLEST SERPL-MCNC: 220 MG/DL (ref 120–199)
CHOLEST/HDLC SERPL: 2.9 {RATIO} (ref 2–5)
DIFFERENTIAL METHOD: ABNORMAL
EOSINOPHIL # BLD AUTO: 0.2 K/UL (ref 0–0.5)
EOSINOPHIL NFR BLD: 2.6 % (ref 0–8)
ERYTHROCYTE [DISTWIDTH] IN BLOOD BY AUTOMATED COUNT: 13.5 % (ref 11.5–14.5)
ESTIMATED AVG GLUCOSE: 126 MG/DL (ref 68–131)
GLUCOSE SERPL-MCNC: 89 MG/DL (ref 70–110)
HBA1C MFR BLD: 6 % (ref 4–5.6)
HCT VFR BLD AUTO: 42.6 % (ref 37–48.5)
HDLC SERPL-MCNC: 77 MG/DL (ref 40–75)
HDLC SERPL: 35 % (ref 20–50)
HGB BLD-MCNC: 13.5 G/DL (ref 12–16)
IMM GRANULOCYTES # BLD AUTO: 0.04 K/UL (ref 0–0.04)
IMM GRANULOCYTES NFR BLD AUTO: 0.5 % (ref 0–0.5)
LDLC SERPL CALC-MCNC: 119 MG/DL (ref 63–159)
LYMPHOCYTES # BLD AUTO: 1.7 K/UL (ref 1–4.8)
LYMPHOCYTES NFR BLD: 21.2 % (ref 18–48)
MCH RBC QN AUTO: 29.3 PG (ref 27–31)
MCHC RBC AUTO-ENTMCNC: 31.7 G/DL (ref 32–36)
MCV RBC AUTO: 93 FL (ref 82–98)
MONOCYTES # BLD AUTO: 1.1 K/UL (ref 0.3–1)
MONOCYTES NFR BLD: 13.4 % (ref 4–15)
NEUTROPHILS # BLD AUTO: 5 K/UL (ref 1.8–7.7)
NEUTROPHILS NFR BLD: 61.6 % (ref 38–73)
NONHDLC SERPL-MCNC: 143 MG/DL
NRBC BLD-RTO: 0 /100 WBC
PLATELET # BLD AUTO: 269 K/UL (ref 150–450)
PMV BLD AUTO: 9.8 FL (ref 9.2–12.9)
RBC # BLD AUTO: 4.6 M/UL (ref 4–5.4)
TRIGL SERPL-MCNC: 120 MG/DL (ref 30–150)
WBC # BLD AUTO: 8.12 K/UL (ref 3.9–12.7)

## 2022-04-04 PROCEDURE — 82947 ASSAY GLUCOSE BLOOD QUANT: CPT | Performed by: FAMILY MEDICINE

## 2022-04-04 PROCEDURE — 83036 HEMOGLOBIN GLYCOSYLATED A1C: CPT | Performed by: FAMILY MEDICINE

## 2022-04-04 PROCEDURE — 36415 COLL VENOUS BLD VENIPUNCTURE: CPT | Performed by: FAMILY MEDICINE

## 2022-04-04 PROCEDURE — 80061 LIPID PANEL: CPT | Performed by: FAMILY MEDICINE

## 2022-04-04 PROCEDURE — 85025 COMPLETE CBC W/AUTO DIFF WBC: CPT | Performed by: FAMILY MEDICINE

## 2022-04-14 ENCOUNTER — HOSPITAL ENCOUNTER (OUTPATIENT)
Dept: RADIOLOGY | Facility: HOSPITAL | Age: 87
Discharge: HOME | End: 2022-04-14
Attending: INTERNAL MEDICINE
Payer: MEDICARE

## 2022-04-14 DIAGNOSIS — R07.9 CHEST PAIN, UNSPECIFIED TYPE: ICD-10-CM

## 2022-04-14 DIAGNOSIS — R06.02 SOB (SHORTNESS OF BREATH): ICD-10-CM

## 2022-04-14 DIAGNOSIS — R07.89 OTHER CHEST PAIN: ICD-10-CM

## 2022-04-14 DIAGNOSIS — S32.309D CLOSED NONDISPLACED FRACTURE OF ILIUM WITH ROUTINE HEALING, UNSPECIFIED FRACTURE MORPHOLOGY, UNSPECIFIED LATERALITY, SUBSEQUENT ENCOUNTER: ICD-10-CM

## 2022-04-14 PROCEDURE — 25500020 PHARM REV CODE 255: Performed by: INTERNAL MEDICINE

## 2022-04-14 PROCEDURE — 72192 CT PELVIS W/O DYE: CPT | Mod: TC

## 2022-04-14 PROCEDURE — 71045 X-RAY EXAM CHEST 1 VIEW: CPT | Mod: TC

## 2022-04-14 PROCEDURE — 71275 CT ANGIOGRAPHY CHEST: CPT | Mod: TC

## 2022-04-14 RX ADMIN — IOHEXOL 80 ML: 350 INJECTION, SOLUTION INTRAVENOUS at 04:04

## 2022-05-11 ENCOUNTER — LAB VISIT (OUTPATIENT)
Dept: LAB | Facility: HOSPITAL | Age: 87
End: 2022-05-11
Attending: FAMILY MEDICINE
Payer: MEDICARE

## 2022-05-11 DIAGNOSIS — D51.9 ANEMIA DUE TO VITAMIN B12 DEFICIENCY, UNSPECIFIED B12 DEFICIENCY TYPE: Primary | ICD-10-CM

## 2022-05-11 LAB — VIT B12 SERPL-MCNC: 367 PG/ML (ref 210–950)

## 2022-05-11 PROCEDURE — 82607 VITAMIN B-12: CPT | Performed by: FAMILY MEDICINE

## 2022-05-11 PROCEDURE — 36415 COLL VENOUS BLD VENIPUNCTURE: CPT | Performed by: FAMILY MEDICINE

## 2022-06-18 ENCOUNTER — HOSPITAL ENCOUNTER (INPATIENT)
Facility: HOSPITAL | Age: 87
LOS: 17 days | Discharge: HOME HEALTH | DRG: 871 | End: 2022-07-05
Attending: STUDENT IN AN ORGANIZED HEALTH CARE EDUCATION/TRAINING PROGRAM | Admitting: EMERGENCY MEDICINE
Payer: MEDICARE

## 2022-06-18 DIAGNOSIS — J18.9 PNEUMONIA DUE TO INFECTIOUS ORGANISM, UNSPECIFIED LATERALITY, UNSPECIFIED PART OF LUNG: ICD-10-CM

## 2022-06-18 DIAGNOSIS — I50.9 CONGESTIVE HEART FAILURE, UNSPECIFIED HF CHRONICITY, UNSPECIFIED HEART FAILURE TYPE: ICD-10-CM

## 2022-06-18 DIAGNOSIS — R78.81 BACTEREMIA: ICD-10-CM

## 2022-06-18 DIAGNOSIS — Z79.899 DVT PROPHYLAXIS: ICD-10-CM

## 2022-06-18 DIAGNOSIS — J18.9 PNEUMONIA DUE TO INFECTIOUS ORGANISM: ICD-10-CM

## 2022-06-18 DIAGNOSIS — I21.4 NSTEMI (NON-ST ELEVATED MYOCARDIAL INFARCTION): ICD-10-CM

## 2022-06-18 DIAGNOSIS — R79.89 ELEVATED TROPONIN: ICD-10-CM

## 2022-06-18 DIAGNOSIS — R00.0 TACHYCARDIA: ICD-10-CM

## 2022-06-18 DIAGNOSIS — A41.9 SEPSIS, DUE TO UNSPECIFIED ORGANISM, UNSPECIFIED WHETHER ACUTE ORGAN DYSFUNCTION PRESENT: Primary | ICD-10-CM

## 2022-06-18 LAB
ALBUMIN SERPL BCP-MCNC: 2.5 G/DL (ref 3.5–5.2)
ALP SERPL-CCNC: 96 U/L (ref 55–135)
ALT SERPL W/O P-5'-P-CCNC: 19 U/L (ref 10–44)
ANION GAP SERPL CALC-SCNC: 6 MMOL/L (ref 8–16)
APTT BLDCRRT: 25.3 SEC (ref 21–32)
AST SERPL-CCNC: 17 U/L (ref 10–40)
BACTERIA #/AREA URNS HPF: NEGATIVE /HPF
BASOPHILS # BLD AUTO: 0.03 K/UL (ref 0–0.2)
BASOPHILS NFR BLD: 0.2 % (ref 0–1.9)
BILIRUB SERPL-MCNC: 1.3 MG/DL (ref 0.1–1)
BILIRUB UR QL STRIP: NEGATIVE
BUN SERPL-MCNC: 33 MG/DL (ref 8–23)
CALCIUM SERPL-MCNC: 8.9 MG/DL (ref 8.7–10.5)
CHLORIDE SERPL-SCNC: 102 MMOL/L (ref 95–110)
CLARITY UR: CLEAR
CO2 SERPL-SCNC: 30 MMOL/L (ref 23–29)
COLOR UR: YELLOW
CREAT SERPL-MCNC: 1.4 MG/DL (ref 0.5–1.4)
CTP QC/QA: YES
CTP QC/QA: YES
DIFFERENTIAL METHOD: ABNORMAL
EOSINOPHIL # BLD AUTO: 0 K/UL (ref 0–0.5)
EOSINOPHIL NFR BLD: 0 % (ref 0–8)
ERYTHROCYTE [DISTWIDTH] IN BLOOD BY AUTOMATED COUNT: 12.8 % (ref 11.5–14.5)
EST. GFR  (AFRICAN AMERICAN): 38.2 ML/MIN/1.73 M^2
EST. GFR  (NON AFRICAN AMERICAN): 33.1 ML/MIN/1.73 M^2
GLUCOSE SERPL-MCNC: 160 MG/DL (ref 70–110)
GLUCOSE UR QL STRIP: ABNORMAL
HCT VFR BLD AUTO: 40.3 % (ref 37–48.5)
HGB BLD-MCNC: 13 G/DL (ref 12–16)
HGB UR QL STRIP: NEGATIVE
HYALINE CASTS #/AREA URNS LPF: 5.1 /LPF
IMM GRANULOCYTES # BLD AUTO: 0.13 K/UL (ref 0–0.04)
IMM GRANULOCYTES NFR BLD AUTO: 0.7 % (ref 0–0.5)
INR PPP: 1.2 (ref 0.8–1.2)
KETONES UR QL STRIP: NEGATIVE
LACTATE SERPL-SCNC: 1 MMOL/L (ref 0.5–2.2)
LACTATE SERPL-SCNC: 1.3 MMOL/L (ref 0.5–2.2)
LEUKOCYTE ESTERASE UR QL STRIP: NEGATIVE
LYMPHOCYTES # BLD AUTO: 1.2 K/UL (ref 1–4.8)
LYMPHOCYTES NFR BLD: 5.9 % (ref 18–48)
MAGNESIUM SERPL-MCNC: 1.6 MG/DL (ref 1.6–2.6)
MCH RBC QN AUTO: 28.7 PG (ref 27–31)
MCHC RBC AUTO-ENTMCNC: 32.3 G/DL (ref 32–36)
MCV RBC AUTO: 89 FL (ref 82–98)
MICROSCOPIC COMMENT: ABNORMAL
MONOCYTES # BLD AUTO: 1.9 K/UL (ref 0.3–1)
MONOCYTES NFR BLD: 10 % (ref 4–15)
NEUTROPHILS # BLD AUTO: 16.1 K/UL (ref 1.8–7.7)
NEUTROPHILS NFR BLD: 83.2 % (ref 38–73)
NITRITE UR QL STRIP: NEGATIVE
NRBC BLD-RTO: 0 /100 WBC
NT-PROBNP SERPL-MCNC: 3094 PG/ML (ref 5–1800)
PH UR STRIP: 7 [PH] (ref 5–8)
PLATELET # BLD AUTO: 331 K/UL (ref 150–450)
PMV BLD AUTO: 9.8 FL (ref 9.2–12.9)
POC MOLECULAR INFLUENZA A AGN: NEGATIVE
POC MOLECULAR INFLUENZA B AGN: NEGATIVE
POTASSIUM SERPL-SCNC: 3.9 MMOL/L (ref 3.5–5.1)
PROT SERPL-MCNC: 7.6 G/DL (ref 6–8.4)
PROT UR QL STRIP: ABNORMAL
PROTHROMBIN TIME: 12.6 SEC (ref 9–12.5)
RBC # BLD AUTO: 4.53 M/UL (ref 4–5.4)
RBC #/AREA URNS HPF: 1 /HPF (ref 0–4)
SARS-COV-2 RDRP RESP QL NAA+PROBE: NEGATIVE
SODIUM SERPL-SCNC: 138 MMOL/L (ref 136–145)
SP GR UR STRIP: 1.02 (ref 1–1.03)
SQUAMOUS #/AREA URNS HPF: 3 /HPF
TROPONIN I SERPL DL<=0.01 NG/ML-MCNC: 132.6 PG/ML (ref 0–60)
TROPONIN I SERPL DL<=0.01 NG/ML-MCNC: 171.3 PG/ML (ref 0–60)
TROPONIN I SERPL DL<=0.01 NG/ML-MCNC: 182.6 PG/ML (ref 0–60)
URN SPEC COLLECT METH UR: ABNORMAL
UROBILINOGEN UR STRIP-ACNC: 1 EU/DL
WBC # BLD AUTO: 19.38 K/UL (ref 3.9–12.7)
WBC #/AREA URNS HPF: 1 /HPF (ref 0–5)

## 2022-06-18 PROCEDURE — 93005 ELECTROCARDIOGRAM TRACING: CPT

## 2022-06-18 PROCEDURE — 93010 ELECTROCARDIOGRAM REPORT: CPT | Mod: ,,, | Performed by: INTERNAL MEDICINE

## 2022-06-18 PROCEDURE — 63600175 PHARM REV CODE 636 W HCPCS: Performed by: STUDENT IN AN ORGANIZED HEALTH CARE EDUCATION/TRAINING PROGRAM

## 2022-06-18 PROCEDURE — 36415 COLL VENOUS BLD VENIPUNCTURE: CPT | Performed by: STUDENT IN AN ORGANIZED HEALTH CARE EDUCATION/TRAINING PROGRAM

## 2022-06-18 PROCEDURE — 11000001 HC ACUTE MED/SURG PRIVATE ROOM

## 2022-06-18 PROCEDURE — 85025 COMPLETE CBC W/AUTO DIFF WBC: CPT | Performed by: STUDENT IN AN ORGANIZED HEALTH CARE EDUCATION/TRAINING PROGRAM

## 2022-06-18 PROCEDURE — 83735 ASSAY OF MAGNESIUM: CPT | Performed by: STUDENT IN AN ORGANIZED HEALTH CARE EDUCATION/TRAINING PROGRAM

## 2022-06-18 PROCEDURE — 81000 URINALYSIS NONAUTO W/SCOPE: CPT | Performed by: STUDENT IN AN ORGANIZED HEALTH CARE EDUCATION/TRAINING PROGRAM

## 2022-06-18 PROCEDURE — 99900035 HC TECH TIME PER 15 MIN (STAT)

## 2022-06-18 PROCEDURE — 83880 ASSAY OF NATRIURETIC PEPTIDE: CPT | Performed by: STUDENT IN AN ORGANIZED HEALTH CARE EDUCATION/TRAINING PROGRAM

## 2022-06-18 PROCEDURE — 99291 CRITICAL CARE FIRST HOUR: CPT | Mod: 25

## 2022-06-18 PROCEDURE — 85610 PROTHROMBIN TIME: CPT | Performed by: STUDENT IN AN ORGANIZED HEALTH CARE EDUCATION/TRAINING PROGRAM

## 2022-06-18 PROCEDURE — 84484 ASSAY OF TROPONIN QUANT: CPT | Performed by: STUDENT IN AN ORGANIZED HEALTH CARE EDUCATION/TRAINING PROGRAM

## 2022-06-18 PROCEDURE — 93010 EKG 12-LEAD: ICD-10-PCS | Mod: ,,, | Performed by: INTERNAL MEDICINE

## 2022-06-18 PROCEDURE — 99900031 HC PATIENT EDUCATION (STAT)

## 2022-06-18 PROCEDURE — 87186 SC STD MICRODIL/AGAR DIL: CPT | Mod: 59 | Performed by: STUDENT IN AN ORGANIZED HEALTH CARE EDUCATION/TRAINING PROGRAM

## 2022-06-18 PROCEDURE — 85730 THROMBOPLASTIN TIME PARTIAL: CPT | Performed by: STUDENT IN AN ORGANIZED HEALTH CARE EDUCATION/TRAINING PROGRAM

## 2022-06-18 PROCEDURE — 87040 BLOOD CULTURE FOR BACTERIA: CPT | Performed by: STUDENT IN AN ORGANIZED HEALTH CARE EDUCATION/TRAINING PROGRAM

## 2022-06-18 PROCEDURE — U0002 COVID-19 LAB TEST NON-CDC: HCPCS | Performed by: STUDENT IN AN ORGANIZED HEALTH CARE EDUCATION/TRAINING PROGRAM

## 2022-06-18 PROCEDURE — 94761 N-INVAS EAR/PLS OXIMETRY MLT: CPT

## 2022-06-18 PROCEDURE — 83605 ASSAY OF LACTIC ACID: CPT | Mod: 91 | Performed by: STUDENT IN AN ORGANIZED HEALTH CARE EDUCATION/TRAINING PROGRAM

## 2022-06-18 PROCEDURE — 25000003 PHARM REV CODE 250: Performed by: STUDENT IN AN ORGANIZED HEALTH CARE EDUCATION/TRAINING PROGRAM

## 2022-06-18 PROCEDURE — 96365 THER/PROPH/DIAG IV INF INIT: CPT

## 2022-06-18 PROCEDURE — 87077 CULTURE AEROBIC IDENTIFY: CPT | Performed by: STUDENT IN AN ORGANIZED HEALTH CARE EDUCATION/TRAINING PROGRAM

## 2022-06-18 PROCEDURE — 27000221 HC OXYGEN, UP TO 24 HOURS

## 2022-06-18 PROCEDURE — 80053 COMPREHEN METABOLIC PANEL: CPT | Performed by: STUDENT IN AN ORGANIZED HEALTH CARE EDUCATION/TRAINING PROGRAM

## 2022-06-18 RX ORDER — ACETAMINOPHEN 325 MG/1
650 TABLET ORAL
Status: COMPLETED | OUTPATIENT
Start: 2022-06-18 | End: 2022-06-18

## 2022-06-18 RX ORDER — AMLODIPINE BESYLATE 5 MG/1
5 TABLET ORAL DAILY
Status: DISCONTINUED | OUTPATIENT
Start: 2022-06-19 | End: 2022-06-19

## 2022-06-18 RX ORDER — ONDANSETRON 2 MG/ML
4 INJECTION INTRAMUSCULAR; INTRAVENOUS EVERY 8 HOURS PRN
Status: DISCONTINUED | OUTPATIENT
Start: 2022-06-18 | End: 2022-07-05 | Stop reason: HOSPADM

## 2022-06-18 RX ORDER — EZETIMIBE 10 MG/1
10 TABLET ORAL NIGHTLY
Status: DISCONTINUED | OUTPATIENT
Start: 2022-06-18 | End: 2022-06-18

## 2022-06-18 RX ORDER — ASPIRIN 81 MG/1
81 TABLET ORAL DAILY
Status: DISCONTINUED | OUTPATIENT
Start: 2022-06-19 | End: 2022-07-05 | Stop reason: HOSPADM

## 2022-06-18 RX ORDER — CARVEDILOL 12.5 MG/1
12.5 TABLET ORAL 2 TIMES DAILY
Status: DISCONTINUED | OUTPATIENT
Start: 2022-06-18 | End: 2022-06-18

## 2022-06-18 RX ORDER — SODIUM CHLORIDE 0.9 % (FLUSH) 0.9 %
10 SYRINGE (ML) INJECTION
Status: DISCONTINUED | OUTPATIENT
Start: 2022-06-18 | End: 2022-07-05 | Stop reason: HOSPADM

## 2022-06-18 RX ORDER — ACETAMINOPHEN 325 MG/1
650 TABLET ORAL EVERY 8 HOURS PRN
Status: DISCONTINUED | OUTPATIENT
Start: 2022-06-18 | End: 2022-06-19

## 2022-06-18 RX ORDER — METOPROLOL TARTRATE 50 MG/1
50 TABLET ORAL 2 TIMES DAILY
Status: DISCONTINUED | OUTPATIENT
Start: 2022-06-19 | End: 2022-06-22

## 2022-06-18 RX ORDER — TALC
6 POWDER (GRAM) TOPICAL NIGHTLY PRN
Status: DISCONTINUED | OUTPATIENT
Start: 2022-06-18 | End: 2022-06-19

## 2022-06-18 RX ORDER — CLOPIDOGREL BISULFATE 75 MG/1
75 TABLET ORAL DAILY
Status: DISCONTINUED | OUTPATIENT
Start: 2022-06-19 | End: 2022-06-21

## 2022-06-18 RX ORDER — ENOXAPARIN SODIUM 100 MG/ML
1 INJECTION SUBCUTANEOUS EVERY 24 HOURS
Status: DISCONTINUED | OUTPATIENT
Start: 2022-06-19 | End: 2022-06-20

## 2022-06-18 RX ADMIN — CEFTRIAXONE 1 G: 1 INJECTION, SOLUTION INTRAVENOUS at 12:06

## 2022-06-18 RX ADMIN — ACETAMINOPHEN 650 MG: 325 TABLET ORAL at 12:06

## 2022-06-18 RX ADMIN — AZITHROMYCIN MONOHYDRATE 500 MG: 500 INJECTION, POWDER, LYOPHILIZED, FOR SOLUTION INTRAVENOUS at 05:06

## 2022-06-18 RX ADMIN — SODIUM CHLORIDE 1863 ML: 0.9 INJECTION, SOLUTION INTRAVENOUS at 12:06

## 2022-06-18 NOTE — ED PROVIDER NOTES
Encounter Date: 6/18/2022       History     Chief Complaint   Patient presents with    Weakness     Pt c/o increased weakness. Pt family stated she had deteriorated since she had COVID. EMS reported hot to touch. Pt initial SPO2 was 90% on room air. Pt 97% on 3 L NC. No home O2.      89-year-old female with history hypertension, high cholesterol presents with progressive generalized weakness for the past few months.  Patient has also had progressive shortness of breath and fever during this time.  Also endorses some cough but denies any vomiting, diarrhea, chest pain, leg pain, dysuria, abdominal        Review of patient's allergies indicates:  No Known Allergies  Past Medical History:   Diagnosis Date    Arthritis     Encounter for blood transfusion     High cholesterol     Hypertension      Past Surgical History:   Procedure Laterality Date    HYSTERECTOMY      KNEE ARTHROSCOPY Right      Family History   Problem Relation Age of Onset    Stroke Mother     Heart disease Father     Cancer Sister     Cancer Brother      Social History     Tobacco Use    Smoking status: Never Smoker    Smokeless tobacco: Never Used   Substance Use Topics    Alcohol use: Never    Drug use: Never     Review of Systems   Constitutional: Positive for fatigue.   HENT: Negative.    Respiratory: Positive for cough and shortness of breath.    Cardiovascular: Negative.    Gastrointestinal: Negative.    Genitourinary: Negative.    Musculoskeletal: Negative.    Skin: Negative.    Neurological: Positive for weakness.   Psychiatric/Behavioral: Negative.    All other systems reviewed and are negative.      Physical Exam     Initial Vitals [06/18/22 1153]   BP Pulse Resp Temp SpO2   (!) 159/85 (!) 113 20 (!) 102.5 °F (39.2 °C) 97 %      MAP       --         Physical Exam    Nursing note and vitals reviewed.  Constitutional: Vital signs are normal. She appears well-developed and well-nourished.   HENT:   Head: Normocephalic and  atraumatic.   Eyes: Conjunctivae and lids are normal.   Neck: Trachea normal. Neck supple.   Cardiovascular: Normal rate, regular rhythm, normal heart sounds, intact distal pulses and normal pulses.   No murmur heard.  Pulmonary/Chest: Breath sounds normal. No respiratory distress. She has no wheezes. She has no rales.   Abdominal: Abdomen is soft. Bowel sounds are normal.   Musculoskeletal:         General: Edema present. No tenderness.      Cervical back: Neck supple.     Neurological: She is alert and oriented to person, place, and time. She has normal strength. No cranial nerve deficit or sensory deficit.   Skin: Skin is warm. Capillary refill takes less than 2 seconds.   Psychiatric: She has a normal mood and affect. Her speech is normal. Thought content normal.         ED Course   Critical Care    Date/Time: 6/18/2022 12:05 PM  Performed by: Benjamin Phillips MD  Authorized by: Benjamin Phillips MD   Direct patient critical care time: 10 minutes  Additional history critical care time: 5 minutes  Ordering / reviewing critical care time: 5 minutes  Documentation critical care time: 5 minutes  Other critical care time: 10 minutes  Total critical care time (exclusive of procedural time) : 35 minutes  Critical care time was exclusive of separately billable procedures and treating other patients.  Critical care was necessary to treat or prevent imminent or life-threatening deterioration of the following conditions: sepsis and circulatory failure.  Critical care was time spent personally by me on the following activities: evaluation of patient's response to treatment, examination of patient, ordering and performing treatments and interventions, obtaining history from patient or surrogate, ordering and review of laboratory studies, ordering and review of radiographic studies, re-evaluation of patient's condition and pulse oximetry.        Labs Reviewed   CBC W/ AUTO DIFFERENTIAL - Abnormal; Notable for the following  components:       Result Value    WBC 19.38 (*)     Immature Granulocytes 0.7 (*)     Gran # (ANC) 16.1 (*)     Immature Grans (Abs) 0.13 (*)     Mono # 1.9 (*)     Gran % 83.2 (*)     Lymph % 5.9 (*)     All other components within normal limits   COMPREHENSIVE METABOLIC PANEL - Abnormal; Notable for the following components:    CO2 30 (*)     Glucose 160 (*)     BUN 33 (*)     Albumin 2.5 (*)     Total Bilirubin 1.3 (*)     Anion Gap 6 (*)     eGFR if  38.2 (*)     eGFR if non  33.1 (*)     All other components within normal limits   URINALYSIS, REFLEX TO URINE CULTURE - Abnormal; Notable for the following components:    Protein, UA 2+ (*)     Glucose, UA 1+ (*)     All other components within normal limits    Narrative:     Preferred Collection Type->Urine, Clean Catch  Specimen Source->Urine   TROPONIN I HIGH SENSITIVITY - Abnormal; Notable for the following components:    Troponin I High Sensitivity 132.6 (*)     All other components within normal limits   NT-PRO NATRIURETIC PEPTIDE - Abnormal; Notable for the following components:    NT-proBNP 3094 (*)     All other components within normal limits   PROTIME-INR - Abnormal; Notable for the following components:    Prothrombin Time 12.6 (*)     All other components within normal limits   URINALYSIS MICROSCOPIC - Abnormal; Notable for the following components:    Hyaline Casts, UA 5.1 (*)     All other components within normal limits    Narrative:     Preferred Collection Type->Urine, Clean Catch  Specimen Source->Urine   LACTIC ACID, PLASMA   APTT   MAGNESIUM   SARS-COV-2 RDRP GENE   POCT INFLUENZA A/B MOLECULAR     EKG Readings: (Independently Interpreted)   Initial Reading: No STEMI. Rhythm: Sinus Tachycardia. Axis: Normal.     ECG Results          EKG 12-lead (Final result)  Result time 06/18/22 20:31:38    Final result by Interface, Lab In [facility] (06/18/22 20:31:38)                 Narrative:    Test Reason : R00.0,    Vent.  Rate : 114 BPM     Atrial Rate : 114 BPM     P-R Int : 166 ms          QRS Dur : 070 ms      QT Int : 320 ms       P-R-T Axes : 068 006 041 degrees     QTc Int : 441 ms    Sinus tachycardia  Otherwise normal ECG  When compared with ECG of 01-MAR-2022 14:19,  No significant change was found  Confirmed by Davey GOMEZ MD ([id number]) on 6/18/2022 8:31:28 PM    Referred By: [email or web address]   SELF           Confirmed By:Davey GOMEZ MD                            Imaging Results          X-Ray Chest AP Portable (Final result)  Result time 06/18/22 13:10:00    Final result by Lucia Davis MD (06/18/22 13:10:00)                 Impression:      Bibasilar discoid atelectasis or subtle infiltrates and vague nodular opacities in the mid lungs correlate with the previous CT scan of the chest from April 14, 2022      Electronically signed by: Lucia Davis MD  Date:    06/18/2022  Time:    13:10             Narrative:    EXAMINATION:  XR CHEST AP PORTABLE    CLINICAL HISTORY:  Sepsis;    TECHNIQUE:  portable chest x-ray    COMPARISON:  04/14/2022    FINDINGS:  Bibasilar discoid atelectasis or subtle infiltrates and chronically elevated right hemidiaphragm unchanged.  Vague nodular opacities in the right mid lungs.                                 Medications   sodium chloride 0.9% flush 10 mL (has no administration in time range)   melatonin tablet 6 mg (has no administration in time range)   cefTRIAXone (ROCEPHIN) 1 g/50 mL D5W IVPB (has no administration in time range)   acetaminophen tablet 650 mg (has no administration in time range)   ondansetron injection 4 mg (has no administration in time range)   acetaminophen tablet 650 mg (has no administration in time range)   nitroGLYCERIN 2% TD oint ointment 1 inch (1 inch Topical (Top) Given 6/19/22 0012)   metoprolol tartrate (LOPRESSOR) tablet 50 mg (50 mg Oral Given 6/19/22 0013)   amLODIPine tablet 5 mg (5 mg Oral Given 6/19/22 0012)   aspirin EC tablet 81 mg (81 mg Oral Given 6/19/22 0012)    clopidogreL tablet 75 mg (75 mg Oral Given 6/19/22 0012)   enoxaparin injection 60 mg (60 mg Subcutaneous Given 6/19/22 0013)   sodium chloride 0.9% bolus 1,863 mL (0 mL/kg × 62.1 kg Intravenous Stopped 6/18/22 1308)   cefTRIAXone (ROCEPHIN) 1 g/50 mL D5W IVPB (0 g Intravenous Stopped 6/18/22 1243)   acetaminophen tablet 650 mg (650 mg Oral Given 6/18/22 1212)   azithromycin 500 mg in dextrose 5 % 250 mL IVPB (ready to mix system) (0 mg Intravenous Stopped 6/18/22 1837)     Medical Decision Making:   Initial Assessment:   89-year-old female with history hypertension, high cholesterol presents with progressive generalized weakness for the past few months.  Febrile and tachycardic.  Saturating 90% on room air with improvement on 2 L to 97%.  Speaking in full sentences.  Normotensive.  Will get labs and imaging to rule out sepsis and determine source.  Re-evaluate  Clinical Tests:   Lab Tests: Ordered and Reviewed  The following lab test(s) were unremarkable: CBC, CMP, Lactate, PT, PTT, Troponin, BNP and Urinalysis  Radiological Study: Ordered and Reviewed  Medical Tests: Ordered and Reviewed             ED Course as of 06/19/22 0418   Sat Jun 18, 2022   1417 Labs imaging noted.  Patient continues appear well.  Troponin mildly elevated but denies any chest pain.  Most likely demand.  Will repeat troponin EKG.  Will treat with antibiotics. [HD]      ED Course User Index  [HD] Benjamin Phillips MD             Clinical Impression:   Final diagnoses:  [R00.0] Tachycardia  [J18.9] Pneumonia due to infectious organism, unspecified laterality, unspecified part of lung  [A41.9] Sepsis, due to unspecified organism, unspecified whether acute organ dysfunction present (Primary)          ED Disposition Condition    Admit               Benjamin Phillips MD  06/19/22 8300

## 2022-06-18 NOTE — ED NOTES
..Sepsis post fluid bolus tissue perfusion exam:    See current vital signs.    Cardiopulmonary:   Heart: Regular rate and rhythm   Lungs: Clear to auscultation bilaterally    Capillary refill: < 3 seconds  Peripheral pulses: radial 1+ bilaterally  Skin: warm/dry/pink with ulceration to right leg.

## 2022-06-19 PROBLEM — J18.9 PNEUMONIA DUE TO INFECTIOUS ORGANISM: Status: ACTIVE | Noted: 2022-06-19

## 2022-06-19 PROBLEM — Z79.899 DVT PROPHYLAXIS: Status: ACTIVE | Noted: 2022-06-19

## 2022-06-19 PROBLEM — I10 HYPERTENSION: Status: ACTIVE | Noted: 2022-06-19

## 2022-06-19 LAB
ALBUMIN SERPL BCP-MCNC: 2 G/DL (ref 3.5–5.2)
ALP SERPL-CCNC: 79 U/L (ref 55–135)
ALT SERPL W/O P-5'-P-CCNC: 14 U/L (ref 10–44)
ANION GAP SERPL CALC-SCNC: 5 MMOL/L (ref 8–16)
AST SERPL-CCNC: 13 U/L (ref 10–40)
BASOPHILS # BLD AUTO: 0.05 K/UL (ref 0–0.2)
BASOPHILS NFR BLD: 0.3 % (ref 0–1.9)
BILIRUB SERPL-MCNC: 1.1 MG/DL (ref 0.1–1)
BUN SERPL-MCNC: 28 MG/DL (ref 8–23)
CALCIUM SERPL-MCNC: 8.2 MG/DL (ref 8.7–10.5)
CHLORIDE SERPL-SCNC: 107 MMOL/L (ref 95–110)
CHOLEST SERPL-MCNC: 154 MG/DL (ref 120–199)
CHOLEST/HDLC SERPL: 3.6 {RATIO} (ref 2–5)
CO2 SERPL-SCNC: 29 MMOL/L (ref 23–29)
CREAT SERPL-MCNC: 1 MG/DL (ref 0.5–1.4)
DIFFERENTIAL METHOD: ABNORMAL
EOSINOPHIL # BLD AUTO: 0.1 K/UL (ref 0–0.5)
EOSINOPHIL NFR BLD: 0.7 % (ref 0–8)
ERYTHROCYTE [DISTWIDTH] IN BLOOD BY AUTOMATED COUNT: 12.5 % (ref 11.5–14.5)
EST. GFR  (AFRICAN AMERICAN): 57.3 ML/MIN/1.73 M^2
EST. GFR  (NON AFRICAN AMERICAN): 49.7 ML/MIN/1.73 M^2
GLUCOSE SERPL-MCNC: 100 MG/DL (ref 70–110)
HCT VFR BLD AUTO: 36.1 % (ref 37–48.5)
HDLC SERPL-MCNC: 43 MG/DL (ref 40–75)
HDLC SERPL: 27.9 % (ref 20–50)
HGB BLD-MCNC: 11.1 G/DL (ref 12–16)
IMM GRANULOCYTES # BLD AUTO: 0.06 K/UL (ref 0–0.04)
IMM GRANULOCYTES NFR BLD AUTO: 0.4 % (ref 0–0.5)
LDLC SERPL CALC-MCNC: 90.6 MG/DL (ref 63–159)
LYMPHOCYTES # BLD AUTO: 1.9 K/UL (ref 1–4.8)
LYMPHOCYTES NFR BLD: 12.6 % (ref 18–48)
MCH RBC QN AUTO: 28.2 PG (ref 27–31)
MCHC RBC AUTO-ENTMCNC: 30.7 G/DL (ref 32–36)
MCV RBC AUTO: 92 FL (ref 82–98)
MONOCYTES # BLD AUTO: 1.9 K/UL (ref 0.3–1)
MONOCYTES NFR BLD: 12.3 % (ref 4–15)
NEUTROPHILS # BLD AUTO: 11.3 K/UL (ref 1.8–7.7)
NEUTROPHILS NFR BLD: 73.7 % (ref 38–73)
NONHDLC SERPL-MCNC: 111 MG/DL
NRBC BLD-RTO: 0 /100 WBC
PLATELET # BLD AUTO: 289 K/UL (ref 150–450)
PMV BLD AUTO: 10 FL (ref 9.2–12.9)
POTASSIUM SERPL-SCNC: 3.6 MMOL/L (ref 3.5–5.1)
PROT SERPL-MCNC: 6.1 G/DL (ref 6–8.4)
RBC # BLD AUTO: 3.94 M/UL (ref 4–5.4)
SODIUM SERPL-SCNC: 141 MMOL/L (ref 136–145)
TRIGL SERPL-MCNC: 102 MG/DL (ref 30–150)
TROPONIN I SERPL DL<=0.01 NG/ML-MCNC: 150.8 PG/ML (ref 0–60)
WBC # BLD AUTO: 15.34 K/UL (ref 3.9–12.7)

## 2022-06-19 PROCEDURE — 84484 ASSAY OF TROPONIN QUANT: CPT | Performed by: INTERNAL MEDICINE

## 2022-06-19 PROCEDURE — 99900035 HC TECH TIME PER 15 MIN (STAT)

## 2022-06-19 PROCEDURE — 63600175 PHARM REV CODE 636 W HCPCS: Performed by: INTERNAL MEDICINE

## 2022-06-19 PROCEDURE — 25000003 PHARM REV CODE 250: Performed by: NURSE PRACTITIONER

## 2022-06-19 PROCEDURE — 94761 N-INVAS EAR/PLS OXIMETRY MLT: CPT

## 2022-06-19 PROCEDURE — 25000003 PHARM REV CODE 250: Performed by: INTERNAL MEDICINE

## 2022-06-19 PROCEDURE — 99900031 HC PATIENT EDUCATION (STAT)

## 2022-06-19 PROCEDURE — 11000001 HC ACUTE MED/SURG PRIVATE ROOM

## 2022-06-19 PROCEDURE — 93010 EKG 12-LEAD: ICD-10-PCS | Mod: ,,, | Performed by: INTERNAL MEDICINE

## 2022-06-19 PROCEDURE — 80053 COMPREHEN METABOLIC PANEL: CPT | Performed by: STUDENT IN AN ORGANIZED HEALTH CARE EDUCATION/TRAINING PROGRAM

## 2022-06-19 PROCEDURE — 93005 ELECTROCARDIOGRAM TRACING: CPT

## 2022-06-19 PROCEDURE — 27000221 HC OXYGEN, UP TO 24 HOURS

## 2022-06-19 PROCEDURE — 85025 COMPLETE CBC W/AUTO DIFF WBC: CPT | Performed by: STUDENT IN AN ORGANIZED HEALTH CARE EDUCATION/TRAINING PROGRAM

## 2022-06-19 PROCEDURE — 36415 COLL VENOUS BLD VENIPUNCTURE: CPT | Performed by: INTERNAL MEDICINE

## 2022-06-19 PROCEDURE — 36415 COLL VENOUS BLD VENIPUNCTURE: CPT | Performed by: STUDENT IN AN ORGANIZED HEALTH CARE EDUCATION/TRAINING PROGRAM

## 2022-06-19 PROCEDURE — 93010 ELECTROCARDIOGRAM REPORT: CPT | Mod: ,,, | Performed by: INTERNAL MEDICINE

## 2022-06-19 PROCEDURE — 80061 LIPID PANEL: CPT | Performed by: INTERNAL MEDICINE

## 2022-06-19 PROCEDURE — 63600175 PHARM REV CODE 636 W HCPCS: Performed by: NURSE PRACTITIONER

## 2022-06-19 RX ORDER — TRAMADOL HYDROCHLORIDE 50 MG/1
50 TABLET ORAL EVERY 6 HOURS PRN
Status: DISCONTINUED | OUTPATIENT
Start: 2022-06-19 | End: 2022-07-05 | Stop reason: HOSPADM

## 2022-06-19 RX ORDER — ISOSORBIDE MONONITRATE 30 MG/1
30 TABLET, EXTENDED RELEASE ORAL DAILY
Status: DISCONTINUED | OUTPATIENT
Start: 2022-06-19 | End: 2022-07-05 | Stop reason: HOSPADM

## 2022-06-19 RX ORDER — AMLODIPINE BESYLATE 10 MG/1
10 TABLET ORAL DAILY
Status: DISCONTINUED | OUTPATIENT
Start: 2022-06-20 | End: 2022-06-22

## 2022-06-19 RX ORDER — FAMOTIDINE 20 MG/1
20 TABLET, FILM COATED ORAL DAILY
Status: DISCONTINUED | OUTPATIENT
Start: 2022-06-19 | End: 2022-07-05 | Stop reason: HOSPADM

## 2022-06-19 RX ORDER — MIRTAZAPINE 15 MG/1
15 TABLET, FILM COATED ORAL NIGHTLY
Status: DISCONTINUED | OUTPATIENT
Start: 2022-06-19 | End: 2022-07-05 | Stop reason: HOSPADM

## 2022-06-19 RX ORDER — LOSARTAN POTASSIUM 25 MG/1
25 TABLET ORAL DAILY
Status: DISCONTINUED | OUTPATIENT
Start: 2022-06-19 | End: 2022-06-21

## 2022-06-19 RX ORDER — ACETAMINOPHEN 325 MG/1
650 TABLET ORAL EVERY 6 HOURS PRN
Status: DISCONTINUED | OUTPATIENT
Start: 2022-06-19 | End: 2022-07-05 | Stop reason: HOSPADM

## 2022-06-19 RX ORDER — AMLODIPINE BESYLATE 5 MG/1
5 TABLET ORAL DAILY
Status: COMPLETED | OUTPATIENT
Start: 2022-06-19 | End: 2022-06-19

## 2022-06-19 RX ADMIN — CLOPIDOGREL 75 MG: 75 TABLET, FILM COATED ORAL at 10:06

## 2022-06-19 RX ADMIN — CLOPIDOGREL 75 MG: 75 TABLET, FILM COATED ORAL at 12:06

## 2022-06-19 RX ADMIN — FAMOTIDINE 20 MG: 20 TABLET ORAL at 11:06

## 2022-06-19 RX ADMIN — AMLODIPINE BESYLATE 5 MG: 5 TABLET ORAL at 04:06

## 2022-06-19 RX ADMIN — ASPIRIN 81 MG: 81 TABLET, COATED ORAL at 10:06

## 2022-06-19 RX ADMIN — METOPROLOL TARTRATE 50 MG: 50 TABLET, FILM COATED ORAL at 10:06

## 2022-06-19 RX ADMIN — AZITHROMYCIN MONOHYDRATE 500 MG: 500 INJECTION, POWDER, LYOPHILIZED, FOR SOLUTION INTRAVENOUS at 04:06

## 2022-06-19 RX ADMIN — AMLODIPINE BESYLATE 5 MG: 5 TABLET ORAL at 10:06

## 2022-06-19 RX ADMIN — NITROGLYCERIN 1 INCH: 20 OINTMENT TOPICAL at 10:06

## 2022-06-19 RX ADMIN — TRAMADOL HYDROCHLORIDE 50 MG: 50 TABLET ORAL at 08:06

## 2022-06-19 RX ADMIN — CEFTRIAXONE 1 G: 1 INJECTION, SOLUTION INTRAVENOUS at 11:06

## 2022-06-19 RX ADMIN — ASPIRIN 81 MG: 81 TABLET, COATED ORAL at 12:06

## 2022-06-19 RX ADMIN — ENOXAPARIN SODIUM 60 MG: 100 INJECTION SUBCUTANEOUS at 12:06

## 2022-06-19 RX ADMIN — VANCOMYCIN HYDROCHLORIDE 750 MG: 750 INJECTION, POWDER, LYOPHILIZED, FOR SOLUTION INTRAVENOUS at 08:06

## 2022-06-19 RX ADMIN — MIRTAZAPINE 15 MG: 15 TABLET, FILM COATED ORAL at 08:06

## 2022-06-19 RX ADMIN — ISOSORBIDE MONONITRATE 30 MG: 30 TABLET, EXTENDED RELEASE ORAL at 04:06

## 2022-06-19 RX ADMIN — METOPROLOL TARTRATE 50 MG: 50 TABLET, FILM COATED ORAL at 08:06

## 2022-06-19 RX ADMIN — LOSARTAN POTASSIUM 25 MG: 25 TABLET, FILM COATED ORAL at 04:06

## 2022-06-19 RX ADMIN — METHYLPREDNISOLONE SODIUM SUCCINATE 40 MG: 40 INJECTION, POWDER, FOR SOLUTION INTRAMUSCULAR; INTRAVENOUS at 11:06

## 2022-06-19 RX ADMIN — AMLODIPINE BESYLATE 5 MG: 5 TABLET ORAL at 12:06

## 2022-06-19 RX ADMIN — METOPROLOL TARTRATE 50 MG: 50 TABLET, FILM COATED ORAL at 12:06

## 2022-06-19 RX ADMIN — NITROGLYCERIN 1 INCH: 20 OINTMENT TOPICAL at 12:06

## 2022-06-19 RX ADMIN — ALPRAZOLAM 0.75 MG: 0.5 TABLET ORAL at 08:06

## 2022-06-19 NOTE — NURSING
Called  re: order from BEVERLY Cagle to consult him tonight re: increasing troponins.  Reviewed pt with .   said to stop the Coreg, hold the Zetia.  Give Metoprolol 50 mg BID, Nitropaste 0.4 mg/hr topical Q12-hrs, Amlodipine 5 mg PO now, ASA EC 81 mg daily - start now, Plavix 75 mg daily, start now, Lovenox 1 mg/kg/SQ Q12-hrs, first dose now.  Draw another troponin with a.m. labs, get 12-lead EKG at 0700 hrs.  RB&V'd.

## 2022-06-19 NOTE — NURSING
Called service re: increasing troponin of 182.6.  BEVERLY Keyes said to restart BP meds of Coreg 12.5 mg PO BID, and Zetia 10 mg PO daily.  Consult Cardiology now re: increasing troponin.  RB&V'd.

## 2022-06-19 NOTE — H&P
[facility]  History & Physical    Patient Name: Codi Black  MRN: [id number]  Patient Class: IP- Inpatient  Admission Date: 6/18/2022  Attending Physician: Maria Elena Hogan MD   Primary Care Provider: Rafael Quan III, MD         Patient information was obtained from patient, past medical records and ER records.     Subjective:     Principal Problem:Pneumonia due to infectious organism    Chief Complaint:   Chief Complaint   Patient presents with    Weakness     Pt c/o increased weakness. Pt family stated she had deteriorated since she had COVID. EMS reported hot to touch. Pt initial SPO2 was 90% on room air. Pt 97% on 3 L NC. No home O2.         HPI: Pt is 89 y/o female with Hx of hypertension, hyperlipidemia, and arthritis, presented to [facility] ED yesterday with c/o generalized weakness. She reports having a productive cough. She denies chest pain. High-Sensitivity Troponin levels, although negative, trended up x3 sets - Cardiology consulted. This morning she reports continued fatigue and weakness; also c/o left hand pain; shortness of breath improved with rest and oxygen.      Past Medical History:   Diagnosis Date    Arthritis     Encounter for blood transfusion     High cholesterol     Hypertension        Past Surgical History:   Procedure Laterality Date    HYSTERECTOMY      KNEE ARTHROSCOPY Right        Review of patient's allergies indicates:  No Known Allergies    No current facility-administered medications on file prior to encounter.     Current Outpatient Medications on File Prior to Encounter   Medication Sig    acetaminophen (TYLENOL) 325 MG tablet Take 2 tablets (650 mg total) by mouth every 8 (eight) hours as needed.    ALPRAZolam (XANAX) 0.5 MG tablet Take 0.75 mg by mouth every evening.    ezetimibe (ZETIA) 10 mg tablet Take 10 mg by mouth once daily.    furosemide (LASIX) 20 MG tablet Take 1 tablet (20 mg total) by mouth once daily.    mirtazapine  (REMERON) 15 MG tablet Take 1 tablet (15 mg total) by mouth every evening.    carvediloL (COREG) 12.5 MG tablet Take 1 tablet (12.5 mg total) by mouth 2 (two) times daily.    [DISCONTINUED] hydroCHLOROthiazide (HYDRODIURIL) 25 MG tablet Take 50 mg by mouth once daily.     Family History       Problem Relation (Age of Onset)    Cancer Sister, Brother    Heart disease Father    Stroke Mother          Tobacco Use    Smoking status: Never Smoker    Smokeless tobacco: Never Used   Substance and Sexual Activity    Alcohol use: Never    Drug use: Never    Sexual activity: Not on file     Review of Systems   Constitutional:  Positive for activity change and fatigue.   HENT: Negative.     Eyes: Negative.    Respiratory:  Positive for cough and shortness of breath. Negative for chest tightness and wheezing.    Cardiovascular:  Negative for chest pain, palpitations and leg swelling.   Gastrointestinal:  Negative for abdominal pain, constipation, diarrhea, nausea and vomiting.   Endocrine: Negative.    Genitourinary: Negative.    Musculoskeletal:  Positive for arthralgias.   Neurological:  Positive for weakness.   Psychiatric/Behavioral: Negative.     Objective:     Vital Signs (Most Recent):  Temp: 98.1 °F (36.7 °C) (06/19/22 0726)  Pulse: 78 (06/19/22 0726)  Resp: (!) 22 (06/19/22 0726)  BP: (!) 159/73 (06/19/22 1003)  SpO2: (!) 92 % (06/19/22 0726)   Vital Signs (24h Range):  Temp:  [98.1 °F (36.7 °C)-102.5 °F (39.2 °C)] 98.1 °F (36.7 °C)  Pulse:  [] 78  Resp:  [18-32] 22  SpO2:  [92 %-100 %] 92 %  BP: (125-170)/(70-95) 159/73     Weight: 62.1 kg (137 lb)  Body mass index is 23.52 kg/m².    Physical Exam  Vitals and nursing note reviewed.   HENT:      Head: Normocephalic and atraumatic.      Mouth/Throat:      Mouth: Mucous membranes are moist.      Pharynx: Oropharynx is clear.   Eyes:      General: No scleral icterus.     Extraocular Movements: Extraocular movements intact.      Pupils: Pupils are equal,  round, and reactive to light.   Cardiovascular:      Rate and Rhythm: Normal rate and regular rhythm.      Pulses: Normal pulses.      Heart sounds: Normal heart sounds.   Pulmonary:      Effort: Pulmonary effort is normal.      Breath sounds: Rales present.   Abdominal:      General: Bowel sounds are normal. There is no distension.      Palpations: Abdomen is soft.      Tenderness: There is no abdominal tenderness. There is no guarding.   Musculoskeletal:         General: Tenderness (left hand) present. Normal range of motion.      Cervical back: Normal range of motion. No rigidity.   Skin:     General: Skin is warm and dry.      Capillary Refill: Capillary refill takes less than 2 seconds.   Neurological:      General: No focal deficit present.      Mental Status: She is alert and oriented to person, place, and time.      Motor: Weakness present.   Psychiatric:         Mood and Affect: Mood normal.         Behavior: Behavior normal.         CRANIAL NERVES     CN III, IV, VI   Pupils are equal, round, and reactive to light.     Significant Labs: All pertinent labs within the past 24 hours have been reviewed.  CBC:   Recent Labs   Lab 06/18/22  1205 06/19/22  0332   WBC 19.38* 15.34*   HGB 13.0 11.1*   HCT 40.3 36.1*    289     CMP:   Recent Labs   Lab 06/18/22  1204 06/19/22  0332    141   K 3.9 3.6    107   CO2 30* 29   * 100   BUN 33* 28*   CREATININE 1.4 1.0   CALCIUM 8.9 8.2*   PROT 7.6 6.1   ALBUMIN 2.5* 2.0*   BILITOT 1.3* 1.1*   ALKPHOS 96 79   AST 17 13   ALT 19 14   ANIONGAP 6* 5*   EGFRNONAA 33.1* 49.7*     Coagulation:   Recent Labs   Lab 06/18/22  1205   INR 1.2   APTT 25.3     Lactic Acid:   Recent Labs   Lab 06/18/22  1204 06/18/22  1548   LACTATE 1.3 1.0     Magnesium:   Recent Labs   Lab 06/18/22  1204   MG 1.6     Urine Studies:   Recent Labs   Lab 06/18/22  1247   COLORU Yellow   APPEARANCEUA Clear   PHUR 7.0   SPECGRAV 1.020   PROTEINUA 2+*   GLUCUA 1+*   KETONESU  Negative   BILIRUBINUA Negative   OCCULTUA Negative   NITRITE Negative   UROBILINOGEN 1.0   LEUKOCYTESUR Negative   RBCUA 1   WBCUA 1   BACTERIA Negative   SQUAMEPITHEL 3   HYALINECASTS 5.1*     Recent Labs   Lab 06/18/22  1548 06/18/22  2142 06/19/22  0332   Troponin I High Sensitivity 171.3 H 182.6 H 150.8 H       Significant Imaging: I have reviewed all pertinent imaging results/findings within the past 24 hours.    X-Ray Chest AP Portable  Narrative: EXAMINATION:  XR CHEST AP PORTABLE    CLINICAL HISTORY:  Sepsis;    TECHNIQUE:  portable chest x-ray    COMPARISON:  04/14/2022    FINDINGS:  Bibasilar discoid atelectasis or subtle infiltrates and chronically elevated right hemidiaphragm unchanged.  Vague nodular opacities in the right mid lungs.  Impression: Bibasilar discoid atelectasis or subtle infiltrates and vague nodular opacities in the mid lungs correlate with the previous CT scan of the chest from April 14, 2022    Electronically signed by: Lucia Davis MD  Date:    06/18/2022  Time:    13:10    Assessment/Plan:     * Pneumonia due to infectious organism  6/19: Day 2 of IV abx therapy with Rocephin and Azithromycin, following cultures      Arthritis  Evaluate CRP and Sed Rate. Treatment with steroids.      Hypertension  Home blood pressure medications adjusted by Dr. Chapin      DVT prophylaxis  Lovenox        VTE Risk Mitigation (From admission, onward)         Ordered     enoxaparin injection 60 mg  Daily         06/18/22 2357     IP VTE HIGH RISK PATIENT  Once         06/18/22 1525     Place sequential compression device  Until discontinued         06/18/22 1525                   Stephy Young NP  Department of Hospital Medicine   [facility]

## 2022-06-19 NOTE — HPI
Pt is 89 y/o female with Hx of hypertension, hyperlipidemia, and arthritis, presented to [facility] ED yesterday with c/o generalized weakness. She reports having a productive cough. She denies chest pain. High-Sensitivity Troponin levels, although negative, trended up x3 sets - Cardiology consulted. This morning she reports continued fatigue and weakness; also c/o left hand pain; shortness of breath improved with rest and oxygen.

## 2022-06-19 NOTE — NURSING
A.M. labs resulted with Troponin of 150.8.  This is trending down.  Will inform MD's when they round.

## 2022-06-19 NOTE — PROGRESS NOTES
Pharmacist Renal Dose Adjustment Note    Codi Black is a 89 y.o. female being treated with the medication enoxaparin.    Patient Data:    Vital Signs (Most Recent):  Temp: 98.2 °F (36.8 °C) (06/18/22 1932)  Pulse: 83 (06/18/22 1932)  Resp: 18 (06/18/22 1932)  BP: (!) 160/78 (06/18/22 1932)  SpO2: 96 % (06/18/22 1932)   Vital Signs (72h Range):  Temp:  [98.1 °F (36.7 °C)-102.5 °F (39.2 °C)]   Pulse:  []   Resp:  [18-32]   BP: (125-170)/(70-85)   SpO2:  [93 %-99 %]      Recent Labs   Lab 06/18/22  1204   CREATININE 1.4     Serum creatinine: 1.4 mg/dL 06/18/22 1204  Estimated creatinine clearance: 23.1 mL/min    Enoxaparin 60 mg (1 mg/kg) Q12H will be changed to enoxaparin 60 mg (1 mg/kg) Q24H    Pharmacist's Name: Venice Pavon  Pharmacist's Extension: [id number]

## 2022-06-19 NOTE — PROGRESS NOTES
Pharmacokinetic Initial Assessment: IV Vancomycin    Assessment/Plan:    Initiate intravenous vancomycin with loading dose of 0 mg once followed by a maintenance dose of vancomycin 750mg IV every 24 hours  Desired empiric serum trough concentration is 15 to 20 mcg/mL  Draw vancomycin trough level 60 min prior to third dose on 6/21 at approximately 1900  Pharmacy will continue to follow and monitor vancomycin.      Thank you for allowing pharmacy participate in this patient's care.     Alfreda Lomax, PharmD  Clinical Pharmacist, [facility] [place] [facility]  [email or web address]  office [phone]    Patient brief summary:  Codi Black is a 89 y.o. female initiated on antimicrobial therapy with IV Vancomycin for treatment of suspected bacteremia    Drug Allergies:   Review of patient's allergies indicates:  No Known Allergies    Actual Body Weight:   62.1kg    Renal Function:   Estimated Creatinine Clearance: 32.3 mL/min (based on SCr of 1 mg/dL).,     Dialysis Method (if applicable):  N/A    CBC (last 72 hours):  Recent Labs   Lab Result Units 06/18/22  1205 06/19/22  0332   WBC K/uL 19.38* 15.34*   Hemoglobin g/dL 13.0 11.1*   Hematocrit % 40.3 36.1*   Platelets K/uL 331 289   Gran % % 83.2* 73.7*   Lymph % % 5.9* 12.6*   Mono % % 10.0 12.3   Eosinophil % % 0.0 0.7   Basophil % % 0.2 0.3   Differential Method  Automated Automated       Metabolic Panel (last 72 hours):  Recent Labs   Lab Result Units 06/18/22  1204 06/18/22  1247 06/19/22  0332   Sodium mmol/L 138  --  141   Potassium mmol/L 3.9  --  3.6   Chloride mmol/L 102  --  107   CO2 mmol/L 30*  --  29   Glucose mg/dL 160*  --  100   Glucose, UA   --  1+*  --    BUN mg/dL 33*  --  28*   Creatinine mg/dL 1.4  --  1.0   Albumin g/dL 2.5*  --  2.0*   Total Bilirubin mg/dL 1.3*  --  1.1*   Alkaline Phosphatase U/L 96  --  79   AST U/L 17  --  13   ALT U/L 19  --  14   Magnesium mg/dL 1.6  --   --        Drug levels (last 3 results):  No results for input(s):  VANCOMYCINRA, VANCORANDOM, VANCOMYCINPE, VANCOPEAK, VANCOMYCINTR, VANCOTROUGH in the last 72 hours.    Microbiologic Results:  Microbiology Results (last 7 days)       Procedure Component Value Units Date/Time    Blood culture x two cultures. Draw prior to antibiotics. [[id number]] Collected: 06/18/22 1205    Order Status: Completed Specimen: Blood Updated: 06/19/22 1432     Blood Culture, Routine Gram stain laura bottle: Gram positive cocci in clusters resembling Staph       Results called to and read back by: Zuleika Howell 06/19/2022  12:06      Gram stain aer bottle: Gram positive cocci in clusters resembling Staph       Positive results previously called 06/19/2022  14:32    Narrative:      Aerobic and anaerobic    Blood culture x two cultures. Draw prior to antibiotics. [[id number]] Collected: 06/18/22 1209    Order Status: Completed Specimen: Blood Updated: 06/19/22 0345     Blood Culture, Routine No Growth to date    Narrative:      Aerobic and anaerobic

## 2022-06-19 NOTE — PLAN OF CARE
06/19/22 1041   Medicare Message   Important Message from Medicare regarding Discharge Appeal Rights Signed/date by patient/caregiver   Date IMM was signed 06/18/22   Time IMM was signed [id number]     IMM obtained by registration

## 2022-06-19 NOTE — CONSULTS
Cardiology Consultation Note  ([facility])    Today's Date:  6/19/2022  Patient Name: Codi Black    MRN: [id number]    Code Status: Full Code     Attending Physician: Maria Elena Hogan MD   Consulting Physician: SCOT Chapin MD  ______________________________________________________________________    Reason for Consult:   Hypertension   Shortness of breath   Elevated troponin I    Temp: 99.2 °F (37.3 °C) (06/19/22 1118)  Pulse: 69 (06/19/22 1118)  Resp: (!) 26 (06/19/22 1118)  BP: (!) 151/67 (06/19/22 1118)  SpO2: 99 % (06/19/22 1118)  Subjective:    The patient is a 89-year-old lady who was admitted to the hospital for symptoms of progressive weakness and productive cough.  The patient clinically has  Progressively decline since being diagnosed with COVID infection.    Past medical history is remarkable for hypertension with episodes of hypertensive urgency.     The patient was admitted for pneumonia with associated dyspnea.    Since her admission, her blood pressure has been elevated.    Earlier today, cardiac markers were obtained which were elevated.    EKG:  Vent. Rate : 079 BPM     Atrial Rate : 079 BPM      P-R Int : 148 ms          QRS Dur : 082 ms       QT Int : 378 ms       P-R-T Axes : 043 -05 032 degrees      QTc Int : 433 ms     Sinus rhythm with sinus arrhythmia with occasional Premature ventricular   complexes   Otherwise normal ECG   When compared with ECG of 18-JUN-2022 11:53,   Premature ventricular complexes are now Present   Confirmed by JEANNIE NARANJO, HOMEYAR ([id number]) on 6/19/2022 8:36:31 AM   I have reviewed the EKG -- there are no acute ischemic changes.    Also, I have compared the EKG to previous EKGs and there has been no significant interval change.      Past Medical History:  Past Medical History:   Diagnosis Date    Arthritis     Encounter for blood transfusion     High cholesterol     Hypertension        Past Surgical History:   Procedure Laterality Date    HYSTERECTOMY       KNEE ARTHROSCOPY Right        Family History   Problem Relation Age of Onset    Stroke Mother     Heart disease Father     Cancer Sister     Cancer Brother        Social History     Socioeconomic History    Marital status:    Tobacco Use    Smoking status: Never Smoker    Smokeless tobacco: Never Used   Substance and Sexual Activity    Alcohol use: Never    Drug use: Never       Medications:  Current Facility-Administered Medications   Medication Dose Route Frequency Provider Last Rate Last Admin    acetaminophen tablet 650 mg  650 mg Oral Q6H PRN Stephy Young NP        ALPRAZolam tablet 0.75 mg  0.75 mg Oral QHS Stephy Young NP        amLODIPine tablet 5 mg  5 mg Oral Daily Stephy Young NP   5 mg at 06/19/22 1003    aspirin EC tablet 81 mg  81 mg Oral Daily Stephy Young NP   81 mg at 06/19/22 1003    azithromycin 500 mg in dextrose 5 % 250 mL IVPB (ready to mix system)  500 mg Intravenous Q24H Stephy Young NP        cefTRIAXone (ROCEPHIN) 1 g/50 mL D5W IVPB  1 g Intravenous Q12H Stephy Young NP   Stopped at 06/19/22 1213    clopidogreL tablet 75 mg  75 mg Oral Daily Stephy Young NP   75 mg at 06/19/22 1003    enoxaparin injection 60 mg  1 mg/kg Subcutaneous Daily Stephy Young NP   60 mg at 06/19/22 0013    famotidine tablet 20 mg  20 mg Oral Daily Stephy Young NP   20 mg at 06/19/22 1143    methylPREDNISolone sodium succinate injection 40 mg  40 mg Intravenous Q12H Stephy Young NP   40 mg at 06/19/22 1143    metoprolol tartrate (LOPRESSOR) tablet 50 mg  50 mg Oral BID Stephy Young NP   50 mg at 06/19/22 1003    nitroGLYCERIN 2% TD oint ointment 1 inch  1 inch Topical (Top) Q12H Stephy Young NP   1 inch at 06/19/22 1003    ondansetron injection 4 mg  4 mg Intravenous Q8H PRN Stephy Young NP        sodium chloride 0.9% flush 10 mL  10 mL Intravenous PRN Stephy Young NP            Allergies:  Review of patient's allergies indicates:  No Known Allergies     Labs:   CMP   Recent Labs   Lab 06/18/22  1204 06/19/22  0332    141   K 3.9 3.6    107   CO2 30* 29   * 100   BUN 33* 28*   CREATININE 1.4 1.0   CALCIUM 8.9 8.2*   PROT 7.6 6.1   ALBUMIN 2.5* 2.0*   BILITOT 1.3* 1.1*   ALKPHOS 96 79   AST 17 13   ALT 19 14   ANIONGAP 6* 5*   ESTGFRAFRICA 38.2* 57.3*   EGFRNONAA 33.1* 49.7*    and CBC   Recent Labs   Lab 06/18/22  1205 06/19/22  0332   WBC 19.38* 15.34*   HGB 13.0 11.1*   HCT 40.3 36.1*    289       Imaging:   Chest x-ray:  COMPARISON:  04/14/2022     FINDINGS:  Bibasilar discoid atelectasis or subtle infiltrates and chronically elevated right hemidiaphragm unchanged.  Vague nodular opacities in the right mid lungs.     Impression:     Bibasilar discoid atelectasis or subtle infiltrates and vague nodular opacities in the mid lungs correlate with the previous CT scan of the chest from April 14, 2022        Electronically signed by: Lucia Davis MD  Date:                                            06/18/2022  Time:                                           13:10    Vital Signs (Most Recent):  Temp: 99.2 °F (37.3 °C) (06/19/22 1118)  Pulse: 69 (06/19/22 1118)  Resp: (!) 26 (06/19/22 1118)  BP: (!) 151/67 (06/19/22 1118)  SpO2: 99 % (06/19/22 1118) Vital Signs (24h Range):  Temp:  [98.1 °F (36.7 °C)-99.2 °F (37.3 °C)] 99.2 °F (37.3 °C)  Pulse:  [61-89] 69  Resp:  [18-32] 26  SpO2:  [92 %-100 %] 99 %  BP: (139-170)/(67-95) 151/67     Weight: 62.1 kg (137 lb)  Body mass index is 23.52 kg/m².    SpO2: 99 %  O2 Device (Oxygen Therapy): nasal cannula      Intake/Output Summary (Last 24 hours) at 6/19/2022 1545  Last data filed at 6/19/2022 0500  Gross per 24 hour   Intake 370 ml   Output 200 ml   Net 170 ml       Assessment and Plan:     1.  Non-ST segment elevated MI: patient with  An elevation in her troponin I x3.  During this time she was hypertensive and  tachycardic.  Given risk factors, she likely has coronary artery disease.  · Aspirin 81 mg daily  · Plavix 75 mg daily  · Metoprolol 50 mg twice daily  · Amlodipine 5 mg daily.  · Will benefit from ARB therapy.  However will make adjustments once the patient is more stable.  (Will trend her blood pressure and adjust accordingly)  · DC Nitropatch, and  [place] isosorbide mononitrate 30 mg daily.  · Reduce Lovenox tomorrow to 40 mg daily.  · Echocardiogram in the morning.    2.  Hypertension: patient with a history of hypertensive urgency.  She has resistant hypertension.  · Continue with metoprolol 50 mg twice daily.  (Rate is well controlled.)  · Increase amlodipine to 10 mg daily.  · [place] losartan 25 mg 1 tablet daily.    3.  Pneumonia: on antibiotic therapy.  · Managed by internal medicine.    4.  Hyperlipidemia:   The patient had been on Zetia therapy.  · Check lipid panel in the morning.

## 2022-06-19 NOTE — PLAN OF CARE
Problem: Adult Inpatient Plan of Care  Goal: Plan of Care Review  Outcome: Ongoing, Progressing  Goal: Patient-Specific Goal (Individualized)  Outcome: Ongoing, Progressing  Goal: Absence of Hospital-Acquired Illness or Injury  Outcome: Ongoing, Progressing  Goal: Optimal Comfort and Wellbeing  Outcome: Ongoing, Progressing  Goal: Readiness for Transition of Care  Outcome: Ongoing, Progressing     Problem: Fluid and Electrolyte Imbalance (Acute Kidney Injury/Impairment)  Goal: Fluid and Electrolyte Balance  Outcome: Ongoing, Progressing     Problem: Oral Intake Inadequate (Acute Kidney Injury/Impairment)  Goal: Optimal Nutrition Intake  Outcome: Ongoing, Progressing     Problem: Renal Function Impairment (Acute Kidney Injury/Impairment)  Goal: Effective Renal Function  Outcome: Ongoing, Progressing     Problem: Skin Injury Risk Increased  Goal: Skin Health and Integrity  Outcome: Ongoing, Progressing     Problem: Fall Injury Risk  Goal: Absence of Fall and Fall-Related Injury  Outcome: Ongoing, Progressing

## 2022-06-19 NOTE — SUBJECTIVE & OBJECTIVE
Past Medical History:   Diagnosis Date    Arthritis     Encounter for blood transfusion     High cholesterol     Hypertension        Past Surgical History:   Procedure Laterality Date    HYSTERECTOMY      KNEE ARTHROSCOPY Right        Review of patient's allergies indicates:  No Known Allergies    No current facility-administered medications on file prior to encounter.     Current Outpatient Medications on File Prior to Encounter   Medication Sig    acetaminophen (TYLENOL) 325 MG tablet Take 2 tablets (650 mg total) by mouth every 8 (eight) hours as needed.    ALPRAZolam (XANAX) 0.5 MG tablet Take 0.75 mg by mouth every evening.    ezetimibe (ZETIA) 10 mg tablet Take 10 mg by mouth once daily.    furosemide (LASIX) 20 MG tablet Take 1 tablet (20 mg total) by mouth once daily.    mirtazapine (REMERON) 15 MG tablet Take 1 tablet (15 mg total) by mouth every evening.    carvediloL (COREG) 12.5 MG tablet Take 1 tablet (12.5 mg total) by mouth 2 (two) times daily.    [DISCONTINUED] hydroCHLOROthiazide (HYDRODIURIL) 25 MG tablet Take 50 mg by mouth once daily.     Family History       Problem Relation (Age of Onset)    Cancer Sister, Brother    Heart disease Father    Stroke Mother          Tobacco Use    Smoking status: Never Smoker    Smokeless tobacco: Never Used   Substance and Sexual Activity    Alcohol use: Never    Drug use: Never    Sexual activity: Not on file     Review of Systems   Constitutional:  Positive for activity change and fatigue.   HENT: Negative.     Eyes: Negative.    Respiratory:  Positive for cough and shortness of breath. Negative for chest tightness and wheezing.    Cardiovascular:  Negative for chest pain, palpitations and leg swelling.   Gastrointestinal:  Negative for abdominal pain, constipation, diarrhea, nausea and vomiting.   Endocrine: Negative.    Genitourinary: Negative.    Musculoskeletal:  Positive for arthralgias.   Neurological:  Positive for weakness.   Psychiatric/Behavioral:  Negative.     Objective:     Vital Signs (Most Recent):  Temp: 98.1 °F (36.7 °C) (06/19/22 0726)  Pulse: 78 (06/19/22 0726)  Resp: (!) 22 (06/19/22 0726)  BP: (!) 159/73 (06/19/22 1003)  SpO2: (!) 92 % (06/19/22 0726)   Vital Signs (24h Range):  Temp:  [98.1 °F (36.7 °C)-102.5 °F (39.2 °C)] 98.1 °F (36.7 °C)  Pulse:  [] 78  Resp:  [18-32] 22  SpO2:  [92 %-100 %] 92 %  BP: (125-170)/(70-95) 159/73     Weight: 62.1 kg (137 lb)  Body mass index is 23.52 kg/m².    Physical Exam  Vitals and nursing note reviewed.   HENT:      Head: Normocephalic and atraumatic.      Mouth/Throat:      Mouth: Mucous membranes are moist.      Pharynx: Oropharynx is clear.   Eyes:      General: No scleral icterus.     Extraocular Movements: Extraocular movements intact.      Pupils: Pupils are equal, round, and reactive to light.   Cardiovascular:      Rate and Rhythm: Normal rate and regular rhythm.      Pulses: Normal pulses.      Heart sounds: Normal heart sounds.   Pulmonary:      Effort: Pulmonary effort is normal.      Breath sounds: Rales present.   Abdominal:      General: Bowel sounds are normal. There is no distension.      Palpations: Abdomen is soft.      Tenderness: There is no abdominal tenderness. There is no guarding.   Musculoskeletal:         General: Tenderness (left hand) present. Normal range of motion.      Cervical back: Normal range of motion. No rigidity.   Skin:     General: Skin is warm and dry.      Capillary Refill: Capillary refill takes less than 2 seconds.   Neurological:      General: No focal deficit present.      Mental Status: She is alert and oriented to person, place, and time.      Motor: Weakness present.   Psychiatric:         Mood and Affect: Mood normal.         Behavior: Behavior normal.         CRANIAL NERVES     CN III, IV, VI   Pupils are equal, round, and reactive to light.     Significant Labs: All pertinent labs within the past 24 hours have been reviewed.  CBC:   Recent Labs   Lab  06/18/22  1205 06/19/22  0332   WBC 19.38* 15.34*   HGB 13.0 11.1*   HCT 40.3 36.1*    289     CMP:   Recent Labs   Lab 06/18/22  1204 06/19/22  0332    141   K 3.9 3.6    107   CO2 30* 29   * 100   BUN 33* 28*   CREATININE 1.4 1.0   CALCIUM 8.9 8.2*   PROT 7.6 6.1   ALBUMIN 2.5* 2.0*   BILITOT 1.3* 1.1*   ALKPHOS 96 79   AST 17 13   ALT 19 14   ANIONGAP 6* 5*   EGFRNONAA 33.1* 49.7*     Coagulation:   Recent Labs   Lab 06/18/22  1205   INR 1.2   APTT 25.3     Lactic Acid:   Recent Labs   Lab 06/18/22  1204 06/18/22  1548   LACTATE 1.3 1.0     Magnesium:   Recent Labs   Lab 06/18/22  1204   MG 1.6     Urine Studies:   Recent Labs   Lab 06/18/22  1247   COLORU Yellow   APPEARANCEUA Clear   PHUR 7.0   SPECGRAV 1.020   PROTEINUA 2+*   GLUCUA 1+*   KETONESU Negative   BILIRUBINUA Negative   OCCULTUA Negative   NITRITE Negative   UROBILINOGEN 1.0   LEUKOCYTESUR Negative   RBCUA 1   WBCUA 1   BACTERIA Negative   SQUAMEPITHEL 3   HYALINECASTS 5.1*     Recent Labs   Lab 06/18/22  1548 06/18/22  2142 06/19/22  0332   Troponin I High Sensitivity 171.3 H 182.6 H 150.8 H       Significant Imaging: I have reviewed all pertinent imaging results/findings within the past 24 hours.    X-Ray Chest AP Portable  Narrative: EXAMINATION:  XR CHEST AP PORTABLE    CLINICAL HISTORY:  Sepsis;    TECHNIQUE:  portable chest x-ray    COMPARISON:  04/14/2022    FINDINGS:  Bibasilar discoid atelectasis or subtle infiltrates and chronically elevated right hemidiaphragm unchanged.  Vague nodular opacities in the right mid lungs.  Impression: Bibasilar discoid atelectasis or subtle infiltrates and vague nodular opacities in the mid lungs correlate with the previous CT scan of the chest from April 14, 2022    Electronically signed by: Lucia Davis MD  Date:    06/18/2022  Time:    13:10

## 2022-06-20 ENCOUNTER — CLINICAL SUPPORT (OUTPATIENT)
Dept: CARDIOLOGY | Facility: HOSPITAL | Age: 87
DRG: 871 | End: 2022-06-20
Attending: INTERNAL MEDICINE
Payer: MEDICARE

## 2022-06-20 VITALS
BODY MASS INDEX: 23.39 KG/M2 | DIASTOLIC BLOOD PRESSURE: 82 MMHG | WEIGHT: 137 LBS | SYSTOLIC BLOOD PRESSURE: 141 MMHG | HEIGHT: 64 IN

## 2022-06-20 PROBLEM — I21.4 NSTEMI (NON-ST ELEVATED MYOCARDIAL INFARCTION): Status: ACTIVE | Noted: 2022-06-20

## 2022-06-20 LAB
ALBUMIN SERPL BCP-MCNC: 1.9 G/DL (ref 3.5–5.2)
ALP SERPL-CCNC: 73 U/L (ref 55–135)
ALT SERPL W/O P-5'-P-CCNC: 13 U/L (ref 10–44)
ANION GAP SERPL CALC-SCNC: 3 MMOL/L (ref 8–16)
AORTIC ROOT ANNULUS: 2.67 CM
AORTIC VALVE CUSP SEPERATION: 1.18 CM
ASCENDING AORTA: 3.83 CM
AST SERPL-CCNC: 10 U/L (ref 10–40)
AV INDEX (PROSTH): 0.93
AV MEAN GRADIENT: 2 MMHG
AV PEAK GRADIENT: 4 MMHG
AV VALVE AREA: 2.8 CM2
AV VELOCITY RATIO: 0.72
BASOPHILS # BLD AUTO: 0.01 K/UL (ref 0–0.2)
BASOPHILS NFR BLD: 0.1 % (ref 0–1.9)
BILIRUB SERPL-MCNC: 0.5 MG/DL (ref 0.1–1)
BSA FOR ECHO PROCEDURE: 1.68 M2
BUN SERPL-MCNC: 28 MG/DL (ref 8–23)
CALCIUM SERPL-MCNC: 8 MG/DL (ref 8.7–10.5)
CHLORIDE SERPL-SCNC: 103 MMOL/L (ref 95–110)
CO2 SERPL-SCNC: 29 MMOL/L (ref 23–29)
CREAT SERPL-MCNC: 1 MG/DL (ref 0.5–1.4)
CRP SERPL-MCNC: 20.7 MG/DL (ref 0–0.75)
CV ECHO LV RWT: 0.64 CM
DIFFERENTIAL METHOD: ABNORMAL
DOP CALC AO PEAK VEL: 1.02 M/S
DOP CALC AO VTI: 18.04 CM
DOP CALC LVOT AREA: 3 CM2
DOP CALC LVOT DIAMETER: 1.96 CM
DOP CALC LVOT PEAK VEL: 0.73 M/S
DOP CALC LVOT STROKE VOLUME: 50.51 CM3
DOP CALCLVOT PEAK VEL VTI: 16.75 CM
E WAVE DECELERATION TIME: 240.83 MSEC
E/A RATIO: 0.5
E/E' RATIO: 7.43 M/S
ECHO LV POSTERIOR WALL: 1.25 CM (ref 0.6–1.1)
EJECTION FRACTION: 55 %
EOSINOPHIL # BLD AUTO: 0 K/UL (ref 0–0.5)
EOSINOPHIL NFR BLD: 0.1 % (ref 0–8)
ERYTHROCYTE [DISTWIDTH] IN BLOOD BY AUTOMATED COUNT: 12.5 % (ref 11.5–14.5)
ERYTHROCYTE [SEDIMENTATION RATE] IN BLOOD: 130 MM/HR (ref 0–20)
EST. GFR  (AFRICAN AMERICAN): 57.3 ML/MIN/1.73 M^2
EST. GFR  (NON AFRICAN AMERICAN): 49.7 ML/MIN/1.73 M^2
FRACTIONAL SHORTENING: 36 % (ref 28–44)
GLUCOSE SERPL-MCNC: 137 MG/DL (ref 70–110)
HCT VFR BLD AUTO: 34.1 % (ref 37–48.5)
HGB BLD-MCNC: 10.6 G/DL (ref 12–16)
IMM GRANULOCYTES # BLD AUTO: 0.08 K/UL (ref 0–0.04)
IMM GRANULOCYTES NFR BLD AUTO: 0.5 % (ref 0–0.5)
INTERVENTRICULAR SEPTUM: 1.15 CM (ref 0.6–1.1)
IVRT: 134.95 MSEC
LA MAJOR: 4.18 CM
LA WIDTH: 3.6 CM
LEFT ATRIUM SIZE: 3.8 CM
LEFT INTERNAL DIMENSION IN SYSTOLE: 2.52 CM (ref 2.1–4)
LEFT VENTRICLE DIASTOLIC VOLUME INDEX: 40.28 ML/M2
LEFT VENTRICLE DIASTOLIC VOLUME: 67.26 ML
LEFT VENTRICLE MASS INDEX: 96 G/M2
LEFT VENTRICLE SYSTOLIC VOLUME INDEX: 13.7 ML/M2
LEFT VENTRICLE SYSTOLIC VOLUME: 22.8 ML
LEFT VENTRICULAR INTERNAL DIMENSION IN DIASTOLE: 3.93 CM (ref 3.5–6)
LEFT VENTRICULAR MASS: 161.12 G
LV LATERAL E/E' RATIO: 6.5 M/S
LV SEPTAL E/E' RATIO: 8.67 M/S
LYMPHOCYTES # BLD AUTO: 0.8 K/UL (ref 1–4.8)
LYMPHOCYTES NFR BLD: 4.9 % (ref 18–48)
MAGNESIUM SERPL-MCNC: 1.5 MG/DL (ref 1.6–2.6)
MCH RBC QN AUTO: 28.2 PG (ref 27–31)
MCHC RBC AUTO-ENTMCNC: 31.1 G/DL (ref 32–36)
MCV RBC AUTO: 91 FL (ref 82–98)
MONOCYTES # BLD AUTO: 0.7 K/UL (ref 0.3–1)
MONOCYTES NFR BLD: 3.9 % (ref 4–15)
MV A" WAVE DURATION": 14.88 MSEC
MV PEAK A VEL: 1.03 M/S
MV PEAK E VEL: 0.52 M/S
MV STENOSIS PRESSURE HALF TIME: 69.84 MS
MV VALVE AREA P 1/2 METHOD: 3.15 CM2
NEUTROPHILS # BLD AUTO: 15.1 K/UL (ref 1.8–7.7)
NEUTROPHILS NFR BLD: 90.5 % (ref 38–73)
NRBC BLD-RTO: 0 /100 WBC
PISA TR MAX VEL: 2.24 M/S
PLATELET # BLD AUTO: 308 K/UL (ref 150–450)
PMV BLD AUTO: 10.3 FL (ref 9.2–12.9)
POTASSIUM SERPL-SCNC: 4 MMOL/L (ref 3.5–5.1)
PROT SERPL-MCNC: 6.1 G/DL (ref 6–8.4)
PV PEAK VELOCITY: 0.56 CM/S
RA MAJOR: 3.97 CM
RA PRESSURE: 3 MMHG
RA WIDTH: 2.81 CM
RBC # BLD AUTO: 3.76 M/UL (ref 4–5.4)
RIGHT VENTRICULAR END-DIASTOLIC DIMENSION: 2.62 CM
SODIUM SERPL-SCNC: 135 MMOL/L (ref 136–145)
TDI LATERAL: 0.08 M/S
TDI SEPTAL: 0.06 M/S
TDI: 0.07 M/S
TR MAX PG: 20 MMHG
TRICUSPID ANNULAR PLANE SYSTOLIC EXCURSION: 1.57 CM
TV REST PULMONARY ARTERY PRESSURE: 23 MMHG
WBC # BLD AUTO: 16.63 K/UL (ref 3.9–12.7)

## 2022-06-20 PROCEDURE — 99900031 HC PATIENT EDUCATION (STAT)

## 2022-06-20 PROCEDURE — 80053 COMPREHEN METABOLIC PANEL: CPT | Performed by: NURSE PRACTITIONER

## 2022-06-20 PROCEDURE — 85651 RBC SED RATE NONAUTOMATED: CPT | Performed by: NURSE PRACTITIONER

## 2022-06-20 PROCEDURE — 93306 TTE W/DOPPLER COMPLETE: CPT

## 2022-06-20 PROCEDURE — 25000003 PHARM REV CODE 250: Performed by: INTERNAL MEDICINE

## 2022-06-20 PROCEDURE — 99900035 HC TECH TIME PER 15 MIN (STAT)

## 2022-06-20 PROCEDURE — 27000221 HC OXYGEN, UP TO 24 HOURS

## 2022-06-20 PROCEDURE — 86140 C-REACTIVE PROTEIN: CPT | Performed by: NURSE PRACTITIONER

## 2022-06-20 PROCEDURE — 83735 ASSAY OF MAGNESIUM: CPT | Performed by: NURSE PRACTITIONER

## 2022-06-20 PROCEDURE — 85025 COMPLETE CBC W/AUTO DIFF WBC: CPT | Performed by: NURSE PRACTITIONER

## 2022-06-20 PROCEDURE — 63600175 PHARM REV CODE 636 W HCPCS: Performed by: INTERNAL MEDICINE

## 2022-06-20 PROCEDURE — 36415 COLL VENOUS BLD VENIPUNCTURE: CPT | Performed by: NURSE PRACTITIONER

## 2022-06-20 PROCEDURE — 94761 N-INVAS EAR/PLS OXIMETRY MLT: CPT

## 2022-06-20 PROCEDURE — 63600175 PHARM REV CODE 636 W HCPCS: Performed by: NURSE PRACTITIONER

## 2022-06-20 PROCEDURE — 11000001 HC ACUTE MED/SURG PRIVATE ROOM

## 2022-06-20 PROCEDURE — 25000003 PHARM REV CODE 250: Performed by: NURSE PRACTITIONER

## 2022-06-20 RX ORDER — ENOXAPARIN SODIUM 100 MG/ML
40 INJECTION SUBCUTANEOUS
Status: DISCONTINUED | OUTPATIENT
Start: 2022-06-21 | End: 2022-06-21

## 2022-06-20 RX ADMIN — CLOPIDOGREL 75 MG: 75 TABLET, FILM COATED ORAL at 08:06

## 2022-06-20 RX ADMIN — LOSARTAN POTASSIUM 25 MG: 25 TABLET, FILM COATED ORAL at 08:06

## 2022-06-20 RX ADMIN — CEFTRIAXONE 1 G: 1 INJECTION, SOLUTION INTRAVENOUS at 11:06

## 2022-06-20 RX ADMIN — METOPROLOL TARTRATE 50 MG: 50 TABLET, FILM COATED ORAL at 08:06

## 2022-06-20 RX ADMIN — METHYLPREDNISOLONE SODIUM SUCCINATE 40 MG: 40 INJECTION, POWDER, FOR SOLUTION INTRAMUSCULAR; INTRAVENOUS at 11:06

## 2022-06-20 RX ADMIN — FAMOTIDINE 20 MG: 20 TABLET ORAL at 08:06

## 2022-06-20 RX ADMIN — ASPIRIN 81 MG: 81 TABLET, COATED ORAL at 08:06

## 2022-06-20 RX ADMIN — VANCOMYCIN HYDROCHLORIDE 750 MG: 750 INJECTION, POWDER, LYOPHILIZED, FOR SOLUTION INTRAVENOUS at 07:06

## 2022-06-20 RX ADMIN — ENOXAPARIN SODIUM 60 MG: 100 INJECTION SUBCUTANEOUS at 08:06

## 2022-06-20 RX ADMIN — MIRTAZAPINE 15 MG: 15 TABLET, FILM COATED ORAL at 08:06

## 2022-06-20 RX ADMIN — ISOSORBIDE MONONITRATE 30 MG: 30 TABLET, EXTENDED RELEASE ORAL at 08:06

## 2022-06-20 RX ADMIN — AZITHROMYCIN MONOHYDRATE 500 MG: 500 INJECTION, POWDER, LYOPHILIZED, FOR SOLUTION INTRAVENOUS at 04:06

## 2022-06-20 RX ADMIN — AMLODIPINE BESYLATE 10 MG: 10 TABLET ORAL at 08:06

## 2022-06-20 RX ADMIN — TRAMADOL HYDROCHLORIDE 50 MG: 50 TABLET ORAL at 08:06

## 2022-06-20 NOTE — PROGRESS NOTES
[facility] Medicine  Progress Note    Patient Name: Codi Black  MRN: [id number]  Patient Class: IP- Inpatient   Admission Date: 6/18/2022  Length of Stay: 2 days  Attending Physician: Maria Elena Hogan MD  Primary Care Provider: Rafael Quan III, MD        Subjective:     Principal Problem:Pneumonia due to infectious organism        HPI:  Pt is 89 y/o female with Hx of hypertension, hyperlipidemia, and arthritis, presented to [facility] ED yesterday with c/o generalized weakness. She reports having a productive cough. She denies chest pain. High-Sensitivity Troponin levels, although negative, trended up x3 sets - Cardiology consulted. This morning she reports continued fatigue and weakness; also c/o left hand pain; shortness of breath improved with rest and oxygen.      Overview/Hospital Course:  6/20 SD: Pt's spouse at bedside. Pt reports feeling better compared to initial arrival to the hospital. Blood cultures x2 positive for gram + cocci in clusters resembling Staph, awaiting sensitivity. Vancomycin added to antibiotic therapy. Appreciate Dr. Chapin's recommendations for NSTEMI and HTN. Dr. Chapin advises that pt will need angiogram after treatment of pneumonia; will determine need for inpatient vs outpatient as patient care progresses.      Interval History: Pt seen and evaluated    Review of Systems   Constitutional:  Positive for activity change and fatigue.   HENT: Negative.     Eyes: Negative.    Respiratory:  Positive for cough and shortness of breath. Negative for chest tightness and wheezing.    Cardiovascular:  Negative for chest pain, palpitations and leg swelling.   Gastrointestinal:  Negative for abdominal pain, constipation, diarrhea, nausea and vomiting.   Endocrine: Negative.    Genitourinary: Negative.    Musculoskeletal:  Positive for arthralgias.   Neurological:  Positive for weakness.   Psychiatric/Behavioral: Negative.     Objective:     Vital Signs (Most Recent):  Temp:  97.8 °F (36.6 °C) (06/20/22 0740)  Pulse: 92 (06/20/22 0740)  Resp: 20 (06/20/22 0740)  BP: 128/76 (06/20/22 0740)  SpO2: (!) 92 % (06/20/22 0740)   Vital Signs (24h Range):  Temp:  [97.5 °F (36.4 °C)-99.2 °F (37.3 °C)] 97.8 °F (36.6 °C)  Pulse:  [] 92  Resp:  [18-26] 20  SpO2:  [92 %-100 %] 92 %  BP: (128-158)/(65-82) 128/76     Weight: 62.1 kg (137 lb)  Body mass index is 23.52 kg/m².    Intake/Output Summary (Last 24 hours) at 6/20/2022 1018  Last data filed at 6/20/2022 0500  Gross per 24 hour   Intake 1740 ml   Output 1350 ml   Net 390 ml      Physical Exam  Vitals and nursing note reviewed.   HENT:      Head: Normocephalic and atraumatic.      Mouth/Throat:      Mouth: Mucous membranes are moist.      Pharynx: Oropharynx is clear.   Eyes:      General: No scleral icterus.     Extraocular Movements: Extraocular movements intact.      Pupils: Pupils are equal, round, and reactive to light.   Cardiovascular:      Rate and Rhythm: Normal rate and regular rhythm.      Pulses: Normal pulses.      Heart sounds: Normal heart sounds.   Pulmonary:      Effort: Pulmonary effort is normal.      Breath sounds: Rales present.   Abdominal:      General: Bowel sounds are normal. There is no distension.      Palpations: Abdomen is soft.      Tenderness: There is no abdominal tenderness. There is no guarding.   Musculoskeletal:         General: Tenderness (left hand) present. Normal range of motion.      Cervical back: Normal range of motion. No rigidity.   Skin:     General: Skin is warm and dry.      Capillary Refill: Capillary refill takes less than 2 seconds.   Neurological:      General: No focal deficit present.      Mental Status: She is alert and oriented to person, place, and time.      Motor: Weakness present.   Psychiatric:         Mood and Affect: Mood normal.         Behavior: Behavior normal.       Significant Labs: All pertinent labs within the past 24 hours have been reviewed.    Blood Culture:   Recent  Labs   Lab 06/18/22  1205 06/18/22  1209   LABBLOO Gram stain laura bottle: Gram positive cocci in clusters resembling Staph   Results called to and read back by: Zuleika Howell 06/19/2022  12:06  Gram stain aer bottle: Gram positive cocci in clusters resembling Staph   Positive results previously called 06/19/2022  14:32  STAPHYLOCOCCUS SPECIES  Identification and susceptibility pending  * Gram stain laura bottle: Gram positive cocci in clusters resembling Staph   Results called to and read back by: Poornima Rojas RN 06/19/2022  22:26     CBC:   Recent Labs   Lab 06/18/22  1205 06/19/22  0332 06/20/22  0322   WBC 19.38* 15.34* 16.63*   HGB 13.0 11.1* 10.6*   HCT 40.3 36.1* 34.1*    289 308     CMP:   Recent Labs   Lab 06/18/22  1204 06/19/22  0332 06/20/22  0322    141 135*   K 3.9 3.6 4.0    107 103   CO2 30* 29 29   * 100 137*   BUN 33* 28* 28*   CREATININE 1.4 1.0 1.0   CALCIUM 8.9 8.2* 8.0*   PROT 7.6 6.1 6.1   ALBUMIN 2.5* 2.0* 1.9*   BILITOT 1.3* 1.1* 0.5   ALKPHOS 96 79 73   AST 17 13 10   ALT 19 14 13   ANIONGAP 6* 5* 3*   EGFRNONAA 33.1* 49.7* 49.7*     Magnesium:   Recent Labs   Lab 06/18/22  1204 06/20/22  0322   MG 1.6 1.5*       Significant Imaging: I have reviewed all pertinent imaging results/findings within the past 24 hours.      Assessment/Plan:      * Pneumonia due to infectious organism  Cultures indicated gram positive cocci in clusters resembling Staph, awaiting sensitivity report. Vancomycin initiated on 6/19/22 6/20: Day 3 of IV abx therapy with Rocephin and Azithromycin; Day 1 of abx therapy with Vancomycin      Arthritis  Pain to left hand. CRP and Sed Rate elevated. Treatment with steroids.      Hypertension  Managed by Dr. Chapin      NSTEMI (non-ST elevated myocardial infarction)  Managed by Dr. Chapin    DVT prophylaxis  Lovenox        VTE Risk Mitigation (From admission, onward)         Ordered     enoxaparin injection 40 mg  Every 24 hours (non-standard times)          06/20/22 1017     IP VTE HIGH RISK PATIENT  Once         06/18/22 1525     Place sequential compression device  Until discontinued         06/18/22 1525                Discharge Planning   KIMANI:      Code Status: Full Code   Is the patient medically ready for discharge?:     Reason for patient still in hospital (select all that apply): Patient trending condition, Laboratory test and Treatment  Discharge Plan A: Home with family, Home Health                  Stephy Young NP  Department of Hospital Medicine   [facility]

## 2022-06-20 NOTE — PROGRESS NOTES
Cardiology Progress Note  ([facility])    Today's Date:  6/20/2022  Patient Name: Codi Black    MRN: [id number]    Code Status: Full Code     Attending Physician: Maria Elena Hogan MD   Consulting Physician: SCOT Chapin MD    Hospital Course:     Clinical:   Feeling well this morning.  She is not complaining of chest pain or shortness of breath.  She does feel weak however.    Hemodynamic:    Blood pressure is improving and stabilized.    Telemetry:    Normal sinus rhythm.  No PVCs; no episodes of nonsustained ventricular tachycardia.    Brief episode of SVT with ST segment depression consistent with ischemia.  (Equivalent of a positive stress test.)    Relevant testing:    Echocardiogram: pending.    Review of Systems   Constitutional: Positive for malaise/fatigue. Negative for decreased appetite.   Cardiovascular: Positive for dyspnea on exertion and leg swelling. Negative for chest pain, near-syncope, orthopnea, palpitations and syncope.       Vital Signs (Most Recent):  Temp: 97.8 °F (36.6 °C) (06/20/22 0740)  Pulse: 92 (06/20/22 0740)  Resp: 20 (06/20/22 0740)  BP: 128/76 (06/20/22 0740)  SpO2: (!) 92 % (06/20/22 0740) Vital Signs (24h Range):  Temp:  [97.5 °F (36.4 °C)-98.9 °F (37.2 °C)] 97.8 °F (36.6 °C)  Pulse:  [] 92  Resp:  [18-25] 20  SpO2:  [92 %-100 %] 92 %  BP: (128-158)/(65-82) 128/76     Weight: 62.1 kg (137 lb)  Body mass index is 23.52 kg/m².    SpO2: (!) 92 %  O2 Device (Oxygen Therapy): nasal cannula      Intake/Output Summary (Last 24 hours) at 6/20/2022 1312  Last data filed at 6/20/2022 0500  Gross per 24 hour   Intake 1740 ml   Output 1350 ml   Net 390 ml     Labs:  CMP   Recent Labs   Lab 06/19/22  0332 06/20/22  0322    135*   K 3.6 4.0    103   CO2 29 29    137*   BUN 28* 28*   CREATININE 1.0 1.0   CALCIUM 8.2* 8.0*   PROT 6.1 6.1   ALBUMIN 2.0* 1.9*   BILITOT 1.1* 0.5   ALKPHOS 79 73   AST 13 10   ALT 14 13   ANIONGAP 5* 3*   ESTGFRAFRICA 57.3* 57.3*    EGFRNONAA 49.7* 49.7*   , CBC   Recent Labs   Lab 06/19/22  0332 06/20/22  0322   WBC 15.34* 16.63*   HGB 11.1* 10.6*   HCT 36.1* 34.1*    308    and   Pathology Results  (Last 10 years)    None      Troponin I peaked at at 182.6.  (Now trending downward.)    Imaging:  Echocardiogram: pending.    Physical Examination:  General:   Alert and oriented.  She is in no distress.  Heent:   No JVD.  No appreciable carotid bruits.  Lungs:   Good breath sounds.  No wheezing or rhonchi.  Heart:   Regular rate rhythm; grade 1 holosystolic murmur.  Extremities:   Bruising and chronic skin changes.  Distal pulses are mildly diminished.    Assessment and Plan:     1.  Non-ST segment elevated MI: patient with  An elevation in her troponin I x3.  During this time she was hypertensive and tachycardic.  Given risk factors, she likely has coronary artery disease.  · Aspirin 81 mg daily  · Plavix 75 mg daily  · Metoprolol 50 mg twice daily  · Amlodipine 5 mg daily.  · Losartan 25 mg daily  · DC Nitropatch, and  Dyess isosorbide mononitrate 30 mg daily.  · Reduce Lovenox tomorrow to 40 mg daily.  · Echocardiogram in the morning.     2.  Hypertension:  Blood pressure is well controlled.  · Continue with metoprolol 50 mg twice daily.  (Rate is well controlled.)  · Increase amlodipine to 10 mg daily.  · Dyess losartan 25 mg 1 tablet daily.     3.  Pneumonia: on antibiotic therapy.  · Managed by internal medicine.     4.  Hyperlipidemia:     LDL is elevated.  ·   Dyess Crestor 10 mg daily.    Telemetry revealed an episode of SVT with associated ST segment depression (flat) of approximately 2-3 mV. This is consistent with a positive stress test.  The patient will require invasive evaluation.   I have explained this to the patient and her .  They are considering their options.  If they are willing to proceed, the timing will depend on her clinical and hemodynamic stability.        Echocardiogram:  · The left  ventricle is normal in size with moderate concentric hypertrophy and normal systolic function.  · The estimated ejection fraction is 55%.  · Normal left ventricular diastolic function.  · Mild tricuspid regurgitation.  · Mild pulmonic regurgitation.  · The estimated PA systolic pressure is 23 mmHg.  · Trivial pericardial effusion.

## 2022-06-20 NOTE — SUBJECTIVE & OBJECTIVE
Interval History: Pt seen and evaluated    Review of Systems   Constitutional:  Positive for activity change and fatigue.   HENT: Negative.     Eyes: Negative.    Respiratory:  Positive for cough and shortness of breath. Negative for chest tightness and wheezing.    Cardiovascular:  Negative for chest pain, palpitations and leg swelling.   Gastrointestinal:  Negative for abdominal pain, constipation, diarrhea, nausea and vomiting.   Endocrine: Negative.    Genitourinary: Negative.    Musculoskeletal:  Positive for arthralgias.   Neurological:  Positive for weakness.   Psychiatric/Behavioral: Negative.     Objective:     Vital Signs (Most Recent):  Temp: 97.8 °F (36.6 °C) (06/20/22 0740)  Pulse: 92 (06/20/22 0740)  Resp: 20 (06/20/22 0740)  BP: 128/76 (06/20/22 0740)  SpO2: (!) 92 % (06/20/22 0740)   Vital Signs (24h Range):  Temp:  [97.5 °F (36.4 °C)-99.2 °F (37.3 °C)] 97.8 °F (36.6 °C)  Pulse:  [] 92  Resp:  [18-26] 20  SpO2:  [92 %-100 %] 92 %  BP: (128-158)/(65-82) 128/76     Weight: 62.1 kg (137 lb)  Body mass index is 23.52 kg/m².    Intake/Output Summary (Last 24 hours) at 6/20/2022 1018  Last data filed at 6/20/2022 0500  Gross per 24 hour   Intake 1740 ml   Output 1350 ml   Net 390 ml      Physical Exam  Vitals and nursing note reviewed.   HENT:      Head: Normocephalic and atraumatic.      Mouth/Throat:      Mouth: Mucous membranes are moist.      Pharynx: Oropharynx is clear.   Eyes:      General: No scleral icterus.     Extraocular Movements: Extraocular movements intact.      Pupils: Pupils are equal, round, and reactive to light.   Cardiovascular:      Rate and Rhythm: Normal rate and regular rhythm.      Pulses: Normal pulses.      Heart sounds: Normal heart sounds.   Pulmonary:      Effort: Pulmonary effort is normal.      Breath sounds: Rales present.   Abdominal:      General: Bowel sounds are normal. There is no distension.      Palpations: Abdomen is soft.      Tenderness: There is no  abdominal tenderness. There is no guarding.   Musculoskeletal:         General: Tenderness (left hand) present. Normal range of motion.      Cervical back: Normal range of motion. No rigidity.   Skin:     General: Skin is warm and dry.      Capillary Refill: Capillary refill takes less than 2 seconds.   Neurological:      General: No focal deficit present.      Mental Status: She is alert and oriented to person, place, and time.      Motor: Weakness present.   Psychiatric:         Mood and Affect: Mood normal.         Behavior: Behavior normal.       Significant Labs: All pertinent labs within the past 24 hours have been reviewed.    Blood Culture:   Recent Labs   Lab 06/18/22  1205 06/18/22  1209   LABBLOO Gram stain laura bottle: Gram positive cocci in clusters resembling Staph   Results called to and read back by: Zuleika Howell 06/19/2022  12:06  Gram stain aer bottle: Gram positive cocci in clusters resembling Staph   Positive results previously called 06/19/2022  14:32  STAPHYLOCOCCUS SPECIES  Identification and susceptibility pending  * Gram stain laura bottle: Gram positive cocci in clusters resembling Staph   Results called to and read back by: Poornima Rojas RN 06/19/2022  22:26     CBC:   Recent Labs   Lab 06/18/22  1205 06/19/22  0332 06/20/22  0322   WBC 19.38* 15.34* 16.63*   HGB 13.0 11.1* 10.6*   HCT 40.3 36.1* 34.1*    289 308     CMP:   Recent Labs   Lab 06/18/22  1204 06/19/22  0332 06/20/22  0322    141 135*   K 3.9 3.6 4.0    107 103   CO2 30* 29 29   * 100 137*   BUN 33* 28* 28*   CREATININE 1.4 1.0 1.0   CALCIUM 8.9 8.2* 8.0*   PROT 7.6 6.1 6.1   ALBUMIN 2.5* 2.0* 1.9*   BILITOT 1.3* 1.1* 0.5   ALKPHOS 96 79 73   AST 17 13 10   ALT 19 14 13   ANIONGAP 6* 5* 3*   EGFRNONAA 33.1* 49.7* 49.7*     Magnesium:   Recent Labs   Lab 06/18/22  1204 06/20/22  0322   MG 1.6 1.5*       Significant Imaging: I have reviewed all pertinent imaging results/findings within the past 24  hours.

## 2022-06-20 NOTE — PT/OT/SLP EVAL
"Occupational Therapy  Evaluation Attempt    Patient Name:  Codi Black   MRN:  [id number]    Patient not seen today secondary to pt refusing therapy stating "I don't believe in therapy. I don't want it." OT educated pt that it is beneficial to complete therapy to help the healing process but pt denied. Educated pt that we will check back tomorrow if she has changed her mind, pt verbally agreed. Will follow-up 6/20/2022.    6/20/2022  "

## 2022-06-20 NOTE — PLAN OF CARE
[facility] Surg  Initial Discharge Assessment       Primary Care Provider: Rafael Quan III, MD    Admission Diagnosis: Tachycardia [R00.0]  Pneumonia due to infectious organism, unspecified laterality, unspecified part of lung [J18.9]  Sepsis, due to unspecified organism, unspecified whether acute organ dysfunction present [A41.9]    Admission Date: 6/18/2022  Expected Discharge Date:     Discharge Barriers Identified: None    Payor: HU[employer] MANAGED MEDICARE / Plan: HU[employer]NA MEDICARE PPO / Product Type: Medicare Advantage /     Extended Emergency Contact Information  Primary Emergency Contact: Major Terrazas  Mobile Phone: [phone]  Relation: Spouse  Secondary Emergency Contact: RhettOfelia marie  Mobile Phone: [phone]  Relation: Relative    Discharge Plan A: Home with family, Home Health  Discharge Plan B: Home with family, Home Health      [facility] - [place], [place] - [address]  [address]  [facility] [address]  Phone: [phone] Fax: [phone]      Initial Assessment (most recent)     Adult Discharge Assessment - 06/20/22 0753        Discharge Assessment    Assessment Type Discharge Planning Assessment     Confirmed/corrected address, phone number and insurance Yes     Confirmed Demographics Correct on Facesheet     Source of Information patient     When was your last doctors appointment? --   The patient stated that she saw her PCP a month ago.    Reason For Admission Pneumonia due to infectious organism     Lives With spouse     Do you expect to return to your current living situation? --   TBD    Do you have help at home or someone to help you manage your care at home? Yes     Who are your caregiver(s) and their phone number(s)? Major (spouse)-[phone]   The patient stated that her daughter will be relocating in order to assist her with her care. Sherin Gaitan (Daughter)-[phone]    Prior to hospitilization cognitive status: Alert/Oriented     Current  cognitive status: Alert/Oriented     Walking or Climbing Stairs Difficulty ambulation difficulty, requires equipment;ambulation difficulty, assistance 1 person     Mobility Management walker     Dressing/Bathing Difficulty bathing difficulty, requires equipment;bathing difficulty, assistance 1 person     Dressing/Bathing Management shower chair     Home Accessibility wheelchair accessible;stairs to enter home     Number of Stairs, Main Entrance three;other (see comments)   ramp attached    Home Layout Able to live on 1st floor     Equipment Currently Used at Home grab bar;walker, standard;shower chair;other (see comments)   The patient has a wheelchair at home, but her home is not wheelchair accessible.    Readmission within 30 days? No     Patient currently being followed by outpatient case management? No     Do you currently have service(s) that help you manage your care at home? Yes     Name and Contact number of agency [facility]-[phone]     Is the pt/caregiver preference to resume services with current agency Yes     Do you take prescription medications? Yes     Do you have prescription coverage? Yes     Coverage HU[employer] MANAGED MEDICARE - HU[employer] MEDICARE PPO     Do you have any problems affording any of your prescribed medications? No     Is the patient taking medications as prescribed? yes     Who is going to help you get home at discharge? family     How do you get to doctors appointments? family or friend will provide     Are you on dialysis? No     Discharge Plan A Home with family;Home Health     Discharge Plan B Home with family;Home Health     DME Needed Upon Discharge  other (see comments)   TBD    Discharge Plan discussed with: Patient     Discharge Barriers Identified None             Initial discharge assessment is completed. FOSTER spoke to the patient in her room. She was awake, alert, and oriented to the questions being asked. The patient's spouse, Major, was present at her bedside during  the assessment. The patient lives in a single level home with her spouse. She requires assistance with her care. The patient stated that her niece, Ofelia, is able to assist her with her home and groceries. She also stated that she has two housekeepers as well. The patient currently has home health  with [facility]. She plans to resume care with this agency upon discharge. The patient is not interested in SNF or IPR placement at this time.     SW will continue to monitor.

## 2022-06-20 NOTE — PROGRESS NOTES
Cardiology Progress Note  ([facility])    Today's Date:  6/20/2022  Patient Name: Codi Black    MRN: [id number]    Code Status: Full Code     Attending Physician: Maria Elena Hogan MD   Consulting Physician: SCOT Chapin MD        Progress note completed.  See progress note with same date.

## 2022-06-20 NOTE — HOSPITAL COURSE
6/20 SD: Pt's spouse at bedside. Pt reports feeling better compared to initial arrival to the hospital. Blood cultures x2 positive for gram + cocci in clusters resembling Staph, awaiting sensitivity. Vancomycin added to antibiotic therapy. Appreciate Dr. Chapin's recommendations for NSTEMI and HTN. Dr. Chapin advises that pt will need angiogram after treatment of pneumonia; will determine need for inpatient vs outpatient as patient care progresses.  6/21 SD: Pt's spouse at bedside. Blood cultures x2 positive for staphylococcus epidermidis, susceptibility pending. Continue IV abx therapy and repeat blood cultures today. Encourage pt to participate with therapy.  6/22 SD: Continue IV abx, following repeat cultures. Encourage participation with therapy.  6/23 SD: Sitting up in bed, at breakfast with good appetite. Continue IV abx therapy, following repeat cultures. Encourage participation with therapy.  6/24 SD: Pt's spouse and daughter at bedside. Continue IV abx therapy, following cultures. Continue therapy efforts.  6/25 AA, weekend crosscover, patient being treated for pneumonia, on IV antibiotic, blood cultures 2/2 bottles positive for Staph epidermidis, on IV vanc, PT OT on board  6/26 AA, weekend crosscover, antibiotics on board for pneumonia, on IV vancomycin, blood culture positive for Staph epidermidis, afebrile, PT/OT.  6/27/2022 FM:  Patient case has been reviewed and I am returning from vacation.  Patient was admitted with pneumonia and positive blood cultures and a decline in her physical condition.  Her acute medical issues seem to have stabilized but she is significantly declined in functional state and her daughter has moved here from out of town to health care for her.  Her daughter is unable to physically meet her needs.  The patient wants to return home and live with her .  May be a potential candidate for inpatient rehab if she is able to participate and putting good effort.  Needs better  pain control for the arthralgia of the hip.  6/28/2022 FM:  Patient worked with therapy yesterday and the notes reviewed.  I do feel that she is a good candidate for inpatient rehab and her goal is to return home at a supervision to modified independent functional level.  I feel with better pain control and close supervision and medical management this is achievable.  Will submit to her insurance company for approval.  6/29/2022 FM:  Awaiting insurance company approval.  Patient is working with therapy.  Patient's pain is improved.  6/30/2022 FM:  Patient's caregivers have been diagnosed with COVID-19 pneumonia and are under able to assist with care at this time.  Patient's insurance company denied her admission to inpatient rehab despite her meeting the criteria in my opinion.  Appeal is in progress.  7/1/2022 FM:  Patient states she has become frustrated waiting and is frustrated with her insurance company.  Patient is requesting for the home to be set up for a possible discharge home if her appeal fails.  The patient's primary caregiver at home is currently COVID-19 infected and will be released from quarantine on Monday.  Will hold the patient here until Monday and then discharged home with a hospital bed and her other DME equipment.  If the patient's appeal is overturned she can move up to rehab.  7/2 [place] Sore throat improved with throat lozenges, back to baseline room air, provided encouragement to use incentive spirometer. Patient agreeable to discharge plan above.   7/3 CG Has not had a BM in a few days, requesting getting out of chair  7/4 CG: Out of isolation. Wanting to do more PT , ready to go home.   Discharge Note:  Patient was admitted to our acute care facility and found to have a pulmonary infiltrate and we felt this was likely community-acquired pneumonia.  Patient has a history of pelvic fracture and has had increasing amounts of pain and discomfort and despite rehabilitative care she has had  difficulty returning back to her prior quality of life.  Ultimately the patient improved during this admission but she had a significant setback in her strength mobility function and safety and we had attempted to get the patient into an inpatient rehab facility.  The patient's insurance company denied her request and ultimately this was an appeal.  The patient became frustrated and ultimately wants to return home with family.  Her symptoms have improved she is needing intermittent oxygen suspect she has a component of diastolic heart dysfunction and heart failure and several days of her admission were also recently prolonged because of a caregiver getting COVID-19 infection.

## 2022-06-20 NOTE — ASSESSMENT & PLAN NOTE
Cultures indicated gram positive cocci in clusters resembling Staph, awaiting sensitivity report. Vancomycin initiated on 6/19/22 6/20: Day 3 of IV abx therapy with Rocephin and Azithromycin; Day 1 of abx therapy with Vancomycin

## 2022-06-20 NOTE — PT/OT/SLP EVAL
"Physical Therapy      Patient Name:  Codi Black   MRN:  [id number]    Patient not seen today secondary to PT walked in room and introduced self and patient stated. "I don't believe in therapy. It only makes me hurt more." Patient politely refusing therapy services at this time. Patient informed that therapist will check back at a later time to see if she has changed mind about participating in therapy intervention. Will follow-up next available treatment session.        "

## 2022-06-20 NOTE — PLAN OF CARE
Problem: Adult Inpatient Plan of Care  Goal: Plan of Care Review  Outcome: Ongoing, Progressing  Goal: Patient-Specific Goal (Individualized)  Outcome: Ongoing, Progressing  Goal: Absence of Hospital-Acquired Illness or Injury  Outcome: Ongoing, Progressing  Goal: Optimal Comfort and Wellbeing  Outcome: Ongoing, Progressing  Goal: Readiness for Transition of Care  Outcome: Ongoing, Progressing     Problem: Fluid and Electrolyte Imbalance (Acute Kidney Injury/Impairment)  Goal: Fluid and Electrolyte Balance  Outcome: Ongoing, Progressing     Problem: Oral Intake Inadequate (Acute Kidney Injury/Impairment)  Goal: Optimal Nutrition Intake  Outcome: Ongoing, Progressing     Problem: Renal Function Impairment (Acute Kidney Injury/Impairment)  Goal: Effective Renal Function  Outcome: Ongoing, Progressing     Problem: Skin Injury Risk Increased  Goal: Skin Health and Integrity  Outcome: Ongoing, Progressing     Problem: Fall Injury Risk  Goal: Absence of Fall and Fall-Related Injury  Outcome: Ongoing, Progressing     Problem: Impaired Wound Healing  Goal: Optimal Wound Healing  Outcome: Ongoing, Progressing     Problem: Fluid Imbalance (Pneumonia)  Goal: Fluid Balance  Outcome: Ongoing, Progressing     Problem: Infection (Pneumonia)  Goal: Resolution of Infection Signs and Symptoms  Outcome: Ongoing, Progressing     Problem: Respiratory Compromise (Pneumonia)  Goal: Effective Oxygenation and Ventilation  Outcome: Ongoing, Progressing

## 2022-06-21 LAB
ALBUMIN SERPL BCP-MCNC: 1.9 G/DL (ref 3.5–5.2)
ALP SERPL-CCNC: 78 U/L (ref 55–135)
ALT SERPL W/O P-5'-P-CCNC: 14 U/L (ref 10–44)
ANION GAP SERPL CALC-SCNC: 6 MMOL/L (ref 8–16)
AST SERPL-CCNC: 12 U/L (ref 10–40)
BASOPHILS # BLD AUTO: 0.02 K/UL (ref 0–0.2)
BASOPHILS NFR BLD: 0.1 % (ref 0–1.9)
BILIRUB SERPL-MCNC: 0.2 MG/DL (ref 0.1–1)
BUN SERPL-MCNC: 45 MG/DL (ref 8–23)
CALCIUM SERPL-MCNC: 8.5 MG/DL (ref 8.7–10.5)
CHLORIDE SERPL-SCNC: 101 MMOL/L (ref 95–110)
CO2 SERPL-SCNC: 28 MMOL/L (ref 23–29)
CREAT SERPL-MCNC: 1.6 MG/DL (ref 0.5–1.4)
DIFFERENTIAL METHOD: ABNORMAL
EOSINOPHIL # BLD AUTO: 0 K/UL (ref 0–0.5)
EOSINOPHIL NFR BLD: 0 % (ref 0–8)
ERYTHROCYTE [DISTWIDTH] IN BLOOD BY AUTOMATED COUNT: 12 % (ref 11.5–14.5)
EST. GFR  (AFRICAN AMERICAN): 32.5 ML/MIN/1.73 M^2
EST. GFR  (NON AFRICAN AMERICAN): 28.2 ML/MIN/1.73 M^2
GLUCOSE SERPL-MCNC: 151 MG/DL (ref 70–110)
HCT VFR BLD AUTO: 36.6 % (ref 37–48.5)
HGB BLD-MCNC: 11.5 G/DL (ref 12–16)
IMM GRANULOCYTES # BLD AUTO: 0.09 K/UL (ref 0–0.04)
IMM GRANULOCYTES NFR BLD AUTO: 0.5 % (ref 0–0.5)
LYMPHOCYTES # BLD AUTO: 0.8 K/UL (ref 1–4.8)
LYMPHOCYTES NFR BLD: 4.5 % (ref 18–48)
MAGNESIUM SERPL-MCNC: 1.7 MG/DL (ref 1.6–2.6)
MCH RBC QN AUTO: 28.5 PG (ref 27–31)
MCHC RBC AUTO-ENTMCNC: 31.4 G/DL (ref 32–36)
MCV RBC AUTO: 91 FL (ref 82–98)
MONOCYTES # BLD AUTO: 0.5 K/UL (ref 0.3–1)
MONOCYTES NFR BLD: 2.9 % (ref 4–15)
NEUTROPHILS # BLD AUTO: 16.9 K/UL (ref 1.8–7.7)
NEUTROPHILS NFR BLD: 92 % (ref 38–73)
NRBC BLD-RTO: 0 /100 WBC
PLATELET # BLD AUTO: 297 K/UL (ref 150–450)
PMV BLD AUTO: 10.4 FL (ref 9.2–12.9)
POTASSIUM SERPL-SCNC: 4.2 MMOL/L (ref 3.5–5.1)
PROT SERPL-MCNC: 6.5 G/DL (ref 6–8.4)
RBC # BLD AUTO: 4.04 M/UL (ref 4–5.4)
SODIUM SERPL-SCNC: 135 MMOL/L (ref 136–145)
VANCOMYCIN TROUGH SERPL-MCNC: 13.4 UG/ML (ref 10–22)
WBC # BLD AUTO: 18.33 K/UL (ref 3.9–12.7)

## 2022-06-21 PROCEDURE — 97166 OT EVAL MOD COMPLEX 45 MIN: CPT

## 2022-06-21 PROCEDURE — 11000001 HC ACUTE MED/SURG PRIVATE ROOM

## 2022-06-21 PROCEDURE — 83735 ASSAY OF MAGNESIUM: CPT | Performed by: NURSE PRACTITIONER

## 2022-06-21 PROCEDURE — 80053 COMPREHEN METABOLIC PANEL: CPT | Performed by: NURSE PRACTITIONER

## 2022-06-21 PROCEDURE — 63600175 PHARM REV CODE 636 W HCPCS: Performed by: NURSE PRACTITIONER

## 2022-06-21 PROCEDURE — 99900035 HC TECH TIME PER 15 MIN (STAT)

## 2022-06-21 PROCEDURE — 80202 ASSAY OF VANCOMYCIN: CPT | Performed by: INTERNAL MEDICINE

## 2022-06-21 PROCEDURE — 85025 COMPLETE CBC W/AUTO DIFF WBC: CPT | Performed by: NURSE PRACTITIONER

## 2022-06-21 PROCEDURE — 27000221 HC OXYGEN, UP TO 24 HOURS

## 2022-06-21 PROCEDURE — 25000003 PHARM REV CODE 250: Performed by: INTERNAL MEDICINE

## 2022-06-21 PROCEDURE — 25000003 PHARM REV CODE 250: Performed by: NURSE PRACTITIONER

## 2022-06-21 PROCEDURE — 63600175 PHARM REV CODE 636 W HCPCS: Performed by: INTERNAL MEDICINE

## 2022-06-21 PROCEDURE — 36415 COLL VENOUS BLD VENIPUNCTURE: CPT | Performed by: INTERNAL MEDICINE

## 2022-06-21 PROCEDURE — 36415 COLL VENOUS BLD VENIPUNCTURE: CPT | Performed by: NURSE PRACTITIONER

## 2022-06-21 PROCEDURE — 94761 N-INVAS EAR/PLS OXIMETRY MLT: CPT

## 2022-06-21 PROCEDURE — 97162 PT EVAL MOD COMPLEX 30 MIN: CPT

## 2022-06-21 PROCEDURE — 87040 BLOOD CULTURE FOR BACTERIA: CPT | Performed by: NURSE PRACTITIONER

## 2022-06-21 PROCEDURE — 99900031 HC PATIENT EDUCATION (STAT)

## 2022-06-21 RX ORDER — ENOXAPARIN SODIUM 100 MG/ML
30 INJECTION SUBCUTANEOUS EVERY 24 HOURS
Status: DISCONTINUED | OUTPATIENT
Start: 2022-06-22 | End: 2022-06-21

## 2022-06-21 RX ORDER — ATORVASTATIN CALCIUM 10 MG/1
10 TABLET, FILM COATED ORAL DAILY
Status: DISCONTINUED | OUTPATIENT
Start: 2022-06-21 | End: 2022-06-21

## 2022-06-21 RX ORDER — AMIODARONE HYDROCHLORIDE 200 MG/1
200 TABLET ORAL 2 TIMES DAILY
Status: DISCONTINUED | OUTPATIENT
Start: 2022-06-21 | End: 2022-07-05 | Stop reason: HOSPADM

## 2022-06-21 RX ORDER — ATORVASTATIN CALCIUM 10 MG/1
10 TABLET, FILM COATED ORAL NIGHTLY
Status: DISCONTINUED | OUTPATIENT
Start: 2022-06-21 | End: 2022-07-05 | Stop reason: HOSPADM

## 2022-06-21 RX ORDER — ENOXAPARIN SODIUM 100 MG/ML
30 INJECTION SUBCUTANEOUS EVERY 24 HOURS
Status: DISCONTINUED | OUTPATIENT
Start: 2022-06-21 | End: 2022-06-21

## 2022-06-21 RX ADMIN — APIXABAN 2.5 MG: 2.5 TABLET, FILM COATED ORAL at 08:06

## 2022-06-21 RX ADMIN — ATORVASTATIN CALCIUM 10 MG: 10 TABLET, FILM COATED ORAL at 08:06

## 2022-06-21 RX ADMIN — ASPIRIN 81 MG: 81 TABLET, COATED ORAL at 08:06

## 2022-06-21 RX ADMIN — ALPRAZOLAM 0.75 MG: 0.5 TABLET ORAL at 08:06

## 2022-06-21 RX ADMIN — CEFTRIAXONE 1 G: 1 INJECTION, SOLUTION INTRAVENOUS at 10:06

## 2022-06-21 RX ADMIN — METOPROLOL TARTRATE 50 MG: 50 TABLET, FILM COATED ORAL at 08:06

## 2022-06-21 RX ADMIN — MIRTAZAPINE 15 MG: 15 TABLET, FILM COATED ORAL at 09:06

## 2022-06-21 RX ADMIN — METHYLPREDNISOLONE SODIUM SUCCINATE 40 MG: 40 INJECTION, POWDER, FOR SOLUTION INTRAMUSCULAR; INTRAVENOUS at 11:06

## 2022-06-21 RX ADMIN — AZITHROMYCIN MONOHYDRATE 500 MG: 500 INJECTION, POWDER, LYOPHILIZED, FOR SOLUTION INTRAVENOUS at 02:06

## 2022-06-21 RX ADMIN — TRAMADOL HYDROCHLORIDE 50 MG: 50 TABLET ORAL at 08:06

## 2022-06-21 RX ADMIN — VANCOMYCIN HYDROCHLORIDE 750 MG: 750 INJECTION, POWDER, LYOPHILIZED, FOR SOLUTION INTRAVENOUS at 08:06

## 2022-06-21 RX ADMIN — ISOSORBIDE MONONITRATE 30 MG: 30 TABLET, EXTENDED RELEASE ORAL at 08:06

## 2022-06-21 RX ADMIN — LOSARTAN POTASSIUM 25 MG: 25 TABLET, FILM COATED ORAL at 08:06

## 2022-06-21 RX ADMIN — METHYLPREDNISOLONE SODIUM SUCCINATE 40 MG: 40 INJECTION, POWDER, FOR SOLUTION INTRAMUSCULAR; INTRAVENOUS at 10:06

## 2022-06-21 RX ADMIN — ACETAMINOPHEN 650 MG: 325 TABLET ORAL at 04:06

## 2022-06-21 RX ADMIN — ENOXAPARIN SODIUM 40 MG: 100 INJECTION SUBCUTANEOUS at 08:06

## 2022-06-21 RX ADMIN — FAMOTIDINE 20 MG: 20 TABLET ORAL at 08:06

## 2022-06-21 RX ADMIN — AMLODIPINE BESYLATE 10 MG: 10 TABLET ORAL at 08:06

## 2022-06-21 RX ADMIN — CLOPIDOGREL 75 MG: 75 TABLET, FILM COATED ORAL at 08:06

## 2022-06-21 RX ADMIN — AMIODARONE HYDROCHLORIDE 200 MG: 200 TABLET ORAL at 08:06

## 2022-06-21 NOTE — PT/OT/SLP EVAL
"Occupational Therapy   Evaluation    Name: Codi Black  MRN: [id number]  Admitting Diagnosis:  Pneumonia due to infectious organism  Recent Surgery: * No surgery found *      Recommendations:     Discharge Recommendations: other (see comments) (TBD)  Discharge Equipment Recommendations:  other (see comments) (TBD)  Barriers to discharge:  Other (Comment) (Current medical status)    Assessment:     Codi Black is a 89 y.o. female with a medical diagnosis of Pneumonia due to infectious organism.  She presents with weakness. Performance deficits affecting function: weakness, impaired endurance, decreased upper extremity function, pain.      Rehab Prognosis: Good; patient would benefit from acute skilled OT services to address these deficits and reach maximum level of function.       Plan:     Patient to be seen 6 x/week to address the above listed problems via self-care/home management, therapeutic activities, therapeutic exercises  · Plan of Care Expires: 07/08/22  · Plan of Care Reviewed with: patient    Subjective       Occupational Profile:  Living Environment: Home with spouse  Previous level of function: independent  Equipment Used at Home:  shower chair, rollator, walker, rolling, wheelchair, grab bar  Assistance upon Discharge: TBD    Pain/Comfort:  · Pain Rating 1: 10/10  · Location - Side 1: Right  · Location 1: back  · Pain Addressed 1: Reposition  · Pain Rating Post-Intervention 1: other (see comments)  · Pain Rating 2:  (Pt states "it feels alot better")    Patients cultural, spiritual, Christianity conflicts given the current situation: no    Objective:     Communicated with: Nurse prior to session.  Patient found left sidelying with oxygen, telemetry, PureWick, peripheral IV upon OT entry to room.    General Precautions: Standard, fall   Orthopedic Precautions:N/A   Braces: N/A  Respiratory Status: Nasal cannula, flow 2 L/min    Occupational Performance:    Bed Mobility:    · Patient completed " "Rolling/Turning to Right with moderate assistance  · Patient completed Scooting/Bridging with moderate assistance  · Patient completed Supine to Sit with moderate assistance    Functional Mobility/Transfers:  · Patient completed Sit <> Stand Transfer with moderate assistance  with  standard walker and 2 persons   · Patient completed Bed <> Chair Transfer using Step Transfer technique with moderate assistance with standard walker    Activities of Daily Living:  · Feeding:  independence    · Grooming: moderate assistance    · Bathing: maximal assistance    · Upper Body Dressing: maximal assistance    · Lower Body Dressing: maximal assistance    · Toileting: maximal assistance      Cognitive/Visual Perceptual:  Cognitive/Psychosocial Skills:     -       Oriented to: Person, Place, Time and Situation   -       Follows Commands/attention:Follows multistep  commands  -       Communication: clear/fluent    Physical Exam:  Upper Extremity Range of Motion:     -       Right Upper Extremity: WFL  -       Left Upper Extremity: WFL  Upper Extremity Strength:    -       Right Upper Extremity: WFL  -       Left Upper Extremity: WFL   Strength:    -       Right Upper Extremity: WFL  -       Left Upper Extremity: WFL    AMPAC 6 Click ADL:  AMPAC Total Score: 14    Treatment & Education:  Pt was alert upon entering the room and willing for therapy after OT/PT educating pt that therapy is beneficial to care. Pt requested to sit in a recliner. PT/OT was able to help pt to transition from be to recliner with mod A. Pt was reporting pain at a 10/10 when supine in bed. After transitioning to recliner pt stated that her pain has subsided stating "I feel so much better sitting up". During transfer to chair, pt became SOB. OT/PT educated on breathing techniques to enhance oxygen consumption. Pt v/u and SOB subsided.   Education:    Patient left up in chair with all lines intact, call button in reach and nurse notified    GOALS: "   Multidisciplinary Problems     Occupational Therapy Goals        Problem: Occupational Therapy    Goal Priority Disciplines Outcome Interventions   Occupational Therapy Goal     OT, PT/OT     Description: Goals to be met by: 7/8/2022     Patient will increase functional independence with ADLs by performing:    UE Dressing with Minimal Assistance.  Grooming while EOB with Set-up Assistance.  Toileting from bedside commode with Minimal Assistance for hygiene and clothing management.   Sitting at edge of bed x5 minutes with Minimal Assistance.  Rolling to Right, Left with Minimal Assistance.   Supine to sit with Minimal Assistance.                     History:     Past Medical History:   Diagnosis Date    Arthritis     Encounter for blood transfusion     High cholesterol     Hypertension        Past Surgical History:   Procedure Laterality Date    HYSTERECTOMY      KNEE ARTHROSCOPY Right        Time Tracking:     OT Date of Treatment: 06/21/22  OT Start Time: 1315  OT Stop Time: 1346  OT Total Time (min): 31 min    Billable Minutes:Evaluation 31    6/21/2022

## 2022-06-21 NOTE — PT/OT/SLP EVAL
Physical Therapy Evaluation    Patient Name:  Codi Black   MRN:  [id number]    Recommendations:     Discharge Recommendations:   (TBD)   Discharge Equipment Recommendations:  (TBD)   Barriers to discharge: None at this time    Assessment:     Codi Black is a 89 y.o. female admitted with a medical diagnosis of Pneumonia due to infectious organism.  She presents with the following impairments/functional limitations:  weakness, impaired endurance, impaired functional mobilty, gait instability, impaired balance, pain, decreasing her functional independence and safety with mobility.    Rehab Prognosis: Good; patient would benefit from acute skilled PT services to address these deficits and reach maximum level of function.    Recent Surgery: * No surgery found *      Plan:     During this hospitalization, patient to be seen 6 x/week to address the identified rehab impairments via gait training, therapeutic activities, therapeutic exercises and progress toward the following goals:    · Plan of Care Expires:  07/08/22    Subjective     Chief Complaint: Pain in R buttocks  Patient/Family Comments/goals: pt reports wanting to get out of bed  Pain/Comfort:  · Pain Rating 1: 10/10  · Location - Side 1: Right  · Location 1: back  · Pain Addressed 1: Reposition, Distraction  · Pain Rating Post-Intervention 1:  (not rated; pt reports decrease in pain)    Patients cultural, spiritual, Denominational conflicts given the current situation: no    Living Environment:  Pt lives with her  in one story home with 3 steps to enter.  Prior to admission, patients level of function was independent.  Equipment used at home: walker, rolling, wheelchair.  DME owned (not currently used): Standard walker.  Upon discharge, patient will have assistance from family.    Objective:     Communicated with RN prior to session.  Patient found supine with HOB elevated with oxygen, PureWick, peripheral IV, telemetry  upon PT entry to  room.    General Precautions: Standard, fall   Orthopedic Precautions:  N/A  Braces:  N/A  Respiratory Status: Nasal cannula, flow 2 L/min    Exams:  · Cognitive Exam:  Patient is oriented to Person, Place and Situation  · Sensation:    · -       Intact  pt denies numbness/tingling  · Skin Integrity/Edema:      · -       Skin integrity: Wound RLE  · -       Edema: Mild BLE  · RLE ROM: WFL  · RLE Strength: grossly 3+/5 MMT  · LLE ROM: WFL  · LLE Strength: grossly 3+/5 MMT    Functional Mobility:  · Bed Mobility:     · Supine to Sit: maximal assistance  · Transfers:     · Sit to Stand:  minimum assistance with standard walker  · Bed to Chair: moderate assistance with  standard walker  using  Step Transfer  · Gait: 2 side steps using standard walker with Mod A and verbal cuing for safety    Therapeutic Activities and Exercises:   Not Performed    AM-PAC 6 CLICK MOBILITY  Total Score:12     Patient left up in chair with all lines intact, call button in reach and RN notified.    GOALS:   Multidisciplinary Problems     Physical Therapy Goals        Problem: Physical Therapy    Goal Priority Disciplines Outcome Goal Variances Interventions   Physical Therapy Goal     PT, PT/OT      Description: Goals to be met by: 2022    Patient will increase functional independence with mobility by performin. Supine to/from sit with MInimal Assistance.  2. Rolling to Left and Right with Stand-by Assistance.  3. Sit to stand transfer with Contact Guard Assistance  4. Bed to chair transfer with Contact Guard Assistance using Rolling Walker  5. Gait  x 10 feet with Minimal Assistance using Rolling Walker.                        History:     Past Medical History:   Diagnosis Date    Arthritis     Encounter for blood transfusion     High cholesterol     Hypertension        Past Surgical History:   Procedure Laterality Date    HYSTERECTOMY      KNEE ARTHROSCOPY Right        Time Tracking:     PT Received On: 22  PT Start  Time: 1315     PT Stop Time: 1346  PT Total Time (min): 31 min     Billable Minutes: Evaluation 31 min      06/21/2022

## 2022-06-21 NOTE — PROGRESS NOTES
[facility] Medicine  Progress Note    Patient Name: Codi Black  MRN: [id number]  Patient Class: IP- Inpatient   Admission Date: 6/18/2022  Length of Stay: 3 days  Attending Physician: Maria Elena Hogan MD  Primary Care Provider: Rafael Quan III, MD        Subjective:     Principal Problem:Pneumonia due to infectious organism        HPI:  Pt is 89 y/o female with Hx of hypertension, hyperlipidemia, and arthritis, presented to [facility] ED yesterday with c/o generalized weakness. She reports having a productive cough. She denies chest pain. High-Sensitivity Troponin levels, although negative, trended up x3 sets - Cardiology consulted. This morning she reports continued fatigue and weakness; also c/o left hand pain; shortness of breath improved with rest and oxygen.      Overview/Hospital Course:  6/20 SD: Pt's spouse at bedside. Pt reports feeling better compared to initial arrival to the hospital. Blood cultures x2 positive for gram + cocci in clusters resembling Staph, awaiting sensitivity. Vancomycin added to antibiotic therapy. Appreciate Dr. Chapin's recommendations for NSTEMI and HTN. Dr. Chapin advises that pt will need angiogram after treatment of pneumonia; will determine need for inpatient vs outpatient as patient care progresses.  6/21 SD: Pt's spouse at bedside. Blood cultures x2 positive for staphylococcus epidermidis, susceptibility pending. Continue IV abx therapy and repeat blood cultures today. Encourage pt to participate with therapy.      Interval History: Pt seen and evaluated    Review of Systems   Constitutional:  Positive for activity change and fatigue.   HENT: Negative.     Eyes: Negative.    Respiratory:  Positive for cough and shortness of breath. Negative for chest tightness and wheezing.    Cardiovascular:  Negative for chest pain, palpitations and leg swelling.   Gastrointestinal:  Negative for abdominal pain, constipation, diarrhea, nausea and vomiting.   Endocrine:  Negative.    Genitourinary: Negative.    Musculoskeletal:  Positive for arthralgias.   Neurological:  Positive for weakness.   Psychiatric/Behavioral: Negative.     Objective:     Vital Signs (Most Recent):  Temp: 97.6 °F (36.4 °C) (06/21/22 1137)  Pulse: (!) 121 (06/21/22 1500)  Resp: 20 (06/21/22 1137)  BP: 97/61 (06/21/22 1137)  SpO2: (!) 93 % (06/21/22 1137)   Vital Signs (24h Range):  Temp:  [97.3 °F (36.3 °C)-98.4 °F (36.9 °C)] 97.6 °F (36.4 °C)  Pulse:  [] 121  Resp:  [16-20] 20  SpO2:  [92 %-100 %] 93 %  BP: ()/(58-97) 97/61     Weight: 62.1 kg (137 lb)  Body mass index is 23.52 kg/m².    Intake/Output Summary (Last 24 hours) at 6/21/2022 1605  Last data filed at 6/21/2022 0600  Gross per 24 hour   Intake 950 ml   Output 1480 ml   Net -530 ml        Physical Exam  Vitals and nursing note reviewed.   HENT:      Head: Normocephalic and atraumatic.      Mouth/Throat:      Mouth: Mucous membranes are moist.      Pharynx: Oropharynx is clear.   Eyes:      General: No scleral icterus.     Extraocular Movements: Extraocular movements intact.      Pupils: Pupils are equal, round, and reactive to light.   Cardiovascular:      Rate and Rhythm: Normal rate and regular rhythm.      Pulses: Normal pulses.      Heart sounds: Normal heart sounds.   Pulmonary:      Effort: Pulmonary effort is normal.      Breath sounds: Rales present.   Abdominal:      General: Bowel sounds are normal. There is no distension.      Palpations: Abdomen is soft.      Tenderness: There is no abdominal tenderness. There is no guarding.   Musculoskeletal:         General: Tenderness (left hand) present. Normal range of motion.      Cervical back: Normal range of motion. No rigidity.   Skin:     General: Skin is warm and dry.      Capillary Refill: Capillary refill takes less than 2 seconds.   Neurological:      General: No focal deficit present.      Mental Status: She is alert and oriented to person, place, and time.      Motor:  Weakness present.   Psychiatric:         Mood and Affect: Mood normal.         Behavior: Behavior normal.       Significant Labs: All pertinent labs within the past 24 hours have been reviewed.    Blood Culture:   No results for input(s): LABBLOO in the last 48 hours.    CBC:   Recent Labs   Lab 06/20/22 0322 06/21/22  0336   WBC 16.63* 18.33*   HGB 10.6* 11.5*   HCT 34.1* 36.6*    297       CMP:   Recent Labs   Lab 06/20/22 0322 06/21/22  0336   * 135*   K 4.0 4.2    101   CO2 29 28   * 151*   BUN 28* 45*   CREATININE 1.0 1.6*   CALCIUM 8.0* 8.5*   PROT 6.1 6.5   ALBUMIN 1.9* 1.9*   BILITOT 0.5 0.2   ALKPHOS 73 78   AST 10 12   ALT 13 14   ANIONGAP 3* 6*   EGFRNONAA 49.7* 28.2*       Magnesium:   Recent Labs   Lab 06/20/22 0322 06/21/22  0336   MG 1.5* 1.7         Significant Imaging: I have reviewed all pertinent imaging results/findings within the past 24 hours.      Assessment/Plan:      * Pneumonia due to infectious organism  Blood cultures x2 positive for staphylococcus epidermidis, susceptibility pending. Vancomycin initiated on 6/19/22 6/21: Day 4 of IV abx therapy with Rocephin and Azithromycin; Day 2 of abx therapy with Vancomycin. Repeat blood cultures drawn 6/21/22      Arthritis  Pain to left hand. CRP and Sed Rate elevated. Treatment with steroids.      Hypertension  Managed by Dr. Chapin      NSTEMI (non-ST elevated myocardial infarction)  Managed by Dr. Chapin    DVT prophylaxis  Lovenox        VTE Risk Mitigation (From admission, onward)         Ordered     enoxaparin injection 30 mg  Daily         06/21/22 0911     IP VTE HIGH RISK PATIENT  Once         06/18/22 1525     Place sequential compression device  Until discontinued         06/18/22 1525                Discharge Planning   KIMANI:      Code Status: Full Code   Is the patient medically ready for discharge?:     Reason for patient still in hospital (select all that apply): Patient trending condition, Laboratory  test and Treatment  Discharge Plan A: Home with family, Home Health                  Stephy Young NP  Department of Hospital Medicine   [facility]

## 2022-06-21 NOTE — PROGRESS NOTES
Cardiology Progress Note  ([facility])    Today's Date:  6/21/2022  Patient Name: Codi Black    MRN: [id number]    Code Status: Full Code     Attending Physician: Maria Elena Hogan MD   Consulting Physician: SCOT Chapin MD    Hospital Course:     Clinical:   The patient is sitting up in a chair this evening.  She states that she is breathing better with only minimal dyspnea.  Her daughter is with her at the bedside stating that she does become dyspneic with low levels of activity.  She is not experiencing chest pain or shortness of breath.  Also, she denies left hand pain/arm pain which is her anginal equivalent.    Because of renal insufficiency, Lovenox was down titrated from 40-30 mg per day.    Hemodynamic:    Blood pressure is improving and stabilizing.    Telemetry:   Atrial fibrillation with rapid ventricular rate ranging between 117 and 122 beats per minute.  * patient on June 20th had a brief episode of supraventricular tachycardia with rapid ventricular rate of 165 beats per minute at which time she had ST segment depression.    Relevant testing:     Echocardiogram:   · The left ventricle is normal in size with moderate concentric hypertrophy and normal systolic function.  · The estimated ejection fraction is 55%.  · Normal left ventricular diastolic function.  · Mild tricuspid regurgitation.  · Mild pulmonic regurgitation.  · The estimated PA systolic pressure is 23 mmHg.  · Trivial pericardial effusion.     Review of Systems   Constitutional: Positive for malaise/fatigue. Negative for decreased appetite.   Cardiovascular: Positive for dyspnea on exertion and leg swelling. Negative for chest pain, near-syncope, orthopnea, palpitations and syncope.       Vital Signs (Most Recent):  Temp: 97.5 °F (36.4 °C) (06/21/22 0730)  Pulse: 82 (06/21/22 0730)  Resp: 20 (06/21/22 0730)  BP: (!) 156/72 (06/21/22 0730)  SpO2: 96 % (06/21/22 0755) Vital Signs (24h Range):  Temp:  [97.3 °F (36.3 °C)-98.4 °F  (36.9 °C)] 97.5 °F (36.4 °C)  Pulse:  [50-82] 82  Resp:  [16-20] 20  SpO2:  [92 %-100 %] 96 %  BP: (112-156)/(58-97) 156/72     Weight: 62.1 kg (137 lb)  Body mass index is 23.52 kg/m².    SpO2: 96 %  O2 Device (Oxygen Therapy): nasal cannula      Intake/Output Summary (Last 24 hours) at 6/21/2022 1008  Last data filed at 6/21/2022 0600  Gross per 24 hour   Intake 950 ml   Output 1480 ml   Net -530 ml     Labs:  CMP   Recent Labs   Lab 06/20/22  0322 06/21/22  0336   * 135*   K 4.0 4.2    101   CO2 29 28   * 151*   BUN 28* 45*   CREATININE 1.0 1.6*   CALCIUM 8.0* 8.5*   PROT 6.1 6.5   ALBUMIN 1.9* 1.9*   BILITOT 0.5 0.2   ALKPHOS 73 78   AST 10 12   ALT 13 14   ANIONGAP 3* 6*   ESTGFRAFRICA 57.3* 32.5*   EGFRNONAA 49.7* 28.2*   , CBC   Recent Labs   Lab 06/20/22  0322 06/21/22  0336   WBC 16.63* 18.33*   HGB 10.6* 11.5*   HCT 34.1* 36.6*    297    and   Pathology Results  (Last 10 years)    None      Troponin I peaked at at 182.6.  (Now trending downward.)    Physical Examination:  General:   Alert and oriented.  She is in no distress.  Heent:   No JVD.  No appreciable carotid bruits.  Lungs:   Good breath sounds.  No wheezing or rhonchi.  Heart:   Regular rate rhythm; grade 1 holosystolic murmur.  Extremities:   Bruising and chronic skin changes.  Distal pulses are mildly diminished.  Skin tenting consistent with a fluid loss.    Assessment and Plan:     1.  Non-ST segment elevated MI: patient with  An elevation in her troponin I x3.  During this time she was hypertensive and tachycardic.  Given risk factors, she likely has coronary artery disease.  · Aspirin 81 mg daily  · Plavix 75 mg daily  · Metoprolol 50 mg twice daily  · Amlodipine 5 mg daily.  · Hold Losartan 25 mg daily secondary to prerenal azotemia.  · Isosorbide mononitrate 30 mg daily.  · Discontinue Lovenox therapy.    2.  Hypertension:  Blood pressure is well controlled.  · Continue with metoprolol 50 mg twice  daily.  · Increase amlodipine to 10 mg daily.  · [place] losartan 25 mg 1 tablet daily.     3.  Pneumonia: on antibiotic therapy.  · Managed by internal medicine.     4.  Hyperlipidemia:     LDL is elevated.  ·   [place] Crestor 10 mg daily.    5. Atrial fibrillation:  Rate is accelerated.  This is new onset atrial fibrillation.   · Eliquis at 2.5 mg b.i.d.  · Discontinue Plavix as I am concerned about DAPT with Eliquis and is potential for bleeding.  · Amiodarone 200 mg 1 tablet b.i.d..    Telemetry revealed an episode of SVT with associated ST segment depression (flat) of approximately 2-3 mV. This is consistent with a positive stress test.  The patient will require invasive evaluation.   I have explained this to the patient and her .  They are considering their options.  If they are willing to proceed, the timing will depend on her clinical and hemodynamic stability.     5.  Renal insufficiency: we will need to monitor closely particularly as she is on a vancomycin.

## 2022-06-21 NOTE — ASSESSMENT & PLAN NOTE
Blood cultures x2 positive for staphylococcus epidermidis, susceptibility pending. Vancomycin initiated on 6/19/22 6/21: Day 4 of IV abx therapy with Rocephin and Azithromycin; Day 2 of abx therapy with Vancomycin. Repeat blood cultures drawn 6/21/22

## 2022-06-21 NOTE — PROGRESS NOTES
Pharmacist Renal Dose Adjustment Note    Codi Black is a 89 y.o. female being treated with the medication enoxaparin    Patient Data:    Vital Signs (Most Recent):  Temp: 97.5 °F (36.4 °C) (06/21/22 0730)  Pulse: 82 (06/21/22 0730)  Resp: 20 (06/21/22 0730)  BP: (!) 156/72 (06/21/22 0730)  SpO2: 100 % (06/21/22 0730)   Vital Signs (72h Range):  Temp:  [97.3 °F (36.3 °C)-102.5 °F (39.2 °C)]   Pulse:  []   Resp:  [16-32]   BP: (112-170)/(58-97)   SpO2:  [92 %-100 %]      Recent Labs   Lab 06/19/22  0332 06/20/22  0322 06/21/22  0336   CREATININE 1.0 1.0 1.6*     Serum creatinine: 1.6 mg/dL (H) 06/21/22 0336  Estimated creatinine clearance: 20.2 mL/min (A)    Medication:enoxaparin dose: 40 mg frequency daily will be changed to medication:enoxaparin dose:30 mg frequency:daily    Pharmacist's Name: Libertad Camilo  Pharmacist's Extension: [phone]

## 2022-06-21 NOTE — PLAN OF CARE
Plan of care reviewed with pt., no respiratory distress, SOB with exertion, on continuous O2 per nasal cannula as ordered, on 3L, takes meds whole, purewick in use, incontinent, generalized weakness noted, instructed to call for needs/assistance, will continue to monitor.

## 2022-06-21 NOTE — SUBJECTIVE & OBJECTIVE
Interval History: Pt seen and evaluated    Review of Systems   Constitutional:  Positive for activity change and fatigue.   HENT: Negative.     Eyes: Negative.    Respiratory:  Positive for cough and shortness of breath. Negative for chest tightness and wheezing.    Cardiovascular:  Negative for chest pain, palpitations and leg swelling.   Gastrointestinal:  Negative for abdominal pain, constipation, diarrhea, nausea and vomiting.   Endocrine: Negative.    Genitourinary: Negative.    Musculoskeletal:  Positive for arthralgias.   Neurological:  Positive for weakness.   Psychiatric/Behavioral: Negative.     Objective:     Vital Signs (Most Recent):  Temp: 97.6 °F (36.4 °C) (06/21/22 1137)  Pulse: (!) 121 (06/21/22 1500)  Resp: 20 (06/21/22 1137)  BP: 97/61 (06/21/22 1137)  SpO2: (!) 93 % (06/21/22 1137)   Vital Signs (24h Range):  Temp:  [97.3 °F (36.3 °C)-98.4 °F (36.9 °C)] 97.6 °F (36.4 °C)  Pulse:  [] 121  Resp:  [16-20] 20  SpO2:  [92 %-100 %] 93 %  BP: ()/(58-97) 97/61     Weight: 62.1 kg (137 lb)  Body mass index is 23.52 kg/m².    Intake/Output Summary (Last 24 hours) at 6/21/2022 1605  Last data filed at 6/21/2022 0600  Gross per 24 hour   Intake 950 ml   Output 1480 ml   Net -530 ml        Physical Exam  Vitals and nursing note reviewed.   HENT:      Head: Normocephalic and atraumatic.      Mouth/Throat:      Mouth: Mucous membranes are moist.      Pharynx: Oropharynx is clear.   Eyes:      General: No scleral icterus.     Extraocular Movements: Extraocular movements intact.      Pupils: Pupils are equal, round, and reactive to light.   Cardiovascular:      Rate and Rhythm: Normal rate and regular rhythm.      Pulses: Normal pulses.      Heart sounds: Normal heart sounds.   Pulmonary:      Effort: Pulmonary effort is normal.      Breath sounds: Rales present.   Abdominal:      General: Bowel sounds are normal. There is no distension.      Palpations: Abdomen is soft.      Tenderness: There is no  abdominal tenderness. There is no guarding.   Musculoskeletal:         General: Tenderness (left hand) present. Normal range of motion.      Cervical back: Normal range of motion. No rigidity.   Skin:     General: Skin is warm and dry.      Capillary Refill: Capillary refill takes less than 2 seconds.   Neurological:      General: No focal deficit present.      Mental Status: She is alert and oriented to person, place, and time.      Motor: Weakness present.   Psychiatric:         Mood and Affect: Mood normal.         Behavior: Behavior normal.       Significant Labs: All pertinent labs within the past 24 hours have been reviewed.    Blood Culture:   No results for input(s): LABBLOO in the last 48 hours.    CBC:   Recent Labs   Lab 06/20/22 0322 06/21/22  0336   WBC 16.63* 18.33*   HGB 10.6* 11.5*   HCT 34.1* 36.6*    297       CMP:   Recent Labs   Lab 06/20/22 0322 06/21/22  0336   * 135*   K 4.0 4.2    101   CO2 29 28   * 151*   BUN 28* 45*   CREATININE 1.0 1.6*   CALCIUM 8.0* 8.5*   PROT 6.1 6.5   ALBUMIN 1.9* 1.9*   BILITOT 0.5 0.2   ALKPHOS 73 78   AST 10 12   ALT 13 14   ANIONGAP 3* 6*   EGFRNONAA 49.7* 28.2*       Magnesium:   Recent Labs   Lab 06/20/22 0322 06/21/22  0336   MG 1.5* 1.7         Significant Imaging: I have reviewed all pertinent imaging results/findings within the past 24 hours.

## 2022-06-22 LAB
ALBUMIN SERPL BCP-MCNC: 2.1 G/DL (ref 3.5–5.2)
ALP SERPL-CCNC: 78 U/L (ref 55–135)
ALT SERPL W/O P-5'-P-CCNC: 19 U/L (ref 10–44)
ANION GAP SERPL CALC-SCNC: 7 MMOL/L (ref 8–16)
AST SERPL-CCNC: 18 U/L (ref 10–40)
BACTERIA BLD CULT: ABNORMAL
BASOPHILS # BLD AUTO: 0.03 K/UL (ref 0–0.2)
BASOPHILS NFR BLD: 0.1 % (ref 0–1.9)
BILIRUB SERPL-MCNC: 0.3 MG/DL (ref 0.1–1)
BUN SERPL-MCNC: 49 MG/DL (ref 8–23)
CALCIUM SERPL-MCNC: 8.6 MG/DL (ref 8.7–10.5)
CHLORIDE SERPL-SCNC: 100 MMOL/L (ref 95–110)
CO2 SERPL-SCNC: 29 MMOL/L (ref 23–29)
CREAT SERPL-MCNC: 1.5 MG/DL (ref 0.5–1.4)
DIFFERENTIAL METHOD: ABNORMAL
EOSINOPHIL # BLD AUTO: 0 K/UL (ref 0–0.5)
EOSINOPHIL NFR BLD: 0 % (ref 0–8)
ERYTHROCYTE [DISTWIDTH] IN BLOOD BY AUTOMATED COUNT: 12.2 % (ref 11.5–14.5)
EST. GFR  (AFRICAN AMERICAN): 35.1 ML/MIN/1.73 M^2
EST. GFR  (NON AFRICAN AMERICAN): 30.5 ML/MIN/1.73 M^2
GLUCOSE SERPL-MCNC: 148 MG/DL (ref 70–110)
HCT VFR BLD AUTO: 40 % (ref 37–48.5)
HGB BLD-MCNC: 12.9 G/DL (ref 12–16)
IMM GRANULOCYTES # BLD AUTO: 0.19 K/UL (ref 0–0.04)
IMM GRANULOCYTES NFR BLD AUTO: 0.8 % (ref 0–0.5)
LYMPHOCYTES # BLD AUTO: 0.9 K/UL (ref 1–4.8)
LYMPHOCYTES NFR BLD: 3.7 % (ref 18–48)
MAGNESIUM SERPL-MCNC: 1.7 MG/DL (ref 1.6–2.6)
MCH RBC QN AUTO: 28.9 PG (ref 27–31)
MCHC RBC AUTO-ENTMCNC: 32.3 G/DL (ref 32–36)
MCV RBC AUTO: 90 FL (ref 82–98)
MONOCYTES # BLD AUTO: 0.5 K/UL (ref 0.3–1)
MONOCYTES NFR BLD: 2.1 % (ref 4–15)
NEUTROPHILS # BLD AUTO: 21.2 K/UL (ref 1.8–7.7)
NEUTROPHILS NFR BLD: 93.3 % (ref 38–73)
NRBC BLD-RTO: 0 /100 WBC
PLATELET # BLD AUTO: 453 K/UL (ref 150–450)
PMV BLD AUTO: 9.9 FL (ref 9.2–12.9)
POTASSIUM SERPL-SCNC: 4.5 MMOL/L (ref 3.5–5.1)
PROT SERPL-MCNC: 6.9 G/DL (ref 6–8.4)
RBC # BLD AUTO: 4.46 M/UL (ref 4–5.4)
SODIUM SERPL-SCNC: 136 MMOL/L (ref 136–145)
VANCOMYCIN SERPL-MCNC: 18.6 UG/ML
WBC # BLD AUTO: 22.78 K/UL (ref 3.9–12.7)

## 2022-06-22 PROCEDURE — 85025 COMPLETE CBC W/AUTO DIFF WBC: CPT | Performed by: NURSE PRACTITIONER

## 2022-06-22 PROCEDURE — 80202 ASSAY OF VANCOMYCIN: CPT | Performed by: INTERNAL MEDICINE

## 2022-06-22 PROCEDURE — 99900035 HC TECH TIME PER 15 MIN (STAT)

## 2022-06-22 PROCEDURE — 94761 N-INVAS EAR/PLS OXIMETRY MLT: CPT

## 2022-06-22 PROCEDURE — 63600175 PHARM REV CODE 636 W HCPCS: Performed by: NURSE PRACTITIONER

## 2022-06-22 PROCEDURE — 97530 THERAPEUTIC ACTIVITIES: CPT

## 2022-06-22 PROCEDURE — 36415 COLL VENOUS BLD VENIPUNCTURE: CPT | Performed by: NURSE PRACTITIONER

## 2022-06-22 PROCEDURE — 80053 COMPREHEN METABOLIC PANEL: CPT | Performed by: NURSE PRACTITIONER

## 2022-06-22 PROCEDURE — 25000003 PHARM REV CODE 250: Performed by: NURSE PRACTITIONER

## 2022-06-22 PROCEDURE — 11000001 HC ACUTE MED/SURG PRIVATE ROOM

## 2022-06-22 PROCEDURE — 25000003 PHARM REV CODE 250: Performed by: INTERNAL MEDICINE

## 2022-06-22 PROCEDURE — 63600175 PHARM REV CODE 636 W HCPCS: Performed by: INTERNAL MEDICINE

## 2022-06-22 PROCEDURE — 83735 ASSAY OF MAGNESIUM: CPT | Performed by: NURSE PRACTITIONER

## 2022-06-22 PROCEDURE — 27000221 HC OXYGEN, UP TO 24 HOURS

## 2022-06-22 PROCEDURE — 99900031 HC PATIENT EDUCATION (STAT)

## 2022-06-22 RX ORDER — AMLODIPINE BESYLATE 5 MG/1
5 TABLET ORAL DAILY
Status: DISCONTINUED | OUTPATIENT
Start: 2022-06-22 | End: 2022-07-05 | Stop reason: HOSPADM

## 2022-06-22 RX ADMIN — TRAMADOL HYDROCHLORIDE 50 MG: 50 TABLET ORAL at 05:06

## 2022-06-22 RX ADMIN — VANCOMYCIN HYDROCHLORIDE 750 MG: 750 INJECTION, POWDER, LYOPHILIZED, FOR SOLUTION INTRAVENOUS at 09:06

## 2022-06-22 RX ADMIN — ASPIRIN 81 MG: 81 TABLET, COATED ORAL at 09:06

## 2022-06-22 RX ADMIN — AMIODARONE HYDROCHLORIDE 200 MG: 200 TABLET ORAL at 08:06

## 2022-06-22 RX ADMIN — METHYLPREDNISOLONE SODIUM SUCCINATE 40 MG: 40 INJECTION, POWDER, FOR SOLUTION INTRAMUSCULAR; INTRAVENOUS at 11:06

## 2022-06-22 RX ADMIN — METOPROLOL TARTRATE 75 MG: 50 TABLET, FILM COATED ORAL at 09:06

## 2022-06-22 RX ADMIN — ALPRAZOLAM 0.75 MG: 0.5 TABLET ORAL at 08:06

## 2022-06-22 RX ADMIN — AZITHROMYCIN MONOHYDRATE 500 MG: 500 INJECTION, POWDER, LYOPHILIZED, FOR SOLUTION INTRAVENOUS at 04:06

## 2022-06-22 RX ADMIN — CEFTRIAXONE 1 G: 1 INJECTION, SOLUTION INTRAVENOUS at 11:06

## 2022-06-22 RX ADMIN — METHYLPREDNISOLONE SODIUM SUCCINATE 40 MG: 40 INJECTION, POWDER, FOR SOLUTION INTRAMUSCULAR; INTRAVENOUS at 12:06

## 2022-06-22 RX ADMIN — AMLODIPINE BESYLATE 5 MG: 5 TABLET ORAL at 09:06

## 2022-06-22 RX ADMIN — AMIODARONE HYDROCHLORIDE 200 MG: 200 TABLET ORAL at 09:06

## 2022-06-22 RX ADMIN — METOPROLOL TARTRATE 75 MG: 50 TABLET, FILM COATED ORAL at 08:06

## 2022-06-22 RX ADMIN — TRAMADOL HYDROCHLORIDE 50 MG: 50 TABLET ORAL at 08:06

## 2022-06-22 RX ADMIN — APIXABAN 2.5 MG: 2.5 TABLET, FILM COATED ORAL at 09:06

## 2022-06-22 RX ADMIN — APIXABAN 2.5 MG: 2.5 TABLET, FILM COATED ORAL at 08:06

## 2022-06-22 RX ADMIN — CEFTRIAXONE 1 G: 1 INJECTION, SOLUTION INTRAVENOUS at 12:06

## 2022-06-22 RX ADMIN — ATORVASTATIN CALCIUM 10 MG: 10 TABLET, FILM COATED ORAL at 08:06

## 2022-06-22 RX ADMIN — ISOSORBIDE MONONITRATE 30 MG: 30 TABLET, EXTENDED RELEASE ORAL at 09:06

## 2022-06-22 RX ADMIN — MIRTAZAPINE 15 MG: 15 TABLET, FILM COATED ORAL at 08:06

## 2022-06-22 RX ADMIN — FAMOTIDINE 20 MG: 20 TABLET ORAL at 09:06

## 2022-06-22 NOTE — ASSESSMENT & PLAN NOTE
Managed by Dr. Chapin  6/22: Tachycardia. Increase metoprolol from 50mg to 75mg BID and decrease amlodipine from 10mg to 5mg daily.

## 2022-06-22 NOTE — PROGRESS NOTES
Pharmacokinetic Assessment Follow Up: IV Vancomycin    Vancomycin serum concentration assessment(s):    The random level was drawn correctly and can be used to guide therapy at this time. The measurement is within the desired definitive target range of 15 to 20 mcg/mL.   - The random level was drawn ~9 hours after last dose.  **Of note, appears nurse must have hung scheduled 750 mg dose while I was adjusting the plan and then hung the one-time 750 mg dose I ordered. As a result, she accidentally received 1500 mg ~2030 yesterday.    Vancomycin Regimen Plan:    Will re-dose once with vanc 750 mg since level <20.  - Re-dose when the random level is less than 20 mcg/mL, next level to be drawn at 0430 with AM labs on 6/23.  - Will continue pulse dosing since Scr still elevated above baseline. Will consider rescheduling when Scr back at baseline.     Drug levels (last 3 results):  Recent Labs   Lab Result Units 06/21/22  1922 06/22/22  0544   Vancomycin, Random ug/mL  --  18.6   Vancomycin-Trough ug/mL 13.4  --        Pharmacy will continue to follow and monitor vancomycin.    Please contact pharmacy for questions regarding this assessment.    Thank you for the consult,   Venice Pavon       Patient brief summary:  Codi Black is a 89 y.o. female initiated on antimicrobial therapy with IV Vancomycin for treatment of bacteremia    The patient's current regimen is pulse dosing in the setting of elevated sCr.    Actual Body Weight:   62.1 kg    Renal Function:   Estimated Creatinine Clearance: 21.5 mL/min (A) (based on SCr of 1.5 mg/dL (H)).    Dialysis Method (if applicable):  N/A

## 2022-06-22 NOTE — PLAN OF CARE
Plan of care reviewed with pt. And significant other at bedside, no complaints of pain at current, pain medication prn as ordered, takes meds whole, continuous O2 per nasal cannula as ordered, SOB noted with exertion, turn q 2 hours, purewick in use, incontinent, call bell in reach, instructed to call with needs/assistance, will continue to monitor.

## 2022-06-22 NOTE — SUBJECTIVE & OBJECTIVE
Interval History: Pt seen and evaluated    Review of Systems   Constitutional:  Positive for activity change and fatigue.   HENT: Negative.     Eyes: Negative.    Respiratory:  Positive for cough. Negative for chest tightness and wheezing.    Cardiovascular:  Negative for chest pain, palpitations and leg swelling.   Gastrointestinal:  Negative for abdominal pain, constipation, diarrhea, nausea and vomiting.   Endocrine: Negative.    Genitourinary: Negative.    Musculoskeletal:  Positive for arthralgias.   Neurological:  Positive for weakness.   Psychiatric/Behavioral: Negative.     Objective:     Vital Signs (Most Recent):  Temp: 97.6 °F (36.4 °C) (06/22/22 0804)  Pulse: (!) 115 (06/22/22 0804)  Resp: 20 (06/22/22 0804)  BP: 123/80 (06/22/22 0804)  SpO2: 95 % (06/22/22 0804)   Vital Signs (24h Range):  Temp:  [97.3 °F (36.3 °C)-98.7 °F (37.1 °C)] 97.6 °F (36.4 °C)  Pulse:  [] 115  Resp:  [16-20] 20  SpO2:  [93 %-95 %] 95 %  BP: ()/(56-80) 123/80     Weight: 62.1 kg (137 lb)  Body mass index is 23.52 kg/m².    Intake/Output Summary (Last 24 hours) at 6/22/2022 0848  Last data filed at 6/22/2022 0600  Gross per 24 hour   Intake 1260 ml   Output 900 ml   Net 360 ml        Physical Exam  Vitals and nursing note reviewed.   HENT:      Head: Normocephalic and atraumatic.      Mouth/Throat:      Mouth: Mucous membranes are moist.      Pharynx: Oropharynx is clear.   Eyes:      General: No scleral icterus.     Extraocular Movements: Extraocular movements intact.      Pupils: Pupils are equal, round, and reactive to light.   Cardiovascular:      Rate and Rhythm: Normal rate and regular rhythm.      Pulses: Normal pulses.      Heart sounds: Normal heart sounds.   Pulmonary:      Effort: Pulmonary effort is normal.      Breath sounds: Normal breath sounds.   Abdominal:      General: Bowel sounds are normal. There is no distension.      Palpations: Abdomen is soft.      Tenderness: There is no abdominal tenderness.  There is no guarding.   Musculoskeletal:         General: Normal range of motion.      Cervical back: Normal range of motion. No rigidity.   Skin:     General: Skin is warm and dry.      Capillary Refill: Capillary refill takes less than 2 seconds.   Neurological:      General: No focal deficit present.      Mental Status: She is alert and oriented to person, place, and time.      Motor: Weakness present.   Psychiatric:         Mood and Affect: Mood normal.         Behavior: Behavior normal.       Significant Labs: All pertinent labs within the past 24 hours have been reviewed.    Blood Culture:   Recent Labs   Lab 06/21/22  1643 06/21/22  1647   LABBLOO No Growth to date No Growth to date       CBC:   Recent Labs   Lab 06/21/22  0336 06/22/22  0544   WBC 18.33* 22.78*   HGB 11.5* 12.9   HCT 36.6* 40.0    453*       CMP:   Recent Labs   Lab 06/21/22  0336 06/22/22  0544   * 136   K 4.2 4.5    100   CO2 28 29   * 148*   BUN 45* 49*   CREATININE 1.6* 1.5*   CALCIUM 8.5* 8.6*   PROT 6.5 6.9   ALBUMIN 1.9* 2.1*   BILITOT 0.2 0.3   ALKPHOS 78 78   AST 12 18   ALT 14 19   ANIONGAP 6* 7*   EGFRNONAA 28.2* 30.5*       Magnesium:   Recent Labs   Lab 06/21/22  0336 06/22/22  0544   MG 1.7 1.7         Significant Imaging: I have reviewed all pertinent imaging results/findings within the past 24 hours.

## 2022-06-22 NOTE — PROGRESS NOTES
[facility] Medicine  Progress Note    Patient Name: Codi Black  MRN: [id number]  Patient Class: IP- Inpatient   Admission Date: 6/18/2022  Length of Stay: 4 days  Attending Physician: Maria Elena Hogan MD  Primary Care Provider: Rafael Quan III, MD        Subjective:     Principal Problem:Pneumonia due to infectious organism        HPI:  Pt is 89 y/o female with Hx of hypertension, hyperlipidemia, and arthritis, presented to [facility] ED yesterday with c/o generalized weakness. She reports having a productive cough. She denies chest pain. High-Sensitivity Troponin levels, although negative, trended up x3 sets - Cardiology consulted. This morning she reports continued fatigue and weakness; also c/o left hand pain; shortness of breath improved with rest and oxygen.      Overview/Hospital Course:  6/20 SD: Pt's spouse at bedside. Pt reports feeling better compared to initial arrival to the hospital. Blood cultures x2 positive for gram + cocci in clusters resembling Staph, awaiting sensitivity. Vancomycin added to antibiotic therapy. Appreciate Dr. Chapin's recommendations for NSTEMI and HTN. Dr. Chapin advises that pt will need angiogram after treatment of pneumonia; will determine need for inpatient vs outpatient as patient care progresses.  6/21 SD: Pt's spouse at bedside. Blood cultures x2 positive for staphylococcus epidermidis, susceptibility pending. Continue IV abx therapy and repeat blood cultures today. Encourage pt to participate with therapy.  6/22 SD: Continue IV abx, following repeat cultures. Encourage participation with therapy.      Interval History: Pt seen and evaluated    Review of Systems   Constitutional:  Positive for activity change and fatigue.   HENT: Negative.     Eyes: Negative.    Respiratory:  Positive for cough. Negative for chest tightness and wheezing.    Cardiovascular:  Negative for chest pain, palpitations and leg swelling.   Gastrointestinal:  Negative for  abdominal pain, constipation, diarrhea, nausea and vomiting.   Endocrine: Negative.    Genitourinary: Negative.    Musculoskeletal:  Positive for arthralgias.   Neurological:  Positive for weakness.   Psychiatric/Behavioral: Negative.     Objective:     Vital Signs (Most Recent):  Temp: 97.6 °F (36.4 °C) (06/22/22 0804)  Pulse: (!) 115 (06/22/22 0804)  Resp: 20 (06/22/22 0804)  BP: 123/80 (06/22/22 0804)  SpO2: 95 % (06/22/22 0804)   Vital Signs (24h Range):  Temp:  [97.3 °F (36.3 °C)-98.7 °F (37.1 °C)] 97.6 °F (36.4 °C)  Pulse:  [] 115  Resp:  [16-20] 20  SpO2:  [93 %-95 %] 95 %  BP: ()/(56-80) 123/80     Weight: 62.1 kg (137 lb)  Body mass index is 23.52 kg/m².    Intake/Output Summary (Last 24 hours) at 6/22/2022 0848  Last data filed at 6/22/2022 0600  Gross per 24 hour   Intake 1260 ml   Output 900 ml   Net 360 ml        Physical Exam  Vitals and nursing note reviewed.   HENT:      Head: Normocephalic and atraumatic.      Mouth/Throat:      Mouth: Mucous membranes are moist.      Pharynx: Oropharynx is clear.   Eyes:      General: No scleral icterus.     Extraocular Movements: Extraocular movements intact.      Pupils: Pupils are equal, round, and reactive to light.   Cardiovascular:      Rate and Rhythm: Normal rate and regular rhythm.      Pulses: Normal pulses.      Heart sounds: Normal heart sounds.   Pulmonary:      Effort: Pulmonary effort is normal.      Breath sounds: Normal breath sounds.   Abdominal:      General: Bowel sounds are normal. There is no distension.      Palpations: Abdomen is soft.      Tenderness: There is no abdominal tenderness. There is no guarding.   Musculoskeletal:         General: Normal range of motion.      Cervical back: Normal range of motion. No rigidity.   Skin:     General: Skin is warm and dry.      Capillary Refill: Capillary refill takes less than 2 seconds.   Neurological:      General: No focal deficit present.      Mental Status: She is alert and oriented  to person, place, and time.      Motor: Weakness present.   Psychiatric:         Mood and Affect: Mood normal.         Behavior: Behavior normal.       Significant Labs: All pertinent labs within the past 24 hours have been reviewed.    Blood Culture:   Recent Labs   Lab 06/21/22  1643 06/21/22  1647   LABBLOO No Growth to date No Growth to date       CBC:   Recent Labs   Lab 06/21/22  0336 06/22/22  0544   WBC 18.33* 22.78*   HGB 11.5* 12.9   HCT 36.6* 40.0    453*       CMP:   Recent Labs   Lab 06/21/22  0336 06/22/22  0544   * 136   K 4.2 4.5    100   CO2 28 29   * 148*   BUN 45* 49*   CREATININE 1.6* 1.5*   CALCIUM 8.5* 8.6*   PROT 6.5 6.9   ALBUMIN 1.9* 2.1*   BILITOT 0.2 0.3   ALKPHOS 78 78   AST 12 18   ALT 14 19   ANIONGAP 6* 7*   EGFRNONAA 28.2* 30.5*       Magnesium:   Recent Labs   Lab 06/21/22  0336 06/22/22  0544   MG 1.7 1.7         Significant Imaging: I have reviewed all pertinent imaging results/findings within the past 24 hours.      Assessment/Plan:      * Pneumonia due to infectious organism  Blood cultures x2 positive for staphylococcus epidermidis, susceptibility pending. Vancomycin initiated on 6/19/22 6/22: Day 5 of IV abx therapy with Rocephin and Azithromycin; Day 3 of abx therapy with Vancomycin. Repeat blood cultures showing NGTD x1 day      Arthritis  Pain to left hand. CRP and Sed Rate elevated. Treatment with steroids.  6/22: Pain improving      Hypertension  Managed by Dr. Chapin  6/22: Tachycardia. Increase metoprolol from 50mg to 75mg BID and decrease amlodipine from 10mg to 5mg daily.      NSTEMI (non-ST elevated myocardial infarction)  Managed by Dr. Chapin    DVT prophylaxis  Lovenox        VTE Risk Mitigation (From admission, onward)         Ordered     apixaban tablet 2.5 mg  2 times daily         06/21/22 1856     IP VTE HIGH RISK PATIENT  Once         06/18/22 1525     Place sequential compression device  Until discontinued         06/18/22 1525                 Discharge Planning   KIMANI:      Code Status: Full Code   Is the patient medically ready for discharge?:     Reason for patient still in hospital (select all that apply): Patient trending condition, Laboratory test and Treatment  Discharge Plan A: Home with family, Home Health                  Stephy Young NP  Department of Hospital Medicine   [facility]

## 2022-06-22 NOTE — PT/OT/SLP PROGRESS
Occupational Therapy   Treatment    Name: Codi Black  MRN: [id number]  Admitting Diagnosis:  Pneumonia due to infectious organism       Recommendations:     Discharge Recommendations: other (see comments) (TBD)  Discharge Equipment Recommendations:  other (see comments) (TBD)  Barriers to discharge:  Other (Comment) (Current medical status)    Assessment:     Codi Black is a 89 y.o. female with a medical diagnosis of Pneumonia due to infectious organism.  She presents with weakness. Performance deficits affecting function are weakness, impaired endurance, decreased upper extremity function, pain.     Rehab Prognosis:  Good; patient would benefit from acute skilled OT services to address these deficits and reach maximum level of function.       Plan:     Patient to be seen 6 x/week to address the above listed problems via self-care/home management, therapeutic activities, therapeutic exercises  · Plan of Care Expires: 07/08/22  · Plan of Care Reviewed with: patient, spouse    Subjective     Pain/Comfort:  · Pain Rating 1: other (see comments) (not rated)  · Location 1: hip  · Pain Addressed 1: Reposition  · Pain Rating Post-Intervention 1: other (see comments)  · Pain Rating 2:  (not rated pt states it felt better when repositioned)    Objective:     Communicated with: nurse prior to session.  Patient found supine with peripheral IV, telemetry, PureWick, oxygen upon OT entry to room.    General Precautions: Standard, fall   Orthopedic Precautions:N/A   Braces: N/A  Respiratory Status: Nasal cannula, flow 2 L/min     Occupational Performance:     Bed Mobility:    · Patient completed Rolling/Turning to Right with moderate assistance  · Patient completed Scooting/Bridging with moderate assistance  · Patient completed Supine to Sit with moderate assistance     Functional Mobility/Transfers:  · Patient completed Sit <> Stand Transfer with moderate assistance  with  rolling walker   · Patient completed Bed <> Chair  "Transfer using Step Transfer technique with moderate assistance with rolling walker    Activities of Daily Living:  · Grooming: set up assistance in recliner        [facility] 6 Click ADL: 14    Treatment & Education:  Pt was alert and eager for therapy upon entering the room. Pt stated "vale been waiting for yall to come get me up". Pt was able to transfer from bed to chair with moderate assistance. Pt was also able to complete grooming in recliner with set up assistance using a comb for brushing hair.    Patient left up in chair with all lines intact, call button in reach and spouse presentEducation:      GOALS:   Multidisciplinary Problems     Occupational Therapy Goals        Problem: Occupational Therapy    Goal Priority Disciplines Outcome Interventions   Occupational Therapy Goal     OT, PT/OT Ongoing, Progressing    Description: Goals to be met by: 7/8/2022     Patient will increase functional independence with ADLs by performing:    UE Dressing with Minimal Assistance.  Grooming while EOB with Set-up Assistance.  Toileting from bedside commode with Minimal Assistance for hygiene and clothing management.   Sitting at edge of bed x5 minutes with Minimal Assistance.  Rolling to Right, Left with Minimal Assistance.   Supine to sit with Minimal Assistance.                     Time Tracking:     OT Date of Treatment: 06/22/22  OT Start Time: 0825  OT Stop Time: 0840  OT Total Time (min): 15 min    Billable Minutes:Therapeutic Activity 15    OT/REJI: OT          6/22/2022    "

## 2022-06-22 NOTE — PT/OT/SLP PROGRESS
"Physical Therapy Treatment    Patient Name:  Codi Black   MRN:  [id number]    Recommendations:     Discharge Recommendations:  home with home health (24 hr supervision)   Discharge Equipment Recommendations:  (TBD)   Barriers to discharge: None    Assessment:     Codi Black is a 89 y.o. female admitted with a medical diagnosis of Pneumonia due to infectious organism.  She presents with the following impairments/functional limitations:  weakness, impaired endurance, impaired functional mobilty, gait instability, impaired balance, pain, impairing functional mobility.    Rehab Prognosis: Good; patient would benefit from acute skilled PT services to address these deficits and reach maximum level of function.    Recent Surgery: * No surgery found *      Plan:     During this hospitalization, patient to be seen 6 x/week to address the identified rehab impairments via gait training, therapeutic activities, therapeutic exercises and progress toward the following goals:    · Plan of Care Expires:  07/08/22    Subjective     Chief Complaint: pain in R hip  Patient/Family Comments/goals: "I want to sit up to comb my hair"  Pain/Comfort:  Pain Rating 1:  (not rated)  Location - Side 1: Right  Location 1: hip  Pain Addressed 1: Reposition      Objective:     Communicated with RN prior to session.  Patient found supine with oxygen, PureWick, peripheral IV, telemetry upon PT entry to room.     General Precautions: Standard, fall   Orthopedic Precautions:    Braces:    Respiratory Status: Nasal cannula, flow 2 L/min     Functional Mobility:  · Bed Mobility:     · Scooting: moderate assistance  · Supine to Sit: moderate assistance  · Transfers:     · Sit to Stand:  contact guard assistance with rolling walker  · Bed to Chair: contact guard assistance with  rolling walker  using  Step Transfer  · Gait: pt takes 2 side steps to chair using RW with CGA and verbal cuing for safety      AM-PAC 6 CLICK MOBILITY  Turning over in bed " (including adjusting bedclothes, sheets and blankets)?: 3  Sitting down on and standing up from a chair with arms (e.g., wheelchair, bedside commode, etc.): 3  Moving from lying on back to sitting on the side of the bed?: 2  Moving to and from a bed to a chair (including a wheelchair)?: 3  Need to walk in hospital room?: 2  Climbing 3-5 steps with a railing?: 2  Basic Mobility Total Score: 15       Therapeutic Activities and Exercises:   Not Performed    Patient left up in chair with all lines intact and  present..    GOALS:   Multidisciplinary Problems     Physical Therapy Goals        Problem: Physical Therapy    Goal Priority Disciplines Outcome Goal Variances Interventions   Physical Therapy Goal     PT, PT/OT Ongoing, Progressing     Description: Goals to be met by: 2022    Patient will increase functional independence with mobility by performin. Supine to/from sit with MInimal Assistance.  2. Rolling to Left and Right with Stand-by Assistance.  3. Sit to stand transfer with Contact Guard Assistance  4. Bed to chair transfer with Contact Guard Assistance using Rolling Walker  5. Gait  x 10 feet with Minimal Assistance using Rolling Walker.                        Time Tracking:     PT Received On: 22  PT Start Time: 825     PT Stop Time: 0840  PT Total Time (min): 15 min     Billable Minutes: Therapeutic Activity 15 min    Treatment Type: Treatment  PT/PTA: PT           2022

## 2022-06-22 NOTE — PROGRESS NOTES
Cardiology Progress Note  ([facility])    Today's Date:  6/22/2022  Patient Name: Codi Black    MRN: [id number]    Code Status: Full Code     Attending Physician: Maria Elena Hogan MD   Consulting Physician: SCOT Chapin MD    Hospital Course:     Clinical:  The patient states that she is doing well this morning.  She just finished eating lunch.  She states that she feels good.  She states that she feels that she could do more activity during physical therapy.  She is not complaining of chest pain, shortness of breath, or left arm pain.  She is not experiencing stroke-like symptoms.    Hemodynamic:      06/22/2022:  Blood pressure is well controlled on current therapy.  (See yesterday losartan was discontinued secondary to prerenal azotemia with increasing creatinine.)    Telemetry:   Atrial fibrillation with rapid ventricular rate ranging between 117 and 122 beats per minute.  * patient on June 20th had a brief episode of supraventricular tachycardia with rapid ventricular rate of 165 beats per minute at which time she had ST segment depression.    06/22/2022:  Atrial fibrillation with a controlled ventricular rate of 92-97 beats per minute.    Relevant testing:     Echocardiogram:   · The left ventricle is normal in size with moderate concentric hypertrophy and normal systolic function.  · The estimated ejection fraction is 55%.  · Normal left ventricular diastolic function.  · Mild tricuspid regurgitation.  · Mild pulmonic regurgitation.  · The estimated PA systolic pressure is 23 mmHg.  · Trivial pericardial effusion.     Review of Systems   Constitutional: Positive for malaise/fatigue. Negative for decreased appetite.   Cardiovascular: Positive for dyspnea on exertion and leg swelling. Negative for chest pain, near-syncope, orthopnea, palpitations and syncope.       Vital Signs (Most Recent):  Temp: 97.6 °F (36.4 °C) (06/22/22 0804)  Pulse: (!) 117 (06/22/22 0900)  Resp: 20 (06/22/22 0804)  BP:  123/80 (06/22/22 0947)  SpO2: 96 % (06/22/22 0900) Vital Signs (24h Range):  Temp:  [97.3 °F (36.3 °C)-98.7 °F (37.1 °C)] 97.6 °F (36.4 °C)  Pulse:  [] 117  Resp:  [16-20] 20  SpO2:  [94 %-96 %] 96 %  BP: ()/(56-80) 123/80     Weight: 62.1 kg (137 lb)  Body mass index is 23.52 kg/m².    SpO2: 96 %  O2 Device (Oxygen Therapy): nasal cannula      Intake/Output Summary (Last 24 hours) at 6/22/2022 1205  Last data filed at 6/22/2022 0600  Gross per 24 hour   Intake 1260 ml   Output 900 ml   Net 360 ml     Labs:  CMP   Recent Labs   Lab 06/21/22  0336 06/22/22  0544   * 136   K 4.2 4.5    100   CO2 28 29   * 148*   BUN 45* 49*   CREATININE 1.6* 1.5*   CALCIUM 8.5* 8.6*   PROT 6.5 6.9   ALBUMIN 1.9* 2.1*   BILITOT 0.2 0.3   ALKPHOS 78 78   AST 12 18   ALT 14 19   ANIONGAP 6* 7*   ESTGFRAFRICA 32.5* 35.1*   EGFRNONAA 28.2* 30.5*   , CBC   Recent Labs   Lab 06/21/22  0336 06/22/22  0544   WBC 18.33* 22.78*   HGB 11.5* 12.9   HCT 36.6* 40.0    453*    and   Pathology Results  (Last 10 years)    None      Troponin I peaked at at 182.6.  (Now trending downward.)    Physical Examination:  General:   Alert and oriented.  She is in no distress.  Heent:   No JVD.  No appreciable carotid bruits.  Lungs:   Good breath sounds.  No wheezing or rhonchi.  Heart:   Regular rate rhythm; grade 1 holosystolic murmur.  Extremities:   Bruising and chronic skin changes.  Distal pulses are mildly diminished.  Skin tenting consistent with a fluid loss.    Assessment and Plan:     1.  Non-ST segment elevated MI: patient with  An elevation in her troponin I x3.  During this time she was hypertensive and tachycardic.  Given risk factors, she likely has coronary artery disease.  · Aspirin 81 mg daily  · Plavix 75 mg daily  · Metoprolol 50 mg twice daily  · Amlodipine 5 mg daily.  · Hold Losartan 25 mg daily secondary to prerenal azotemia.  · Isosorbide mononitrate 30 mg daily.  ·   The patient, her daughter and  I had a lengthy discussion concerning intervention.  The patient states that she would prefer medication only at this time.  My recommendation would be to continue with this strategy, however, if she should become unstable i.e., chest pain or any signs and symptoms of unstable angina, to consider invasive evaluation.    2.  Hypertension:  Blood pressure is well controlled, and stable.  · Continue with metoprolol 50 mg twice daily.  · Increase amlodipine to 10 mg daily.       3.  Pneumonia: on antibiotic therapy.  White count is increased.  · Managed by internal medicine.     4.  Hyperlipidemia:     LDL is elevated.  ·   [place] Crestor 10 mg daily.    5. Atrial fibrillation:  Rate is well controlled.    · Eliquis at 2.5 mg b.i.d.  · Amiodarone 200 mg 1 tablet b.i.d..  · Metoprolol increased to 75 mg b.i.d.  · Continue with rate control.  I am hopeful that she will convert to sinus rhythm.    Telemetry revealed an episode of SVT with associated ST segment depression (flat) of approximately 2-3 mV. This is consistent with a positive stress test.  The patient will require invasive evaluation.   I have explained this to the patient and her .  They are considering their options.  If they are willing to proceed, the timing will depend on her clinical and hemodynamic stability.     5.  Renal insufficiency:  Creatinine is stable at 1.5.

## 2022-06-22 NOTE — PLAN OF CARE
Problem: Physical Therapy  Goal: Physical Therapy Goal  Description: Goals to be met by: 2022    Patient will increase functional independence with mobility by performin. Supine to/from sit with MInimal Assistance.  2. Rolling to Left and Right with Stand-by Assistance.  3. Sit to stand transfer with Contact Guard Assistance  4. Bed to chair transfer with Contact Guard Assistance using Rolling Walker  5. Gait  x 10 feet with Minimal Assistance using Rolling Walker.       Outcome: Ongoing, Progressing

## 2022-06-22 NOTE — PROGRESS NOTES
Pharmacokinetic Assessment Follow Up: IV Vancomycin    Vancomycin serum concentration assessment(s):    The trough level was drawn correctly and can be used to guide therapy at this time. The measurement is below the desired definitive target range of 15 to 20 mcg/mL.   - The trough level was drawn ~24 hours after previous dose and resulted at 13.4.    Vancomycin Regimen Plan:    Discontinue the scheduled vancomycin regimen and re-dose when the random level is less than 20 mcg/mL, next level to be drawn at 0430 with AM labs on 6/22.   - Trough level subtherapeutic, but today's SCR 1.6 from 1.0 in 24 hours. Given jump in Scr, will pulse dose for now until Scr returns to baseline.  - Will re-dose once now with vancomycin 750 mg since level <20.    Drug levels (last 3 results):  Recent Labs   Lab Result Units 06/21/22  1922   Vancomycin-Trough ug/mL 13.4       Pharmacy will continue to follow and monitor vancomycin.    Please contact pharmacy for questions regarding this assessment.    Thank you for the consult,   Venice Pavon       Patient brief summary:  Codi Black is a 89 y.o. female initiated on antimicrobial therapy with IV Vancomycin for treatment of bacteremia    The patient's current regimen is pulse dosing in the setting of Scr jump    Actual Body Weight:   62.1 kg    Renal Function:   Estimated Creatinine Clearance: 20.2 mL/min (A) (based on SCr of 1.6 mg/dL (H)).     Dialysis Method (if applicable):  N/A

## 2022-06-22 NOTE — ASSESSMENT & PLAN NOTE
Blood cultures x2 positive for staphylococcus epidermidis, susceptibility pending. Vancomycin initiated on 6/19/22 6/22: Day 5 of IV abx therapy with Rocephin and Azithromycin; Day 3 of abx therapy with Vancomycin. Repeat blood cultures showing NGTD x1 day

## 2022-06-23 LAB
ALBUMIN SERPL BCP-MCNC: 2 G/DL (ref 3.5–5.2)
ALP SERPL-CCNC: 68 U/L (ref 55–135)
ALT SERPL W/O P-5'-P-CCNC: 20 U/L (ref 10–44)
ANION GAP SERPL CALC-SCNC: 4 MMOL/L (ref 8–16)
AST SERPL-CCNC: 14 U/L (ref 10–40)
BASOPHILS # BLD AUTO: 0.02 K/UL (ref 0–0.2)
BASOPHILS NFR BLD: 0.1 % (ref 0–1.9)
BILIRUB SERPL-MCNC: 0.2 MG/DL (ref 0.1–1)
BUN SERPL-MCNC: 57 MG/DL (ref 8–23)
CALCIUM SERPL-MCNC: 8.3 MG/DL (ref 8.7–10.5)
CHLORIDE SERPL-SCNC: 103 MMOL/L (ref 95–110)
CO2 SERPL-SCNC: 31 MMOL/L (ref 23–29)
CREAT SERPL-MCNC: 1.5 MG/DL (ref 0.5–1.4)
DIFFERENTIAL METHOD: ABNORMAL
EOSINOPHIL # BLD AUTO: 0 K/UL (ref 0–0.5)
EOSINOPHIL NFR BLD: 0 % (ref 0–8)
ERYTHROCYTE [DISTWIDTH] IN BLOOD BY AUTOMATED COUNT: 12.2 % (ref 11.5–14.5)
EST. GFR  (AFRICAN AMERICAN): 35.1 ML/MIN/1.73 M^2
EST. GFR  (NON AFRICAN AMERICAN): 30.5 ML/MIN/1.73 M^2
GLUCOSE SERPL-MCNC: 123 MG/DL (ref 70–110)
HCT VFR BLD AUTO: 35.6 % (ref 37–48.5)
HGB BLD-MCNC: 11.3 G/DL (ref 12–16)
IMM GRANULOCYTES # BLD AUTO: 0.21 K/UL (ref 0–0.04)
IMM GRANULOCYTES NFR BLD AUTO: 1 % (ref 0–0.5)
LYMPHOCYTES # BLD AUTO: 0.8 K/UL (ref 1–4.8)
LYMPHOCYTES NFR BLD: 4 % (ref 18–48)
MAGNESIUM SERPL-MCNC: 1.9 MG/DL (ref 1.6–2.6)
MCH RBC QN AUTO: 28.3 PG (ref 27–31)
MCHC RBC AUTO-ENTMCNC: 31.7 G/DL (ref 32–36)
MCV RBC AUTO: 89 FL (ref 82–98)
MONOCYTES # BLD AUTO: 0.5 K/UL (ref 0.3–1)
MONOCYTES NFR BLD: 2.3 % (ref 4–15)
NEUTROPHILS # BLD AUTO: 18.6 K/UL (ref 1.8–7.7)
NEUTROPHILS NFR BLD: 92.6 % (ref 38–73)
NRBC BLD-RTO: 0 /100 WBC
PLATELET # BLD AUTO: 420 K/UL (ref 150–450)
PMV BLD AUTO: 9.6 FL (ref 9.2–12.9)
POTASSIUM SERPL-SCNC: 4.5 MMOL/L (ref 3.5–5.1)
PROT SERPL-MCNC: 5.9 G/DL (ref 6–8.4)
RBC # BLD AUTO: 4 M/UL (ref 4–5.4)
SODIUM SERPL-SCNC: 138 MMOL/L (ref 136–145)
VANCOMYCIN SERPL-MCNC: 21 UG/ML
WBC # BLD AUTO: 20.1 K/UL (ref 3.9–12.7)

## 2022-06-23 PROCEDURE — 99900035 HC TECH TIME PER 15 MIN (STAT)

## 2022-06-23 PROCEDURE — 27000221 HC OXYGEN, UP TO 24 HOURS

## 2022-06-23 PROCEDURE — 83735 ASSAY OF MAGNESIUM: CPT | Performed by: NURSE PRACTITIONER

## 2022-06-23 PROCEDURE — 25000003 PHARM REV CODE 250: Performed by: INTERNAL MEDICINE

## 2022-06-23 PROCEDURE — 25000003 PHARM REV CODE 250: Performed by: NURSE PRACTITIONER

## 2022-06-23 PROCEDURE — 63600175 PHARM REV CODE 636 W HCPCS: Performed by: NURSE PRACTITIONER

## 2022-06-23 PROCEDURE — 94761 N-INVAS EAR/PLS OXIMETRY MLT: CPT

## 2022-06-23 PROCEDURE — 97530 THERAPEUTIC ACTIVITIES: CPT

## 2022-06-23 PROCEDURE — 85025 COMPLETE CBC W/AUTO DIFF WBC: CPT | Performed by: NURSE PRACTITIONER

## 2022-06-23 PROCEDURE — 99900031 HC PATIENT EDUCATION (STAT)

## 2022-06-23 PROCEDURE — 97110 THERAPEUTIC EXERCISES: CPT

## 2022-06-23 PROCEDURE — 80202 ASSAY OF VANCOMYCIN: CPT | Performed by: INTERNAL MEDICINE

## 2022-06-23 PROCEDURE — 80053 COMPREHEN METABOLIC PANEL: CPT | Performed by: NURSE PRACTITIONER

## 2022-06-23 PROCEDURE — 11000001 HC ACUTE MED/SURG PRIVATE ROOM

## 2022-06-23 RX ADMIN — AZITHROMYCIN MONOHYDRATE 500 MG: 500 INJECTION, POWDER, LYOPHILIZED, FOR SOLUTION INTRAVENOUS at 04:06

## 2022-06-23 RX ADMIN — METOPROLOL TARTRATE 75 MG: 50 TABLET, FILM COATED ORAL at 09:06

## 2022-06-23 RX ADMIN — FAMOTIDINE 20 MG: 20 TABLET ORAL at 09:06

## 2022-06-23 RX ADMIN — APIXABAN 2.5 MG: 2.5 TABLET, FILM COATED ORAL at 09:06

## 2022-06-23 RX ADMIN — METHYLPREDNISOLONE SODIUM SUCCINATE 40 MG: 40 INJECTION, POWDER, FOR SOLUTION INTRAMUSCULAR; INTRAVENOUS at 11:06

## 2022-06-23 RX ADMIN — ALPRAZOLAM 0.75 MG: 0.5 TABLET ORAL at 09:06

## 2022-06-23 RX ADMIN — CEFTRIAXONE 1 G: 1 INJECTION, SOLUTION INTRAVENOUS at 12:06

## 2022-06-23 RX ADMIN — TRAMADOL HYDROCHLORIDE 50 MG: 50 TABLET ORAL at 09:06

## 2022-06-23 RX ADMIN — AMIODARONE HYDROCHLORIDE 200 MG: 200 TABLET ORAL at 09:06

## 2022-06-23 RX ADMIN — ASPIRIN 81 MG: 81 TABLET, COATED ORAL at 09:06

## 2022-06-23 RX ADMIN — METHYLPREDNISOLONE SODIUM SUCCINATE 40 MG: 40 INJECTION, POWDER, FOR SOLUTION INTRAMUSCULAR; INTRAVENOUS at 12:06

## 2022-06-23 RX ADMIN — ISOSORBIDE MONONITRATE 30 MG: 30 TABLET, EXTENDED RELEASE ORAL at 09:06

## 2022-06-23 RX ADMIN — AMLODIPINE BESYLATE 5 MG: 5 TABLET ORAL at 09:06

## 2022-06-23 RX ADMIN — MIRTAZAPINE 15 MG: 15 TABLET, FILM COATED ORAL at 09:06

## 2022-06-23 RX ADMIN — ATORVASTATIN CALCIUM 10 MG: 10 TABLET, FILM COATED ORAL at 09:06

## 2022-06-23 RX ADMIN — TRAMADOL HYDROCHLORIDE 50 MG: 50 TABLET ORAL at 04:06

## 2022-06-23 RX ADMIN — CEFTRIAXONE 1 G: 1 INJECTION, SOLUTION INTRAVENOUS at 11:06

## 2022-06-23 NOTE — SUBJECTIVE & OBJECTIVE
Interval History: Pt seen and evaluated    Review of Systems   Constitutional:  Positive for activity change and fatigue.   HENT: Negative.     Eyes: Negative.    Respiratory:  Positive for cough. Negative for chest tightness and wheezing.    Cardiovascular:  Negative for chest pain, palpitations and leg swelling.   Gastrointestinal:  Negative for abdominal pain, constipation, diarrhea, nausea and vomiting.   Endocrine: Negative.    Genitourinary: Negative.    Musculoskeletal:  Positive for arthralgias.   Neurological:  Positive for weakness.   Psychiatric/Behavioral: Negative.     Objective:     Vital Signs (Most Recent):  Temp: 97.4 °F (36.3 °C) (06/23/22 0831)  Pulse: 63 (06/23/22 0831)  Resp: 20 (06/23/22 0831)  BP: (!) 157/67 (06/23/22 0915)  SpO2: (!) 93 % (06/23/22 0831)   Vital Signs (24h Range):  Temp:  [97.4 °F (36.3 °C)-98.4 °F (36.9 °C)] 97.4 °F (36.3 °C)  Pulse:  [56-72] 63  Resp:  [18-22] 20  SpO2:  [91 %-97 %] 93 %  BP: (110-157)/(56-82) 157/67     Weight: 62.1 kg (137 lb)  Body mass index is 23.52 kg/m².    Intake/Output Summary (Last 24 hours) at 6/23/2022 1025  Last data filed at 6/23/2022 0500  Gross per 24 hour   Intake 3010 ml   Output 800 ml   Net 2210 ml        Physical Exam  Vitals and nursing note reviewed.   HENT:      Head: Normocephalic and atraumatic.      Mouth/Throat:      Mouth: Mucous membranes are moist.      Pharynx: Oropharynx is clear.   Eyes:      General: No scleral icterus.     Extraocular Movements: Extraocular movements intact.      Pupils: Pupils are equal, round, and reactive to light.   Cardiovascular:      Rate and Rhythm: Normal rate and regular rhythm.      Pulses: Normal pulses.      Heart sounds: Normal heart sounds.   Pulmonary:      Effort: Pulmonary effort is normal.      Breath sounds: Normal breath sounds.   Abdominal:      General: Bowel sounds are normal. There is no distension.      Palpations: Abdomen is soft.      Tenderness: There is no abdominal  tenderness. There is no guarding.   Musculoskeletal:         General: Normal range of motion.      Cervical back: Normal range of motion. No rigidity.   Skin:     General: Skin is warm and dry.      Capillary Refill: Capillary refill takes less than 2 seconds.   Neurological:      General: No focal deficit present.      Mental Status: She is alert and oriented to person, place, and time.      Motor: Weakness present.   Psychiatric:         Mood and Affect: Mood normal.         Behavior: Behavior normal.       Significant Labs: All pertinent labs within the past 24 hours have been reviewed.    Blood Culture:   Recent Labs   Lab 06/21/22  1643 06/21/22  1647   LABBLOO No Growth to date  No Growth to date No Growth to date  No Growth to date       CBC:   Recent Labs   Lab 06/22/22  0544 06/23/22  0419   WBC 22.78* 20.10*   HGB 12.9 11.3*   HCT 40.0 35.6*   * 420       CMP:   Recent Labs   Lab 06/22/22  0544 06/23/22  0419    138   K 4.5 4.5    103   CO2 29 31*   * 123*   BUN 49* 57*   CREATININE 1.5* 1.5*   CALCIUM 8.6* 8.3*   PROT 6.9 5.9*   ALBUMIN 2.1* 2.0*   BILITOT 0.3 0.2   ALKPHOS 78 68   AST 18 14   ALT 19 20   ANIONGAP 7* 4*   EGFRNONAA 30.5* 30.5*       Magnesium:   Recent Labs   Lab 06/22/22  0544 06/23/22  0419   MG 1.7 1.9         Significant Imaging: I have reviewed all pertinent imaging results/findings within the past 24 hours.

## 2022-06-23 NOTE — PROGRESS NOTES
Pharmacokinetic Assessment Follow Up: IV Vancomycin    Vancomycin serum concentration assessment(s):    The random level was drawn correctly and can be used to guide therapy at this time. The measurement is above the desired definitive target range of 15 to 20 mcg/mL.    Vancomycin Regimen Plan:    Re-dose when the random level is less than 20 mcg/mL, next level to be drawn at with am labs on 6/24/22    Drug levels (last 3 results):  Recent Labs   Lab Result Units 06/21/22  1922 06/22/22  0544 06/23/22  0418   Vancomycin, Random ug/mL  --  18.6 21.0   Vancomycin-Trough ug/mL 13.4  --   --        Pharmacy will continue to follow and monitor vancomycin.    Please contact pharmacy at extension [phone] for questions regarding this assessment.    Thank you for the consult,   Libertad Camilo       Patient brief summary:  Codi Black is a 89 y.o. female initiated on antimicrobial therapy with IV Vancomycin for treatment of bacteremia    The patient's current regimen is pulse dosing due to decreased renal function    Drug Allergies:   Review of patient's allergies indicates:  No Known Allergies    Actual Body Weight:   62.1 kg    Renal Function:   Estimated Creatinine Clearance: 21.5 mL/min (A) (based on SCr of 1.5 mg/dL (H)).,     Dialysis Method (if applicable):  N/A    CBC (last 72 hours):  Recent Labs   Lab Result Units 06/21/22  0336 06/22/22  0544 06/23/22  0419   WBC K/uL 18.33* 22.78* 20.10*   Hemoglobin g/dL 11.5* 12.9 11.3*   Hematocrit % 36.6* 40.0 35.6*   Platelets K/uL 297 453* 420   Gran % % 92.0* 93.3* 92.6*   Lymph % % 4.5* 3.7* 4.0*   Mono % % 2.9* 2.1* 2.3*   Eosinophil % % 0.0 0.0 0.0   Basophil % % 0.1 0.1 0.1   Differential Method  Automated Automated Automated       Metabolic Panel (last 72 hours):  Recent Labs   Lab Result Units 06/21/22  0336 06/22/22  0544 06/23/22  0419   Sodium mmol/L 135* 136 138   Potassium mmol/L 4.2 4.5 4.5   Chloride mmol/L 101 100 103   CO2 mmol/L 28 29 31*   Glucose  mg/dL 151* 148* 123*   BUN mg/dL 45* 49* 57*   Creatinine mg/dL 1.6* 1.5* 1.5*   Albumin g/dL 1.9* 2.1* 2.0*   Total Bilirubin mg/dL 0.2 0.3 0.2   Alkaline Phosphatase U/L 78 78 68   AST U/L 12 18 14   ALT U/L 14 19 20   Magnesium mg/dL 1.7 1.7 1.9       Vancomycin Administrations:  vancomycin given in the last 96 hours                     vancomycin 750 mg in dextrose 5 % 250 mL IVPB (ready to mix system) (mg) 750 mg New Bag 06/22/22 0948    vancomycin 750 mg in dextrose 5 % 250 mL IVPB (ready to mix system) (mg) 750 mg New Bag 06/21/22 2030     750 mg New Bag 06/20/22 1939     750 mg New Bag 06/19/22 2029    vancomycin 750 mg in dextrose 5 % 250 mL IVPB (ready to mix system) (mg) 750 mg New Bag 06/21/22 2029                    Microbiologic Results:  Microbiology Results (last 7 days)       Procedure Component Value Units Date/Time    Blood culture [[id number]] Collected: 06/21/22 1643    Order Status: Completed Specimen: Blood Updated: 06/22/22 2312     Blood Culture, Routine No Growth to date      No Growth to date    Blood culture [[id number]] Collected: 06/21/22 1647    Order Status: Completed Specimen: Blood from Antecubital, Left Updated: 06/22/22 2312     Blood Culture, Routine No Growth to date      No Growth to date    Blood culture x two cultures. Draw prior to antibiotics. [[id number]]  (Abnormal)  (Susceptibility) Collected: 06/18/22 1209    Order Status: Completed Specimen: Blood Updated: 06/22/22 0815     Blood Culture, Routine Gram stain laura bottle: Gram positive cocci in clusters resembling Staph       Results called to and read back by: Poornima Rojas RN 06/19/2022  22:26      STAPHYLOCOCCUS EPIDERMIDIS    Narrative:      Aerobic and anaerobic    Blood culture x two cultures. Draw prior to antibiotics. [[id number]]  (Abnormal)  (Susceptibility) Collected: 06/18/22 1205    Order Status: Completed Specimen: Blood Updated: 06/22/22 0700     Blood Culture, Routine Gram stain laura bottle: Gram positive cocci in  clusters resembling Staph       Results called to and read back by: Zuleika Howell 06/19/2022  12:06      Gram stain aer bottle: Gram positive cocci in clusters resembling Staph       Positive results previously called 06/19/2022  14:32      STAPHYLOCOCCUS EPIDERMIDIS    Narrative:      Aerobic and anaerobic

## 2022-06-23 NOTE — ASSESSMENT & PLAN NOTE
Blood cultures x2 positive for staphylococcus epidermidis, susceptibility pending. Vancomycin initiated on 6/19/22 6/23: Day 6 of IV abx therapy with Rocephin and Azithromycin; Day 4 of abx therapy with Vancomycin. Repeat blood cultures showing NGTD x2 days

## 2022-06-23 NOTE — PROGRESS NOTES
[facility] Medicine  Progress Note    Patient Name: Codi Black  MRN: [id number]  Patient Class: IP- Inpatient   Admission Date: 6/18/2022  Length of Stay: 5 days  Attending Physician: Rafael Quan III, MD  Primary Care Provider: Rafael Quan III, MD        Subjective:     Principal Problem:Pneumonia due to infectious organism        HPI:  Pt is 89 y/o female with Hx of hypertension, hyperlipidemia, and arthritis, presented to [facility] ED yesterday with c/o generalized weakness. She reports having a productive cough. She denies chest pain. High-Sensitivity Troponin levels, although negative, trended up x3 sets - Cardiology consulted. This morning she reports continued fatigue and weakness; also c/o left hand pain; shortness of breath improved with rest and oxygen.      Overview/Hospital Course:  6/20 SD: Pt's spouse at bedside. Pt reports feeling better compared to initial arrival to the hospital. Blood cultures x2 positive for gram + cocci in clusters resembling Staph, awaiting sensitivity. Vancomycin added to antibiotic therapy. Appreciate Dr. Chapin's recommendations for NSTEMI and HTN. Dr. Chapin advises that pt will need angiogram after treatment of pneumonia; will determine need for inpatient vs outpatient as patient care progresses.  6/21 SD: Pt's spouse at bedside. Blood cultures x2 positive for staphylococcus epidermidis, susceptibility pending. Continue IV abx therapy and repeat blood cultures today. Encourage pt to participate with therapy.  6/22 SD: Continue IV abx, following repeat cultures. Encourage participation with therapy.  6/23 SD: Sitting up in bed, at breakfast with good appetite. Continue IV abx therapy, following repeat cultures. Encourage participation with therapy.      Interval History: Pt seen and evaluated    Review of Systems   Constitutional:  Positive for activity change and fatigue.   HENT: Negative.     Eyes: Negative.    Respiratory:  Positive for cough.  Negative for chest tightness and wheezing.    Cardiovascular:  Negative for chest pain, palpitations and leg swelling.   Gastrointestinal:  Negative for abdominal pain, constipation, diarrhea, nausea and vomiting.   Endocrine: Negative.    Genitourinary: Negative.    Musculoskeletal:  Positive for arthralgias.   Neurological:  Positive for weakness.   Psychiatric/Behavioral: Negative.     Objective:     Vital Signs (Most Recent):  Temp: 97.4 °F (36.3 °C) (06/23/22 0831)  Pulse: 63 (06/23/22 0831)  Resp: 20 (06/23/22 0831)  BP: (!) 157/67 (06/23/22 0915)  SpO2: (!) 93 % (06/23/22 0831)   Vital Signs (24h Range):  Temp:  [97.4 °F (36.3 °C)-98.4 °F (36.9 °C)] 97.4 °F (36.3 °C)  Pulse:  [56-72] 63  Resp:  [18-22] 20  SpO2:  [91 %-97 %] 93 %  BP: (110-157)/(56-82) 157/67     Weight: 62.1 kg (137 lb)  Body mass index is 23.52 kg/m².    Intake/Output Summary (Last 24 hours) at 6/23/2022 1025  Last data filed at 6/23/2022 0500  Gross per 24 hour   Intake 3010 ml   Output 800 ml   Net 2210 ml        Physical Exam  Vitals and nursing note reviewed.   HENT:      Head: Normocephalic and atraumatic.      Mouth/Throat:      Mouth: Mucous membranes are moist.      Pharynx: Oropharynx is clear.   Eyes:      General: No scleral icterus.     Extraocular Movements: Extraocular movements intact.      Pupils: Pupils are equal, round, and reactive to light.   Cardiovascular:      Rate and Rhythm: Normal rate and regular rhythm.      Pulses: Normal pulses.      Heart sounds: Normal heart sounds.   Pulmonary:      Effort: Pulmonary effort is normal.      Breath sounds: Normal breath sounds.   Abdominal:      General: Bowel sounds are normal. There is no distension.      Palpations: Abdomen is soft.      Tenderness: There is no abdominal tenderness. There is no guarding.   Musculoskeletal:         General: Normal range of motion.      Cervical back: Normal range of motion. No rigidity.   Skin:     General: Skin is warm and dry.       Capillary Refill: Capillary refill takes less than 2 seconds.   Neurological:      General: No focal deficit present.      Mental Status: She is alert and oriented to person, place, and time.      Motor: Weakness present.   Psychiatric:         Mood and Affect: Mood normal.         Behavior: Behavior normal.       Significant Labs: All pertinent labs within the past 24 hours have been reviewed.    Blood Culture:   Recent Labs   Lab 06/21/22  1643 06/21/22  1647   LABBLOO No Growth to date  No Growth to date No Growth to date  No Growth to date       CBC:   Recent Labs   Lab 06/22/22  0544 06/23/22  0419   WBC 22.78* 20.10*   HGB 12.9 11.3*   HCT 40.0 35.6*   * 420       CMP:   Recent Labs   Lab 06/22/22  0544 06/23/22  0419    138   K 4.5 4.5    103   CO2 29 31*   * 123*   BUN 49* 57*   CREATININE 1.5* 1.5*   CALCIUM 8.6* 8.3*   PROT 6.9 5.9*   ALBUMIN 2.1* 2.0*   BILITOT 0.3 0.2   ALKPHOS 78 68   AST 18 14   ALT 19 20   ANIONGAP 7* 4*   EGFRNONAA 30.5* 30.5*       Magnesium:   Recent Labs   Lab 06/22/22  0544 06/23/22  0419   MG 1.7 1.9         Significant Imaging: I have reviewed all pertinent imaging results/findings within the past 24 hours.      Assessment/Plan:      * Pneumonia due to infectious organism  Blood cultures x2 positive for staphylococcus epidermidis, susceptibility pending. Vancomycin initiated on 6/19/22 6/23: Day 6 of IV abx therapy with Rocephin and Azithromycin; Day 4 of abx therapy with Vancomycin. Repeat blood cultures showing NGTD x2 days      Arthritis  Pain to left hand. CRP and Sed Rate elevated. Treatment with steroids.  6/23: Pain improving      Hypertension  Dr. Chapin following        NSTEMI (non-ST elevated myocardial infarction)  Dr. Chapin managing    DVT prophylaxis  Lovenox        VTE Risk Mitigation (From admission, onward)         Ordered     apixaban tablet 2.5 mg  2 times daily         06/21/22 1856     IP VTE HIGH RISK PATIENT  Once          06/18/22 1525     Place sequential compression device  Until discontinued         06/18/22 1525                Discharge Planning   KIMANI:      Code Status: Full Code   Is the patient medically ready for discharge?:     Reason for patient still in hospital (select all that apply): Patient trending condition, Laboratory test and Treatment  Discharge Plan A: Home with family, Home Health                  Stephy Young NP  Department of Hospital Medicine   [facility]

## 2022-06-23 NOTE — PT/OT/SLP PROGRESS
Occupational Therapy   Treatment    Name: Codi Black  MRN: [id number]  Admitting Diagnosis:  Pneumonia due to infectious organism       Recommendations:     Discharge Recommendations: other (see comments) (24 hour care if home with home health; patient does not want IPR)  Discharge Equipment Recommendations:  other (see comments) (TBD)  Barriers to discharge:  Other (Comment) (Current medical condition)    Assessment:     Codi Black is a 89 y.o. female with a medical diagnosis of Pneumonia due to infectious organism.  She presents with weakness. Performance deficits affecting function are weakness, impaired endurance, decreased upper extremity function, pain.     Rehab Prognosis:  Poor; patient would benefit from acute skilled OT services to address these deficits and reach maximum level of function.       Plan:     Patient to be seen 6 x/week to address the above listed problems via self-care/home management, therapeutic activities, therapeutic exercises  · Plan of Care Expires: 07/08/22  · Plan of Care Reviewed with: patient, spouse    Subjective     Pain/Comfort:  · Pain Rating 1: other (see comments) (R hip pain unable to rate on pain scale)  · Pain Addressed 1: Reposition    Objective:     Communicated with: Nurse prior to session.  Patient found supine with peripheral IV, telemetry, PureWick, oxygen upon OT entry to room.    General Precautions: Standard, fall   Orthopedic Precautions:N/A   Braces: N/A  Respiratory Status: Nasal cannula, flow 1 L/min     Occupational Performance:     Bed Mobility:    · Patient completed Rolling/Turning to Left with  moderate assistance  · Patient completed Rolling/Turning to Right with moderate assistance  · Patient completed Scooting/Bridging with moderate assistance  · Patient completed Supine to Sit with moderate assistance  · Patient completed Sit to Supine with moderate assistance     Functional Mobility/Transfers:  · Patient completed Sit <> Stand Transfer with  moderate assistance  with  rolling walker     Activities of Daily Living:  · Grooming: minimum assistance        [facility] 6 Click ADL: 14    Treatment & Education:  Pt was alert and willing for therapy. Pt was able to complete sit to stand transfer x2 for independence in ADLs. Pt also was able to sit at EOB ~ 3 min before having to return to supine due to R hip pain. Pt was slightly agitated during therapy and stated that she needs to complete therapy so that she can walk around her house when she leaves. Pt educated that OT/PT will help her the best that we can but that it is a process before she can get to prior level of function. Pt v/u.     Patient left supine with all lines intact, call button in reach and nurse notifiedEducation:      GOALS:   Multidisciplinary Problems     Occupational Therapy Goals        Problem: Occupational Therapy    Goal Priority Disciplines Outcome Interventions   Occupational Therapy Goal     OT, PT/OT Ongoing, Progressing    Description: Goals to be met by: 7/8/2022     Patient will increase functional independence with ADLs by performing:    UE Dressing with Minimal Assistance.  Grooming while EOB with Set-up Assistance.  Toileting from bedside commode with Minimal Assistance for hygiene and clothing management.   Sitting at edge of bed x5 minutes with Minimal Assistance.  Rolling to Right, Left with Minimal Assistance.   Supine to sit with Minimal Assistance.                     Time Tracking:     OT Date of Treatment: 06/23/22  OT Start Time: 1018  OT Stop Time: 1048  OT Total Time (min): 30 min    Billable Minutes:Therapeutic Activity 30    OT/REJI: OT          6/23/2022

## 2022-06-23 NOTE — PLAN OF CARE
Plan of care reviewed.Patient has increase pain to upper right leg. Pain effectively managed with Tramadol 50mg. No questions or concerns at this time.

## 2022-06-23 NOTE — PROGRESS NOTES
Cardiology Progress Note  ([facility])    Today's Date:  6/23/2022  Patient Name: Codi Black    MRN: [id number]    Code Status: Full Code     Attending Physician: Rafael Quan III, MD   Consulting Physician: SCOT Chapin MD    Hospital Course:     Clinical:  The patient states that she is doing well this morning.  She just finished eating lunch.  She states that she feels good.  She states that she feels that she could do more activity during physical therapy.  She is not complaining of chest pain, shortness of breath, or left arm pain.  She is not experiencing stroke-like symptoms.    06/23/2022:  Clinically the patient appears to be doing fairly well.  She underwent physical therapy.  She is not complaining of chest pain or shortness of breath.    Hemodynamic:      06/22/2022:  Blood pressure is well controlled on current therapy.  (See yesterday losartan was discontinued secondary to prerenal azotemia with increasing creatinine.)    06/23/2022:  Vital signs remain stable.  Heart rate is well controlled as she is back in sinus rhythm.    Telemetry:   Atrial fibrillation with rapid ventricular rate ranging between 117 and 122 beats per minute.  * patient on June 20th had a brief episode of supraventricular tachycardia with rapid ventricular rate of 165 beats per minute at which time she had ST segment depression.    06/22/2022:  Atrial fibrillation with a controlled ventricular rate of 92-97 beats per minute.    06/23/2022:  The patient has converted to normal sinus rhythm with a ventricular rate of 62 beats per minute.    Relevant testing:     Echocardiogram:   · The left ventricle is normal in size with moderate concentric hypertrophy and normal systolic function.  · The estimated ejection fraction is 55%.  · Normal left ventricular diastolic function.  · Mild tricuspid regurgitation.  · Mild pulmonic regurgitation.  · The estimated PA systolic pressure is 23 mmHg.  · Trivial pericardial effusion.      Review of Systems   Constitutional: Positive for malaise/fatigue. Negative for decreased appetite.   Cardiovascular: Positive for dyspnea on exertion and leg swelling. Negative for chest pain, near-syncope, orthopnea, palpitations and syncope.       Vital Signs (Most Recent):  Temp: 97.4 °F (36.3 °C) (06/23/22 0831)  Pulse: 61 (06/23/22 1030)  Resp: 20 (06/23/22 0831)  BP: (!) 157/67 (06/23/22 0915)  SpO2: 95 % (06/23/22 1030) Vital Signs (24h Range):  Temp:  [97.4 °F (36.3 °C)-98.4 °F (36.9 °C)] 97.4 °F (36.3 °C)  Pulse:  [52-72] 61  Resp:  [18-22] 20  SpO2:  [91 %-97 %] 95 %  BP: (110-157)/(56-82) 157/67     Weight: 62.1 kg (137 lb)  Body mass index is 23.52 kg/m².    SpO2: 95 %  O2 Device (Oxygen Therapy): nasal cannula      Intake/Output Summary (Last 24 hours) at 6/23/2022 1130  Last data filed at 6/23/2022 0500  Gross per 24 hour   Intake 3010 ml   Output 800 ml   Net 2210 ml     Labs:  CMP   Recent Labs   Lab 06/22/22  0544 06/23/22 0419    138   K 4.5 4.5    103   CO2 29 31*   * 123*   BUN 49* 57*   CREATININE 1.5* 1.5*   CALCIUM 8.6* 8.3*   PROT 6.9 5.9*   ALBUMIN 2.1* 2.0*   BILITOT 0.3 0.2   ALKPHOS 78 68   AST 18 14   ALT 19 20   ANIONGAP 7* 4*   ESTGFRAFRICA 35.1* 35.1*   EGFRNONAA 30.5* 30.5*   , CBC   Recent Labs   Lab 06/22/22  0544 06/23/22 0419   WBC 22.78* 20.10*   HGB 12.9 11.3*   HCT 40.0 35.6*   * 420    and   Pathology Results  (Last 10 years)    None      Troponin I peaked at at 182.6.  (Now trending downward.)    Physical Examination:  General:   Alert and oriented.  She is in no distress.  Heent:   No JVD.  No appreciable carotid bruits.  Lungs:   Good breath sounds.  No wheezing or rhonchi.  Heart:   Regular rate rhythm; grade 1 holosystolic murmur.  Extremities:   Bruising and chronic skin changes.  Distal pulses are mildly diminished.  Skin tenting consistent with a fluid loss.    Assessment and Plan:     1.  Non-ST segment elevated MI: patient with  An  elevation in her troponin I x3.  During this time she was hypertensive and tachycardic.  Given risk factors, she likely has coronary artery disease.  · Aspirin 81 mg daily  · Metoprolol 75 mg twice daily  · Amlodipine 10 mg daily.  · Isosorbide mononitrate 30 mg daily.  ·   The patient, her daughter and I had a lengthy discussion concerning intervention.  The patient states that she would prefer medication only at this time.  My recommendation would be to continue with this strategy, however, if she should become unstable i.e., chest pain or any signs and symptoms of unstable angina, to consider invasive evaluation.    2.  Hypertension:  Blood pressure is fluctuating, and stable.  · Continue with metoprolol 50 mg twice daily.  · Increase amlodipine to 10 mg daily.     3.  Pneumonia: on antibiotic therapy.  White count is increased.  Blood cultures are negative today.  · Managed by internal medicine.     4.  Hyperlipidemia:     LDL is elevated.  · [place]field Crestor 10 mg daily.    5. Atrial fibrillation:  Rate is well controlled.    · Eliquis at 2.5 mg b.i.d.  · Amiodarone 200 mg 1 tablet b.i.d..-->  reduce amiodarone to 200 mg q.d. as she is in sinus rhythm.  · Continue metoprolol at  75 mg b.i.d.    Telemetry revealed an episode of SVT with associated ST segment depression (flat) of approximately 2-3 mV. This is consistent with a positive stress test.  The patient will require invasive evaluation.   I have explained this to the patient and her .  They are considering their options.  If they are willing to proceed, the timing will depend on her clinical and hemodynamic stability.     5.  Renal insufficiency:  Creatinine is stable at 1.5 x 2 days.

## 2022-06-23 NOTE — PT/OT/SLP PROGRESS
Physical Therapy Treatment    Patient Name:  Codi Black   MRN:  [id number]    Recommendations:     Discharge Recommendations:   (24 hour care if home with home health; patient does not want IPR)   Discharge Equipment Recommendations:  (Hospital bed)   Barriers to discharge: current medical and functional status    Assessment:     Codi Black is a 89 y.o. female admitted with a medical diagnosis of Pneumonia due to infectious organism.  She presents with the following impairments/functional limitations:    decreased functional endurance and strength, pain impacting gait, transfers, balance, coordination, safety and overall functional mobility. .    Rehab Prognosis: Fair; patient would benefit from acute skilled PT services to address these deficits and reach maximum level of function.    Recent Surgery: * No surgery found *      Plan:     During this hospitalization, patient to be seen 6 x/week to address the identified rehab impairments via gait training, therapeutic activities, therapeutic exercises and progress toward the following goals:    · Plan of Care Expires:  07/08/22    Subjective     Chief Complaint: Patient complains of (R) hip pain. Patient poor historian and does not clearly remember therapy working with her yesterday.   Patient/Family Comments/goals: daughter reports goal is for patient to return home with home health  Pain/Comfort:  · Pain Rating 1:  ((R) hip pain unable to rate on pain scale)      Objective:     Communicated with nurse, OT prior to session.  Patient found HOB elevated with   oxygen, purewick, peripheral IV, upon PT entry to room.     General Precautions: Standard, fall   Orthopedic Precautions:  n/a  Braces:  n/a  Respiratory Status: oxygen per nasal canula     Therapeutic Activities and Exercises:   Patient seen for cotreatment with OT to optimize patient participation and safety with therapy. Patient performed supine to sit at edge of bed with max assist x2 for trunk  elevation and to remove LEs from bed. Patient sat at edge of bed to perform (B) LE therex  With cueing for technique and for sitting posture. Patient performed sit to stand max assist x2 with walker and performed marching in place 10 reps x2. Attempted sidestepping however patient unable to perform task at this time. Patient with difficulty weight shifting onto (R) LE.Patient became fatigued and requested to lay down and was assisted to laying with max assist x2. Patient pulled up in bed with total assist x2 using bedding.     Patient left HOB elevated with all lines intact, call button in reach and nurse notified..    GOALS:   Multidisciplinary Problems     Physical Therapy Goals        Problem: Physical Therapy    Goal Priority Disciplines Outcome Goal Variances Interventions   Physical Therapy Goal     PT, PT/OT Ongoing, Progressing     Description: Goals to be met by: 2022    Patient will increase functional independence with mobility by performin. Supine to/from sit with MInimal Assistance.  2. Rolling to Left and Right with Stand-by Assistance.  3. Sit to stand transfer with Contact Guard Assistance  4. Bed to chair transfer with Contact Guard Assistance using Rolling Walker  5. Gait  x 10 feet with Minimal Assistance using Rolling Walker.                        Time Tracking:     PT Received On: 22  PT Start Time: 1018     PT Stop Time: 1048  PT Total Time (min): 30 min     Billable Minutes: Therapeutic Activity 15 and Therapeutic Exercise 15    Treatment Type: Treatment  PT/PTA: PT           2022

## 2022-06-24 PROBLEM — R53.1 WEAKNESS: Status: ACTIVE | Noted: 2022-06-24

## 2022-06-24 LAB
ALBUMIN SERPL BCP-MCNC: 2.3 G/DL (ref 3.5–5.2)
ALP SERPL-CCNC: 73 U/L (ref 55–135)
ALT SERPL W/O P-5'-P-CCNC: 26 U/L (ref 10–44)
ANION GAP SERPL CALC-SCNC: 4 MMOL/L (ref 8–16)
AST SERPL-CCNC: 16 U/L (ref 10–40)
BASOPHILS # BLD AUTO: 0.03 K/UL (ref 0–0.2)
BASOPHILS NFR BLD: 0.2 % (ref 0–1.9)
BILIRUB SERPL-MCNC: 0.3 MG/DL (ref 0.1–1)
BUN SERPL-MCNC: 53 MG/DL (ref 8–23)
CALCIUM SERPL-MCNC: 9.1 MG/DL (ref 8.7–10.5)
CHLORIDE SERPL-SCNC: 103 MMOL/L (ref 95–110)
CO2 SERPL-SCNC: 33 MMOL/L (ref 23–29)
CREAT SERPL-MCNC: 1.3 MG/DL (ref 0.5–1.4)
DIFFERENTIAL METHOD: ABNORMAL
EOSINOPHIL # BLD AUTO: 0 K/UL (ref 0–0.5)
EOSINOPHIL NFR BLD: 0 % (ref 0–8)
ERYTHROCYTE [DISTWIDTH] IN BLOOD BY AUTOMATED COUNT: 12.4 % (ref 11.5–14.5)
EST. GFR  (AFRICAN AMERICAN): 41.7 ML/MIN/1.73 M^2
EST. GFR  (NON AFRICAN AMERICAN): 36.2 ML/MIN/1.73 M^2
GLUCOSE SERPL-MCNC: 125 MG/DL (ref 70–110)
HCT VFR BLD AUTO: 39.7 % (ref 37–48.5)
HGB BLD-MCNC: 12.3 G/DL (ref 12–16)
IMM GRANULOCYTES # BLD AUTO: 0.22 K/UL (ref 0–0.04)
IMM GRANULOCYTES NFR BLD AUTO: 1.2 % (ref 0–0.5)
LYMPHOCYTES # BLD AUTO: 0.8 K/UL (ref 1–4.8)
LYMPHOCYTES NFR BLD: 4.3 % (ref 18–48)
MAGNESIUM SERPL-MCNC: 1.9 MG/DL (ref 1.6–2.6)
MCH RBC QN AUTO: 28.2 PG (ref 27–31)
MCHC RBC AUTO-ENTMCNC: 31 G/DL (ref 32–36)
MCV RBC AUTO: 91 FL (ref 82–98)
MONOCYTES # BLD AUTO: 0.4 K/UL (ref 0.3–1)
MONOCYTES NFR BLD: 2.4 % (ref 4–15)
NEUTROPHILS # BLD AUTO: 16.8 K/UL (ref 1.8–7.7)
NEUTROPHILS NFR BLD: 91.9 % (ref 38–73)
NRBC BLD-RTO: 0 /100 WBC
PLATELET # BLD AUTO: 466 K/UL (ref 150–450)
PMV BLD AUTO: 9.8 FL (ref 9.2–12.9)
POTASSIUM SERPL-SCNC: 5 MMOL/L (ref 3.5–5.1)
PROT SERPL-MCNC: 6.4 G/DL (ref 6–8.4)
RBC # BLD AUTO: 4.36 M/UL (ref 4–5.4)
SODIUM SERPL-SCNC: 140 MMOL/L (ref 136–145)
VANCOMYCIN SERPL-MCNC: 16.9 UG/ML
WBC # BLD AUTO: 18.24 K/UL (ref 3.9–12.7)

## 2022-06-24 PROCEDURE — 80202 ASSAY OF VANCOMYCIN: CPT | Performed by: INTERNAL MEDICINE

## 2022-06-24 PROCEDURE — 94761 N-INVAS EAR/PLS OXIMETRY MLT: CPT

## 2022-06-24 PROCEDURE — 11000001 HC ACUTE MED/SURG PRIVATE ROOM

## 2022-06-24 PROCEDURE — 63600175 PHARM REV CODE 636 W HCPCS: Performed by: NURSE PRACTITIONER

## 2022-06-24 PROCEDURE — 83735 ASSAY OF MAGNESIUM: CPT | Performed by: NURSE PRACTITIONER

## 2022-06-24 PROCEDURE — 99900035 HC TECH TIME PER 15 MIN (STAT)

## 2022-06-24 PROCEDURE — 80053 COMPREHEN METABOLIC PANEL: CPT | Performed by: NURSE PRACTITIONER

## 2022-06-24 PROCEDURE — 25000003 PHARM REV CODE 250: Performed by: INTERNAL MEDICINE

## 2022-06-24 PROCEDURE — 63600175 PHARM REV CODE 636 W HCPCS: Performed by: INTERNAL MEDICINE

## 2022-06-24 PROCEDURE — 97530 THERAPEUTIC ACTIVITIES: CPT

## 2022-06-24 PROCEDURE — 25000003 PHARM REV CODE 250: Performed by: NURSE PRACTITIONER

## 2022-06-24 PROCEDURE — 85025 COMPLETE CBC W/AUTO DIFF WBC: CPT | Performed by: NURSE PRACTITIONER

## 2022-06-24 PROCEDURE — 27000221 HC OXYGEN, UP TO 24 HOURS

## 2022-06-24 PROCEDURE — 36415 COLL VENOUS BLD VENIPUNCTURE: CPT | Performed by: INTERNAL MEDICINE

## 2022-06-24 PROCEDURE — 99900031 HC PATIENT EDUCATION (STAT)

## 2022-06-24 RX ADMIN — MIRTAZAPINE 15 MG: 15 TABLET, FILM COATED ORAL at 08:06

## 2022-06-24 RX ADMIN — TRAMADOL HYDROCHLORIDE 50 MG: 50 TABLET ORAL at 08:06

## 2022-06-24 RX ADMIN — METHYLPREDNISOLONE SODIUM SUCCINATE 40 MG: 40 INJECTION, POWDER, FOR SOLUTION INTRAMUSCULAR; INTRAVENOUS at 11:06

## 2022-06-24 RX ADMIN — APIXABAN 2.5 MG: 2.5 TABLET, FILM COATED ORAL at 09:06

## 2022-06-24 RX ADMIN — FAMOTIDINE 20 MG: 20 TABLET ORAL at 09:06

## 2022-06-24 RX ADMIN — ISOSORBIDE MONONITRATE 30 MG: 30 TABLET, EXTENDED RELEASE ORAL at 09:06

## 2022-06-24 RX ADMIN — AMLODIPINE BESYLATE 5 MG: 5 TABLET ORAL at 09:06

## 2022-06-24 RX ADMIN — AMIODARONE HYDROCHLORIDE 200 MG: 200 TABLET ORAL at 08:06

## 2022-06-24 RX ADMIN — VANCOMYCIN HYDROCHLORIDE 750 MG: 750 INJECTION, POWDER, LYOPHILIZED, FOR SOLUTION INTRAVENOUS at 09:06

## 2022-06-24 RX ADMIN — CEFTRIAXONE 1 G: 1 INJECTION, SOLUTION INTRAVENOUS at 11:06

## 2022-06-24 RX ADMIN — APIXABAN 2.5 MG: 2.5 TABLET, FILM COATED ORAL at 08:06

## 2022-06-24 RX ADMIN — ATORVASTATIN CALCIUM 10 MG: 10 TABLET, FILM COATED ORAL at 08:06

## 2022-06-24 RX ADMIN — AMIODARONE HYDROCHLORIDE 200 MG: 200 TABLET ORAL at 09:06

## 2022-06-24 RX ADMIN — ALPRAZOLAM 0.75 MG: 0.5 TABLET ORAL at 08:06

## 2022-06-24 RX ADMIN — METOPROLOL TARTRATE 75 MG: 50 TABLET, FILM COATED ORAL at 08:06

## 2022-06-24 RX ADMIN — METOPROLOL TARTRATE 75 MG: 50 TABLET, FILM COATED ORAL at 09:06

## 2022-06-24 RX ADMIN — ASPIRIN 81 MG: 81 TABLET, COATED ORAL at 09:06

## 2022-06-24 RX ADMIN — AZITHROMYCIN MONOHYDRATE 500 MG: 500 INJECTION, POWDER, LYOPHILIZED, FOR SOLUTION INTRAVENOUS at 03:06

## 2022-06-24 NOTE — ASSESSMENT & PLAN NOTE
Blood cultures x2 positive for staphylococcus epidermidis, susceptibility pending. Vancomycin initiated on 6/19/22 6/24: Day 7 of IV abx therapy with Rocephin and Azithromycin; Day 5 of abx therapy with Vancomycin. Repeat blood cultures showing NGTD x3 days     Statement Selected

## 2022-06-24 NOTE — PT/OT/SLP PROGRESS
Occupational Therapy   Treatment    Name: Codi Black  MRN: [id number]  Admitting Diagnosis:  Pneumonia due to infectious organism       Recommendations:     Discharge Recommendations: other (see comments) (24 hour care if home with home health; patient does not want IPR)  Discharge Equipment Recommendations:  other (see comments) (TBD)  Barriers to discharge:  Decreased caregiver support    Assessment:     Codi Black is a 89 y.o. female with a medical diagnosis of Pneumonia due to infectious organism.  She presents with weakness. Performance deficits affecting function are weakness, impaired endurance, decreased upper extremity function, pain.     Rehab Prognosis:  Fair; patient would benefit from acute skilled OT services to address these deficits and reach maximum level of function.       Plan:     Patient to be seen 6 x/week to address the above listed problems via self-care/home management, therapeutic activities, therapeutic exercises  · Plan of Care Expires: 07/08/22  · Plan of Care Reviewed with: patient, spouse    Subjective     Pain/Comfort:  · Pain Rating 1: other (see comments) (not rated)    Objective:     Communicated with: Nurse prior to session.  Patient found supine with peripheral IV, telemetry, PureWick, oxygen upon OT entry to room.    General Precautions: Standard, fall   Orthopedic Precautions:N/A   Braces: N/A  Respiratory Status: Nasal cannula, flow 1 L/min     Occupational Performance:     Bed Mobility:    · Patient completed Rolling/Turning to Left with  moderate assistance  · Patient completed Rolling/Turning to Right with moderate assistance  · Patient completed Scooting/Bridging with moderate assistance  · Patient completed Supine to Sit with moderate assistance and 2 persons  · Patient completed Sit to Supine with moderate assistance     Functional Mobility/Transfers:  · Patient completed Sit <> Stand Transfer with minimum assistance  with  rolling walker   · Patient completed Bed  <> Chair Transfer using Step Transfer technique with minimum assistance with rolling walker      [facility] 6 Click ADL: 14    Treatment & Education:  Pt was alert and willing to complete therapy today. Pt was able to complete sit <> stand transfer x 3 with emphasizing using both UE to lift buttocks off of bed for increased independence in ADLs. Pt was also able to transfer from bed to chair with min A x 2 persons.    Patient left up in chair with all lines intact, call button in reach and nurse presentEducation:      GOALS:   Multidisciplinary Problems     Occupational Therapy Goals        Problem: Occupational Therapy    Goal Priority Disciplines Outcome Interventions   Occupational Therapy Goal     OT, PT/OT Ongoing, Progressing    Description: Goals to be met by: 7/8/2022     Patient will increase functional independence with ADLs by performing:    UE Dressing with Minimal Assistance.  Grooming while EOB with Set-up Assistance.  Toileting from bedside commode with Minimal Assistance for hygiene and clothing management.   Sitting at edge of bed x5 minutes with Minimal Assistance.  Rolling to Right, Left with Minimal Assistance.   Supine to sit with Minimal Assistance.                     Time Tracking:     OT Date of Treatment: 06/24/22  OT Start Time: 0935  OT Stop Time: 1005  OT Total Time (min): 30 min    Billable Minutes:Therapeutic Activity 30    OT/REJI: OT          6/24/2022

## 2022-06-24 NOTE — PLAN OF CARE
Plan of care reviewed. Patient found with fluid-filled blister to posterior left thigh. Denies pain or discomfort at this time. No questions or concerns.

## 2022-06-24 NOTE — PROGRESS NOTES
Pharmacokinetic Assessment Follow Up: IV Vancomycin    Vancomycin serum concentration assessment(s):    The random level was drawn correctly and can be used to guide therapy at this time. The measurement is within the desired definitive target range of 15 to 20 mcg/mL.    Vancomycin Regimen Plan:    Continue regimen to Vancomycin 750 mg IV every pulse dosing hours with next serum trough concentration measured at 0430 prior to next dose on 6/25/2022    Drug levels (last 3 results):  Recent Labs   Lab Result Units 06/21/22  1922 06/22/22  0544 06/23/22  0418 06/24/22  0518   Vancomycin, Random ug/mL  --  18.6 21.0 16.9   Vancomycin-Trough ug/mL 13.4  --   --   --        Pharmacy will continue to follow and monitor vancomycin.    Please contact pharmacy at extension [phone] for questions regarding this assessment.    Thank you for the consult,   Tiffanie Espinoza       Patient brief summary:  Codi Black is a 89 y.o. female initiated on antimicrobial therapy with IV Vancomycin for treatment of bacteremia    The patient's current regimen is 750 mg pulse dosing    Drug Allergies:   Review of patient's allergies indicates:  No Known Allergies    Actual Body Weight:   62.1 kg    Renal Function:   Estimated Creatinine Clearance: 24.8 mL/min (based on SCr of 1.3 mg/dL).,     Dialysis Method (if applicable):  N/A    CBC (last 72 hours):  Recent Labs   Lab Result Units 06/22/22  0544 06/23/22 0419 06/24/22  0518   WBC K/uL 22.78* 20.10* 18.24*   Hemoglobin g/dL 12.9 11.3* 12.3   Hematocrit % 40.0 35.6* 39.7   Platelets K/uL 453* 420 466*   Gran % % 93.3* 92.6* 91.9*   Lymph % % 3.7* 4.0* 4.3*   Mono % % 2.1* 2.3* 2.4*   Eosinophil % % 0.0 0.0 0.0   Basophil % % 0.1 0.1 0.2   Differential Method  Automated Automated Automated       Metabolic Panel (last 72 hours):  Recent Labs   Lab Result Units 06/22/22  0544 06/23/22  0419 06/24/22  0518   Sodium mmol/L 136 138 140   Potassium mmol/L 4.5 4.5 5.0   Chloride mmol/L 100 103 103   CO2  mmol/L 29 31* 33*   Glucose mg/dL 148* 123* 125*   BUN mg/dL 49* 57* 53*   Creatinine mg/dL 1.5* 1.5* 1.3   Albumin g/dL 2.1* 2.0* 2.3*   Total Bilirubin mg/dL 0.3 0.2 0.3   Alkaline Phosphatase U/L 78 68 73   AST U/L 18 14 16   ALT U/L 19 20 26   Magnesium mg/dL 1.7 1.9 1.9       Vancomycin Administrations:  vancomycin given in the last 96 hours                     vancomycin 750 mg in dextrose 5 % 250 mL IVPB (ready to mix system) (mg) 750 mg New Bag 06/22/22 0948    vancomycin 750 mg in dextrose 5 % 250 mL IVPB (ready to mix system) (mg) 750 mg New Bag 06/21/22 2030     750 mg New Bag 06/20/22 1939    vancomycin 750 mg in dextrose 5 % 250 mL IVPB (ready to mix system) (mg) 750 mg New Bag 06/21/22 2029                    Microbiologic Results:  Microbiology Results (last 7 days)       Procedure Component Value Units Date/Time    Blood culture [[id number]] Collected: 06/21/22 1647    Order Status: Completed Specimen: Blood from Antecubital, Left Updated: 06/23/22 2312     Blood Culture, Routine No Growth to date      No Growth to date      No Growth to date    Blood culture [[id number]] Collected: 06/21/22 1643    Order Status: Completed Specimen: Blood Updated: 06/23/22 2312     Blood Culture, Routine No Growth to date      No Growth to date      No Growth to date    Blood culture x two cultures. Draw prior to antibiotics. [[id number]]  (Abnormal)  (Susceptibility) Collected: 06/18/22 1209    Order Status: Completed Specimen: Blood Updated: 06/22/22 0815     Blood Culture, Routine Gram stain laura bottle: Gram positive cocci in clusters resembling Staph       Results called to and read back by: Poornima Rojas RN 06/19/2022  22:26      STAPHYLOCOCCUS EPIDERMIDIS    Narrative:      Aerobic and anaerobic    Blood culture x two cultures. Draw prior to antibiotics. [[id number]]  (Abnormal)  (Susceptibility) Collected: 06/18/22 1205    Order Status: Completed Specimen: Blood Updated: 06/22/22 0700     Blood Culture, Routine Gram  stain laura bottle: Gram positive cocci in clusters resembling Staph       Results called to and read back by: Zuleika Howell 06/19/2022  12:06      Gram stain aer bottle: Gram positive cocci in clusters resembling Staph       Positive results previously called 06/19/2022  14:32      STAPHYLOCOCCUS EPIDERMIDIS    Narrative:      Aerobic and anaerobic

## 2022-06-24 NOTE — PROGRESS NOTES
[facility] Medicine  Progress Note    Patient Name: Codi Black  MRN: [id number]  Patient Class: IP- Inpatient   Admission Date: 6/18/2022  Length of Stay: 6 days  Attending Physician: Rafael Quan III, MD  Primary Care Provider: Rafael Quan III, MD        Subjective:     Principal Problem:Pneumonia due to infectious organism        HPI:  Pt is 89 y/o female with Hx of hypertension, hyperlipidemia, and arthritis, presented to [facility] ED yesterday with c/o generalized weakness. She reports having a productive cough. She denies chest pain. High-Sensitivity Troponin levels, although negative, trended up x3 sets - Cardiology consulted. This morning she reports continued fatigue and weakness; also c/o left hand pain; shortness of breath improved with rest and oxygen.      Overview/Hospital Course:  6/20 SD: Pt's spouse at bedside. Pt reports feeling better compared to initial arrival to the hospital. Blood cultures x2 positive for gram + cocci in clusters resembling Staph, awaiting sensitivity. Vancomycin added to antibiotic therapy. Appreciate Dr. Chapin's recommendations for NSTEMI and HTN. Dr. Chapin advises that pt will need angiogram after treatment of pneumonia; will determine need for inpatient vs outpatient as patient care progresses.  6/21 SD: Pt's spouse at bedside. Blood cultures x2 positive for staphylococcus epidermidis, susceptibility pending. Continue IV abx therapy and repeat blood cultures today. Encourage pt to participate with therapy.  6/22 SD: Continue IV abx, following repeat cultures. Encourage participation with therapy.  6/23 SD: Sitting up in bed, at breakfast with good appetite. Continue IV abx therapy, following repeat cultures. Encourage participation with therapy.  6/24 SD: Pt's spouse and daughter at bedside. Continue IV abx therapy, following cultures. Continue therapy efforts.      Interval History: Pt seen and evaluated    Review of Systems   Constitutional:   Positive for activity change and fatigue.   HENT: Negative.     Eyes: Negative.    Respiratory:  Positive for cough. Negative for chest tightness and wheezing.    Cardiovascular:  Negative for chest pain, palpitations and leg swelling.   Gastrointestinal:  Negative for abdominal pain, constipation, diarrhea, nausea and vomiting.   Endocrine: Negative.    Genitourinary: Negative.    Musculoskeletal:  Positive for arthralgias.   Neurological:  Positive for weakness.   Psychiatric/Behavioral: Negative.     Objective:     Vital Signs (Most Recent):  Temp: 96.4 °F (35.8 °C) (06/24/22 0720)  Pulse: 65 (06/24/22 0727)  Resp: 20 (06/24/22 0727)  BP: (!) 164/74 (06/24/22 0720)  SpO2: 96 % (06/24/22 0727)   Vital Signs (24h Range):  Temp:  [96.4 °F (35.8 °C)-97.7 °F (36.5 °C)] 96.4 °F (35.8 °C)  Pulse:  [54-68] 65  Resp:  [16-22] 20  SpO2:  [94 %-100 %] 96 %  BP: (122-164)/(58-77) 164/74     Weight: 62.1 kg (137 lb)  Body mass index is 23.52 kg/m².    Intake/Output Summary (Last 24 hours) at 6/24/2022 1016  Last data filed at 6/24/2022 0600  Gross per 24 hour   Intake 3410 ml   Output 1600 ml   Net 1810 ml        Physical Exam  Vitals and nursing note reviewed.   HENT:      Head: Normocephalic and atraumatic.      Mouth/Throat:      Mouth: Mucous membranes are moist.      Pharynx: Oropharynx is clear.   Eyes:      General: No scleral icterus.     Extraocular Movements: Extraocular movements intact.      Pupils: Pupils are equal, round, and reactive to light.   Cardiovascular:      Rate and Rhythm: Normal rate and regular rhythm.      Pulses: Normal pulses.      Heart sounds: Normal heart sounds.   Pulmonary:      Effort: Pulmonary effort is normal.      Breath sounds: Normal breath sounds.   Abdominal:      General: Bowel sounds are normal. There is no distension.      Palpations: Abdomen is soft.      Tenderness: There is no abdominal tenderness. There is no guarding.   Musculoskeletal:         General: Normal range of  motion.      Cervical back: Normal range of motion. No rigidity.   Skin:     General: Skin is warm and dry.      Capillary Refill: Capillary refill takes less than 2 seconds.   Neurological:      General: No focal deficit present.      Mental Status: She is alert and oriented to person, place, and time.      Motor: Weakness present.   Psychiatric:         Mood and Affect: Mood normal.         Behavior: Behavior normal.       Significant Labs: All pertinent labs within the past 24 hours have been reviewed.    CBC:   Recent Labs   Lab 06/23/22  0419 06/24/22  0518   WBC 20.10* 18.24*   HGB 11.3* 12.3   HCT 35.6* 39.7    466*       CMP:   Recent Labs   Lab 06/23/22 0419 06/24/22  0518    140   K 4.5 5.0    103   CO2 31* 33*   * 125*   BUN 57* 53*   CREATININE 1.5* 1.3   CALCIUM 8.3* 9.1   PROT 5.9* 6.4   ALBUMIN 2.0* 2.3*   BILITOT 0.2 0.3   ALKPHOS 68 73   AST 14 16   ALT 20 26   ANIONGAP 4* 4*   EGFRNONAA 30.5* 36.2*       Magnesium:   Recent Labs   Lab 06/23/22 0419 06/24/22  0518   MG 1.9 1.9         Significant Imaging: I have reviewed all pertinent imaging results/findings within the past 24 hours.      Assessment/Plan:      * Pneumonia due to infectious organism  Blood cultures x2 positive for staphylococcus epidermidis, susceptibility pending. Vancomycin initiated on 6/19/22 6/24: Day 7 of IV abx therapy with Rocephin and Azithromycin; Day 5 of abx therapy with Vancomycin. Repeat blood cultures showing NGTD x3 days      Weakness  Continue PT/OT efforts      Arthritis  Pain to left hand. CRP and Sed Rate elevated. Treatment with steroids.  6/24: Pain improving      Hypertension  Dr. Chapin following        NSTEMI (non-ST elevated myocardial infarction)  Dr. Chapin managing    DVT prophylaxis  Lovenox        VTE Risk Mitigation (From admission, onward)         Ordered     apixaban tablet 2.5 mg  2 times daily         06/21/22 5876     IP VTE HIGH RISK PATIENT  Once         06/18/22  1525     Place sequential compression device  Until discontinued         06/18/22 1525                Discharge Planning   KIMANI:      Code Status: Full Code   Is the patient medically ready for discharge?:     Reason for patient still in hospital (select all that apply): Patient new problem, Patient trending condition, Laboratory test and Treatment  Discharge Plan A: Home with family, Home Health                  Stephy Young NP  Department of Hospital Medicine   [facility]

## 2022-06-24 NOTE — SUBJECTIVE & OBJECTIVE
Interval History: Pt seen and evaluated    Review of Systems   Constitutional:  Positive for activity change and fatigue.   HENT: Negative.     Eyes: Negative.    Respiratory:  Positive for cough. Negative for chest tightness and wheezing.    Cardiovascular:  Negative for chest pain, palpitations and leg swelling.   Gastrointestinal:  Negative for abdominal pain, constipation, diarrhea, nausea and vomiting.   Endocrine: Negative.    Genitourinary: Negative.    Musculoskeletal:  Positive for arthralgias.   Neurological:  Positive for weakness.   Psychiatric/Behavioral: Negative.     Objective:     Vital Signs (Most Recent):  Temp: 96.4 °F (35.8 °C) (06/24/22 0720)  Pulse: 65 (06/24/22 0727)  Resp: 20 (06/24/22 0727)  BP: (!) 164/74 (06/24/22 0720)  SpO2: 96 % (06/24/22 0727)   Vital Signs (24h Range):  Temp:  [96.4 °F (35.8 °C)-97.7 °F (36.5 °C)] 96.4 °F (35.8 °C)  Pulse:  [54-68] 65  Resp:  [16-22] 20  SpO2:  [94 %-100 %] 96 %  BP: (122-164)/(58-77) 164/74     Weight: 62.1 kg (137 lb)  Body mass index is 23.52 kg/m².    Intake/Output Summary (Last 24 hours) at 6/24/2022 1016  Last data filed at 6/24/2022 0600  Gross per 24 hour   Intake 3410 ml   Output 1600 ml   Net 1810 ml        Physical Exam  Vitals and nursing note reviewed.   HENT:      Head: Normocephalic and atraumatic.      Mouth/Throat:      Mouth: Mucous membranes are moist.      Pharynx: Oropharynx is clear.   Eyes:      General: No scleral icterus.     Extraocular Movements: Extraocular movements intact.      Pupils: Pupils are equal, round, and reactive to light.   Cardiovascular:      Rate and Rhythm: Normal rate and regular rhythm.      Pulses: Normal pulses.      Heart sounds: Normal heart sounds.   Pulmonary:      Effort: Pulmonary effort is normal.      Breath sounds: Normal breath sounds.   Abdominal:      General: Bowel sounds are normal. There is no distension.      Palpations: Abdomen is soft.      Tenderness: There is no abdominal tenderness.  There is no guarding.   Musculoskeletal:         General: Normal range of motion.      Cervical back: Normal range of motion. No rigidity.   Skin:     General: Skin is warm and dry.      Capillary Refill: Capillary refill takes less than 2 seconds.   Neurological:      General: No focal deficit present.      Mental Status: She is alert and oriented to person, place, and time.      Motor: Weakness present.   Psychiatric:         Mood and Affect: Mood normal.         Behavior: Behavior normal.       Significant Labs: All pertinent labs within the past 24 hours have been reviewed.    CBC:   Recent Labs   Lab 06/23/22 0419 06/24/22 0518   WBC 20.10* 18.24*   HGB 11.3* 12.3   HCT 35.6* 39.7    466*       CMP:   Recent Labs   Lab 06/23/22 0419 06/24/22 0518    140   K 4.5 5.0    103   CO2 31* 33*   * 125*   BUN 57* 53*   CREATININE 1.5* 1.3   CALCIUM 8.3* 9.1   PROT 5.9* 6.4   ALBUMIN 2.0* 2.3*   BILITOT 0.2 0.3   ALKPHOS 68 73   AST 14 16   ALT 20 26   ANIONGAP 4* 4*   EGFRNONAA 30.5* 36.2*       Magnesium:   Recent Labs   Lab 06/23/22 0419 06/24/22 0518   MG 1.9 1.9         Significant Imaging: I have reviewed all pertinent imaging results/findings within the past 24 hours.

## 2022-06-24 NOTE — PT/OT/SLP PROGRESS
"Physical Therapy Treatment    Patient Name:  Codi Black   MRN:  [id number]    Recommendations:     Discharge Recommendations:  home health PT (24 hr supervision)   Discharge Equipment Recommendations: walker, rolling   Barriers to discharge: None    Assessment:     Codi Black is a 89 y.o. female admitted with a medical diagnosis of Pneumonia due to infectious organism.  She presents with the following impairments/functional limitations:  weakness, impaired endurance, gait instability, impaired balance impairing her functional independence. Pt is able to side step along bedside today using RW and CGA/min A. Pt demonstrating impaired balance with stepping and decreased coordination to move the walker.    Rehab Prognosis: Fair; patient would benefit from acute skilled PT services to address these deficits and reach maximum level of function.    Recent Surgery: * No surgery found *      Plan:     During this hospitalization, patient to be seen 6 x/week to address the identified rehab impairments via gait training, therapeutic activities, therapeutic exercises and progress toward the following goals:    · Plan of Care Expires:  07/08/22    Subjective     Chief Complaint: pain in R buttock  Patient/Family Comments/goals: "I've been waiting to get up since 5am"  Pain/Comfort:  Pain Rating 1:  (not rated)  Location - Side 1: Right  Location 1: hip  Pain Addressed 1: Reposition, Distraction      Objective:     Communicated with RN and OT prior to session.  Patient found HOB elevated with telemetry, oxygen, peripheral IV (purewick) upon PT entry to room.     General Precautions: Standard, fall   Orthopedic Precautions:N/A   Braces: N/A  Respiratory Status: Nasal cannula, flow 1 L/min     Functional Mobility:  · Bed Mobility:     · Rolling Right: minimum assistance  · Scooting: moderate assistance and to scoot to EOB  · Supine to Sit: moderate assistance and of 2 persons  · Transfers:     · Sit to Stand:  contact guard " assistance, minimum assistance and of 2 persons with rolling walker  · Bed to Chair: minimum assistance and of 2 persons with  rolling walker  using  Step Transfer  · Gait: Pt performs side stepping along bedside for a total of about 6 feet using RW and CGA/[place] x 2 persons to assist with walker positioning.  · Balance: Pt demonstrates fair balance during static standing with RW and CGA      AM-PAC 6 CLICK MOBILITY  Turning over in bed (including adjusting bedclothes, sheets and blankets)?: 3  Sitting down on and standing up from a chair with arms (e.g., wheelchair, bedside commode, etc.): 3  Moving from lying on back to sitting on the side of the bed?: 2  Moving to and from a bed to a chair (including a wheelchair)?: 3  Need to walk in hospital room?: 2  Climbing 3-5 steps with a railing?: 1  Basic Mobility Total Score: 14       Therapeutic Activities and Exercises:   Not Performed    Patient left up in chair with all lines intact and call button in reach..    GOALS:   Multidisciplinary Problems     Physical Therapy Goals        Problem: Physical Therapy    Goal Priority Disciplines Outcome Goal Variances Interventions   Physical Therapy Goal     PT, PT/OT Ongoing, Progressing     Description: Goals to be met by: 2022    Patient will increase functional independence with mobility by performin. Supine to/from sit with MInimal Assistance.  2. Rolling to Left and Right with Stand-by Assistance.  3. Sit to stand transfer with Contact Guard Assistance  4. Bed to chair transfer with Contact Guard Assistance using Rolling Walker  5. Gait  x 10 feet with Minimal Assistance using Rolling Walker.                        Time Tracking:     PT Received On: 22  PT Start Time: 0935     PT Stop Time: 1005  PT Total Time (min): 30 min     Billable Minutes: Therapeutic Activity 30 min    Treatment Type: Treatment  PT/PTA: PT           2022

## 2022-06-25 PROBLEM — R78.81 BACTEREMIA: Status: ACTIVE | Noted: 2022-06-25

## 2022-06-25 LAB
ALBUMIN SERPL BCP-MCNC: 2.2 G/DL (ref 3.5–5.2)
ALP SERPL-CCNC: 65 U/L (ref 55–135)
ALT SERPL W/O P-5'-P-CCNC: 26 U/L (ref 10–44)
ANION GAP SERPL CALC-SCNC: 4 MMOL/L (ref 8–16)
AST SERPL-CCNC: 15 U/L (ref 10–40)
BASOPHILS # BLD AUTO: 0.04 K/UL (ref 0–0.2)
BASOPHILS NFR BLD: 0.2 % (ref 0–1.9)
BILIRUB SERPL-MCNC: 0.2 MG/DL (ref 0.1–1)
BUN SERPL-MCNC: 54 MG/DL (ref 8–23)
CALCIUM SERPL-MCNC: 8.9 MG/DL (ref 8.7–10.5)
CHLORIDE SERPL-SCNC: 105 MMOL/L (ref 95–110)
CO2 SERPL-SCNC: 32 MMOL/L (ref 23–29)
CREAT SERPL-MCNC: 1.4 MG/DL (ref 0.5–1.4)
DIFFERENTIAL METHOD: ABNORMAL
EOSINOPHIL # BLD AUTO: 0 K/UL (ref 0–0.5)
EOSINOPHIL NFR BLD: 0 % (ref 0–8)
ERYTHROCYTE [DISTWIDTH] IN BLOOD BY AUTOMATED COUNT: 12.5 % (ref 11.5–14.5)
EST. GFR  (AFRICAN AMERICAN): 38.2 ML/MIN/1.73 M^2
EST. GFR  (NON AFRICAN AMERICAN): 33.1 ML/MIN/1.73 M^2
GLUCOSE SERPL-MCNC: 143 MG/DL (ref 70–110)
HCT VFR BLD AUTO: 37.8 % (ref 37–48.5)
HGB BLD-MCNC: 11.9 G/DL (ref 12–16)
IMM GRANULOCYTES # BLD AUTO: 0.3 K/UL (ref 0–0.04)
IMM GRANULOCYTES NFR BLD AUTO: 1.4 % (ref 0–0.5)
LYMPHOCYTES # BLD AUTO: 0.7 K/UL (ref 1–4.8)
LYMPHOCYTES NFR BLD: 3.2 % (ref 18–48)
MAGNESIUM SERPL-MCNC: 2 MG/DL (ref 1.6–2.6)
MCH RBC QN AUTO: 28.6 PG (ref 27–31)
MCHC RBC AUTO-ENTMCNC: 31.5 G/DL (ref 32–36)
MCV RBC AUTO: 91 FL (ref 82–98)
MONOCYTES # BLD AUTO: 0.4 K/UL (ref 0.3–1)
MONOCYTES NFR BLD: 2.1 % (ref 4–15)
NEUTROPHILS # BLD AUTO: 20 K/UL (ref 1.8–7.7)
NEUTROPHILS NFR BLD: 93.1 % (ref 38–73)
NRBC BLD-RTO: 0 /100 WBC
PLATELET # BLD AUTO: 435 K/UL (ref 150–450)
PMV BLD AUTO: 9.8 FL (ref 9.2–12.9)
POTASSIUM SERPL-SCNC: 5.1 MMOL/L (ref 3.5–5.1)
PROT SERPL-MCNC: 6 G/DL (ref 6–8.4)
RBC # BLD AUTO: 4.16 M/UL (ref 4–5.4)
SODIUM SERPL-SCNC: 141 MMOL/L (ref 136–145)
VANCOMYCIN SERPL-MCNC: 18.4 UG/ML
WBC # BLD AUTO: 21.46 K/UL (ref 3.9–12.7)

## 2022-06-25 PROCEDURE — 83735 ASSAY OF MAGNESIUM: CPT | Performed by: NURSE PRACTITIONER

## 2022-06-25 PROCEDURE — 25000003 PHARM REV CODE 250: Performed by: INTERNAL MEDICINE

## 2022-06-25 PROCEDURE — 97110 THERAPEUTIC EXERCISES: CPT

## 2022-06-25 PROCEDURE — 63600175 PHARM REV CODE 636 W HCPCS: Performed by: NURSE PRACTITIONER

## 2022-06-25 PROCEDURE — 99900035 HC TECH TIME PER 15 MIN (STAT)

## 2022-06-25 PROCEDURE — 80202 ASSAY OF VANCOMYCIN: CPT | Performed by: INTERNAL MEDICINE

## 2022-06-25 PROCEDURE — 94761 N-INVAS EAR/PLS OXIMETRY MLT: CPT

## 2022-06-25 PROCEDURE — 25000003 PHARM REV CODE 250: Performed by: NURSE PRACTITIONER

## 2022-06-25 PROCEDURE — 11000001 HC ACUTE MED/SURG PRIVATE ROOM

## 2022-06-25 PROCEDURE — 97535 SELF CARE MNGMENT TRAINING: CPT

## 2022-06-25 PROCEDURE — 99900031 HC PATIENT EDUCATION (STAT)

## 2022-06-25 PROCEDURE — 36415 COLL VENOUS BLD VENIPUNCTURE: CPT | Performed by: NURSE PRACTITIONER

## 2022-06-25 PROCEDURE — 27000221 HC OXYGEN, UP TO 24 HOURS

## 2022-06-25 PROCEDURE — 85025 COMPLETE CBC W/AUTO DIFF WBC: CPT | Performed by: NURSE PRACTITIONER

## 2022-06-25 PROCEDURE — 97530 THERAPEUTIC ACTIVITIES: CPT

## 2022-06-25 PROCEDURE — 80053 COMPREHEN METABOLIC PANEL: CPT | Performed by: NURSE PRACTITIONER

## 2022-06-25 RX ADMIN — AMLODIPINE BESYLATE 5 MG: 5 TABLET ORAL at 08:06

## 2022-06-25 RX ADMIN — AMIODARONE HYDROCHLORIDE 200 MG: 200 TABLET ORAL at 09:06

## 2022-06-25 RX ADMIN — AMIODARONE HYDROCHLORIDE 200 MG: 200 TABLET ORAL at 08:06

## 2022-06-25 RX ADMIN — APIXABAN 2.5 MG: 2.5 TABLET, FILM COATED ORAL at 08:06

## 2022-06-25 RX ADMIN — METOPROLOL TARTRATE 75 MG: 50 TABLET, FILM COATED ORAL at 09:06

## 2022-06-25 RX ADMIN — METHYLPREDNISOLONE SODIUM SUCCINATE 40 MG: 40 INJECTION, POWDER, FOR SOLUTION INTRAMUSCULAR; INTRAVENOUS at 10:06

## 2022-06-25 RX ADMIN — APIXABAN 2.5 MG: 2.5 TABLET, FILM COATED ORAL at 09:06

## 2022-06-25 RX ADMIN — CEFTRIAXONE 1 G: 1 INJECTION, SOLUTION INTRAVENOUS at 10:06

## 2022-06-25 RX ADMIN — MIRTAZAPINE 15 MG: 15 TABLET, FILM COATED ORAL at 09:06

## 2022-06-25 RX ADMIN — FAMOTIDINE 20 MG: 20 TABLET ORAL at 08:06

## 2022-06-25 RX ADMIN — ATORVASTATIN CALCIUM 10 MG: 10 TABLET, FILM COATED ORAL at 09:06

## 2022-06-25 RX ADMIN — METHYLPREDNISOLONE SODIUM SUCCINATE 40 MG: 40 INJECTION, POWDER, FOR SOLUTION INTRAMUSCULAR; INTRAVENOUS at 12:06

## 2022-06-25 RX ADMIN — METOPROLOL TARTRATE 75 MG: 50 TABLET, FILM COATED ORAL at 08:06

## 2022-06-25 RX ADMIN — ISOSORBIDE MONONITRATE 30 MG: 30 TABLET, EXTENDED RELEASE ORAL at 08:06

## 2022-06-25 RX ADMIN — ALPRAZOLAM 0.75 MG: 0.5 TABLET ORAL at 09:06

## 2022-06-25 RX ADMIN — ASPIRIN 81 MG: 81 TABLET, COATED ORAL at 08:06

## 2022-06-25 NOTE — SUBJECTIVE & OBJECTIVE
Interval History: Patient seen and examined.    Review of Systems   Constitutional:  Positive for activity change and fatigue.   Respiratory:  Positive for cough. Negative for chest tightness and wheezing.    Cardiovascular:  Negative for chest pain, palpitations and leg swelling.   Gastrointestinal:  Negative for abdominal pain, constipation, diarrhea, nausea and vomiting.   Musculoskeletal:  Positive for arthralgias.   Neurological:  Positive for weakness.   Objective:     Vital Signs (Most Recent):  Temp: 97.6 °F (36.4 °C) (06/25/22 0702)  Pulse: 66 (06/25/22 0806)  Resp: 18 (06/25/22 0806)  BP: (!) 156/70 (06/25/22 0702)  SpO2: 95 % (06/25/22 0806)   Vital Signs (24h Range):  Temp:  [96.4 °F (35.8 °C)-97.9 °F (36.6 °C)] 97.6 °F (36.4 °C)  Pulse:  [53-66] 66  Resp:  [18-22] 18  SpO2:  [95 %-100 %] 95 %  BP: (122-158)/(61-74) 156/70     Weight: 62.1 kg (137 lb)  Body mass index is 23.52 kg/m².    Intake/Output Summary (Last 24 hours) at 6/25/2022 1020  Last data filed at 6/25/2022 0500  Gross per 24 hour   Intake 1550 ml   Output 800 ml   Net 750 ml      Physical Exam  Vitals and nursing note reviewed.   HENT:      Head: Normocephalic and atraumatic.      Mouth/Throat:      Mouth: Mucous membranes are moist.      Pharynx: Oropharynx is clear.   Eyes:      General: No scleral icterus.     Extraocular Movements: Extraocular movements intact.      Pupils: Pupils are equal, round, and reactive to light.   Cardiovascular:      Rate and Rhythm: Normal rate and regular rhythm.      Pulses: Normal pulses.      Heart sounds: Normal heart sounds.   Pulmonary:      Effort: Pulmonary effort is normal.      Breath sounds: Normal breath sounds.   Abdominal:      General: Bowel sounds are normal. There is no distension.      Palpations: Abdomen is soft.      Tenderness: There is no abdominal tenderness. There is no guarding.   Musculoskeletal:         General: Normal range of motion.      Cervical back: Normal range of motion.  No rigidity.   Skin:     General: Skin is warm and dry.      Capillary Refill: Capillary refill takes less than 2 seconds.   Neurological:      General: No focal deficit present.      Mental Status: She is alert and oriented to person, place, and time.      Motor: Weakness present.   Psychiatric:         Mood and Affect: Mood normal.         Behavior: Behavior normal.       Significant Labs: All pertinent labs within the past 24 hours have been reviewed.  Recent Lab Results         06/25/22  0500        Albumin 2.2       Alkaline Phosphatase 65       ALT 26       Anion Gap 4       AST 15       Baso # 0.04       Basophil % 0.2       BILIRUBIN TOTAL 0.2  Comment: For infants and newborns, interpretation of results should be based  on gestational age, weight and in agreement with clinical  observations.    Premature Infant recommended reference ranges:  Up to 24 hours.............<8.0 mg/dL  Up to 48 hours............<12.0 mg/dL  3-5 days..................<15.0 mg/dL  6-29 days.................<15.0 mg/dL    For patients on Eltrombopag therapy, use of Dimension [place] TBIL is   not   recommended.         BUN 54       Calcium 8.9       Chloride 105       CO2 32       Creatinine 1.4       Differential Method Automated       eGFR if  38.2       eGFR if non  33.1  Comment: Calculation used to obtain the estimated glomerular filtration  rate (eGFR) is the CKD-EPI equation.          Eos # 0.0       Eosinophil % 0.0       Glucose 143       Gran # (ANC) 20.0       Gran % 93.1       Hematocrit 37.8       Hemoglobin 11.9       Immature Grans (Abs) 0.30  Comment: Mild elevation in immature granulocytes is non specific and   can be seen in a variety of conditions including stress response,   acute inflammation, trauma and pregnancy. Correlation with other   laboratory and clinical findings is essential.         Immature Granulocytes 1.4       Lymph # 0.7       Lymph % 3.2       Magnesium 2.0        MCH 28.6       MCHC 31.5       MCV 91       Mono # 0.4       Mono % 2.1       MPV 9.8       nRBC 0       Platelets 435       Potassium 5.1       PROTEIN TOTAL 6.0       RBC 4.16       RDW 12.5       Sodium 141       Vancomycin, Random 18.4       WBC 21.46               Significant Imaging: I have reviewed all pertinent imaging results/findings within the past 24 hours.

## 2022-06-25 NOTE — PLAN OF CARE
Plan of care reviewed. Patient denies questions and concerns. Pain managed effectively with Tramadol 50mg.

## 2022-06-25 NOTE — PROGRESS NOTES
Pharmacokinetic Assessment Follow Up: IV Vancomycin    Vancomycin serum concentration assessment(s):    The random level was drawn correctly and can be used to guide therapy at this time. The measurement is within the desired definitive target range of 15 to 20 mcg/mL.    Vancomycin Regimen Plan:    Continue regimen to Vancomycin 750 mg IV every pulse dosing hours with next serum trough concentration measured at 0430 prior to next dose dose on 06/26/2022    Drug levels (last 3 results):  Recent Labs   Lab Result Units 06/23/22 0418 06/24/22 0518 06/25/22  0500   Vancomycin, Random ug/mL 21.0 16.9 18.4       Pharmacy will continue to follow and monitor vancomycin.    Please contact pharmacy at extension [phone] for questions regarding this assessment.    Thank you for the consult,   Lance Capellan       Patient brief summary:  Codi Black is a 89 y.o. female initiated on antimicrobial therapy with IV Vancomycin for treatment of bacteremia    The patient's current regimen is Vancomycin 750 mg pulse dosing with random levels.     Drug Allergies:   Review of patient's allergies indicates:  No Known Allergies    Actual Body Weight:   62.1 kg    Renal Function:   Estimated Creatinine Clearance: 23.1 mL/min (based on SCr of 1.4 mg/dL).,     Dialysis Method (if applicable):  N/A    CBC (last 72 hours):  Recent Labs   Lab Result Units 06/23/22 0419 06/24/22 0518 06/25/22  0500   WBC K/uL 20.10* 18.24* 21.46*   Hemoglobin g/dL 11.3* 12.3 11.9*   Hematocrit % 35.6* 39.7 37.8   Platelets K/uL 420 466* 435   Gran % % 92.6* 91.9* 93.1*   Lymph % % 4.0* 4.3* 3.2*   Mono % % 2.3* 2.4* 2.1*   Eosinophil % % 0.0 0.0 0.0   Basophil % % 0.1 0.2 0.2   Differential Method  Automated Automated Automated       Metabolic Panel (last 72 hours):  Recent Labs   Lab Result Units 06/23/22 0419 06/24/22  0518 06/25/22  0500   Sodium mmol/L 138 140 141   Potassium mmol/L 4.5 5.0 5.1   Chloride mmol/L 103 103 105   CO2 mmol/L 31* 33* 32*    Glucose mg/dL 123* 125* 143*   BUN mg/dL 57* 53* 54*   Creatinine mg/dL 1.5* 1.3 1.4   Albumin g/dL 2.0* 2.3* 2.2*   Total Bilirubin mg/dL 0.2 0.3 0.2   Alkaline Phosphatase U/L 68 73 65   AST U/L 14 16 15   ALT U/L 20 26 26   Magnesium mg/dL 1.9 1.9 2.0       Vancomycin Administrations:  vancomycin given in the last 96 hours                     vancomycin 750 mg in dextrose 5 % 250 mL IVPB (ready to mix system) (mg) 750 mg New Bag 06/24/22 0947    vancomycin 750 mg in dextrose 5 % 250 mL IVPB (ready to mix system) (mg) 750 mg New Bag 06/22/22 0948    vancomycin 750 mg in dextrose 5 % 250 mL IVPB (ready to mix system) (mg) 750 mg New Bag 06/21/22 2030    vancomycin 750 mg in dextrose 5 % 250 mL IVPB (ready to mix system) (mg) 750 mg New Bag 06/21/22 2029                    Microbiologic Results:  Microbiology Results (last 7 days)       Procedure Component Value Units Date/Time    Blood culture [[id number]] Collected: 06/21/22 1647    Order Status: Completed Specimen: Blood from Antecubital, Left Updated: 06/24/22 2312     Blood Culture, Routine No Growth to date      No Growth to date      No Growth to date      No Growth to date    Blood culture [[id number]] Collected: 06/21/22 1643    Order Status: Completed Specimen: Blood Updated: 06/24/22 2312     Blood Culture, Routine No Growth to date      No Growth to date      No Growth to date      No Growth to date    Blood culture x two cultures. Draw prior to antibiotics. [[id number]]  (Abnormal)  (Susceptibility) Collected: 06/18/22 1209    Order Status: Completed Specimen: Blood Updated: 06/22/22 0815     Blood Culture, Routine Gram stain laura bottle: Gram positive cocci in clusters resembling Staph       Results called to and read back by: Poornima Rojas RN 06/19/2022  22:26      STAPHYLOCOCCUS EPIDERMIDIS    Narrative:      Aerobic and anaerobic    Blood culture x two cultures. Draw prior to antibiotics. [[id number]]  (Abnormal)  (Susceptibility) Collected: 06/18/22 1205     Order Status: Completed Specimen: Blood Updated: 06/22/22 0700     Blood Culture, Routine Gram stain laura bottle: Gram positive cocci in clusters resembling Staph       Results called to and read back by: Zuleika Howell 06/19/2022  12:06      Gram stain aer bottle: Gram positive cocci in clusters resembling Staph       Positive results previously called 06/19/2022  14:32      STAPHYLOCOCCUS EPIDERMIDIS    Narrative:      Aerobic and anaerobic

## 2022-06-25 NOTE — PROGRESS NOTES
[facility] Medicine  Progress Note    Patient Name: Codi Black  MRN: [id number]  Patient Class: IP- Inpatient   Admission Date: 6/18/2022  Length of Stay: 7 days  Attending Physician: Rafael Quan III, MD  Primary Care Provider: Rafael Quan III, MD        Subjective:     Principal Problem:Pneumonia due to infectious organism    HPI:  Pt is 89 y/o female with Hx of hypertension, hyperlipidemia, and arthritis, presented to [facility] ED yesterday with c/o generalized weakness. She reports having a productive cough. She denies chest pain. High-Sensitivity Troponin levels, although negative, trended up x3 sets - Cardiology consulted. This morning she reports continued fatigue and weakness; also c/o left hand pain; shortness of breath improved with rest and oxygen.      Overview/Hospital Course:  6/20 SD: Pt's spouse at bedside. Pt reports feeling better compared to initial arrival to the hospital. Blood cultures x2 positive for gram + cocci in clusters resembling Staph, awaiting sensitivity. Vancomycin added to antibiotic therapy. Appreciate Dr. Chapin's recommendations for NSTEMI and HTN. Dr. Chapin advises that pt will need angiogram after treatment of pneumonia; will determine need for inpatient vs outpatient as patient care progresses.  6/21 SD: Pt's spouse at bedside. Blood cultures x2 positive for staphylococcus epidermidis, susceptibility pending. Continue IV abx therapy and repeat blood cultures today. Encourage pt to participate with therapy.  6/22 SD: Continue IV abx, following repeat cultures. Encourage participation with therapy.  6/23 SD: Sitting up in bed, at breakfast with good appetite. Continue IV abx therapy, following repeat cultures. Encourage participation with therapy.  6/24 SD: Pt's spouse and daughter at bedside. Continue IV abx therapy, following cultures. Continue therapy efforts.  6/25 AA, weekend crosscover, patient being treated for pneumonia, on IV antibiotic, blood  cultures 2/2 bottles positive for Staph epidermidis, on IV vanc, PT OT on board      Interval History: Patient seen and examined.    Review of Systems   Constitutional:  Positive for activity change and fatigue.   Respiratory:  Positive for cough. Negative for chest tightness and wheezing.    Cardiovascular:  Negative for chest pain, palpitations and leg swelling.   Gastrointestinal:  Negative for abdominal pain, constipation, diarrhea, nausea and vomiting.   Musculoskeletal:  Positive for arthralgias.   Neurological:  Positive for weakness.   Objective:     Vital Signs (Most Recent):  Temp: 97.6 °F (36.4 °C) (06/25/22 0702)  Pulse: 66 (06/25/22 0806)  Resp: 18 (06/25/22 0806)  BP: (!) 156/70 (06/25/22 0702)  SpO2: 95 % (06/25/22 0806)   Vital Signs (24h Range):  Temp:  [96.4 °F (35.8 °C)-97.9 °F (36.6 °C)] 97.6 °F (36.4 °C)  Pulse:  [53-66] 66  Resp:  [18-22] 18  SpO2:  [95 %-100 %] 95 %  BP: (122-158)/(61-74) 156/70     Weight: 62.1 kg (137 lb)  Body mass index is 23.52 kg/m².    Intake/Output Summary (Last 24 hours) at 6/25/2022 1020  Last data filed at 6/25/2022 0500  Gross per 24 hour   Intake 1550 ml   Output 800 ml   Net 750 ml      Physical Exam  Vitals and nursing note reviewed.   HENT:      Head: Normocephalic and atraumatic.      Mouth/Throat:      Mouth: Mucous membranes are moist.      Pharynx: Oropharynx is clear.   Eyes:      General: No scleral icterus.     Extraocular Movements: Extraocular movements intact.      Pupils: Pupils are equal, round, and reactive to light.   Cardiovascular:      Rate and Rhythm: Normal rate and regular rhythm.      Pulses: Normal pulses.      Heart sounds: Normal heart sounds.   Pulmonary:      Effort: Pulmonary effort is normal.      Breath sounds: Normal breath sounds.   Abdominal:      General: Bowel sounds are normal. There is no distension.      Palpations: Abdomen is soft.      Tenderness: There is no abdominal tenderness. There is no guarding.   Musculoskeletal:          General: Normal range of motion.      Cervical back: Normal range of motion. No rigidity.   Skin:     General: Skin is warm and dry.      Capillary Refill: Capillary refill takes less than 2 seconds.   Neurological:      General: No focal deficit present.      Mental Status: She is alert and oriented to person, place, and time.      Motor: Weakness present.   Psychiatric:         Mood and Affect: Mood normal.         Behavior: Behavior normal.       Significant Labs: All pertinent labs within the past 24 hours have been reviewed.  Recent Lab Results         06/25/22  0500        Albumin 2.2       Alkaline Phosphatase 65       ALT 26       Anion Gap 4       AST 15       Baso # 0.04       Basophil % 0.2       BILIRUBIN TOTAL 0.2  Comment: For infants and newborns, interpretation of results should be based  on gestational age, weight and in agreement with clinical  observations.    Premature Infant recommended reference ranges:  Up to 24 hours.............<8.0 mg/dL  Up to 48 hours............<12.0 mg/dL  3-5 days..................<15.0 mg/dL  6-29 days.................<15.0 mg/dL    For patients on Eltrombopag therapy, use of Dimension [place] TBIL is   not   recommended.         BUN 54       Calcium 8.9       Chloride 105       CO2 32       Creatinine 1.4       Differential Method Automated       eGFR if  38.2       eGFR if non  33.1  Comment: Calculation used to obtain the estimated glomerular filtration  rate (eGFR) is the CKD-EPI equation.          Eos # 0.0       Eosinophil % 0.0       Glucose 143       Gran # (ANC) 20.0       Gran % 93.1       Hematocrit 37.8       Hemoglobin 11.9       Immature Grans (Abs) 0.30  Comment: Mild elevation in immature granulocytes is non specific and   can be seen in a variety of conditions including stress response,   acute inflammation, trauma and pregnancy. Correlation with other   laboratory and clinical findings is essential.          Immature Granulocytes 1.4       Lymph # 0.7       Lymph % 3.2       Magnesium 2.0       MCH 28.6       MCHC 31.5       MCV 91       Mono # 0.4       Mono % 2.1       MPV 9.8       nRBC 0       Platelets 435       Potassium 5.1       PROTEIN TOTAL 6.0       RBC 4.16       RDW 12.5       Sodium 141       Vancomycin, Random 18.4       WBC 21.46               Significant Imaging: I have reviewed all pertinent imaging results/findings within the past 24 hours.      Assessment/Plan:      * Pneumonia due to infectious organism  Blood cultures x2 positive for staphylococcus epidermidis, susceptibility pending. Vancomycin initiated on 6/19/22 6/24: Day 7 of IV abx therapy with Rocephin and Azithromycin; Day 5 of abx therapy with Vancomycin. Repeat blood cultures showing NGTD x3 days  6/25 patient on Rocephin azithromycin, completed seven day therapy, plan to DC, on day six for IV vanc for blood cultures with Staph epi      Bacteremia  Patient 2/2 bottles positive for Staph epidermidis, sensitive to Vanc, on day 6/7      Weakness  Continue PT/OT efforts      NSTEMI (non-ST elevated myocardial infarction)  Dr. Chapin managing    DVT prophylaxis  Lovenox      Hypertension  Dr. Chapin following        Arthritis  Pain to left hand. CRP and Sed Rate elevated. Treatment with steroids.  6/24: Pain improving        VTE Risk Mitigation (From admission, onward)         Ordered     apixaban tablet 2.5 mg  2 times daily         06/21/22 1856     IP VTE HIGH RISK PATIENT  Once         06/18/22 1525     Place sequential compression device  Until discontinued         06/18/22 1525                Discharge Planning   KIMANI:      Code Status: Full Code   Is the patient medically ready for discharge?:     Reason for patient still in hospital (select all that apply): Treatment  Discharge Plan A: Home with family, Home Health                  Rell Alston MD  Department of Hospital Medicine   [facility]

## 2022-06-25 NOTE — ASSESSMENT & PLAN NOTE
Blood cultures x2 positive for staphylococcus epidermidis, susceptibility pending. Vancomycin initiated on 6/19/22 6/24: Day 7 of IV abx therapy with Rocephin and Azithromycin; Day 5 of abx therapy with Vancomycin. Repeat blood cultures showing NGTD x3 days  6/25 patient on Rocephin azithromycin, completed seven day therapy, plan to DC, on day six for IV vanc for blood cultures with Staph epi

## 2022-06-25 NOTE — PT/OT/SLP PROGRESS
Occupational Therapy   Treatment    Name: Codi Black  MRN: [id number]  Admitting Diagnosis:  Pneumonia due to infectious organism       Recommendations:     Discharge Recommendations: rehabilitation facility  Discharge Equipment Recommendations:  other (see comments) (TBD)  Barriers to discharge:  Medical and functional status    Assessment:     Codi Black is a 89 y.o. female with a medical diagnosis of Pneumonia due to infectious organism. Performance deficits affecting function are weakness, impaired endurance, impaired self care skills, impaired functional mobilty, impaired balance, decreased coordination, decreased safety awareness, pain, decreased ROM, impaired cardiopulmonary response to activity.     Rehab Prognosis:  Good; patient would benefit from acute skilled OT services to address these deficits and reach maximum level of function.       Plan:     Patient to be seen 6 x/week to address the above listed problems via self-care/home management, therapeutic activities, therapeutic exercises  · Plan of Care Expires: 07/08/22  · Plan of Care Reviewed with: patient    Subjective     Pain/Comfort:  · Pain Rating 1: 3/10  · Location - Side 1: Right  · Location - Orientation 1: proximal  · Location 1: hip  · Pain Addressed 1: Reposition, Cessation of Activity, Nurse notified  · Pain Rating Post-Intervention 1: 3/10    Objective:     Communicated with: nurse prior to session.  Patient found supine with telemetry, pulse ox (continuous), PureWick, oxygen upon OT entry to room.    General Precautions: Standard, fall   Orthopedic Precautions:N/A   Braces:    Respiratory Status: Nasal cannula, flow 1 L/min     Occupational Performance:     Bed Mobility:    · Patient completed Rolling/Turning to Left with  minimum assistance  · Patient completed Rolling/Turning to Right with minimum assistance  · Patient completed Scooting/Bridging with moderate assistance  · Patient completed Supine to Sit with moderate  assistance  · Patient completed Sit to Supine with moderate assistance     Functional Mobility/Transfers:  · Patient completed Sit <> Stand Transfer with moderate assistance  with  rolling walker   · Patient completed Bed <> Chair Transfer using Step Transfer technique with moderate assistance with rolling walker  · Patient completed Toilet Transfer Step Transfer technique with moderate assistance with  rolling walker and bedside commode  · Functional Mobility: Pt ambulated 26' requiring steadying assist utilizing RW on 1 L O2 per NC.      Treatment & Education:  Pt was cooperative and motivated with verbal encouragement while exhibiting improved affect as treatment session progressed. She performed bed mobility requiring mod assist secondary to some lifting and stabilization of trunk during supine to sit transition, some scooting assist at (R) hip to EOB, and lifting assist of bilateral LE's during sit to supine transition with additional cueing for technique utilizing bedrails. Pt then participated in ADL retraining regarding toileting emphasizing fall prevention providing extra time requiring max assist secondary to assist with perineal hygiene and clothing management with additional steadying for safety. Next, she participated in therapeutic exercise performing 4x5 seated pushups requiring lifting assist with verbal and tactile cueing for technique challenging her to lift buttocks off seated surface to end range elbow extension in order to strengthen triceps brachii to improve performance with sit <> stand transitions particularly from lower surfaces. Pt ambulated 26' between surfaces requiring steadying assist utilizing RW on 1 L O2 per NC.    Patient left up in chair with all lines intact, call button in reach and nurse notifiedEducation:      GOALS:   Multidisciplinary Problems     Occupational Therapy Goals        Problem: Occupational Therapy    Goal Priority Disciplines Outcome Interventions   Occupational  Therapy Goal     OT, PT/OT Ongoing, Progressing    Description: Goals to be met by: 7/8/2022     Patient will increase functional independence with ADLs by performing:    UE Dressing with Minimal Assistance.  Grooming while EOB with Set-up Assistance.  Toileting from bedside commode with Minimal Assistance for hygiene and clothing management.   Sitting at edge of bed x5 minutes with Minimal Assistance.  Rolling to Right, Left with Minimal Assistance.   Supine to sit with Minimal Assistance.                     Time Tracking:     OT Date of Treatment: 06/25/22  OT Start Time: 1120  OT Stop Time: 1205  OT Total Time (min): 45 min    Billable Minutes:Self Care/Home Management 15  Therapeutic Activity 15  Therapeutic Exercise 15    OT/REJI: OT          6/25/2022

## 2022-06-26 LAB
ALBUMIN SERPL BCP-MCNC: 2.2 G/DL (ref 3.5–5.2)
ALP SERPL-CCNC: 60 U/L (ref 55–135)
ALT SERPL W/O P-5'-P-CCNC: 26 U/L (ref 10–44)
ANION GAP SERPL CALC-SCNC: 2 MMOL/L (ref 8–16)
AST SERPL-CCNC: 14 U/L (ref 10–40)
BACTERIA BLD CULT: NORMAL
BACTERIA BLD CULT: NORMAL
BASOPHILS # BLD AUTO: 0.07 K/UL (ref 0–0.2)
BASOPHILS NFR BLD: 0.3 % (ref 0–1.9)
BILIRUB SERPL-MCNC: 0.4 MG/DL (ref 0.1–1)
BUN SERPL-MCNC: 58 MG/DL (ref 8–23)
CALCIUM SERPL-MCNC: 8.7 MG/DL (ref 8.7–10.5)
CHLORIDE SERPL-SCNC: 105 MMOL/L (ref 95–110)
CO2 SERPL-SCNC: 34 MMOL/L (ref 23–29)
CREAT SERPL-MCNC: 1.4 MG/DL (ref 0.5–1.4)
DIFFERENTIAL METHOD: ABNORMAL
EOSINOPHIL # BLD AUTO: 0 K/UL (ref 0–0.5)
EOSINOPHIL NFR BLD: 0 % (ref 0–8)
ERYTHROCYTE [DISTWIDTH] IN BLOOD BY AUTOMATED COUNT: 12.7 % (ref 11.5–14.5)
EST. GFR  (AFRICAN AMERICAN): 38.2 ML/MIN/1.73 M^2
EST. GFR  (NON AFRICAN AMERICAN): 33.1 ML/MIN/1.73 M^2
GLUCOSE SERPL-MCNC: 138 MG/DL (ref 70–110)
HCT VFR BLD AUTO: 33.2 % (ref 37–48.5)
HGB BLD-MCNC: 10.8 G/DL (ref 12–16)
IMM GRANULOCYTES # BLD AUTO: 0.52 K/UL (ref 0–0.04)
IMM GRANULOCYTES NFR BLD AUTO: 2.3 % (ref 0–0.5)
LYMPHOCYTES # BLD AUTO: 0.7 K/UL (ref 1–4.8)
LYMPHOCYTES NFR BLD: 3 % (ref 18–48)
MAGNESIUM SERPL-MCNC: 1.8 MG/DL (ref 1.6–2.6)
MCH RBC QN AUTO: 29.1 PG (ref 27–31)
MCHC RBC AUTO-ENTMCNC: 32.5 G/DL (ref 32–36)
MCV RBC AUTO: 90 FL (ref 82–98)
MONOCYTES # BLD AUTO: 0.5 K/UL (ref 0.3–1)
MONOCYTES NFR BLD: 2.3 % (ref 4–15)
NEUTROPHILS # BLD AUTO: 21.2 K/UL (ref 1.8–7.7)
NEUTROPHILS NFR BLD: 92.1 % (ref 38–73)
NRBC BLD-RTO: 0 /100 WBC
PLATELET # BLD AUTO: 432 K/UL (ref 150–450)
PMV BLD AUTO: 9.7 FL (ref 9.2–12.9)
POTASSIUM SERPL-SCNC: 4.9 MMOL/L (ref 3.5–5.1)
PROT SERPL-MCNC: 5.6 G/DL (ref 6–8.4)
RBC # BLD AUTO: 3.71 M/UL (ref 4–5.4)
SODIUM SERPL-SCNC: 141 MMOL/L (ref 136–145)
VANCOMYCIN SERPL-MCNC: 13.6 UG/ML
WBC # BLD AUTO: 23.05 K/UL (ref 3.9–12.7)

## 2022-06-26 PROCEDURE — 80053 COMPREHEN METABOLIC PANEL: CPT | Performed by: NURSE PRACTITIONER

## 2022-06-26 PROCEDURE — 99900031 HC PATIENT EDUCATION (STAT)

## 2022-06-26 PROCEDURE — 25000003 PHARM REV CODE 250: Performed by: INTERNAL MEDICINE

## 2022-06-26 PROCEDURE — 63600175 PHARM REV CODE 636 W HCPCS: Performed by: INTERNAL MEDICINE

## 2022-06-26 PROCEDURE — 80202 ASSAY OF VANCOMYCIN: CPT | Performed by: INTERNAL MEDICINE

## 2022-06-26 PROCEDURE — 11000001 HC ACUTE MED/SURG PRIVATE ROOM

## 2022-06-26 PROCEDURE — 25000003 PHARM REV CODE 250: Performed by: NURSE PRACTITIONER

## 2022-06-26 PROCEDURE — 27000221 HC OXYGEN, UP TO 24 HOURS

## 2022-06-26 PROCEDURE — 83735 ASSAY OF MAGNESIUM: CPT | Performed by: NURSE PRACTITIONER

## 2022-06-26 PROCEDURE — 36415 COLL VENOUS BLD VENIPUNCTURE: CPT | Performed by: INTERNAL MEDICINE

## 2022-06-26 PROCEDURE — 99900035 HC TECH TIME PER 15 MIN (STAT)

## 2022-06-26 PROCEDURE — 85025 COMPLETE CBC W/AUTO DIFF WBC: CPT | Performed by: NURSE PRACTITIONER

## 2022-06-26 PROCEDURE — 94761 N-INVAS EAR/PLS OXIMETRY MLT: CPT

## 2022-06-26 RX ADMIN — FAMOTIDINE 20 MG: 20 TABLET ORAL at 09:06

## 2022-06-26 RX ADMIN — MIRTAZAPINE 15 MG: 15 TABLET, FILM COATED ORAL at 09:06

## 2022-06-26 RX ADMIN — APIXABAN 2.5 MG: 2.5 TABLET, FILM COATED ORAL at 09:06

## 2022-06-26 RX ADMIN — AMIODARONE HYDROCHLORIDE 200 MG: 200 TABLET ORAL at 09:06

## 2022-06-26 RX ADMIN — AMLODIPINE BESYLATE 5 MG: 5 TABLET ORAL at 09:06

## 2022-06-26 RX ADMIN — ATORVASTATIN CALCIUM 10 MG: 10 TABLET, FILM COATED ORAL at 09:06

## 2022-06-26 RX ADMIN — ALPRAZOLAM 0.75 MG: 0.5 TABLET ORAL at 09:06

## 2022-06-26 RX ADMIN — METOPROLOL TARTRATE 75 MG: 50 TABLET, FILM COATED ORAL at 09:06

## 2022-06-26 RX ADMIN — VANCOMYCIN HYDROCHLORIDE 750 MG: 750 INJECTION, POWDER, LYOPHILIZED, FOR SOLUTION INTRAVENOUS at 09:06

## 2022-06-26 RX ADMIN — ISOSORBIDE MONONITRATE 30 MG: 30 TABLET, EXTENDED RELEASE ORAL at 09:06

## 2022-06-26 RX ADMIN — ASPIRIN 81 MG: 81 TABLET, COATED ORAL at 09:06

## 2022-06-26 NOTE — SUBJECTIVE & OBJECTIVE
Interval History: Patient seen and examined.    Review of Systems   Constitutional:  Positive for activity change and fatigue.   Respiratory:  Positive for cough. Negative for chest tightness and wheezing.    Cardiovascular:  Negative for chest pain, palpitations and leg swelling.   Gastrointestinal:  Negative for abdominal pain, constipation, diarrhea, nausea and vomiting.   Musculoskeletal:  Positive for arthralgias.   Neurological:  Positive for weakness.   Objective:     Vital Signs (Most Recent):  Temp: 98.2 °F (36.8 °C) (06/26/22 0746)  Pulse: (!) 59 (06/26/22 1100)  Resp: 20 (06/26/22 0746)  BP: 133/63 (06/26/22 0746)  SpO2: 95 % (06/26/22 0746)   Vital Signs (24h Range):  Temp:  [96.7 °F (35.9 °C)-98.3 °F (36.8 °C)] 98.2 °F (36.8 °C)  Pulse:  [55-72] 59  Resp:  [17-25] 20  SpO2:  [94 %-95 %] 95 %  BP: (133-181)/(63-82) 133/63     Weight: 62.1 kg (137 lb)  Body mass index is 23.52 kg/m².    Intake/Output Summary (Last 24 hours) at 6/26/2022 1207  Last data filed at 6/26/2022 0600  Gross per 24 hour   Intake 1200 ml   Output 800 ml   Net 400 ml        Physical Exam  Vitals and nursing note reviewed.   HENT:      Head: Normocephalic and atraumatic.      Mouth/Throat:      Mouth: Mucous membranes are moist.      Pharynx: Oropharynx is clear.   Eyes:      General: No scleral icterus.     Extraocular Movements: Extraocular movements intact.      Pupils: Pupils are equal, round, and reactive to light.   Cardiovascular:      Rate and Rhythm: Normal rate and regular rhythm.      Pulses: Normal pulses.      Heart sounds: Normal heart sounds.   Pulmonary:      Effort: Pulmonary effort is normal.      Breath sounds: Normal breath sounds.   Abdominal:      General: Bowel sounds are normal. There is no distension.      Palpations: Abdomen is soft.      Tenderness: There is no abdominal tenderness. There is no guarding.   Musculoskeletal:         General: Normal range of motion.      Cervical back: Normal range of motion.  No rigidity.   Skin:     General: Skin is warm and dry.      Capillary Refill: Capillary refill takes less than 2 seconds.   Neurological:      General: No focal deficit present.      Mental Status: She is alert and oriented to person, place, and time.      Motor: Weakness present.   Psychiatric:         Mood and Affect: Mood normal.         Behavior: Behavior normal.       Significant Labs: All pertinent labs within the past 24 hours have been reviewed.  Recent Lab Results         06/26/22  0509        Albumin 2.2       Alkaline Phosphatase 60       ALT 26       Anion Gap 2       AST 14       Baso # 0.07       Basophil % 0.3       BILIRUBIN TOTAL 0.4  Comment: For infants and newborns, interpretation of results should be based  on gestational age, weight and in agreement with clinical  observations.    Premature Infant recommended reference ranges:  Up to 24 hours.............<8.0 mg/dL  Up to 48 hours............<12.0 mg/dL  3-5 days..................<15.0 mg/dL  6-29 days.................<15.0 mg/dL    For patients on Eltrombopag therapy, use of Dimension [place] TBIL is   not   recommended.         BUN 58       Calcium 8.7       Chloride 105       CO2 34       Creatinine 1.4       Differential Method Automated       eGFR if  38.2       eGFR if non  33.1  Comment: Calculation used to obtain the estimated glomerular filtration  rate (eGFR) is the CKD-EPI equation.          Eos # 0.0       Eosinophil % 0.0       Glucose 138       Gran # (ANC) 21.2       Gran % 92.1       Hematocrit 33.2       Hemoglobin 10.8       Immature Grans (Abs) 0.52  Comment: Mild elevation in immature granulocytes is non specific and   can be seen in a variety of conditions including stress response,   acute inflammation, trauma and pregnancy. Correlation with other   laboratory and clinical findings is essential.         Immature Granulocytes 2.3       Lymph # 0.7       Lymph % 3.0       Magnesium 1.8        MCH 29.1       MCHC 32.5       MCV 90       Mono # 0.5       Mono % 2.3       MPV 9.7       nRBC 0       Platelets 432       Potassium 4.9       PROTEIN TOTAL 5.6       RBC 3.71       RDW 12.7       Sodium 141       Vancomycin, Random 13.6       WBC 23.05               Significant Imaging: I have reviewed all pertinent imaging results/findings within the past 24 hours.

## 2022-06-26 NOTE — PROGRESS NOTES
Pharmacokinetic Assessment Follow Up: IV Vancomycin    Vancomycin serum concentration assessment(s):    The random level was drawn correctly and can be used to guide therapy at this time. The measurement is below the desired definitive target range of 15 to 20 mcg/mL.    Vancomycin Regimen Plan:  Re-dose when the random level is less than 20 mcg/mL, next level to be drawn with am labs on 06/27/22    Drug levels (last 3 results):  Recent Labs   Lab Result Units 06/24/22  0518 06/25/22  0500 06/26/22  0509   Vancomycin, Random ug/mL 16.9 18.4 13.6       Pharmacy will continue to follow and monitor vancomycin.    Please contact pharmacy at extension [phone] for questions regarding this assessment.    Thank you for the consult,   Lance Capellan       Patient brief summary:  Codi Black is a 89 y.o. female initiated on antimicrobial therapy with IV Vancomycin for treatment of bacteremia    The patient's current regimen is pulse dosing with random levels due to decreased renal function     Drug Allergies:   Review of patient's allergies indicates:  No Known Allergies    Actual Body Weight:   62.1 kg    Renal Function:   Estimated Creatinine Clearance: 23.1 mL/min (based on SCr of 1.4 mg/dL).,     Dialysis Method (if applicable):  N/A    CBC (last 72 hours):  Recent Labs   Lab Result Units 06/24/22 0518 06/25/22  0500 06/26/22  0509   WBC K/uL 18.24* 21.46* 23.05*   Hemoglobin g/dL 12.3 11.9* 10.8*   Hematocrit % 39.7 37.8 33.2*   Platelets K/uL 466* 435 432   Gran % % 91.9* 93.1*  --    Lymph % % 4.3* 3.2*  --    Mono % % 2.4* 2.1*  --    Eosinophil % % 0.0 0.0  --    Basophil % % 0.2 0.2  --    Differential Method  Automated Automated  --        Metabolic Panel (last 72 hours):  Recent Labs   Lab Result Units 06/24/22 0518 06/25/22  0500 06/26/22  0509   Sodium mmol/L 140 141 141   Potassium mmol/L 5.0 5.1 4.9   Chloride mmol/L 103 105 105   CO2 mmol/L 33* 32* 34*   Glucose mg/dL 125* 143* 138*   BUN mg/dL 53* 54*  58*   Creatinine mg/dL 1.3 1.4 1.4   Albumin g/dL 2.3* 2.2* 2.2*   Total Bilirubin mg/dL 0.3 0.2 0.4   Alkaline Phosphatase U/L 73 65 60   AST U/L 16 15 14   ALT U/L 26 26 26   Magnesium mg/dL 1.9 2.0 1.8       Vancomycin Administrations:  vancomycin given in the last 96 hours                     vancomycin 750 mg in dextrose 5 % 250 mL IVPB (ready to mix system) (mg) 750 mg New Bag 06/24/22 0947    vancomycin 750 mg in dextrose 5 % 250 mL IVPB (ready to mix system) (mg) 750 mg New Bag 06/22/22 0948                    Microbiologic Results:  Microbiology Results (last 7 days)       Procedure Component Value Units Date/Time    Blood culture [[id number]] Collected: 06/21/22 1643    Order Status: Completed Specimen: Blood Updated: 06/25/22 2312     Blood Culture, Routine No Growth to date      No Growth to date      No Growth to date      No Growth to date      No Growth to date    Blood culture [[id number]] Collected: 06/21/22 1647    Order Status: Completed Specimen: Blood from Antecubital, Left Updated: 06/25/22 2312     Blood Culture, Routine No Growth to date      No Growth to date      No Growth to date      No Growth to date      No Growth to date    Blood culture x two cultures. Draw prior to antibiotics. [[id number]]  (Abnormal)  (Susceptibility) Collected: 06/18/22 1209    Order Status: Completed Specimen: Blood Updated: 06/22/22 0815     Blood Culture, Routine Gram stain laura bottle: Gram positive cocci in clusters resembling Staph       Results called to and read back by: Poornima Rojas RN 06/19/2022  22:26      STAPHYLOCOCCUS EPIDERMIDIS    Narrative:      Aerobic and anaerobic    Blood culture x two cultures. Draw prior to antibiotics. [[id number]]  (Abnormal)  (Susceptibility) Collected: 06/18/22 1205    Order Status: Completed Specimen: Blood Updated: 06/22/22 0700     Blood Culture, Routine Gram stain laura bottle: Gram positive cocci in clusters resembling Staph       Results called to and read back by: Zuleika  Dante 06/19/2022  12:06      Gram stain aer bottle: Gram positive cocci in clusters resembling Staph       Positive results previously called 06/19/2022  14:32      STAPHYLOCOCCUS EPIDERMIDIS    Narrative:      Aerobic and anaerobic

## 2022-06-26 NOTE — ASSESSMENT & PLAN NOTE
Patient 2/2 bottles positive for Staph epidermidis, sensitive to Vanc, on day 6/7 6/26 day seven of seven for blood cultures with Staph epidermidis, continue PT OT effort

## 2022-06-26 NOTE — ASSESSMENT & PLAN NOTE
Blood cultures x2 positive for staphylococcus epidermidis, susceptibility pending. Vancomycin initiated on 6/19/22 6/24: Day 7 of IV abx therapy with Rocephin and Azithromycin; Day 5 of abx therapy with Vancomycin. Repeat blood cultures showing NGTD x3 days  6/25 patient on Rocephin azithromycin, completed seven day therapy, plan to DC, on day six for IV vanc for blood cultures with Staph epi  6/26 patient completed antibiotic therapy for pneumonia, on IV vanc for blood cultures, day seven

## 2022-06-26 NOTE — PROGRESS NOTES
Cardiology Progress Note  ([facility])    Today's Date:  6/26/2022  Patient Name: Codi Black    MRN: [id number]    Code Status: Full Code     Attending Physician: Rafael Quan III, MD   Consulting Physician: SCOT Chapin MD    Hospital Course:     Clinical:  The patient states that she is doing well this morning.  She just finished eating lunch.  She states that she feels good.  She states that she feels that she could do more activity during physical therapy.  She is not complaining of chest pain, shortness of breath, or left arm pain.  She is not experiencing stroke-like symptoms.    06/23/2022:  Clinically the patient appears to be doing fairly well.  She underwent physical therapy.  She is not complaining of chest pain or shortness of breath.    06/24/2022:  Patient continues to do well.  She is not complaining of chest pain or shortness of breath.  She is becoming more mobile.  The patient and her daughter state, that discharge will be planned for early next week.    6/20 05/2022:  (logging in reviewing hemodynamic data.)    Hemodynamic:      06/22/2022:  Blood pressure is well controlled on current therapy.  (See yesterday losartan was discontinued secondary to prerenal azotemia with increasing creatinine.)    06/23/2022:  Vital signs remain stable.  Heart rate is well controlled as she is back in sinus rhythm.    06/24/2022:  Vital signs remained stable with controlled blood pressure.    Telemetry:   Atrial fibrillation with rapid ventricular rate ranging between 117 and 122 beats per minute.  * patient on June 20th had a brief episode of supraventricular tachycardia with rapid ventricular rate of 165 beats per minute at which time she had ST segment depression.    06/22/2022:  Atrial fibrillation with a controlled ventricular rate of 92-97 beats per minute.    06/23/2022:  The patient has converted to normal sinus rhythm with a ventricular rate of 62 beats per minute.    06/24/2022:  Telemetry:   Normal sinus rhythm without recurrent atrial fibrillation.  No episodes of ventricular    06/25/2022:  Maintaining normal sinus rhythm.    Relevant testing:     Echocardiogram:   · The left ventricle is normal in size with moderate concentric hypertrophy and normal systolic function.  · The estimated ejection fraction is 55%.  · Normal left ventricular diastolic function.  · Mild tricuspid regurgitation.  · Mild pulmonic regurgitation.  · The estimated PA systolic pressure is 23 mmHg.  · Trivial pericardial effusion.     Review of Systems   Constitutional: Positive for malaise/fatigue. Negative for decreased appetite.   Cardiovascular: Positive for dyspnea on exertion and leg swelling. Negative for chest pain, near-syncope, orthopnea, palpitations and syncope.       Vital Signs (Most Recent):  Temp: 98.2 °F (36.8 °C) (06/26/22 0746)  Pulse: (!) 59 (06/26/22 1100)  Resp: 20 (06/26/22 0746)  BP: 133/63 (06/26/22 0746)  SpO2: 95 % (06/26/22 0746) Vital Signs (24h Range):  Temp:  [96.7 °F (35.9 °C)-98.3 °F (36.8 °C)] 98.2 °F (36.8 °C)  Pulse:  [55-72] 59  Resp:  [17-25] 20  SpO2:  [94 %-95 %] 95 %  BP: (133-181)/(63-82) 133/63     Weight: 62.1 kg (137 lb)  Body mass index is 23.52 kg/m².    SpO2: 95 %  O2 Device (Oxygen Therapy): nasal cannula      Intake/Output Summary (Last 24 hours) at 6/26/2022 1144  Last data filed at 6/26/2022 0600  Gross per 24 hour   Intake 1200 ml   Output 800 ml   Net 400 ml     Labs:  CMP   Recent Labs   Lab 06/25/22  0500 06/26/22  0509    141   K 5.1 4.9    105   CO2 32* 34*   * 138*   BUN 54* 58*   CREATININE 1.4 1.4   CALCIUM 8.9 8.7   PROT 6.0 5.6*   ALBUMIN 2.2* 2.2*   BILITOT 0.2 0.4   ALKPHOS 65 60   AST 15 14   ALT 26 26   ANIONGAP 4* 2*   ESTGFRAFRICA 38.2* 38.2*   EGFRNONAA 33.1* 33.1*   , CBC   Recent Labs   Lab 06/25/22  0500 06/26/22  0509   WBC 21.46* 23.05*   HGB 11.9* 10.8*   HCT 37.8 33.2*    432    and   Pathology Results  (Last 10 years)    None       Troponin I peaked at at 182.6.  (Now trending downward.)    Physical Examination:  General:   Alert and oriented.  She is in no distress.  Heent:   No JVD.  No appreciable carotid bruits.  Lungs:   Good breath sounds.  No wheezing or rhonchi.  Heart:   Regular rate rhythm; grade 1 holosystolic murmur.  Extremities:   Bruising and chronic skin changes.  Distal pulses are mildly diminished.  Skin tenting consistent with a fluid loss.    Assessment and Plan:     1.  Non-ST segment elevated MI: patient with  An elevation in her troponin I x3.  During this time she was hypertensive and tachycardic.  Given risk factors, she likely has coronary artery disease.  · Aspirin 81 mg daily  · Metoprolol 75 mg twice daily  · Amlodipine 10 mg daily.  · Isosorbide mononitrate 30 mg daily.  ·   The patient, her daughter and I had a lengthy discussion concerning intervention.  The patient states that she would prefer medication only at this time.  My recommendation would be to continue with this strategy, however, if she should become unstable i.e., chest pain or any signs and symptoms of unstable angina, to consider invasive evaluation.    2.  Hypertension:  Blood pressure is well controlled, and stable.  · Continue with metoprolol 75 mg twice daily.  · Amlodipine to 5 mg daily.     3.  Pneumonia: on antibiotic therapy.  White count is increased.  Blood cultures are negative today.  · Managed by internal medicine.     4.  Hyperlipidemia:     LDL is elevated.  · Continue Crestor 10 mg daily.    5. Atrial fibrillation:  Maintaining normal sinus rhythm.  · Eliquis at 2.5 mg b.i.d.  · Amiodarone 200 mg 1 tablet b.i.d..-->  reduce amiodarone to 200 mg q.d. as she is in sinus rhythm.  · Continue metoprolol at  75 mg b.i.d.    Telemetry revealed an episode of SVT with associated ST segment depression (flat) of approximately 2-3 mV. This is consistent with a positive stress test.  The patient will require invasive evaluation.   I have  explained this to the patient and her .  They are considering their options.  If they are willing to proceed, the timing will depend on her clinical and hemodynamic stability.     5.  Renal insufficiency:  Creatinine is stable at 1.4.    The patient is stable from a cardiovascular standpoint.  Will continue with current therapy.  Will be signing off.  Please re-consult if needed.

## 2022-06-26 NOTE — PROGRESS NOTES
[facility] Medicine  Progress Note    Patient Name: Codi Black  MRN: [id number]  Patient Class: IP- Inpatient   Admission Date: 6/18/2022  Length of Stay: 8 days  Attending Physician: Rafael Quan III, MD  Primary Care Provider: Rafael Quan III, MD        Subjective:     Principal Problem:Pneumonia due to infectious organism    HPI:  Pt is 89 y/o female with Hx of hypertension, hyperlipidemia, and arthritis, presented to [facility] ED yesterday with c/o generalized weakness. She reports having a productive cough. She denies chest pain. High-Sensitivity Troponin levels, although negative, trended up x3 sets - Cardiology consulted. This morning she reports continued fatigue and weakness; also c/o left hand pain; shortness of breath improved with rest and oxygen.      Overview/Hospital Course:  6/20 SD: Pt's spouse at bedside. Pt reports feeling better compared to initial arrival to the hospital. Blood cultures x2 positive for gram + cocci in clusters resembling Staph, awaiting sensitivity. Vancomycin added to antibiotic therapy. Appreciate Dr. Chapin's recommendations for NSTEMI and HTN. Dr. Chapin advises that pt will need angiogram after treatment of pneumonia; will determine need for inpatient vs outpatient as patient care progresses.  6/21 SD: Pt's spouse at bedside. Blood cultures x2 positive for staphylococcus epidermidis, susceptibility pending. Continue IV abx therapy and repeat blood cultures today. Encourage pt to participate with therapy.  6/22 SD: Continue IV abx, following repeat cultures. Encourage participation with therapy.  6/23 SD: Sitting up in bed, at breakfast with good appetite. Continue IV abx therapy, following repeat cultures. Encourage participation with therapy.  6/24 SD: Pt's spouse and daughter at bedside. Continue IV abx therapy, following cultures. Continue therapy efforts.  6/25 AA, weekend crosscover, patient being treated for pneumonia, on IV antibiotic, blood  cultures 2/2 bottles positive for Staph epidermidis, on IV vanc, PT OT on board  6/26 AA, weekend crosscover, antibiotics on board for pneumonia, on IV vancomycin, blood culture positive for Staph epidermidis, afebrile, PT/OT      Interval History: Patient seen and examined.    Review of Systems   Constitutional:  Positive for activity change and fatigue.   Respiratory:  Positive for cough. Negative for chest tightness and wheezing.    Cardiovascular:  Negative for chest pain, palpitations and leg swelling.   Gastrointestinal:  Negative for abdominal pain, constipation, diarrhea, nausea and vomiting.   Musculoskeletal:  Positive for arthralgias.   Neurological:  Positive for weakness.   Objective:     Vital Signs (Most Recent):  Temp: 98.2 °F (36.8 °C) (06/26/22 0746)  Pulse: (!) 59 (06/26/22 1100)  Resp: 20 (06/26/22 0746)  BP: 133/63 (06/26/22 0746)  SpO2: 95 % (06/26/22 0746)   Vital Signs (24h Range):  Temp:  [96.7 °F (35.9 °C)-98.3 °F (36.8 °C)] 98.2 °F (36.8 °C)  Pulse:  [55-72] 59  Resp:  [17-25] 20  SpO2:  [94 %-95 %] 95 %  BP: (133-181)/(63-82) 133/63     Weight: 62.1 kg (137 lb)  Body mass index is 23.52 kg/m².    Intake/Output Summary (Last 24 hours) at 6/26/2022 1207  Last data filed at 6/26/2022 0600  Gross per 24 hour   Intake 1200 ml   Output 800 ml   Net 400 ml        Physical Exam  Vitals and nursing note reviewed.   HENT:      Head: Normocephalic and atraumatic.      Mouth/Throat:      Mouth: Mucous membranes are moist.      Pharynx: Oropharynx is clear.   Eyes:      General: No scleral icterus.     Extraocular Movements: Extraocular movements intact.      Pupils: Pupils are equal, round, and reactive to light.   Cardiovascular:      Rate and Rhythm: Normal rate and regular rhythm.      Pulses: Normal pulses.      Heart sounds: Normal heart sounds.   Pulmonary:      Effort: Pulmonary effort is normal.      Breath sounds: Normal breath sounds.   Abdominal:      General: Bowel sounds are normal.  There is no distension.      Palpations: Abdomen is soft.      Tenderness: There is no abdominal tenderness. There is no guarding.   Musculoskeletal:         General: Normal range of motion.      Cervical back: Normal range of motion. No rigidity.   Skin:     General: Skin is warm and dry.      Capillary Refill: Capillary refill takes less than 2 seconds.   Neurological:      General: No focal deficit present.      Mental Status: She is alert and oriented to person, place, and time.      Motor: Weakness present.   Psychiatric:         Mood and Affect: Mood normal.         Behavior: Behavior normal.       Significant Labs: All pertinent labs within the past 24 hours have been reviewed.  Recent Lab Results         06/26/22  0509        Albumin 2.2       Alkaline Phosphatase 60       ALT 26       Anion Gap 2       AST 14       Baso # 0.07       Basophil % 0.3       BILIRUBIN TOTAL 0.4  Comment: For infants and newborns, interpretation of results should be based  on gestational age, weight and in agreement with clinical  observations.    Premature Infant recommended reference ranges:  Up to 24 hours.............<8.0 mg/dL  Up to 48 hours............<12.0 mg/dL  3-5 days..................<15.0 mg/dL  6-29 days.................<15.0 mg/dL    For patients on Eltrombopag therapy, use of Dimension [place] TBIL is   not   recommended.         BUN 58       Calcium 8.7       Chloride 105       CO2 34       Creatinine 1.4       Differential Method Automated       eGFR if  38.2       eGFR if non  33.1  Comment: Calculation used to obtain the estimated glomerular filtration  rate (eGFR) is the CKD-EPI equation.          Eos # 0.0       Eosinophil % 0.0       Glucose 138       Gran # (ANC) 21.2       Gran % 92.1       Hematocrit 33.2       Hemoglobin 10.8       Immature Grans (Abs) 0.52  Comment: Mild elevation in immature granulocytes is non specific and   can be seen in a variety of conditions  including stress response,   acute inflammation, trauma and pregnancy. Correlation with other   laboratory and clinical findings is essential.         Immature Granulocytes 2.3       Lymph # 0.7       Lymph % 3.0       Magnesium 1.8       MCH 29.1       MCHC 32.5       MCV 90       Mono # 0.5       Mono % 2.3       MPV 9.7       nRBC 0       Platelets 432       Potassium 4.9       PROTEIN TOTAL 5.6       RBC 3.71       RDW 12.7       Sodium 141       Vancomycin, Random 13.6       WBC 23.05               Significant Imaging: I have reviewed all pertinent imaging results/findings within the past 24 hours.      Assessment/Plan:      * Pneumonia due to infectious organism  Blood cultures x2 positive for staphylococcus epidermidis, susceptibility pending. Vancomycin initiated on 6/19/22 6/24: Day 7 of IV abx therapy with Rocephin and Azithromycin; Day 5 of abx therapy with Vancomycin. Repeat blood cultures showing NGTD x3 days  6/25 patient on Rocephin azithromycin, completed seven day therapy, plan to DC, on day six for IV vanc for blood cultures with Staph epi  6/26 patient completed antibiotic therapy for pneumonia, on IV vanc for blood cultures, day seven      Bacteremia  Patient 2/2 bottles positive for Staph epidermidis, sensitive to Vanc, on day 6/7 6/26 day seven of seven for blood cultures with Staph epidermidis, continue PT OT effort    Weakness  Continue PT/OT efforts      NSTEMI (non-ST elevated myocardial infarction)  Dr. Chapin managing    DVT prophylaxis  Lovenox      Hypertension  Dr. Chapin following        Arthritis  Pain to left hand. CRP and Sed Rate elevated. Treatment with steroids.  6/24: Pain improving        VTE Risk Mitigation (From admission, onward)         Ordered     apixaban tablet 2.5 mg  2 times daily         06/21/22 1856     IP VTE HIGH RISK PATIENT  Once         06/18/22 1525     Place sequential compression device  Until discontinued         06/18/22 1525                Discharge  Planning   KIMANI:      Code Status: Full Code   Is the patient medically ready for discharge?:     Reason for patient still in hospital (select all that apply): Treatment  Discharge Plan A: Home with family, Home Health                  Rell Alston MD  Department of Hospital Medicine   [facility]

## 2022-06-27 LAB
ALBUMIN SERPL BCP-MCNC: 2.4 G/DL (ref 3.5–5.2)
ALP SERPL-CCNC: 63 U/L (ref 55–135)
ALT SERPL W/O P-5'-P-CCNC: 29 U/L (ref 10–44)
ANION GAP SERPL CALC-SCNC: 3 MMOL/L (ref 8–16)
AST SERPL-CCNC: 19 U/L (ref 10–40)
BASOPHILS # BLD AUTO: 0.1 K/UL (ref 0–0.2)
BASOPHILS NFR BLD: 0.4 % (ref 0–1.9)
BILIRUB SERPL-MCNC: 0.5 MG/DL (ref 0.1–1)
BUN SERPL-MCNC: 60 MG/DL (ref 8–23)
CALCIUM SERPL-MCNC: 8.9 MG/DL (ref 8.7–10.5)
CHLORIDE SERPL-SCNC: 105 MMOL/L (ref 95–110)
CO2 SERPL-SCNC: 35 MMOL/L (ref 23–29)
CREAT SERPL-MCNC: 1.3 MG/DL (ref 0.5–1.4)
DIFFERENTIAL METHOD: ABNORMAL
EOSINOPHIL # BLD AUTO: 0 K/UL (ref 0–0.5)
EOSINOPHIL NFR BLD: 0.2 % (ref 0–8)
ERYTHROCYTE [DISTWIDTH] IN BLOOD BY AUTOMATED COUNT: 12.9 % (ref 11.5–14.5)
EST. GFR  (AFRICAN AMERICAN): 41.7 ML/MIN/1.73 M^2
EST. GFR  (NON AFRICAN AMERICAN): 36.2 ML/MIN/1.73 M^2
GLUCOSE SERPL-MCNC: 86 MG/DL (ref 70–110)
HCT VFR BLD AUTO: 35.6 % (ref 37–48.5)
HGB BLD-MCNC: 11.4 G/DL (ref 12–16)
IMM GRANULOCYTES # BLD AUTO: 0.66 K/UL (ref 0–0.04)
IMM GRANULOCYTES NFR BLD AUTO: 2.8 % (ref 0–0.5)
LYMPHOCYTES # BLD AUTO: 2.4 K/UL (ref 1–4.8)
LYMPHOCYTES NFR BLD: 10.3 % (ref 18–48)
MAGNESIUM SERPL-MCNC: 1.6 MG/DL (ref 1.6–2.6)
MCH RBC QN AUTO: 28.6 PG (ref 27–31)
MCHC RBC AUTO-ENTMCNC: 32 G/DL (ref 32–36)
MCV RBC AUTO: 89 FL (ref 82–98)
MONOCYTES # BLD AUTO: 2.6 K/UL (ref 0.3–1)
MONOCYTES NFR BLD: 11.2 % (ref 4–15)
NEUTROPHILS # BLD AUTO: 17.5 K/UL (ref 1.8–7.7)
NEUTROPHILS NFR BLD: 75.1 % (ref 38–73)
NRBC BLD-RTO: 0 /100 WBC
PLATELET # BLD AUTO: 434 K/UL (ref 150–450)
PMV BLD AUTO: 9.6 FL (ref 9.2–12.9)
POTASSIUM SERPL-SCNC: 4.8 MMOL/L (ref 3.5–5.1)
PROT SERPL-MCNC: 5.7 G/DL (ref 6–8.4)
RBC # BLD AUTO: 3.98 M/UL (ref 4–5.4)
SODIUM SERPL-SCNC: 143 MMOL/L (ref 136–145)
VANCOMYCIN SERPL-MCNC: 16.8 UG/ML
WBC # BLD AUTO: 23.28 K/UL (ref 3.9–12.7)

## 2022-06-27 PROCEDURE — 25000003 PHARM REV CODE 250: Performed by: INTERNAL MEDICINE

## 2022-06-27 PROCEDURE — 80053 COMPREHEN METABOLIC PANEL: CPT | Performed by: NURSE PRACTITIONER

## 2022-06-27 PROCEDURE — 99900035 HC TECH TIME PER 15 MIN (STAT)

## 2022-06-27 PROCEDURE — 63600175 PHARM REV CODE 636 W HCPCS: Performed by: INTERNAL MEDICINE

## 2022-06-27 PROCEDURE — 25000003 PHARM REV CODE 250: Performed by: NURSE PRACTITIONER

## 2022-06-27 PROCEDURE — 11000001 HC ACUTE MED/SURG PRIVATE ROOM

## 2022-06-27 PROCEDURE — 97116 GAIT TRAINING THERAPY: CPT | Mod: CQ

## 2022-06-27 PROCEDURE — 99900031 HC PATIENT EDUCATION (STAT)

## 2022-06-27 PROCEDURE — 27000221 HC OXYGEN, UP TO 24 HOURS

## 2022-06-27 PROCEDURE — 80202 ASSAY OF VANCOMYCIN: CPT | Performed by: INTERNAL MEDICINE

## 2022-06-27 PROCEDURE — 83735 ASSAY OF MAGNESIUM: CPT | Performed by: NURSE PRACTITIONER

## 2022-06-27 PROCEDURE — 97110 THERAPEUTIC EXERCISES: CPT

## 2022-06-27 PROCEDURE — 94761 N-INVAS EAR/PLS OXIMETRY MLT: CPT

## 2022-06-27 PROCEDURE — 85025 COMPLETE CBC W/AUTO DIFF WBC: CPT | Performed by: NURSE PRACTITIONER

## 2022-06-27 PROCEDURE — 36415 COLL VENOUS BLD VENIPUNCTURE: CPT | Performed by: NURSE PRACTITIONER

## 2022-06-27 RX ORDER — ALPRAZOLAM 0.5 MG/1
0.5 TABLET ORAL NIGHTLY
Status: DISCONTINUED | OUTPATIENT
Start: 2022-06-27 | End: 2022-07-05 | Stop reason: HOSPADM

## 2022-06-27 RX ORDER — LIDOCAINE 50 MG/G
1 PATCH TOPICAL
Status: DISCONTINUED | OUTPATIENT
Start: 2022-06-27 | End: 2022-07-05 | Stop reason: HOSPADM

## 2022-06-27 RX ADMIN — FAMOTIDINE 20 MG: 20 TABLET ORAL at 10:06

## 2022-06-27 RX ADMIN — APIXABAN 2.5 MG: 2.5 TABLET, FILM COATED ORAL at 08:06

## 2022-06-27 RX ADMIN — VANCOMYCIN HYDROCHLORIDE 750 MG: 750 INJECTION, POWDER, LYOPHILIZED, FOR SOLUTION INTRAVENOUS at 10:06

## 2022-06-27 RX ADMIN — ASPIRIN 81 MG: 81 TABLET, COATED ORAL at 10:06

## 2022-06-27 RX ADMIN — TRAMADOL HYDROCHLORIDE 50 MG: 50 TABLET ORAL at 02:06

## 2022-06-27 RX ADMIN — MIRTAZAPINE 15 MG: 15 TABLET, FILM COATED ORAL at 08:06

## 2022-06-27 RX ADMIN — AMIODARONE HYDROCHLORIDE 200 MG: 200 TABLET ORAL at 08:06

## 2022-06-27 RX ADMIN — ISOSORBIDE MONONITRATE 30 MG: 30 TABLET, EXTENDED RELEASE ORAL at 10:06

## 2022-06-27 RX ADMIN — ATORVASTATIN CALCIUM 10 MG: 10 TABLET, FILM COATED ORAL at 08:06

## 2022-06-27 RX ADMIN — METOPROLOL TARTRATE 75 MG: 50 TABLET, FILM COATED ORAL at 08:06

## 2022-06-27 RX ADMIN — LIDOCAINE 1 PATCH: 50 PATCH TOPICAL at 10:06

## 2022-06-27 RX ADMIN — APIXABAN 2.5 MG: 2.5 TABLET, FILM COATED ORAL at 10:06

## 2022-06-27 RX ADMIN — METOPROLOL TARTRATE 75 MG: 50 TABLET, FILM COATED ORAL at 10:06

## 2022-06-27 RX ADMIN — AMLODIPINE BESYLATE 5 MG: 5 TABLET ORAL at 10:06

## 2022-06-27 RX ADMIN — AMIODARONE HYDROCHLORIDE 200 MG: 200 TABLET ORAL at 10:06

## 2022-06-27 RX ADMIN — ALPRAZOLAM 0.5 MG: 0.5 TABLET ORAL at 08:06

## 2022-06-27 NOTE — SUBJECTIVE & OBJECTIVE
Interval History: Patient seen and examined.    Review of Systems   Constitutional:  Positive for activity change and fatigue.   Respiratory:  Positive for cough. Negative for chest tightness and wheezing.    Cardiovascular:  Negative for chest pain, palpitations and leg swelling.   Gastrointestinal:  Negative for abdominal pain, constipation, diarrhea, nausea and vomiting.   Musculoskeletal:  Positive for arthralgias and gait problem.   Neurological:  Positive for weakness.   Psychiatric/Behavioral:  Positive for agitation. The patient is nervous/anxious.    Objective:     Vital Signs (Most Recent):  Temp: 97.7 °F (36.5 °C) (06/27/22 0753)  Pulse: 64 (06/27/22 0753)  Resp: 20 (06/27/22 0753)  BP: (!) 176/74 (06/27/22 0753)  SpO2: (!) 93 % (06/27/22 0753)   Vital Signs (24h Range):  Temp:  [96.6 °F (35.9 °C)-98.3 °F (36.8 °C)] 97.7 °F (36.5 °C)  Pulse:  [53-70] 64  Resp:  [16-22] 20  SpO2:  [91 %-96 %] 93 %  BP: (132-176)/(63-81) 176/74     Weight: 62.1 kg (137 lb)  Body mass index is 23.52 kg/m².    Intake/Output Summary (Last 24 hours) at 6/27/2022 0816  Last data filed at 6/27/2022 0600  Gross per 24 hour   Intake 1930 ml   Output 1600 ml   Net 330 ml        Physical Exam  Vitals and nursing note reviewed.   HENT:      Head: Normocephalic and atraumatic.      Mouth/Throat:      Mouth: Mucous membranes are moist.      Pharynx: Oropharynx is clear.   Eyes:      General: No scleral icterus.     Extraocular Movements: Extraocular movements intact.      Pupils: Pupils are equal, round, and reactive to light.   Cardiovascular:      Rate and Rhythm: Normal rate. Rhythm irregular.      Pulses: Normal pulses.      Heart sounds: Normal heart sounds.   Pulmonary:      Effort: Pulmonary effort is normal.      Breath sounds: Normal breath sounds.   Abdominal:      General: Bowel sounds are normal. There is no distension.      Palpations: Abdomen is soft.      Tenderness: There is no abdominal tenderness. There is no guarding.    Musculoskeletal:         General: Tenderness present. Normal range of motion.      Cervical back: Normal range of motion. No rigidity.   Skin:     General: Skin is warm and dry.      Capillary Refill: Capillary refill takes less than 2 seconds.   Neurological:      General: No focal deficit present.      Mental Status: She is alert and oriented to person, place, and time.      Cranial Nerves: No cranial nerve deficit.      Sensory: No sensory deficit.      Motor: Weakness present.   Psychiatric:         Mood and Affect: Mood normal.         Behavior: Behavior normal.       Significant Labs: All pertinent labs within the past 24 hours have been reviewed.  Recent Lab Results         06/27/22  0415        Albumin 2.4       Alkaline Phosphatase 63       ALT 29       Anion Gap 3       AST 19       Baso # 0.10       Basophil % 0.4       BILIRUBIN TOTAL 0.5  Comment: For infants and newborns, interpretation of results should be based  on gestational age, weight and in agreement with clinical  observations.    Premature Infant recommended reference ranges:  Up to 24 hours.............<8.0 mg/dL  Up to 48 hours............<12.0 mg/dL  3-5 days..................<15.0 mg/dL  6-29 days.................<15.0 mg/dL    For patients on Eltrombopag therapy, use of Dimension [place] TBIL is   not   recommended.         BUN 60       Calcium 8.9       Chloride 105       CO2 35       Creatinine 1.3       Differential Method Automated       eGFR if  41.7       eGFR if non  36.2  Comment: Calculation used to obtain the estimated glomerular filtration  rate (eGFR) is the CKD-EPI equation.          Eos # 0.0       Eosinophil % 0.2       Glucose 86       Gran # (ANC) 17.5       Gran % 75.1       Hematocrit 35.6       Hemoglobin 11.4       Immature Grans (Abs) 0.66  Comment: Mild elevation in immature granulocytes is non specific and   can be seen in a variety of conditions including stress response,   acute  inflammation, trauma and pregnancy. Correlation with other   laboratory and clinical findings is essential.         Immature Granulocytes 2.8       Lymph # 2.4       Lymph % 10.3       Magnesium 1.6       MCH 28.6       MCHC 32.0       MCV 89       Mono # 2.6       Mono % 11.2       MPV 9.6       nRBC 0       Platelets 434       Potassium 4.8       PROTEIN TOTAL 5.7       RBC 3.98       RDW 12.9       Sodium 143       Vancomycin, Random 16.8       WBC 23.28               Significant Imaging: I have reviewed all pertinent imaging results/findings within the past 24 hours.

## 2022-06-27 NOTE — NURSING
Unable to administer methylprednisolone sodium due to loss of PIV access. Per day shift nurse the patient had loss her IV. Many attempt by different nurses to start another IV was unsuccessful. Patient is a difficult stick.    Awaiting ER nurse to come and start another IV.

## 2022-06-27 NOTE — PROGRESS NOTES
Pharmacokinetic Assessment Follow Up: IV Vancomycin    Vancomycin serum concentration assessment(s):    The trough level was drawn correctly and can be used to guide therapy at this time. The measurement is within the desired definitive target range of 15 to 20 mcg/mL.    Vancomycin Regimen Plan:    Continue regimen to Vancomycin 750 mg IV every day therapeutic  hours with next serum trough concentration measured at 0430 prior to next dose on 6/28    Drug levels (last 3 results):  Recent Labs   Lab Result Units 06/25/22  0500 06/26/22  0509 06/27/22  0415   Vancomycin, Random ug/mL 18.4 13.6 16.8       Pharmacy will continue to follow and monitor vancomycin.    Please contact pharmacy at extension [phone] for questions regarding this assessment.    Thank you for the consult,   Tiffanie Espinoza       Patient brief summary:  Codi Black is a 89 y.o. female initiated on antimicrobial therapy with IV Vancomycin for treatment of bacteremia    The patient's current regimen is 750 mg pulse dosing    Drug Allergies:   Review of patient's allergies indicates:  No Known Allergies    Actual Body Weight:   62.1 kg    Renal Function:   Estimated Creatinine Clearance: 24.8 mL/min (based on SCr of 1.3 mg/dL).,     Dialysis Method (if applicable):  N/A    CBC (last 72 hours):  Recent Labs   Lab Result Units 06/25/22  0500 06/26/22  0509 06/27/22  0415   WBC K/uL 21.46* 23.05* 23.28*   Hemoglobin g/dL 11.9* 10.8* 11.4*   Hematocrit % 37.8 33.2* 35.6*   Platelets K/uL 435 432 434   Gran % % 93.1* 92.1* 75.1*   Lymph % % 3.2* 3.0* 10.3*   Mono % % 2.1* 2.3* 11.2   Eosinophil % % 0.0 0.0 0.2   Basophil % % 0.2 0.3 0.4   Differential Method  Automated Automated Automated       Metabolic Panel (last 72 hours):  Recent Labs   Lab Result Units 06/25/22  0500 06/26/22  0509 06/27/22  0415   Sodium mmol/L 141 141 143   Potassium mmol/L 5.1 4.9 4.8   Chloride mmol/L 105 105 105   CO2 mmol/L 32* 34* 35*   Glucose mg/dL 143* 138* 86   BUN mg/dL 54*  58* 60*   Creatinine mg/dL 1.4 1.4 1.3   Albumin g/dL 2.2* 2.2* 2.4*   Total Bilirubin mg/dL 0.2 0.4 0.5   Alkaline Phosphatase U/L 65 60 63   AST U/L 15 14 19   ALT U/L 26 26 29   Magnesium mg/dL 2.0 1.8 1.6       Vancomycin Administrations:  vancomycin given in the last 96 hours                     vancomycin 750 mg in dextrose 5 % 250 mL IVPB (ready to mix system) (mg) 750 mg New Bag 06/26/22 0902    vancomycin 750 mg in dextrose 5 % 250 mL IVPB (ready to mix system) (mg) 750 mg New Bag 06/24/22 0947                    Microbiologic Results:  Microbiology Results (last 7 days)       Procedure Component Value Units Date/Time    Blood culture [[id number]] Collected: 06/21/22 1647    Order Status: Completed Specimen: Blood from Antecubital, Left Updated: 06/26/22 2312     Blood Culture, Routine No growth after 5 days.    Blood culture [[id number]] Collected: 06/21/22 1643    Order Status: Completed Specimen: Blood Updated: 06/26/22 2312     Blood Culture, Routine No growth after 5 days.    Blood culture x two cultures. Draw prior to antibiotics. [[id number]]  (Abnormal)  (Susceptibility) Collected: 06/18/22 1209    Order Status: Completed Specimen: Blood Updated: 06/22/22 0815     Blood Culture, Routine Gram stain laura bottle: Gram positive cocci in clusters resembling Staph       Results called to and read back by: Poornima Rojas RN 06/19/2022  22:26      STAPHYLOCOCCUS EPIDERMIDIS    Narrative:      Aerobic and anaerobic    Blood culture x two cultures. Draw prior to antibiotics. [[id number]]  (Abnormal)  (Susceptibility) Collected: 06/18/22 1205    Order Status: Completed Specimen: Blood Updated: 06/22/22 0700     Blood Culture, Routine Gram stain laura bottle: Gram positive cocci in clusters resembling Staph       Results called to and read back by: Zuleika Howell 06/19/2022  12:06      Gram stain aer bottle: Gram positive cocci in clusters resembling Staph       Positive results previously called 06/19/2022  14:32       STAPHYLOCOCCUS EPIDERMIDIS    Narrative:      Aerobic and anaerobic

## 2022-06-27 NOTE — ASSESSMENT & PLAN NOTE
Blood cultures x2 positive for staphylococcus epidermidis, susceptibility pending. Vancomycin initiated on 6/19/22 6/24: Day 7 of IV abx therapy with Rocephin and Azithromycin; Day 5 of abx therapy with Vancomycin. Repeat blood cultures showing NGTD x3 days  6/25 patient on Rocephin azithromycin, completed seven day therapy, plan to DC, on day six for IV vanc for blood cultures with Staph epi  6/26 patient completed antibiotic therapy for pneumonia, on IV vanc for blood cultures, day seven    Has completed abx, wbc noted but on steroids, stopping steroids today.

## 2022-06-27 NOTE — CARE UPDATE
06/27/22 0809   PRE-TX-O2   O2 Device (Oxygen Therapy) nasal cannula   $ Is the patient on Low Flow Oxygen? Yes   Flow (L/min) 1   Oxygen Concentration (%) 24   SpO2 (!) 94 %   Pulse Oximetry Type Intermittent   $ Pulse Oximetry - Multiple Charge Pulse Oximetry - Multiple   Probe Placed On (Pulse Ox) Right:;finger   Pulse 66   Resp 18   Pt was found with O2 cannula off and SATs were 89-90%. Pt stated she tookt he o@ off. I explained that she needed to have in onf or a while longer to keeps her O2 levels high. Pt now at 94% on 1 lpm

## 2022-06-27 NOTE — PT/OT/SLP PROGRESS
Occupational Therapy   Treatment    Name: Codi Black  MRN: [id number]  Admitting Diagnosis:  Pneumonia due to infectious organism       Recommendations:     Discharge Recommendations: other (see comments) (24 hour care if home with home health; patient does not want IPR)  Discharge Equipment Recommendations:  walker, rolling  Barriers to discharge:  Decreased caregiver support    Assessment:     Codi Black is a 89 y.o. female with a medical diagnosis of Pneumonia due to infectious organism.  She presents with weakness. Performance deficits affecting function are weakness, impaired endurance, decreased upper extremity function, pain.     Rehab Prognosis:  Fair; patient would benefit from acute skilled OT services to address these deficits and reach maximum level of function.       Plan:     Patient to be seen 6 x/week to address the above listed problems via self-care/home management, therapeutic activities, therapeutic exercises  · Plan of Care Expires: 07/08/22  · Plan of Care Reviewed with: patient    Subjective     Pain/Comfort:  · Pain Rating 1: 0/10  · Pain Rating Post-Intervention 1: 0/10    Objective:     Communicated with: Nurse prior to session.  Patient found up in chair with telemetry, peripheral IV, PureWick upon OT entry to room.    General Precautions: Standard, fall   Orthopedic Precautions:N/A   Braces: N/A  Respiratory Status: Nasal cannula, flow 1 L/min     Occupational Performance:     Functional Mobility/Transfers:  · Patient completed Sit <> Stand Transfer with minimum assistance  with  rolling walker   · Functional Mobility: was able to ambulate 10' with min A and RW to work on functional mobility for ADLs.    [facility] 6 Click ADL: 16    Treatment & Education:  Pt was awake and alert upon entering room. Pt stated that she has a sore throat. Pt was able to complete seat push ups x 3 as well as ambulating 10' with min A and a RW  for increased independence in ADLs.    Patient left up in chair  with all lines intact and call button in reachEducation:      GOALS:   Multidisciplinary Problems     Occupational Therapy Goals        Problem: Occupational Therapy    Goal Priority Disciplines Outcome Interventions   Occupational Therapy Goal     OT, PT/OT Ongoing, Progressing    Description: Goals to be met by: 7/8/2022     Patient will increase functional independence with ADLs by performing:    UE Dressing with Minimal Assistance.  Grooming while EOB with Set-up Assistance.  Toileting from bedside commode with Minimal Assistance for hygiene and clothing management.   Sitting at edge of bed x5 minutes with Minimal Assistance.  Rolling to Right, Left with Minimal Assistance.   Supine to sit with Minimal Assistance.                     Time Tracking:     OT Date of Treatment: 06/27/22  OT Start Time: 1020  OT Stop Time: 1045  OT Total Time (min): 25 min    Billable Minutes:Therapeutic Exercise 25    OT/REJI: OT          6/27/2022

## 2022-06-27 NOTE — ASSESSMENT & PLAN NOTE
Pain to left hand. CRP and Sed Rate elevated. Treatment with steroids.  6/24: Pain improving    Try lidocaine patch.

## 2022-06-27 NOTE — PLAN OF CARE
06/27/22 1525   Post-Acute Status   Post-Acute Authorization Placement   Post-Acute Placement Status Referrals Sent   Discharge Delays None known at this time   Discharge Plan   Discharge Plan A Rehab   Discharge Plan B Home with family;Home Health     New referral. This patient agreed to IPR. The patient's choice for was signed. DEE Hughes was notified.

## 2022-06-27 NOTE — ASSESSMENT & PLAN NOTE
Continue PT/OT efforts  Patient with a significant debility and change in her functional state.  Possibly a inpatient rehab candidate and return home but will discuss with therapy today as she refused yesterday and informed the patient she cannot refuse therapy and must put in good effort.

## 2022-06-28 LAB
ALBUMIN SERPL BCP-MCNC: 2.2 G/DL (ref 3.5–5.2)
ALP SERPL-CCNC: 68 U/L (ref 55–135)
ALT SERPL W/O P-5'-P-CCNC: 30 U/L (ref 10–44)
ANION GAP SERPL CALC-SCNC: 6 MMOL/L (ref 8–16)
AST SERPL-CCNC: 20 U/L (ref 10–40)
BASOPHILS # BLD AUTO: 0.07 K/UL (ref 0–0.2)
BASOPHILS NFR BLD: 0.4 % (ref 0–1.9)
BILIRUB SERPL-MCNC: 0.8 MG/DL (ref 0.1–1)
BUN SERPL-MCNC: 47 MG/DL (ref 8–23)
CALCIUM SERPL-MCNC: 8.8 MG/DL (ref 8.7–10.5)
CHLORIDE SERPL-SCNC: 101 MMOL/L (ref 95–110)
CO2 SERPL-SCNC: 35 MMOL/L (ref 23–29)
CREAT SERPL-MCNC: 1.3 MG/DL (ref 0.5–1.4)
DIFFERENTIAL METHOD: ABNORMAL
EOSINOPHIL # BLD AUTO: 0.4 K/UL (ref 0–0.5)
EOSINOPHIL NFR BLD: 2 % (ref 0–8)
ERYTHROCYTE [DISTWIDTH] IN BLOOD BY AUTOMATED COUNT: 13.2 % (ref 11.5–14.5)
EST. GFR  (AFRICAN AMERICAN): 41.7 ML/MIN/1.73 M^2
EST. GFR  (NON AFRICAN AMERICAN): 36.2 ML/MIN/1.73 M^2
GLUCOSE SERPL-MCNC: 73 MG/DL (ref 70–110)
HCT VFR BLD AUTO: 35.4 % (ref 37–48.5)
HGB BLD-MCNC: 11.1 G/DL (ref 12–16)
IMM GRANULOCYTES # BLD AUTO: 0.62 K/UL (ref 0–0.04)
IMM GRANULOCYTES NFR BLD AUTO: 3.4 % (ref 0–0.5)
LYMPHOCYTES # BLD AUTO: 2 K/UL (ref 1–4.8)
LYMPHOCYTES NFR BLD: 10.8 % (ref 18–48)
MAGNESIUM SERPL-MCNC: 1.5 MG/DL (ref 1.6–2.6)
MCH RBC QN AUTO: 28.5 PG (ref 27–31)
MCHC RBC AUTO-ENTMCNC: 31.4 G/DL (ref 32–36)
MCV RBC AUTO: 91 FL (ref 82–98)
MONOCYTES # BLD AUTO: 2.2 K/UL (ref 0.3–1)
MONOCYTES NFR BLD: 11.8 % (ref 4–15)
NEUTROPHILS # BLD AUTO: 13.2 K/UL (ref 1.8–7.7)
NEUTROPHILS NFR BLD: 71.6 % (ref 38–73)
NRBC BLD-RTO: 0 /100 WBC
PLATELET # BLD AUTO: 403 K/UL (ref 150–450)
PMV BLD AUTO: 9.6 FL (ref 9.2–12.9)
POTASSIUM SERPL-SCNC: 4.9 MMOL/L (ref 3.5–5.1)
PROT SERPL-MCNC: 5.8 G/DL (ref 6–8.4)
RBC # BLD AUTO: 3.9 M/UL (ref 4–5.4)
SARS-COV-2 RNA RESP QL NAA+PROBE: NOT DETECTED
SODIUM SERPL-SCNC: 142 MMOL/L (ref 136–145)
VANCOMYCIN SERPL-MCNC: 18.5 UG/ML
WBC # BLD AUTO: 18.42 K/UL (ref 3.9–12.7)

## 2022-06-28 PROCEDURE — 63600175 PHARM REV CODE 636 W HCPCS: Performed by: INTERNAL MEDICINE

## 2022-06-28 PROCEDURE — 83735 ASSAY OF MAGNESIUM: CPT | Performed by: NURSE PRACTITIONER

## 2022-06-28 PROCEDURE — 25000003 PHARM REV CODE 250: Performed by: INTERNAL MEDICINE

## 2022-06-28 PROCEDURE — 80202 ASSAY OF VANCOMYCIN: CPT | Performed by: INTERNAL MEDICINE

## 2022-06-28 PROCEDURE — 27100171 HC OXYGEN HIGH FLOW UP TO 24 HOURS

## 2022-06-28 PROCEDURE — 97530 THERAPEUTIC ACTIVITIES: CPT

## 2022-06-28 PROCEDURE — U0002 COVID-19 LAB TEST NON-CDC: HCPCS | Performed by: INTERNAL MEDICINE

## 2022-06-28 PROCEDURE — 25000003 PHARM REV CODE 250: Performed by: NURSE PRACTITIONER

## 2022-06-28 PROCEDURE — 36415 COLL VENOUS BLD VENIPUNCTURE: CPT | Performed by: INTERNAL MEDICINE

## 2022-06-28 PROCEDURE — 94761 N-INVAS EAR/PLS OXIMETRY MLT: CPT

## 2022-06-28 PROCEDURE — 85025 COMPLETE CBC W/AUTO DIFF WBC: CPT | Performed by: NURSE PRACTITIONER

## 2022-06-28 PROCEDURE — 27000207 HC ISOLATION

## 2022-06-28 PROCEDURE — 99900035 HC TECH TIME PER 15 MIN (STAT)

## 2022-06-28 PROCEDURE — 99900031 HC PATIENT EDUCATION (STAT)

## 2022-06-28 PROCEDURE — 80053 COMPREHEN METABOLIC PANEL: CPT | Performed by: NURSE PRACTITIONER

## 2022-06-28 PROCEDURE — 11000001 HC ACUTE MED/SURG PRIVATE ROOM

## 2022-06-28 PROCEDURE — 97110 THERAPEUTIC EXERCISES: CPT

## 2022-06-28 RX ADMIN — VANCOMYCIN HYDROCHLORIDE 750 MG: 750 INJECTION, POWDER, LYOPHILIZED, FOR SOLUTION INTRAVENOUS at 10:06

## 2022-06-28 RX ADMIN — METOPROLOL TARTRATE 75 MG: 50 TABLET, FILM COATED ORAL at 08:06

## 2022-06-28 RX ADMIN — FAMOTIDINE 20 MG: 20 TABLET ORAL at 10:06

## 2022-06-28 RX ADMIN — ALPRAZOLAM 0.5 MG: 0.5 TABLET ORAL at 08:06

## 2022-06-28 RX ADMIN — AMIODARONE HYDROCHLORIDE 200 MG: 200 TABLET ORAL at 08:06

## 2022-06-28 RX ADMIN — APIXABAN 2.5 MG: 2.5 TABLET, FILM COATED ORAL at 10:06

## 2022-06-28 RX ADMIN — ATORVASTATIN CALCIUM 10 MG: 10 TABLET, FILM COATED ORAL at 08:06

## 2022-06-28 RX ADMIN — TRAMADOL HYDROCHLORIDE 50 MG: 50 TABLET ORAL at 09:06

## 2022-06-28 RX ADMIN — ASPIRIN 81 MG: 81 TABLET, COATED ORAL at 10:06

## 2022-06-28 RX ADMIN — LIDOCAINE 1 PATCH: 50 PATCH TOPICAL at 10:06

## 2022-06-28 RX ADMIN — APIXABAN 2.5 MG: 2.5 TABLET, FILM COATED ORAL at 08:06

## 2022-06-28 RX ADMIN — AMIODARONE HYDROCHLORIDE 200 MG: 200 TABLET ORAL at 10:06

## 2022-06-28 RX ADMIN — AMLODIPINE BESYLATE 5 MG: 5 TABLET ORAL at 10:06

## 2022-06-28 RX ADMIN — METOPROLOL TARTRATE 75 MG: 50 TABLET, FILM COATED ORAL at 10:06

## 2022-06-28 RX ADMIN — MIRTAZAPINE 15 MG: 15 TABLET, FILM COATED ORAL at 08:06

## 2022-06-28 RX ADMIN — ISOSORBIDE MONONITRATE 30 MG: 30 TABLET, EXTENDED RELEASE ORAL at 10:06

## 2022-06-28 RX ADMIN — TRAMADOL HYDROCHLORIDE 50 MG: 50 TABLET ORAL at 03:06

## 2022-06-28 NOTE — PROGRESS NOTES
Pharmacokinetic Assessment Follow Up: IV Vancomycin    Vancomycin serum concentration assessment(s):    The random level was drawn correctly and can be used to guide therapy at this time. The measurement is within the desired definitive target range of 15 to 20 mcg/mL.    Vancomycin Regimen Plan:  Re-dosed with 750 mg of Vancomycin once today    Re-dose when the random level is less than 20 mcg/mL, next level to be drawn at with AM labs on 6/29    Drug levels (last 3 results):  Recent Labs   Lab Result Units 06/26/22  0509 06/27/22 0415 06/28/22  0559   Vancomycin, Random ug/mL 13.6 16.8 18.5       Pharmacy will continue to follow and monitor vancomycin.    Please contact pharmacy at extension [phone] for questions regarding this assessment.    Thank you for the consult,   Libertad Camilo       Patient brief summary:  Codi Black is a 89 y.o. female initiated on antimicrobial therapy with IV Vancomycin for treatment of bacteremia    The patient's current regimen is pulse dosing due to renal fxn    Drug Allergies:   Review of patient's allergies indicates:  No Known Allergies    Actual Body Weight:   62.1 kg    Renal Function:   Estimated Creatinine Clearance: 24.8 mL/min (based on SCr of 1.3 mg/dL).,     Dialysis Method (if applicable):  N/A    CBC (last 72 hours):  Recent Labs   Lab Result Units 06/26/22  0509 06/27/22 0415 06/28/22  0559   WBC K/uL 23.05* 23.28* 18.42*   Hemoglobin g/dL 10.8* 11.4* 11.1*   Hematocrit % 33.2* 35.6* 35.4*   Platelets K/uL 432 434 403   Gran % % 92.1* 75.1* 71.6   Lymph % % 3.0* 10.3* 10.8*   Mono % % 2.3* 11.2 11.8   Eosinophil % % 0.0 0.2 2.0   Basophil % % 0.3 0.4 0.4   Differential Method  Automated Automated Automated       Metabolic Panel (last 72 hours):  Recent Labs   Lab Result Units 06/26/22  0509 06/27/22 0415 06/28/22  0559   Sodium mmol/L 141 143 142   Potassium mmol/L 4.9 4.8 4.9   Chloride mmol/L 105 105 101   CO2 mmol/L 34* 35* 35*   Glucose mg/dL 138* 86 73    BUN mg/dL 58* 60* 47*   Creatinine mg/dL 1.4 1.3 1.3   Albumin g/dL 2.2* 2.4* 2.2*   Total Bilirubin mg/dL 0.4 0.5 0.8   Alkaline Phosphatase U/L 60 63 68   AST U/L 14 19 20   ALT U/L 26 29 30   Magnesium mg/dL 1.8 1.6 1.5*       Vancomycin Administrations:  vancomycin given in the last 96 hours                     vancomycin 750 mg in dextrose 5 % 250 mL IVPB (ready to mix system) (mg) 750 mg New Bag 06/27/22 1012    vancomycin 750 mg in dextrose 5 % 250 mL IVPB (ready to mix system) (mg) 750 mg New Bag 06/26/22 0902    vancomycin 750 mg in dextrose 5 % 250 mL IVPB (ready to mix system) (mg) 750 mg New Bag 06/24/22 0947                    Microbiologic Results:  Microbiology Results (last 7 days)       Procedure Component Value Units Date/Time    Blood culture [[id number]] Collected: 06/21/22 1647    Order Status: Completed Specimen: Blood from Antecubital, Left Updated: 06/26/22 2312     Blood Culture, Routine No growth after 5 days.    Blood culture [[id number]] Collected: 06/21/22 1643    Order Status: Completed Specimen: Blood Updated: 06/26/22 2312     Blood Culture, Routine No growth after 5 days.    Blood culture x two cultures. Draw prior to antibiotics. [[id number]]  (Abnormal)  (Susceptibility) Collected: 06/18/22 1209    Order Status: Completed Specimen: Blood Updated: 06/22/22 0815     Blood Culture, Routine Gram stain laura bottle: Gram positive cocci in clusters resembling Staph       Results called to and read back by: Poornima Rojas RN 06/19/2022  22:26      STAPHYLOCOCCUS EPIDERMIDIS    Narrative:      Aerobic and anaerobic    Blood culture x two cultures. Draw prior to antibiotics. [[id number]]  (Abnormal)  (Susceptibility) Collected: 06/18/22 1205    Order Status: Completed Specimen: Blood Updated: 06/22/22 0700     Blood Culture, Routine Gram stain laura bottle: Gram positive cocci in clusters resembling Staph       Results called to and read back by: Zuleika Howell 06/19/2022  12:06      Gram stain aer  bottle: Gram positive cocci in clusters resembling Staph       Positive results previously called 06/19/2022  14:32      STAPHYLOCOCCUS EPIDERMIDIS    Narrative:      Aerobic and anaerobic

## 2022-06-28 NOTE — PT/OT/SLP PROGRESS
Physical Therapy Treatment    Patient Name:  Codi Black   MRN:  [id number]    Recommendations:     Discharge Recommendations:   (Patient awaiting insurance approval for IPR)   Discharge Equipment Recommendations:  (to be determined)   Barriers to discharge: current medical and functional status    Assessment:     Codi Black is a 89 y.o. female admitted with a medical diagnosis of Pneumonia due to infectious organism.  She presents with the following impairments/functional limitations:    increased pain, and decreased functional strength, endurance and mobility impacting gait, transfers, bed mobility, coordination, balance, safety and overall functional activity.    Rehab Prognosis: Fair; patient would benefit from acute skilled PT services to address these deficits and reach maximum level of function.    Recent Surgery: * No surgery found *      Plan:     During this hospitalization, patient to be seen 6 x/week to address the identified rehab impairments via gait training, therapeutic activities, therapeutic exercises and progress toward the following goals:    · Plan of Care Expires:  07/08/22    Subjective     Chief Complaint: Patient continues to complain of (R) hip pain and overall fatigue.   Patient/Family Comments/goals: Agreeable to therapy intervention  Pain/Comfort:  · Pain Rating 1:  ((R) hip unable to rate on pain scale)      Objective:     Communicated with nurse prior to session.  Patient found supine upon PT entry to room.     General Precautions: Standard, fall   Orthopedic Precautions:N/A   Braces:  N/A     Therapeutic Activities and Exercises:   Patient performed bed mobility with mod assist for trunk elevation and to remove LEs from bed. Scooting at Mod assist. Patient performed sit to stand with min assist but unable to achieve full standing posture. Patient performed transfer to bedside chair with mod assist and extra time needed; patient with difficulty advancing feet for transfer due to  decreased weight shifting onto (L) LE. Patient performed seated LE exercises x20 reps.     Patient left up in chair with all lines intact, call button in reach, nurse notified notified and  present..    GOALS:   Multidisciplinary Problems     Physical Therapy Goals        Problem: Physical Therapy    Goal Priority Disciplines Outcome Goal Variances Interventions   Physical Therapy Goal     PT, PT/OT Ongoing, Progressing     Description: Goals to be met by: 2022    Patient will increase functional independence with mobility by performin. Supine to/from sit with MInimal Assistance.  2. Rolling to Left and Right with Stand-by Assistance.  3. Sit to stand transfer with Contact Guard Assistance  4. Bed to chair transfer with Contact Guard Assistance using Rolling Walker  5. Gait  x 10 feet with Minimal Assistance using Rolling Walker.                        Time Tracking:     PT Received On: 22  PT Start Time: 1003     PT Stop Time: 1026  PT Total Time (min): 23 min     Billable Minutes: Therapeutic Activity 8 and Therapeutic Exercise 15    2022

## 2022-06-28 NOTE — NURSING
Patient is exhibiting symptoms of COVID. Patient states she has been having a sore throat, cough, and congestion for the past few days. Patient also states that her daughter tested positive for COVID today.

## 2022-06-28 NOTE — PLAN OF CARE
The patient was accepted to IPR by the accepting physician, per IPR DEE Hughes. She is going to submit to the patient's insurance provider for authorization. SW will continue to monitor.

## 2022-06-28 NOTE — PHYSICIAN QUERY
PT Name: Codi Black  MR #: [id number]     DOCUMENTATION CLARIFICATION    CDS: Idalmis Lucas RN      Contact information:[email or web address]  This form is a permanent document in the medical record.    Query Date: June 28, 2022    By submitting this query, we are merely seeking further clarification of documentation to reflect the severity of illness of your patient. Please utilize your independent clinical judgment when addressing the question(s) below.     The Medical Record contains the following:   Indicators   Supporting Clinical Findings Location in Medical Record   x Documentation of Sepsis, Septic Shock Sepsis 6/18 ED PN, and 6/20, 6/21 HM PN     x Blood Culture 6/18 Blood culture x two cultures  Final result:  STAPHYLOCOCCUS EPIDERMIDIS  Susceptibility    Staphylococcus epidermidis   CULTURE, BLOOD   Vancomycin 2 mcg/mL Sensitive    Lab Values    Respiratory Culture      Urine Culture      Other Culture     x Acute/Chronic Illness Pneumonia  Bacteremia  NSTEMI  Hx of hypertension, hyperlipidemia, and arthritis   6/27 HM PN   x Medication/Treatment vancomycin 750 mg in dextrose 5 % 250 mL IVPB q 24 hrs   6/19-6/22, 6/24, 6/26, 6/27 MAR    Other          Provider, please further specify the Sepsis diagnosis:     [  x ] Due to Staphylococcus epidermidis     [   ] Other  (please specify): ____________________     [   ] Clinically Undetermined         Please document in your progress notes daily for the duration of treatment until resolved, and include in your discharge summary.  Form No. [id number]

## 2022-06-28 NOTE — PT/OT/SLP PROGRESS
Occupational Therapy   Treatment    Name: Codi Black  MRN: [id number]  Admitting Diagnosis:  Pneumonia due to infectious organism       Recommendations:     Discharge Recommendations: other (see comments) (24 hour care if home with home health)  Discharge Equipment Recommendations:  walker, rolling  Barriers to discharge:  Decreased caregiver support    Assessment:     Codi Black is a 89 y.o. female with a medical diagnosis of Pneumonia due to infectious organism.  She presents with Weakness and sorethroat. Performance deficits affecting function are weakness, impaired endurance, decreased upper extremity function, pain.     Rehab Prognosis:  Fair; patient would benefit from acute skilled OT services to address these deficits and reach maximum level of function.       Plan:     Patient to be seen 6 x/week to address the above listed problems via self-care/home management, therapeutic activities, therapeutic exercises  · Plan of Care Expires: 07/08/22  · Plan of Care Reviewed with: patient    Subjective     Pain/Comfort:  · Pain Rating 1: other (see comments) (R hip unable to rate on pain scale)    Objective:     Communicated with: Nurse prior to session.  Patient found up in chair with telemetry, peripheral IV, oxygen, PureWick upon OT entry to room.    General Precautions: Standard, fall, airborne, droplet, contact   Orthopedic Precautions:N/A   Braces: N/A     Occupational Performance:     Bed Mobility:    · Patient completed Rolling/Turning to Left with  maximal assistance  · Patient completed Rolling/Turning to Right with maximal assistance  · Patient completed Scooting/Bridging with maximal assistance  · Patient completed Sit to Supine with maximal assistance and 2 persons     Functional Mobility/Transfers:  · Patient completed Bed <> Chair Transfer using Step Transfer technique with minimum assistance with rolling walker    [facility] 6 Click ADL: 16    Treatment & Education:  Pt Requesting to get into bed  upon entering room. Pt was able to transfer using R/W with min A. Pt requesting to be changed once in bed.     Patient left supine with nurse presentEducation:      GOALS:   Multidisciplinary Problems     Occupational Therapy Goals        Problem: Occupational Therapy    Goal Priority Disciplines Outcome Interventions   Occupational Therapy Goal     OT, PT/OT Ongoing, Progressing    Description: Goals to be met by: 7/8/2022     Patient will increase functional independence with ADLs by performing:    UE Dressing with Minimal Assistance.  Grooming while EOB with Set-up Assistance.  Toileting from bedside commode with Minimal Assistance for hygiene and clothing management.   Sitting at edge of bed x5 minutes with Minimal Assistance.  Rolling to Right, Left with Minimal Assistance.   Supine to sit with Minimal Assistance.                     Time Tracking:     OT Date of Treatment: 06/28/22  OT Start Time: 1340  OT Stop Time: 1350  OT Total Time (min): 10 min    Billable Minutes:Therapeutic Activity 10    OT/REJI: OT          6/28/2022

## 2022-06-28 NOTE — PROGRESS NOTES
[facility] Medicine  Progress Note    Patient Name: Codi Black  MRN: [id number]  Patient Class: IP- Inpatient   Admission Date: 6/18/2022  Length of Stay: 10 days  Attending Physician: Rafael Quan III, MD  Primary Care Provider: Rafael Quan III, MD        Subjective:     Principal Problem:Pneumonia due to infectious organism        HPI:  Pt is 89 y/o female with Hx of hypertension, hyperlipidemia, and arthritis, presented to [facility] ED yesterday with c/o generalized weakness. She reports having a productive cough. She denies chest pain. High-Sensitivity Troponin levels, although negative, trended up x3 sets - Cardiology consulted. This morning she reports continued fatigue and weakness; also c/o left hand pain; shortness of breath improved with rest and oxygen.      Overview/Hospital Course:  6/20 SD: Pt's spouse at bedside. Pt reports feeling better compared to initial arrival to the hospital. Blood cultures x2 positive for gram + cocci in clusters resembling Staph, awaiting sensitivity. Vancomycin added to antibiotic therapy. Appreciate Dr. Chapin's recommendations for NSTEMI and HTN. Dr. Chapin advises that pt will need angiogram after treatment of pneumonia; will determine need for inpatient vs outpatient as patient care progresses.  6/21 SD: Pt's spouse at bedside. Blood cultures x2 positive for staphylococcus epidermidis, susceptibility pending. Continue IV abx therapy and repeat blood cultures today. Encourage pt to participate with therapy.  6/22 SD: Continue IV abx, following repeat cultures. Encourage participation with therapy.  6/23 SD: Sitting up in bed, at breakfast with good appetite. Continue IV abx therapy, following repeat cultures. Encourage participation with therapy.  6/24 SD: Pt's spouse and daughter at bedside. Continue IV abx therapy, following cultures. Continue therapy efforts.  6/25 AA, weekend crosscover, patient being treated for pneumonia, on IV antibiotic,  blood cultures 2/2 bottles positive for Staph epidermidis, on IV vanc, PT OT on board  6/26 AA, weekend crosscover, antibiotics on board for pneumonia, on IV vancomycin, blood culture positive for Staph epidermidis, afebrile, PT/OT.  6/27/2022 FM:  Patient case has been reviewed and I am returning from vacation.  Patient was admitted with pneumonia and positive blood cultures and a decline in her physical condition.  Her acute medical issues seem to have stabilized but she is significantly declined in functional state and her daughter has moved here from out of town to health care for her.  Her daughter is unable to physically meet her needs.  The patient wants to return home and live with her .  May be a potential candidate for inpatient rehab if she is able to participate and putting good effort.  Needs better pain control for the arthralgia of the hip.  6/28/2022 FM:  Patient worked with therapy yesterday and the notes reviewed.  I do feel that she is a good candidate for inpatient rehab and her goal is to return home at a supervision to modified independent functional level.  I feel with better pain control and close supervision and medical management this is achievable.  Will submit to her insurance company for approval.      Interval History: Patient seen and examined.    Review of Systems   Constitutional:  Positive for activity change and fatigue.   Respiratory:  Positive for cough. Negative for chest tightness and wheezing.    Cardiovascular:  Negative for chest pain, palpitations and leg swelling.   Gastrointestinal:  Negative for abdominal pain, constipation, diarrhea, nausea and vomiting.   Musculoskeletal:  Positive for arthralgias and gait problem.   Neurological:  Positive for weakness.   Psychiatric/Behavioral:  Positive for agitation. The patient is nervous/anxious.    Objective:     Vital Signs (Most Recent):  Temp: 97.9 °F (36.6 °C) (06/28/22 0747)  Pulse: 64 (06/28/22 0747)  Resp: (!) 22  (06/28/22 0747)  BP: (!) 145/65 (06/28/22 0747)  SpO2: 95 % (06/28/22 0747)   Vital Signs (24h Range):  Temp:  [97.9 °F (36.6 °C)-98.4 °F (36.9 °C)] 97.9 °F (36.6 °C)  Pulse:  [53-67] 64  Resp:  [18-24] 22  SpO2:  [91 %-95 %] 95 %  BP: (122-176)/(57-74) 145/65     Weight: 62.1 kg (137 lb)  Body mass index is 23.52 kg/m².    Intake/Output Summary (Last 24 hours) at 6/28/2022 0844  Last data filed at 6/28/2022 0600  Gross per 24 hour   Intake 1760 ml   Output 1550 ml   Net 210 ml        Physical Exam  Vitals and nursing note reviewed.   HENT:      Head: Normocephalic and atraumatic.      Mouth/Throat:      Mouth: Mucous membranes are moist.      Pharynx: Oropharynx is clear.   Eyes:      General: No scleral icterus.     Extraocular Movements: Extraocular movements intact.      Pupils: Pupils are equal, round, and reactive to light.   Cardiovascular:      Rate and Rhythm: Normal rate. Rhythm irregular.      Pulses: Normal pulses.      Heart sounds: Normal heart sounds.   Pulmonary:      Effort: Pulmonary effort is normal.      Breath sounds: Normal breath sounds.   Abdominal:      General: Bowel sounds are normal. There is no distension.      Palpations: Abdomen is soft.      Tenderness: There is no abdominal tenderness. There is no guarding.   Musculoskeletal:         General: Tenderness present. Normal range of motion.      Cervical back: Normal range of motion. No rigidity.   Skin:     General: Skin is warm and dry.      Capillary Refill: Capillary refill takes less than 2 seconds.   Neurological:      General: No focal deficit present.      Mental Status: She is alert and oriented to person, place, and time.      Cranial Nerves: No cranial nerve deficit.      Sensory: No sensory deficit.      Motor: Weakness present.   Psychiatric:         Mood and Affect: Mood normal.         Behavior: Behavior normal.       Significant Labs: All pertinent labs within the past 24 hours have been reviewed.  Recent Lab Results          06/28/22  0559        Albumin 2.2       Alkaline Phosphatase 68       ALT 30       Anion Gap 6       AST 20       Baso # 0.07       Basophil % 0.4       BILIRUBIN TOTAL 0.8  Comment: For infants and newborns, interpretation of results should be based  on gestational age, weight and in agreement with clinical  observations.    Premature Infant recommended reference ranges:  Up to 24 hours.............<8.0 mg/dL  Up to 48 hours............<12.0 mg/dL  3-5 days..................<15.0 mg/dL  6-29 days.................<15.0 mg/dL    For patients on Eltrombopag therapy, use of Dimension [place] TBIL is   not   recommended.         BUN 47       Calcium 8.8       Chloride 101       CO2 35       Creatinine 1.3       Differential Method Automated       eGFR if  41.7       eGFR if non  36.2  Comment: Calculation used to obtain the estimated glomerular filtration  rate (eGFR) is the CKD-EPI equation.          Eos # 0.4       Eosinophil % 2.0       Glucose 73       Gran # (ANC) 13.2       Gran % 71.6       Hematocrit 35.4       Hemoglobin 11.1       Immature Grans (Abs) 0.62  Comment: Mild elevation in immature granulocytes is non specific and   can be seen in a variety of conditions including stress response,   acute inflammation, trauma and pregnancy. Correlation with other   laboratory and clinical findings is essential.         Immature Granulocytes 3.4       Lymph # 2.0       Lymph % 10.8       Magnesium 1.5       MCH 28.5       MCHC 31.4       MCV 91       Mono # 2.2       Mono % 11.8       MPV 9.6       nRBC 0       Platelets 403       Potassium 4.9       PROTEIN TOTAL 5.8       RBC 3.90       RDW 13.2       Sodium 142       WBC 18.42               Significant Imaging: I have reviewed all pertinent imaging results/findings within the past 24 hours.      Assessment/Plan:      * Pneumonia due to infectious organism  Blood cultures x2 positive for staphylococcus epidermidis, susceptibility  pending. Vancomycin initiated on 6/19/22 6/24: Day 7 of IV abx therapy with Rocephin and Azithromycin; Day 5 of abx therapy with Vancomycin. Repeat blood cultures showing NGTD x3 days  6/25 patient on Rocephin azithromycin, completed seven day therapy, plan to DC, on day six for IV vanc for blood cultures with Staph epi  6/26 patient completed antibiotic therapy for pneumonia, on IV vanc for blood cultures, day seven    Has completed abx, wbc noted but on steroids, stopping steroids today.  Now off steroids      Bacteremia  Patient 2/2 bottles positive for Staph epidermidis, sensitive to Vanc, on day 6/7 6/26 day seven of seven for blood cultures with Staph epidermidis, continue PT OT effort  Doing well with abx, stop soon.    Weakness  Continue PT/OT efforts  Patient with a significant debility and change in her functional state.  Possibly a inpatient rehab candidate and return home but will discuss with therapy today as she refused yesterday and informed the patient she cannot refuse therapy and must put in good effort.    NSTEMI (non-ST elevated myocardial infarction)  Dr. Chapin managing    DVT prophylaxis  On eliquis.    Hypertension  BP noted, continue to add/modify agents prn.        Arthritis  Pain to left hand. CRP and Sed Rate elevated. Treatment with steroids.  6/24: Pain improving    Try lidocaine patch.  Improved pain today.      Pelvic fracture  Still with residual pain and limited mobility, would like to try IPR.        VTE Risk Mitigation (From admission, onward)         Ordered     apixaban tablet 2.5 mg  2 times daily         06/21/22 1856     IP VTE HIGH RISK PATIENT  Once         06/18/22 1525     Place sequential compression device  Until discontinued         06/18/22 1525                Discharge Planning   KIMANI:      Code Status: Full Code   Is the patient medically ready for discharge?:     Reason for patient still in hospital (select all that apply): Patient trending condition  Discharge  Plan A: Rehab   Discharge Delays: None known at this time              Rafael Quan III, MD  Department of Hospital Medicine   [facility]

## 2022-06-28 NOTE — ASSESSMENT & PLAN NOTE
Patient 2/2 bottles positive for Staph epidermidis, sensitive to Vanc, on day 6/7 6/26 day seven of seven for blood cultures with Staph epidermidis, continue PT OT effort  Doing well with abx, stop soon.

## 2022-06-28 NOTE — PLAN OF CARE
Plan of care reviewed. Patient denies questions or concerns. Pain managed effectively with Tramadol 50mg.

## 2022-06-28 NOTE — PLAN OF CARE
Plan of care reviewed with the patient. Patient was moved to room 636 due to COVID being clinically indicated. Romel Samuels RN, infectious disease nurse, will retest in 3-5 days.

## 2022-06-28 NOTE — SUBJECTIVE & OBJECTIVE
Interval History: Patient seen and examined.    Review of Systems   Constitutional:  Positive for activity change and fatigue.   Respiratory:  Positive for cough. Negative for chest tightness and wheezing.    Cardiovascular:  Negative for chest pain, palpitations and leg swelling.   Gastrointestinal:  Negative for abdominal pain, constipation, diarrhea, nausea and vomiting.   Musculoskeletal:  Positive for arthralgias and gait problem.   Neurological:  Positive for weakness.   Psychiatric/Behavioral:  Positive for agitation. The patient is nervous/anxious.    Objective:     Vital Signs (Most Recent):  Temp: 97.9 °F (36.6 °C) (06/28/22 0747)  Pulse: 64 (06/28/22 0747)  Resp: (!) 22 (06/28/22 0747)  BP: (!) 145/65 (06/28/22 0747)  SpO2: 95 % (06/28/22 0747)   Vital Signs (24h Range):  Temp:  [97.9 °F (36.6 °C)-98.4 °F (36.9 °C)] 97.9 °F (36.6 °C)  Pulse:  [53-67] 64  Resp:  [18-24] 22  SpO2:  [91 %-95 %] 95 %  BP: (122-176)/(57-74) 145/65     Weight: 62.1 kg (137 lb)  Body mass index is 23.52 kg/m².    Intake/Output Summary (Last 24 hours) at 6/28/2022 0844  Last data filed at 6/28/2022 0600  Gross per 24 hour   Intake 1760 ml   Output 1550 ml   Net 210 ml        Physical Exam  Vitals and nursing note reviewed.   HENT:      Head: Normocephalic and atraumatic.      Mouth/Throat:      Mouth: Mucous membranes are moist.      Pharynx: Oropharynx is clear.   Eyes:      General: No scleral icterus.     Extraocular Movements: Extraocular movements intact.      Pupils: Pupils are equal, round, and reactive to light.   Cardiovascular:      Rate and Rhythm: Normal rate. Rhythm irregular.      Pulses: Normal pulses.      Heart sounds: Normal heart sounds.   Pulmonary:      Effort: Pulmonary effort is normal.      Breath sounds: Normal breath sounds.   Abdominal:      General: Bowel sounds are normal. There is no distension.      Palpations: Abdomen is soft.      Tenderness: There is no abdominal tenderness. There is no guarding.    Musculoskeletal:         General: Tenderness present. Normal range of motion.      Cervical back: Normal range of motion. No rigidity.   Skin:     General: Skin is warm and dry.      Capillary Refill: Capillary refill takes less than 2 seconds.   Neurological:      General: No focal deficit present.      Mental Status: She is alert and oriented to person, place, and time.      Cranial Nerves: No cranial nerve deficit.      Sensory: No sensory deficit.      Motor: Weakness present.   Psychiatric:         Mood and Affect: Mood normal.         Behavior: Behavior normal.       Significant Labs: All pertinent labs within the past 24 hours have been reviewed.  Recent Lab Results         06/28/22  0559        Albumin 2.2       Alkaline Phosphatase 68       ALT 30       Anion Gap 6       AST 20       Baso # 0.07       Basophil % 0.4       BILIRUBIN TOTAL 0.8  Comment: For infants and newborns, interpretation of results should be based  on gestational age, weight and in agreement with clinical  observations.    Premature Infant recommended reference ranges:  Up to 24 hours.............<8.0 mg/dL  Up to 48 hours............<12.0 mg/dL  3-5 days..................<15.0 mg/dL  6-29 days.................<15.0 mg/dL    For patients on Eltrombopag therapy, use of Dimension [place] TBIL is   not   recommended.         BUN 47       Calcium 8.8       Chloride 101       CO2 35       Creatinine 1.3       Differential Method Automated       eGFR if  41.7       eGFR if non  36.2  Comment: Calculation used to obtain the estimated glomerular filtration  rate (eGFR) is the CKD-EPI equation.          Eos # 0.4       Eosinophil % 2.0       Glucose 73       Gran # (ANC) 13.2       Gran % 71.6       Hematocrit 35.4       Hemoglobin 11.1       Immature Grans (Abs) 0.62  Comment: Mild elevation in immature granulocytes is non specific and   can be seen in a variety of conditions including stress response,   acute  inflammation, trauma and pregnancy. Correlation with other   laboratory and clinical findings is essential.         Immature Granulocytes 3.4       Lymph # 2.0       Lymph % 10.8       Magnesium 1.5       MCH 28.5       MCHC 31.4       MCV 91       Mono # 2.2       Mono % 11.8       MPV 9.6       nRBC 0       Platelets 403       Potassium 4.9       PROTEIN TOTAL 5.8       RBC 3.90       RDW 13.2       Sodium 142       WBC 18.42               Significant Imaging: I have reviewed all pertinent imaging results/findings within the past 24 hours.

## 2022-06-28 NOTE — ASSESSMENT & PLAN NOTE
Blood cultures x2 positive for staphylococcus epidermidis, susceptibility pending. Vancomycin initiated on 6/19/22 6/24: Day 7 of IV abx therapy with Rocephin and Azithromycin; Day 5 of abx therapy with Vancomycin. Repeat blood cultures showing NGTD x3 days  6/25 patient on Rocephin azithromycin, completed seven day therapy, plan to DC, on day six for IV vanc for blood cultures with Staph epi  6/26 patient completed antibiotic therapy for pneumonia, on IV vanc for blood cultures, day seven    Has completed abx, wbc noted but on steroids, stopping steroids today.  Now off steroids

## 2022-06-28 NOTE — ASSESSMENT & PLAN NOTE
Pain to left hand. CRP and Sed Rate elevated. Treatment with steroids.  6/24: Pain improving    Try lidocaine patch.  Improved pain today.

## 2022-06-29 LAB
ALBUMIN SERPL BCP-MCNC: 2.2 G/DL (ref 3.5–5.2)
ALP SERPL-CCNC: 70 U/L (ref 55–135)
ALT SERPL W/O P-5'-P-CCNC: 24 U/L (ref 10–44)
ANION GAP SERPL CALC-SCNC: 0 MMOL/L (ref 8–16)
AST SERPL-CCNC: 21 U/L (ref 10–40)
BASOPHILS # BLD AUTO: 0.07 K/UL (ref 0–0.2)
BASOPHILS NFR BLD: 0.4 % (ref 0–1.9)
BILIRUB SERPL-MCNC: 0.8 MG/DL (ref 0.1–1)
BUN SERPL-MCNC: 39 MG/DL (ref 8–23)
CALCIUM SERPL-MCNC: 8.6 MG/DL (ref 8.7–10.5)
CHLORIDE SERPL-SCNC: 101 MMOL/L (ref 95–110)
CO2 SERPL-SCNC: 38 MMOL/L (ref 23–29)
CREAT SERPL-MCNC: 1.2 MG/DL (ref 0.5–1.4)
DIFFERENTIAL METHOD: ABNORMAL
EOSINOPHIL # BLD AUTO: 0.3 K/UL (ref 0–0.5)
EOSINOPHIL NFR BLD: 1.9 % (ref 0–8)
ERYTHROCYTE [DISTWIDTH] IN BLOOD BY AUTOMATED COUNT: 13.1 % (ref 11.5–14.5)
EST. GFR  (AFRICAN AMERICAN): 46 ML/MIN/1.73 M^2
EST. GFR  (NON AFRICAN AMERICAN): 39.9 ML/MIN/1.73 M^2
GLUCOSE SERPL-MCNC: 80 MG/DL (ref 70–110)
HCT VFR BLD AUTO: 33.2 % (ref 37–48.5)
HGB BLD-MCNC: 10.4 G/DL (ref 12–16)
IMM GRANULOCYTES # BLD AUTO: 0.47 K/UL (ref 0–0.04)
IMM GRANULOCYTES NFR BLD AUTO: 2.7 % (ref 0–0.5)
LYMPHOCYTES # BLD AUTO: 2.1 K/UL (ref 1–4.8)
LYMPHOCYTES NFR BLD: 12.4 % (ref 18–48)
MAGNESIUM SERPL-MCNC: 1.6 MG/DL (ref 1.6–2.6)
MCH RBC QN AUTO: 28.7 PG (ref 27–31)
MCHC RBC AUTO-ENTMCNC: 31.3 G/DL (ref 32–36)
MCV RBC AUTO: 92 FL (ref 82–98)
MONOCYTES # BLD AUTO: 2.1 K/UL (ref 0.3–1)
MONOCYTES NFR BLD: 12.2 % (ref 4–15)
NEUTROPHILS # BLD AUTO: 12 K/UL (ref 1.8–7.7)
NEUTROPHILS NFR BLD: 70.4 % (ref 38–73)
NRBC BLD-RTO: 0 /100 WBC
PLATELET # BLD AUTO: 368 K/UL (ref 150–450)
PMV BLD AUTO: 9.9 FL (ref 9.2–12.9)
POTASSIUM SERPL-SCNC: 5 MMOL/L (ref 3.5–5.1)
PROT SERPL-MCNC: 5.4 G/DL (ref 6–8.4)
RBC # BLD AUTO: 3.62 M/UL (ref 4–5.4)
SODIUM SERPL-SCNC: 139 MMOL/L (ref 136–145)
VANCOMYCIN SERPL-MCNC: 19 UG/ML
WBC # BLD AUTO: 17.1 K/UL (ref 3.9–12.7)

## 2022-06-29 PROCEDURE — 25000003 PHARM REV CODE 250: Performed by: NURSE PRACTITIONER

## 2022-06-29 PROCEDURE — 27000207 HC ISOLATION

## 2022-06-29 PROCEDURE — 80053 COMPREHEN METABOLIC PANEL: CPT | Performed by: NURSE PRACTITIONER

## 2022-06-29 PROCEDURE — 94761 N-INVAS EAR/PLS OXIMETRY MLT: CPT

## 2022-06-29 PROCEDURE — 99900031 HC PATIENT EDUCATION (STAT)

## 2022-06-29 PROCEDURE — 27000221 HC OXYGEN, UP TO 24 HOURS

## 2022-06-29 PROCEDURE — 36415 COLL VENOUS BLD VENIPUNCTURE: CPT | Performed by: INTERNAL MEDICINE

## 2022-06-29 PROCEDURE — 25000003 PHARM REV CODE 250: Performed by: INTERNAL MEDICINE

## 2022-06-29 PROCEDURE — 11000001 HC ACUTE MED/SURG PRIVATE ROOM

## 2022-06-29 PROCEDURE — 80202 ASSAY OF VANCOMYCIN: CPT | Performed by: INTERNAL MEDICINE

## 2022-06-29 PROCEDURE — 97116 GAIT TRAINING THERAPY: CPT | Mod: CQ

## 2022-06-29 PROCEDURE — 63600175 PHARM REV CODE 636 W HCPCS: Performed by: INTERNAL MEDICINE

## 2022-06-29 PROCEDURE — 85025 COMPLETE CBC W/AUTO DIFF WBC: CPT | Performed by: NURSE PRACTITIONER

## 2022-06-29 PROCEDURE — 63600175 PHARM REV CODE 636 W HCPCS: Performed by: NURSE PRACTITIONER

## 2022-06-29 PROCEDURE — 83735 ASSAY OF MAGNESIUM: CPT | Performed by: NURSE PRACTITIONER

## 2022-06-29 PROCEDURE — 97530 THERAPEUTIC ACTIVITIES: CPT | Mod: CO

## 2022-06-29 PROCEDURE — 99900035 HC TECH TIME PER 15 MIN (STAT)

## 2022-06-29 RX ORDER — LEVOFLOXACIN 500 MG/1
500 TABLET, FILM COATED ORAL DAILY
Status: DISCONTINUED | OUTPATIENT
Start: 2022-06-29 | End: 2022-07-02

## 2022-06-29 RX ADMIN — AMIODARONE HYDROCHLORIDE 200 MG: 200 TABLET ORAL at 09:06

## 2022-06-29 RX ADMIN — ALPRAZOLAM 0.5 MG: 0.5 TABLET ORAL at 09:06

## 2022-06-29 RX ADMIN — AMIODARONE HYDROCHLORIDE 200 MG: 200 TABLET ORAL at 08:06

## 2022-06-29 RX ADMIN — ONDANSETRON HYDROCHLORIDE 4 MG: 2 SOLUTION INTRAMUSCULAR; INTRAVENOUS at 01:06

## 2022-06-29 RX ADMIN — ISOSORBIDE MONONITRATE 30 MG: 30 TABLET, EXTENDED RELEASE ORAL at 08:06

## 2022-06-29 RX ADMIN — MIRTAZAPINE 15 MG: 15 TABLET, FILM COATED ORAL at 09:06

## 2022-06-29 RX ADMIN — VANCOMYCIN HYDROCHLORIDE 750 MG: 750 INJECTION, POWDER, LYOPHILIZED, FOR SOLUTION INTRAVENOUS at 08:06

## 2022-06-29 RX ADMIN — APIXABAN 2.5 MG: 2.5 TABLET, FILM COATED ORAL at 09:06

## 2022-06-29 RX ADMIN — AMLODIPINE BESYLATE 5 MG: 5 TABLET ORAL at 08:06

## 2022-06-29 RX ADMIN — LEVOFLOXACIN 500 MG: 500 TABLET, FILM COATED ORAL at 11:06

## 2022-06-29 RX ADMIN — METOPROLOL TARTRATE 75 MG: 50 TABLET, FILM COATED ORAL at 09:06

## 2022-06-29 RX ADMIN — ATORVASTATIN CALCIUM 10 MG: 10 TABLET, FILM COATED ORAL at 09:06

## 2022-06-29 RX ADMIN — ASPIRIN 81 MG: 81 TABLET, COATED ORAL at 08:06

## 2022-06-29 RX ADMIN — FAMOTIDINE 20 MG: 20 TABLET ORAL at 08:06

## 2022-06-29 RX ADMIN — METOPROLOL TARTRATE 75 MG: 50 TABLET, FILM COATED ORAL at 08:06

## 2022-06-29 RX ADMIN — APIXABAN 2.5 MG: 2.5 TABLET, FILM COATED ORAL at 08:06

## 2022-06-29 RX ADMIN — LIDOCAINE 1 PATCH: 50 PATCH TOPICAL at 08:06

## 2022-06-29 RX ADMIN — TRAMADOL HYDROCHLORIDE 50 MG: 50 TABLET ORAL at 09:06

## 2022-06-29 NOTE — PLAN OF CARE
06/29/22 1224   Medicare Message   Important Message from Medicare regarding Discharge Appeal Rights Given to patient/caregiver;Explained to patient/caregiver;Signed/date by patient/caregiver   Date IMM was signed 06/29/22   Time IMM was signed 1149     IMM discussed with patient's daughter, Sherin MERCEDES.  Copy provided and documented.  Voiced understanding.

## 2022-06-29 NOTE — PT/OT/SLP PROGRESS
"Physical Therapy Treatment    Patient Name:  Codi Black   MRN:  [id number]    Time Tracking:     PT Start Time: 1130     PT Stop Time: 1156  PT Total Time (min): 26 min     Billable Minutes: Gait Training 26  Stephy Peña, [facility]  2022      Subjective     Patient/Family Comments/goals: reports discomfort to R posterior hip. Pt reports "ready to get up."   Pain/Comfort: did not rate. Only expressed pain when going sit>supine      Objective:     General Precautions: Standard, fall   Orthopedic Precautions:N/A     Communicated with PT, GEIGER prior to session.  Patient found supine upon PT entry to room.     Functional Mobility:  ·    · Rolling Left:  stand by assistance  · Scooting: minimum assistance  · Supine to Sit: minimum assistance  · Sit to Supine: minimum assistance  ·    · Sit to Stand:  minimum assistance with standard walker     Therapeutic Activities and Exercises:   GT 15'x2 w/in room using SW while monitoring lines and breathing symptoms. Providing MIN A for balance & maneuvering SW. Rest break in between gait episodes for SOB recovery.    Patient left HOB elevated with all lines intact, call button in reach and GEIGER present.    GOALS:   Multidisciplinary Problems     Physical Therapy Goals        Problem: Physical Therapy    Goal Priority Disciplines Outcome Goal Variances Interventions   Physical Therapy Goal     PT, PT/OT Ongoing, Progressing     Description: Goals to be met by: 2022    Patient will increase functional independence with mobility by performin. Supine to/from sit with MInimal Assistance.  2. Rolling to Left and Right with Stand-by Assistance.  3. Sit to stand transfer with Contact Guard Assistance  4. Bed to chair transfer with Contact Guard Assistance using Rolling Walker  5. Gait  x 10 feet with Minimal Assistance using Rolling Walker.                        Assessment:     Codi Black is a 89 y.o. female admitted with a medical diagnosis of Pneumonia due to " infectious organism. Pt cooperative and willing to progress gait to tolerance. Cueing needed for safety and directional changes.       Plan:     During this hospitalization, patient to be seen 6 x/week to address the identified rehab impairments gait training, therapeutic activities, therapeutic exercises and progress toward the goals.      Recommendations:     Cont to progress as tolerated.

## 2022-06-29 NOTE — PROGRESS NOTES
Pharmacokinetic Assessment Follow Up: IV Vancomycin    Vancomycin serum concentration assessment(s):    The random level was drawn correctly and can be used to guide therapy at this time. The measurement is within the desired definitive target range of 15 to 20 mcg/mL.    Vancomycin Regimen Plan:    Re-dose when the random level is less than 20 mcg/mL, next level to be drawn at with AM labs on 6/30    Drug levels (last 3 results):  Recent Labs   Lab Result Units 06/27/22 0415 06/28/22  0559 06/29/22  0539   Vancomycin, Random ug/mL 16.8 18.5 19.0       Pharmacy will continue to follow and monitor vancomycin.    Please contact pharmacy at extension [phone] for questions regarding this assessment.    Thank you for the consult,   Libertad Camilo       Patient brief summary:  Codi Black is a 89 y.o. female initiated on antimicrobial therapy with IV Vancomycin for treatment of bacteremia    The patient's current regimen is pulse dosing     Gave a 750 mg dose today.    Drug Allergies:   Review of patient's allergies indicates:  No Known Allergies    Actual Body Weight:   62.1 kg    Renal Function:   Estimated Creatinine Clearance: 26.9 mL/min (based on SCr of 1.2 mg/dL).,     Dialysis Method (if applicable):  N/A    CBC (last 72 hours):  Recent Labs   Lab Result Units 06/27/22 0415 06/28/22  0559 06/29/22  0540   WBC K/uL 23.28* 18.42* 17.10*   Hemoglobin g/dL 11.4* 11.1* 10.4*   Hematocrit % 35.6* 35.4* 33.2*   Platelets K/uL 434 403 368   Gran % % 75.1* 71.6 70.4   Lymph % % 10.3* 10.8* 12.4*   Mono % % 11.2 11.8 12.2   Eosinophil % % 0.2 2.0 1.9   Basophil % % 0.4 0.4 0.4   Differential Method  Automated Automated Automated       Metabolic Panel (last 72 hours):  Recent Labs   Lab Result Units 06/27/22 0415 06/28/22  0559 06/29/22  0539   Sodium mmol/L 143 142 139   Potassium mmol/L 4.8 4.9 5.0   Chloride mmol/L 105 101 101   CO2 mmol/L 35* 35* 38*   Glucose mg/dL 86 73 80   BUN mg/dL 60* 47* 39*    Creatinine mg/dL 1.3 1.3 1.2   Albumin g/dL 2.4* 2.2* 2.2*   Total Bilirubin mg/dL 0.5 0.8 0.8   Alkaline Phosphatase U/L 63 68 70   AST U/L 19 20 21   ALT U/L 29 30 24   Magnesium mg/dL 1.6 1.5* 1.6       Vancomycin Administrations:  vancomycin given in the last 96 hours                     vancomycin 750 mg in dextrose 5 % 250 mL IVPB (ready to mix system) (mg) 750 mg New Bag 06/28/22 1045    vancomycin 750 mg in dextrose 5 % 250 mL IVPB (ready to mix system) (mg) 750 mg New Bag 06/27/22 1012    vancomycin 750 mg in dextrose 5 % 250 mL IVPB (ready to mix system) (mg) 750 mg New Bag 06/26/22 0902                    Microbiologic Results:  Microbiology Results (last 7 days)       Procedure Component Value Units Date/Time    Blood culture [[id number]] Collected: 06/21/22 1647    Order Status: Completed Specimen: Blood from Antecubital, Left Updated: 06/26/22 2312     Blood Culture, Routine No growth after 5 days.    Blood culture [[id number]] Collected: 06/21/22 1643    Order Status: Completed Specimen: Blood Updated: 06/26/22 2312     Blood Culture, Routine No growth after 5 days.    Blood culture x two cultures. Draw prior to antibiotics. [[id number]]  (Abnormal)  (Susceptibility) Collected: 06/18/22 1209    Order Status: Completed Specimen: Blood Updated: 06/22/22 0815     Blood Culture, Routine Gram stain laura bottle: Gram positive cocci in clusters resembling Staph       Results called to and read back by: Poornima Rojas RN 06/19/2022  22:26      STAPHYLOCOCCUS EPIDERMIDIS    Narrative:      Aerobic and anaerobic

## 2022-06-29 NOTE — PLAN OF CARE
The patient was denied for IPR by insurance provider, per Hilario Lopez Peer Specialist.  Dr. Quan agreed to the P2P with Dr. Bedoya. P2P is scheduled for 1530 today.

## 2022-06-29 NOTE — PROGRESS NOTES
[facility] Medicine  Progress Note    Patient Name: Codi Black  MRN: [id number]  Patient Class: IP- Inpatient   Admission Date: 6/18/2022  Length of Stay: 11 days  Attending Physician: Rafael Quan III, MD  Primary Care Provider: Rafael Quan III, MD        Subjective:     Principal Problem:Pneumonia due to infectious organism        HPI:  Pt is 89 y/o female with Hx of hypertension, hyperlipidemia, and arthritis, presented to [facility] ED yesterday with c/o generalized weakness. She reports having a productive cough. She denies chest pain. High-Sensitivity Troponin levels, although negative, trended up x3 sets - Cardiology consulted. This morning she reports continued fatigue and weakness; also c/o left hand pain; shortness of breath improved with rest and oxygen.      Overview/Hospital Course:  6/20 SD: Pt's spouse at bedside. Pt reports feeling better compared to initial arrival to the hospital. Blood cultures x2 positive for gram + cocci in clusters resembling Staph, awaiting sensitivity. Vancomycin added to antibiotic therapy. Appreciate Dr. Chapin's recommendations for NSTEMI and HTN. Dr. Chapin advises that pt will need angiogram after treatment of pneumonia; will determine need for inpatient vs outpatient as patient care progresses.  6/21 SD: Pt's spouse at bedside. Blood cultures x2 positive for staphylococcus epidermidis, susceptibility pending. Continue IV abx therapy and repeat blood cultures today. Encourage pt to participate with therapy.  6/22 SD: Continue IV abx, following repeat cultures. Encourage participation with therapy.  6/23 SD: Sitting up in bed, at breakfast with good appetite. Continue IV abx therapy, following repeat cultures. Encourage participation with therapy.  6/24 SD: Pt's spouse and daughter at bedside. Continue IV abx therapy, following cultures. Continue therapy efforts.  6/25 AA, weekend crosscover, patient being treated for pneumonia, on IV antibiotic,  blood cultures 2/2 bottles positive for Staph epidermidis, on IV vanc, PT OT on board  6/26 AA, weekend crosscover, antibiotics on board for pneumonia, on IV vancomycin, blood culture positive for Staph epidermidis, afebrile, PT/OT.  6/27/2022 FM:  Patient case has been reviewed and I am returning from vacation.  Patient was admitted with pneumonia and positive blood cultures and a decline in her physical condition.  Her acute medical issues seem to have stabilized but she is significantly declined in functional state and her daughter has moved here from out of town to health care for her.  Her daughter is unable to physically meet her needs.  The patient wants to return home and live with her .  May be a potential candidate for inpatient rehab if she is able to participate and putting good effort.  Needs better pain control for the arthralgia of the hip.  6/28/2022 FM:  Patient worked with therapy yesterday and the notes reviewed.  I do feel that she is a good candidate for inpatient rehab and her goal is to return home at a supervision to modified independent functional level.  I feel with better pain control and close supervision and medical management this is achievable.  Will submit to her insurance company for approval.  6/29/2022 FM:  Awaiting insurance company approval.  Patient is working with therapy.  Patient's pain is improved      Interval History: Patient seen and examined.    Review of Systems   Constitutional:  Positive for activity change and fatigue.   Respiratory:  Positive for cough. Negative for chest tightness and wheezing.    Cardiovascular:  Negative for chest pain, palpitations and leg swelling.   Gastrointestinal:  Negative for abdominal pain, constipation, diarrhea, nausea and vomiting.   Musculoskeletal:  Positive for arthralgias and gait problem.   Neurological:  Positive for weakness.   Psychiatric/Behavioral:  Positive for agitation. The patient is nervous/anxious.    Objective:      Vital Signs (Most Recent):  Temp: 98.2 °F (36.8 °C) (06/29/22 0741)  Pulse: 62 (06/29/22 0855)  Resp: (!) 24 (06/29/22 0741)  BP: (!) 160/77 (06/29/22 0741)  SpO2: 96 % (06/29/22 0741)   Vital Signs (24h Range):  Temp:  [97 °F (36.1 °C)-98.9 °F (37.2 °C)] 98.2 °F (36.8 °C)  Pulse:  [56-73] 62  Resp:  [16-24] 24  SpO2:  [85 %-100 %] 96 %  BP: (139-169)/(65-81) 160/77     Weight: 62.1 kg (137 lb)  Body mass index is 23.52 kg/m².    Intake/Output Summary (Last 24 hours) at 6/29/2022 0859  Last data filed at 6/29/2022 0700  Gross per 24 hour   Intake 960 ml   Output 1200 ml   Net -240 ml        Physical Exam  Vitals and nursing note reviewed.   HENT:      Head: Normocephalic and atraumatic.      Mouth/Throat:      Mouth: Mucous membranes are moist.      Pharynx: Oropharynx is clear.   Eyes:      General: No scleral icterus.     Extraocular Movements: Extraocular movements intact.      Pupils: Pupils are equal, round, and reactive to light.   Cardiovascular:      Rate and Rhythm: Normal rate. Rhythm irregular.      Pulses: Normal pulses.      Heart sounds: Normal heart sounds.   Pulmonary:      Effort: Pulmonary effort is normal.      Breath sounds: Normal breath sounds.   Abdominal:      General: Bowel sounds are normal. There is no distension.      Palpations: Abdomen is soft.      Tenderness: There is no abdominal tenderness. There is no guarding.   Musculoskeletal:         General: Tenderness present. Normal range of motion.      Cervical back: Normal range of motion. No rigidity.   Skin:     General: Skin is warm and dry.      Capillary Refill: Capillary refill takes less than 2 seconds.   Neurological:      General: No focal deficit present.      Mental Status: She is alert and oriented to person, place, and time.      Cranial Nerves: No cranial nerve deficit.      Sensory: No sensory deficit.      Motor: Weakness present.   Psychiatric:         Mood and Affect: Mood normal.         Behavior: Behavior normal.        Significant Labs: All pertinent labs within the past 24 hours have been reviewed.  Recent Lab Results         06/29/22  0540   06/29/22  0539   06/28/22  1058        Albumin   2.2         Alkaline Phosphatase   70         ALT   24         Anion Gap   0         AST   21         Baso # 0.07           Basophil % 0.4           BILIRUBIN TOTAL   0.8  Comment: For infants and newborns, interpretation of results should be based  on gestational age, weight and in agreement with clinical  observations.    Premature Infant recommended reference ranges:  Up to 24 hours.............<8.0 mg/dL  Up to 48 hours............<12.0 mg/dL  3-5 days..................<15.0 mg/dL  6-29 days.................<15.0 mg/dL    For patients on Eltrombopag therapy, use of Dimension [place] TBIL is   not   recommended.           BUN   39         Calcium   8.6         Chloride   101         CO2   38         Creatinine   1.2         Differential Method Automated           eGFR if    46.0         eGFR if non    39.9  Comment: Calculation used to obtain the estimated glomerular filtration  rate (eGFR) is the CKD-EPI equation.            Eos # 0.3           Eosinophil % 1.9           Glucose   80         Gran # (ANC) 12.0           Gran % 70.4           Hematocrit 33.2           Hemoglobin 10.4           Immature Grans (Abs) 0.47  Comment: Mild elevation in immature granulocytes is non specific and   can be seen in a variety of conditions including stress response,   acute inflammation, trauma and pregnancy. Correlation with other   laboratory and clinical findings is essential.             Immature Granulocytes 2.7           Lymph # 2.1           Lymph % 12.4           Magnesium   1.6         MCH 28.7           MCHC 31.3           MCV 92           Mono # 2.1           Mono % 12.2           MPV 9.9           nRBC 0           Platelets 368           Potassium   5.0         PROTEIN TOTAL   5.4         RBC 3.62            RDW 13.1           SARS-CoV2 (COVID-19) Qualitative PCR     Not Detected  Comment: This test utilizes a real-time reverse transcription  polymerase chain reaction procedure to amplify and   detect the SARS-CoV-2 and detect the SARS-CoV-2 N2 and E nucleic  acid targets. The analytical sensitivity (limit of detection) of   this assay is 250 copies/mL.    A Detected result implies that the patient is infected with the  SARS-CoV-2 virus and is presumed to be contagious.    A Not Detected result implies that the SARS-CoV-2 target nucleic  acids are not present above the limit of detection.  It does not  rule out the possibility of COVID-19 and should not be the sole  basis for treatment decisions. If COVID-19 is strongly suspected  based on clinical and epidemiological history, re-testing should  be considered.    This test is only for use under Food and Drug   Administration s Emergency Use Authorization (EUA).   Commercial reagents are provided by [employer].  Performance characteristics of the EUA have been   independently verified by [facility]   Department of Pathology and Laboratory Medicine.           Sodium   139         Vancomycin, Random   19.0         WBC 17.10                   Significant Imaging: I have reviewed all pertinent imaging results/findings within the past 24 hours.      Assessment/Plan:      * Pneumonia due to infectious organism  Blood cultures x2 positive for staphylococcus epidermidis, susceptibility pending. Vancomycin initiated on 6/19/22 6/24: Day 7 of IV abx therapy with Rocephin and Azithromycin; Day 5 of abx therapy with Vancomycin. Repeat blood cultures showing NGTD x3 days  6/25 patient on Rocephin azithromycin, completed seven day therapy, plan to DC, on day six for IV vanc for blood cultures with Staph epi  6/26 patient completed antibiotic therapy for pneumonia, on IV vanc for blood cultures, day seven    Has completed abx, wbc noted but on steroids, stopping steroids  today.  Now off steroids      Bacteremia  Patient 2/2 bottles positive for Staph epidermidis, sensitive to Vanc, on day 6/7 6/26 day seven of seven for blood cultures with Staph epidermidis, continue PT OT effort  Doing well with abx, stop soon.    Weakness  Continue PT/OT efforts  Patient with a significant debility and change in her functional state.  Possibly a inpatient rehab candidate and return home but will discuss with therapy today as she refused yesterday and informed the patient she cannot refuse therapy and must put in good effort.    NSTEMI (non-ST elevated myocardial infarction)  Dr. Chapin managing    DVT prophylaxis  On eliquis.    Hypertension  BP noted, continue to add/modify agents prn.        Arthritis  Pain to left hand. CRP and Sed Rate elevated. Treatment with steroids.  6/24: Pain improving    Try lidocaine patch.  Improved pain today.      Pelvic fracture  Still with residual pain and limited mobility, would like to try IPR.        VTE Risk Mitigation (From admission, onward)         Ordered     apixaban tablet 2.5 mg  2 times daily         06/21/22 1856     IP VTE HIGH RISK PATIENT  Once         06/18/22 1525     Place sequential compression device  Until discontinued         06/18/22 1525                Discharge Planning   KIMANI:      Code Status: Full Code   Is the patient medically ready for discharge?:     Reason for patient still in hospital (select all that apply): Patient trending condition  Discharge Plan A: Rehab   Discharge Delays: None known at this time              Rafael Quan III, MD  Department of Hospital Medicine   [facility]

## 2022-06-29 NOTE — PT/OT/SLP PROGRESS
Occupational Therapy   Treatment    Name: Codi Black  MRN: [id number]  Admitting Diagnosis:  Pneumonia due to infectious organism       Recommendations:     Discharge Recommendations: other (see comments) (24 hour care if home with home health)  Discharge Equipment Recommendations:  walker, rolling  Barriers to discharge:   (Further medical care required)    Assessment:     Codi Black is a 89 y.o. female with a medical diagnosis of Pneumonia due to infectious organism. Performance deficits affecting function are weakness, impaired endurance, impaired self care skills, impaired functional mobilty, impaired balance, decreased safety awareness, pain, impaired cardiopulmonary response to activity.     Rehab Prognosis:  Fair; patient would benefit from acute skilled OT services to address these deficits and reach maximum level of function.       Plan:     Patient to be seen 6 x/week to address the above listed problems via self-care/home management, therapeutic exercises, therapeutic activities, neuromuscular re-education  · Plan of Care Expires: 07/08/22  · Plan of Care Reviewed with: patient    Subjective     Pain/Comfort:  · Pain Rating 1:  (did not rate pain)  · Location - Side 1: Right  · Location - Orientation 1: proximal  · Location 1: hip  · Pain Addressed 1: Nurse notified, Reposition  · Pain Rating Post-Intervention 1:  (did not rate pain)    Objective:     Communicated with: Nurse, PTA, OT prior to session.  Patient found supine with telemetry, pulse ox (continuous), oxygen, PureWick upon OT entry to room.    General Precautions: Standard, fall, airborne, droplet   Orthopedic Precautions:N/A   Braces: N/A  Respiratory Status: Nasal cannula     Occupational Performance:     Bed Mobility:    · Patient completed Rolling/Turning to Left with  stand by assistance  · Patient completed Rolling/Turning to Right with stand by assistance  · Patient completed Scooting/Bridging with minimum assistance  · Patient  completed Supine to Sit with minimum assistance  · Patient completed Sit to Supine with minimum assistance     Functional Mobility/Transfers:  · Patient completed Sit <> Stand Transfer with minimum assistance  with  standard walker   · Functional Mobility: see PTA note for details    Treatment & Education:  Pt alert and agreeable to treatment this day. Co-treat with PTA to maximize pt participation in interventions. Pt transitioned from supine to sitting EOB this day as stated above. Pt performed functional mobility with PTA assisting and GEIGER assisting with lines and verbally cueing pt for proper form and correcting balance deficits. Pt returned to sitting EOB and performed scooting to position self higher in bed. Pt resting throughout session to recover from and decrease shortness of breath. Pt positioned with HOB elevated in bed and lunch positioned for pt to eat. Pt requiring assistance for cutting meat. Pt educated to utilize call light when assistance is needed.    Patient left HOB elevated with all lines intact, call button in reach and nurse notifiedEducation:      GOALS:   Multidisciplinary Problems     Occupational Therapy Goals        Problem: Occupational Therapy    Goal Priority Disciplines Outcome Interventions   Occupational Therapy Goal     OT, PT/OT Ongoing, Progressing    Description: Goals to be met by: 7/8/2022     Patient will increase functional independence with ADLs by performing:    UE Dressing with Minimal Assistance.  Grooming while EOB with Set-up Assistance.  Toileting from bedside commode with Minimal Assistance for hygiene and clothing management.   Sitting at edge of bed x5 minutes with Minimal Assistance.  Rolling to Right, Left with Minimal Assistance.   Supine to sit with Minimal Assistance.                     Time Tracking:     OT Date of Treatment: 06/29/22  OT Start Time: 1130  OT Stop Time: 1156  OT Total Time (min): 26 min    Billable Minutes:Therapeutic Activity  26    OT/REJI: REJI          6/29/2022

## 2022-06-29 NOTE — SUBJECTIVE & OBJECTIVE
Interval History: Patient seen and examined.    Review of Systems   Constitutional:  Positive for activity change and fatigue.   Respiratory:  Positive for cough. Negative for chest tightness and wheezing.    Cardiovascular:  Negative for chest pain, palpitations and leg swelling.   Gastrointestinal:  Negative for abdominal pain, constipation, diarrhea, nausea and vomiting.   Musculoskeletal:  Positive for arthralgias and gait problem.   Neurological:  Positive for weakness.   Psychiatric/Behavioral:  Positive for agitation. The patient is nervous/anxious.    Objective:     Vital Signs (Most Recent):  Temp: 98.2 °F (36.8 °C) (06/29/22 0741)  Pulse: 62 (06/29/22 0855)  Resp: (!) 24 (06/29/22 0741)  BP: (!) 160/77 (06/29/22 0741)  SpO2: 96 % (06/29/22 0741)   Vital Signs (24h Range):  Temp:  [97 °F (36.1 °C)-98.9 °F (37.2 °C)] 98.2 °F (36.8 °C)  Pulse:  [56-73] 62  Resp:  [16-24] 24  SpO2:  [85 %-100 %] 96 %  BP: (139-169)/(65-81) 160/77     Weight: 62.1 kg (137 lb)  Body mass index is 23.52 kg/m².    Intake/Output Summary (Last 24 hours) at 6/29/2022 0859  Last data filed at 6/29/2022 0700  Gross per 24 hour   Intake 960 ml   Output 1200 ml   Net -240 ml        Physical Exam  Vitals and nursing note reviewed.   HENT:      Head: Normocephalic and atraumatic.      Mouth/Throat:      Mouth: Mucous membranes are moist.      Pharynx: Oropharynx is clear.   Eyes:      General: No scleral icterus.     Extraocular Movements: Extraocular movements intact.      Pupils: Pupils are equal, round, and reactive to light.   Cardiovascular:      Rate and Rhythm: Normal rate. Rhythm irregular.      Pulses: Normal pulses.      Heart sounds: Normal heart sounds.   Pulmonary:      Effort: Pulmonary effort is normal.      Breath sounds: Normal breath sounds.   Abdominal:      General: Bowel sounds are normal. There is no distension.      Palpations: Abdomen is soft.      Tenderness: There is no abdominal tenderness. There is no guarding.    Musculoskeletal:         General: Tenderness present. Normal range of motion.      Cervical back: Normal range of motion. No rigidity.   Skin:     General: Skin is warm and dry.      Capillary Refill: Capillary refill takes less than 2 seconds.   Neurological:      General: No focal deficit present.      Mental Status: She is alert and oriented to person, place, and time.      Cranial Nerves: No cranial nerve deficit.      Sensory: No sensory deficit.      Motor: Weakness present.   Psychiatric:         Mood and Affect: Mood normal.         Behavior: Behavior normal.       Significant Labs: All pertinent labs within the past 24 hours have been reviewed.  Recent Lab Results         06/29/22  0540   06/29/22  0539   06/28/22  1058        Albumin   2.2         Alkaline Phosphatase   70         ALT   24         Anion Gap   0         AST   21         Baso # 0.07           Basophil % 0.4           BILIRUBIN TOTAL   0.8  Comment: For infants and newborns, interpretation of results should be based  on gestational age, weight and in agreement with clinical  observations.    Premature Infant recommended reference ranges:  Up to 24 hours.............<8.0 mg/dL  Up to 48 hours............<12.0 mg/dL  3-5 days..................<15.0 mg/dL  6-29 days.................<15.0 mg/dL    For patients on Eltrombopag therapy, use of Dimension [place] TBIL is   not   recommended.           BUN   39         Calcium   8.6         Chloride   101         CO2   38         Creatinine   1.2         Differential Method Automated           eGFR if    46.0         eGFR if non    39.9  Comment: Calculation used to obtain the estimated glomerular filtration  rate (eGFR) is the CKD-EPI equation.            Eos # 0.3           Eosinophil % 1.9           Glucose   80         Gran # (ANC) 12.0           Gran % 70.4           Hematocrit 33.2           Hemoglobin 10.4           Immature Grans (Abs) 0.47  Comment: Mild  elevation in immature granulocytes is non specific and   can be seen in a variety of conditions including stress response,   acute inflammation, trauma and pregnancy. Correlation with other   laboratory and clinical findings is essential.             Immature Granulocytes 2.7           Lymph # 2.1           Lymph % 12.4           Magnesium   1.6         MCH 28.7           MCHC 31.3           MCV 92           Mono # 2.1           Mono % 12.2           MPV 9.9           nRBC 0           Platelets 368           Potassium   5.0         PROTEIN TOTAL   5.4         RBC 3.62           RDW 13.1           SARS-CoV2 (COVID-19) Qualitative PCR     Not Detected  Comment: This test utilizes a real-time reverse transcription  polymerase chain reaction procedure to amplify and   detect the SARS-CoV-2 and detect the SARS-CoV-2 N2 and E nucleic  acid targets. The analytical sensitivity (limit of detection) of   this assay is 250 copies/mL.    A Detected result implies that the patient is infected with the  SARS-CoV-2 virus and is presumed to be contagious.    A Not Detected result implies that the SARS-CoV-2 target nucleic  acids are not present above the limit of detection.  It does not  rule out the possibility of COVID-19 and should not be the sole  basis for treatment decisions. If COVID-19 is strongly suspected  based on clinical and epidemiological history, re-testing should  be considered.    This test is only for use under Food and Drug   Administration s Emergency Use Authorization (EUA).   Commercial reagents are provided by [employer].  Performance characteristics of the EUA have been   independently verified by [facility]   Department of Pathology and Laboratory Medicine.           Sodium   139         Vancomycin, Random   19.0         WBC 17.10                   Significant Imaging: I have reviewed all pertinent imaging results/findings within the past 24 hours.

## 2022-06-30 LAB
ALBUMIN SERPL BCP-MCNC: 2.2 G/DL (ref 3.5–5.2)
ALP SERPL-CCNC: 77 U/L (ref 55–135)
ALT SERPL W/O P-5'-P-CCNC: 21 U/L (ref 10–44)
ANION GAP SERPL CALC-SCNC: 2 MMOL/L (ref 8–16)
AST SERPL-CCNC: 15 U/L (ref 10–40)
BASOPHILS # BLD AUTO: 0.06 K/UL (ref 0–0.2)
BASOPHILS NFR BLD: 0.3 % (ref 0–1.9)
BILIRUB SERPL-MCNC: 0.9 MG/DL (ref 0.1–1)
BUN SERPL-MCNC: 33 MG/DL (ref 8–23)
CALCIUM SERPL-MCNC: 8.4 MG/DL (ref 8.7–10.5)
CHLORIDE SERPL-SCNC: 99 MMOL/L (ref 95–110)
CO2 SERPL-SCNC: 35 MMOL/L (ref 23–29)
CREAT SERPL-MCNC: 1.2 MG/DL (ref 0.5–1.4)
DIFFERENTIAL METHOD: ABNORMAL
EOSINOPHIL # BLD AUTO: 0.3 K/UL (ref 0–0.5)
EOSINOPHIL NFR BLD: 1.1 % (ref 0–8)
ERYTHROCYTE [DISTWIDTH] IN BLOOD BY AUTOMATED COUNT: 13.2 % (ref 11.5–14.5)
EST. GFR  (AFRICAN AMERICAN): 46 ML/MIN/1.73 M^2
EST. GFR  (NON AFRICAN AMERICAN): 39.9 ML/MIN/1.73 M^2
GLUCOSE SERPL-MCNC: 89 MG/DL (ref 70–110)
HCT VFR BLD AUTO: 33.5 % (ref 37–48.5)
HGB BLD-MCNC: 10.4 G/DL (ref 12–16)
IMM GRANULOCYTES # BLD AUTO: 0.62 K/UL (ref 0–0.04)
IMM GRANULOCYTES NFR BLD AUTO: 2.8 % (ref 0–0.5)
LYMPHOCYTES # BLD AUTO: 2.2 K/UL (ref 1–4.8)
LYMPHOCYTES NFR BLD: 9.9 % (ref 18–48)
MAGNESIUM SERPL-MCNC: 1.5 MG/DL (ref 1.6–2.6)
MCH RBC QN AUTO: 28.4 PG (ref 27–31)
MCHC RBC AUTO-ENTMCNC: 31 G/DL (ref 32–36)
MCV RBC AUTO: 92 FL (ref 82–98)
MONOCYTES # BLD AUTO: 2.9 K/UL (ref 0.3–1)
MONOCYTES NFR BLD: 12.9 % (ref 4–15)
NEUTROPHILS # BLD AUTO: 16.4 K/UL (ref 1.8–7.7)
NEUTROPHILS NFR BLD: 73 % (ref 38–73)
NRBC BLD-RTO: 0 /100 WBC
PLATELET # BLD AUTO: 340 K/UL (ref 150–450)
PMV BLD AUTO: 10 FL (ref 9.2–12.9)
POTASSIUM SERPL-SCNC: 4.6 MMOL/L (ref 3.5–5.1)
PROT SERPL-MCNC: 5.8 G/DL (ref 6–8.4)
RBC # BLD AUTO: 3.66 M/UL (ref 4–5.4)
SODIUM SERPL-SCNC: 136 MMOL/L (ref 136–145)
WBC # BLD AUTO: 22.41 K/UL (ref 3.9–12.7)

## 2022-06-30 PROCEDURE — 25000003 PHARM REV CODE 250: Performed by: INTERNAL MEDICINE

## 2022-06-30 PROCEDURE — 97530 THERAPEUTIC ACTIVITIES: CPT

## 2022-06-30 PROCEDURE — 25000003 PHARM REV CODE 250: Performed by: NURSE PRACTITIONER

## 2022-06-30 PROCEDURE — 27000221 HC OXYGEN, UP TO 24 HOURS

## 2022-06-30 PROCEDURE — 99900031 HC PATIENT EDUCATION (STAT)

## 2022-06-30 PROCEDURE — 97110 THERAPEUTIC EXERCISES: CPT

## 2022-06-30 PROCEDURE — 99900035 HC TECH TIME PER 15 MIN (STAT)

## 2022-06-30 PROCEDURE — 83735 ASSAY OF MAGNESIUM: CPT | Performed by: NURSE PRACTITIONER

## 2022-06-30 PROCEDURE — 11000001 HC ACUTE MED/SURG PRIVATE ROOM

## 2022-06-30 PROCEDURE — 97116 GAIT TRAINING THERAPY: CPT

## 2022-06-30 PROCEDURE — 36415 COLL VENOUS BLD VENIPUNCTURE: CPT | Performed by: NURSE PRACTITIONER

## 2022-06-30 PROCEDURE — 63600175 PHARM REV CODE 636 W HCPCS: Performed by: INTERNAL MEDICINE

## 2022-06-30 PROCEDURE — 85025 COMPLETE CBC W/AUTO DIFF WBC: CPT | Performed by: NURSE PRACTITIONER

## 2022-06-30 PROCEDURE — 94761 N-INVAS EAR/PLS OXIMETRY MLT: CPT

## 2022-06-30 PROCEDURE — 80053 COMPREHEN METABOLIC PANEL: CPT | Performed by: NURSE PRACTITIONER

## 2022-06-30 PROCEDURE — 27000207 HC ISOLATION

## 2022-06-30 RX ORDER — LIDOCAINE HYDROCHLORIDE 20 MG/ML
10 SOLUTION OROPHARYNGEAL ONCE
Status: CANCELLED | OUTPATIENT
Start: 2022-06-30 | End: 2022-06-30

## 2022-06-30 RX ORDER — MAG HYDROX/ALUMINUM HYD/SIMETH 200-200-20
30 SUSPENSION, ORAL (FINAL DOSE FORM) ORAL ONCE
Status: CANCELLED | OUTPATIENT
Start: 2022-06-30 | End: 2022-06-30

## 2022-06-30 RX ORDER — PREDNISONE 20 MG/1
20 TABLET ORAL DAILY
Status: DISCONTINUED | OUTPATIENT
Start: 2022-06-30 | End: 2022-07-05 | Stop reason: HOSPADM

## 2022-06-30 RX ORDER — NYSTATIN 100000 [USP'U]/ML
500000 SUSPENSION ORAL 4 TIMES DAILY
Status: DISCONTINUED | OUTPATIENT
Start: 2022-06-30 | End: 2022-07-05 | Stop reason: HOSPADM

## 2022-06-30 RX ADMIN — APIXABAN 2.5 MG: 2.5 TABLET, FILM COATED ORAL at 08:06

## 2022-06-30 RX ADMIN — LEVOFLOXACIN 500 MG: 500 TABLET, FILM COATED ORAL at 08:06

## 2022-06-30 RX ADMIN — ALPRAZOLAM 0.5 MG: 0.5 TABLET ORAL at 08:06

## 2022-06-30 RX ADMIN — AMIODARONE HYDROCHLORIDE 200 MG: 200 TABLET ORAL at 08:06

## 2022-06-30 RX ADMIN — NYSTATIN 500000 UNITS: 100000 SUSPENSION ORAL at 08:06

## 2022-06-30 RX ADMIN — TRAMADOL HYDROCHLORIDE 50 MG: 50 TABLET ORAL at 08:06

## 2022-06-30 RX ADMIN — NYSTATIN 500000 UNITS: 100000 SUSPENSION ORAL at 05:06

## 2022-06-30 RX ADMIN — NYSTATIN 500000 UNITS: 100000 SUSPENSION ORAL at 02:06

## 2022-06-30 RX ADMIN — PHENOL: 1.5 LIQUID ORAL at 10:06

## 2022-06-30 RX ADMIN — ATORVASTATIN CALCIUM 10 MG: 10 TABLET, FILM COATED ORAL at 08:06

## 2022-06-30 RX ADMIN — METOPROLOL TARTRATE 75 MG: 50 TABLET, FILM COATED ORAL at 08:06

## 2022-06-30 RX ADMIN — AMLODIPINE BESYLATE 5 MG: 5 TABLET ORAL at 08:06

## 2022-06-30 RX ADMIN — PHENOL: 1.5 LIQUID ORAL at 02:06

## 2022-06-30 RX ADMIN — MIRTAZAPINE 15 MG: 15 TABLET, FILM COATED ORAL at 08:06

## 2022-06-30 RX ADMIN — ISOSORBIDE MONONITRATE 30 MG: 30 TABLET, EXTENDED RELEASE ORAL at 08:06

## 2022-06-30 RX ADMIN — FAMOTIDINE 20 MG: 20 TABLET ORAL at 08:06

## 2022-06-30 RX ADMIN — PREDNISONE 20 MG: 20 TABLET ORAL at 10:06

## 2022-06-30 RX ADMIN — LIDOCAINE 1 PATCH: 50 PATCH TOPICAL at 08:06

## 2022-06-30 RX ADMIN — ASPIRIN 81 MG: 81 TABLET, COATED ORAL at 08:06

## 2022-06-30 NOTE — PROGRESS NOTES
Physical Therapy  Treatment    Codi Black   MRN: [id number]   Admitting Diagnosis: Pneumonia due to infectious organism                          Billable Minutes:  Therapeutic Activity 23 minutes             PTA Visit Number: 2       General Precautions: Standard, fall  Orthopedic Precautions: N/A   Braces:    Respiratory Status: Nasal cannula, flow 2 L/min    Spiritual, Cultural Beliefs, Gnosticist Practices, Values that Affect Care: no    Subjective:  Communicated with nurse prior to session.   pain: R hip 8/10         Objective:    pt resting in bed, family member at BS. She agrees to participate; pt noted to be very anxious    Functional Mobility:  Bed Mobility:    -mod -max assist supine to sit for trunk elevation and for B LE assist  -max assist sit to supine and max vc's/tactile cues for safety; pt very impulsive today; required assist for trunk placement and B LE elevation  -scooting upin bed using draw sheet total assist x 2    Transfers:   -sit to stand from bed mod assist  -turn and back to bed using RW max assist and max tactile and vc's for safety; pt rushing unsafely to sit in bed, jamming walker into bed/iv pole    Gait:    -performed gait training using SW x 12' max assist, max tactile and vc's, pt extremely unsafe and anxious, pushing walker into IV pole,into bed, dragging L LE behind her and not responding to therapist cueing; luckily able to get pt safely back to sit on edge of bed without a fall and then max assist back into bed.      Balance:   Static Sit: FAIR: Maintains without assist, but unable to take any challenges   Dynamic Sit: FAIR: Cannot move trunk without losing balance  Static Stand: POOR: Needs MODERATE assist to maintain  Dynamic stand: o: max assist      Patient left HOB elevated with all lines intact, call button in reach and family present.    Assessment:  Codi Black is a 89 y.o. female with a medical diagnosis of Pneumonia due to infectious organism and presents with R  hip pain today she reports from prior pelvic fx, increased anxiety and impulsivity today; very unsafe mobiltiy.    Rehab identified problem list/impairments:      Rehab potential is fair.    Activity tolerance: Poor    Discharge recommendations:   cont PT    Barriers to discharge:      Equipment recommendations:       GOALS:   Multidisciplinary Problems     Physical Therapy Goals        Problem: Physical Therapy    Goal Priority Disciplines Outcome Goal Variances Interventions   Physical Therapy Goal     PT, PT/OT Ongoing, Progressing     Description: Goals to be met by: 2022    Patient will increase functional independence with mobility by performin. Supine to/from sit with MInimal Assistance.  2. Rolling to Left and Right with Stand-by Assistance.  3. Sit to stand transfer with Contact Guard Assistance  4. Bed to chair transfer with Contact Guard Assistance using Rolling Walker  5. Gait  x 10 feet with Minimal Assistance using Rolling Walker.                        PLAN:    Patient to be seen 6 x/week  to address the above listed problems via gait training, therapeutic activities, therapeutic exercises  Plan of Care expires: 22  Plan of Care reviewed with: patient, spouse         2022

## 2022-06-30 NOTE — PLAN OF CARE
Spoke to the patient via phone. She expressed that she no longer has a desire to go to [facility]. Physician was notified.

## 2022-06-30 NOTE — PROGRESS NOTES
[facility] Medicine  Progress Note    Patient Name: Codi Black  MRN: [id number]  Patient Class: IP- Inpatient   Admission Date: 6/18/2022  Length of Stay: 12 days  Attending Physician: Rafael Quan III, MD  Primary Care Provider: Rafael Quan III, MD        Subjective:     Principal Problem:Pneumonia due to infectious organism        HPI:  Pt is 89 y/o female with Hx of hypertension, hyperlipidemia, and arthritis, presented to [facility] ED yesterday with c/o generalized weakness. She reports having a productive cough. She denies chest pain. High-Sensitivity Troponin levels, although negative, trended up x3 sets - Cardiology consulted. This morning she reports continued fatigue and weakness; also c/o left hand pain; shortness of breath improved with rest and oxygen.      Overview/Hospital Course:  6/20 SD: Pt's spouse at bedside. Pt reports feeling better compared to initial arrival to the hospital. Blood cultures x2 positive for gram + cocci in clusters resembling Staph, awaiting sensitivity. Vancomycin added to antibiotic therapy. Appreciate Dr. Chapin's recommendations for NSTEMI and HTN. Dr. Chapin advises that pt will need angiogram after treatment of pneumonia; will determine need for inpatient vs outpatient as patient care progresses.  6/21 SD: Pt's spouse at bedside. Blood cultures x2 positive for staphylococcus epidermidis, susceptibility pending. Continue IV abx therapy and repeat blood cultures today. Encourage pt to participate with therapy.  6/22 SD: Continue IV abx, following repeat cultures. Encourage participation with therapy.  6/23 SD: Sitting up in bed, at breakfast with good appetite. Continue IV abx therapy, following repeat cultures. Encourage participation with therapy.  6/24 SD: Pt's spouse and daughter at bedside. Continue IV abx therapy, following cultures. Continue therapy efforts.  6/25 AA, weekend crosscover, patient being treated for pneumonia, on IV antibiotic,  blood cultures 2/2 bottles positive for Staph epidermidis, on IV vanc, PT OT on board  6/26 AA, weekend crosscover, antibiotics on board for pneumonia, on IV vancomycin, blood culture positive for Staph epidermidis, afebrile, PT/OT.  6/27/2022 FM:  Patient case has been reviewed and I am returning from vacation.  Patient was admitted with pneumonia and positive blood cultures and a decline in her physical condition.  Her acute medical issues seem to have stabilized but she is significantly declined in functional state and her daughter has moved here from out of town to health care for her.  Her daughter is unable to physically meet her needs.  The patient wants to return home and live with her .  May be a potential candidate for inpatient rehab if she is able to participate and putting good effort.  Needs better pain control for the arthralgia of the hip.  6/28/2022 FM:  Patient worked with therapy yesterday and the notes reviewed.  I do feel that she is a good candidate for inpatient rehab and her goal is to return home at a supervision to modified independent functional level.  I feel with better pain control and close supervision and medical management this is achievable.  Will submit to her insurance company for approval.  6/29/2022 FM:  Awaiting insurance company approval.  Patient is working with therapy.  Patient's pain is improved.  6/30/2022 FM:  Patient's caregivers have been diagnosed with COVID-19 pneumonia and are under able to assist with care at this time.  Patient's insurance company denied her admission to inpatient rehab despite her meeting the criteria in my opinion.  Appeal is in progress.      Interval History: Patient seen and examined.    Review of Systems   Constitutional:  Positive for activity change and fatigue.   Respiratory:  Positive for cough. Negative for chest tightness and wheezing.    Cardiovascular:  Negative for chest pain, palpitations and leg swelling.    Gastrointestinal:  Negative for abdominal pain, constipation, diarrhea, nausea and vomiting.   Musculoskeletal:  Positive for arthralgias and gait problem.   Neurological:  Positive for weakness.   Psychiatric/Behavioral:  Positive for agitation. The patient is nervous/anxious.    Objective:     Vital Signs (Most Recent):  Temp: 98.8 °F (37.1 °C) (06/30/22 0747)  Pulse: 71 (06/30/22 0747)  Resp: 20 (06/30/22 0747)  BP: (!) 160/73 (06/30/22 0747)  SpO2: 100 % (06/30/22 0747)   Vital Signs (24h Range):  Temp:  [97.7 °F (36.5 °C)-99 °F (37.2 °C)] 98.8 °F (37.1 °C)  Pulse:  [50-71] 71  Resp:  [16-24] 20  SpO2:  [93 %-100 %] 100 %  BP: (122-160)/(55-94) 160/73     Weight: 62.1 kg (137 lb)  Body mass index is 23.52 kg/m².    Intake/Output Summary (Last 24 hours) at 6/30/2022 0854  Last data filed at 6/30/2022 0600  Gross per 24 hour   Intake 1300 ml   Output 1100 ml   Net 200 ml        Physical Exam  Vitals and nursing note reviewed.   HENT:      Head: Normocephalic and atraumatic.      Mouth/Throat:      Mouth: Mucous membranes are moist.      Pharynx: Oropharynx is clear.   Eyes:      General: No scleral icterus.     Extraocular Movements: Extraocular movements intact.      Pupils: Pupils are equal, round, and reactive to light.   Cardiovascular:      Rate and Rhythm: Normal rate. Rhythm irregular.      Pulses: Normal pulses.      Heart sounds: Normal heart sounds.   Pulmonary:      Effort: Pulmonary effort is normal.      Breath sounds: Normal breath sounds.   Abdominal:      General: Bowel sounds are normal. There is no distension.      Palpations: Abdomen is soft.      Tenderness: There is no abdominal tenderness. There is no guarding.   Musculoskeletal:         General: Tenderness present. Normal range of motion.      Cervical back: Normal range of motion. No rigidity.   Skin:     General: Skin is warm and dry.      Capillary Refill: Capillary refill takes less than 2 seconds.   Neurological:      General: No  focal deficit present.      Mental Status: She is alert and oriented to person, place, and time.      Cranial Nerves: No cranial nerve deficit.      Sensory: No sensory deficit.      Motor: Weakness present.   Psychiatric:         Mood and Affect: Mood normal.         Behavior: Behavior normal.       Significant Labs: All pertinent labs within the past 24 hours have been reviewed.  Recent Lab Results         06/30/22  0545        Albumin 2.2       Alkaline Phosphatase 77       ALT 21       Anion Gap 2       AST 15       Baso # 0.06       Basophil % 0.3       BILIRUBIN TOTAL 0.9  Comment: For infants and newborns, interpretation of results should be based  on gestational age, weight and in agreement with clinical  observations.    Premature Infant recommended reference ranges:  Up to 24 hours.............<8.0 mg/dL  Up to 48 hours............<12.0 mg/dL  3-5 days..................<15.0 mg/dL  6-29 days.................<15.0 mg/dL    For patients on Eltrombopag therapy, use of Dimension [place] TBIL is   not   recommended.         BUN 33       Calcium 8.4       Chloride 99       CO2 35       Creatinine 1.2       Differential Method Automated       eGFR if African American 46.0       eGFR if non  39.9  Comment: Calculation used to obtain the estimated glomerular filtration  rate (eGFR) is the CKD-EPI equation.          Eos # 0.3       Eosinophil % 1.1       Glucose 89       Gran # (ANC) 16.4       Gran % 73.0       Hematocrit 33.5       Hemoglobin 10.4       Immature Grans (Abs) 0.62  Comment: Mild elevation in immature granulocytes is non specific and   can be seen in a variety of conditions including stress response,   acute inflammation, trauma and pregnancy. Correlation with other   laboratory and clinical findings is essential.         Immature Granulocytes 2.8       Lymph # 2.2       Lymph % 9.9       Magnesium 1.5       MCH 28.4       MCHC 31.0       MCV 92       Mono # 2.9       Mono % 12.9        MPV 10.0       nRBC 0       Platelets 340       Potassium 4.6       PROTEIN TOTAL 5.8       RBC 3.66       RDW 13.2       Sodium 136       WBC 22.41               Significant Imaging: I have reviewed all pertinent imaging results/findings within the past 24 hours.      Assessment/Plan:      * Pneumonia due to infectious organism  Blood cultures x2 positive for staphylococcus epidermidis, susceptibility pending. Vancomycin initiated on 6/19/22 6/24: Day 7 of IV abx therapy with Rocephin and Azithromycin; Day 5 of abx therapy with Vancomycin. Repeat blood cultures showing NGTD x3 days  6/25 patient on Rocephin azithromycin, completed seven day therapy, plan to DC, on day six for IV vanc for blood cultures with Staph epi  6/26 patient completed antibiotic therapy for pneumonia, on IV vanc for blood cultures, day seven    Has completed abx, wbc noted but on steroids, stopping steroids today.  Now off steroids      Bacteremia  Patient 2/2 bottles positive for Staph epidermidis, sensitive to Vanc, on day 6/7 6/26 day seven of seven for blood cultures with Staph epidermidis, continue PT OT effort  Doing well with abx, stop soon.    Weakness  Continue PT/OT efforts  Patient with a significant debility and change in her functional state.  Possibly a inpatient rehab candidate and return home but will discuss with therapy today as she refused yesterday and informed the patient she cannot refuse therapy and must put in good effort.    NSTEMI (non-ST elevated myocardial infarction)  Dr. Chapin managing    DVT prophylaxis  On eliquis.    Hypertension  BP noted, continue to add/modify agents prn.        Arthritis  Pain to left hand. CRP and Sed Rate elevated. Treatment with steroids.  6/24: Pain improving    Try lidocaine patch.  Improved pain today.      Pelvic fracture  Still with residual pain and limited mobility, would like to try IPR.        VTE Risk Mitigation (From admission, onward)         Ordered     apixaban tablet  2.5 mg  2 times daily         06/21/22 1856     IP VTE HIGH RISK PATIENT  Once         06/18/22 1525     Place sequential compression device  Until discontinued         06/18/22 1525                Discharge Planning   KIMANI:      Code Status: Full Code   Is the patient medically ready for discharge?:     Reason for patient still in hospital (select all that apply): Patient trending condition  Discharge Plan A: Rehab   Discharge Delays: None known at this time              Rafael Quan III, MD  Department of Hospital Medicine   [facility]

## 2022-06-30 NOTE — RESPIRATORY THERAPY
06/30/22 0840   PRE-TX-O2   O2 Device (Oxygen Therapy) (S)  nasal cannula   $ Is the patient on Low Flow Oxygen? Yes   Flow (L/min) (S)  2   Oxygen Concentration (%) 28   SpO2 99 %   Pulse Oximetry Type Intermittent   $ Pulse Oximetry - Multiple Charge Pulse Oximetry - Multiple   Pulse 69   Resp 20   Positioning HOB elevated 30 degrees       Weaned patient's Oxygen from 3lpm nasal cannula to 2lpm nasal cannula at this time. No distress noted. Kimberlyn RUBIN notified

## 2022-06-30 NOTE — PT/OT/SLP PROGRESS
Occupational Therapy   Treatment    Name: Codi Black  MRN: [id number]  Admitting Diagnosis:  Pneumonia due to infectious organism       Recommendations:     Discharge Recommendations: other (see comments) (24 hour care if home with home health)  Discharge Equipment Recommendations:  walker, rolling  Barriers to discharge:  Other (Comment) (current medical status)    Assessment:     Codi Black is a 89 y.o. female with a medical diagnosis of Pneumonia due to infectious organism.  She presents with weakness. Performance deficits affecting function are weakness, impaired endurance, decreased upper extremity function, pain.     Rehab Prognosis:  Fair; patient would benefit from acute skilled OT services to address these deficits and reach maximum level of function.       Plan:     Patient to be seen 6 x/week to address the above listed problems via self-care/home management, therapeutic exercises, therapeutic activities, neuromuscular re-education  · Plan of Care Expires: 07/08/22  · Plan of Care Reviewed with: patient, spouse    Subjective     Pain/Comfort:  · Pain Rating 1: other (see comments) (not rated)  · Location - Side 1: Right  · Location - Orientation 1: proximal  · Location 1: hip  · Pain Addressed 1: Reposition  · Pain Rating Post-Intervention 1: other (see comments) (did not rate)    Objective:     Communicated with: Nurse prior to session.  Patient found supine with telemetry, pulse ox (continuous), oxygen, PureWick upon OT entry to room.    General Precautions: Standard, fall, droplet, airborne, contact   Orthopedic Precautions:N/A   Braces: N/A  Respiratory Status: Nasal cannula     Occupational Performance:     Bed Mobility:    · Patient completed Rolling/Turning to Left with  moderate assistance  · Patient completed Rolling/Turning to Right with moderate assistance  · Patient completed Scooting/Bridging with total assistance  · Patient completed Supine to Sit with maximal assistance  · Patient  completed Sit to Supine with maximal assistance     Functional Mobility/Transfers:  · Patient completed Sit <> Stand Transfer with moderate assistance  with  rolling walker and standard walker   · Functional Mobility: was able to ambulate with standard walker for independence in ADLs. Pt became unsafe and anxious while not responding to therapists cuing. Pt was able to ambulate back to bed with max assist for sit to supine.      [facility] 6 Click ADL: 16    Treatment & Education:  Pt was alert and ready for therapy upon entering the room. Pt very impulsive today requiring max verbal cuing throughout therapy today.    Patient left supine with all lines intact, call button in reach and  presentEducation:      GOALS:   Multidisciplinary Problems     Occupational Therapy Goals        Problem: Occupational Therapy    Goal Priority Disciplines Outcome Interventions   Occupational Therapy Goal     OT, PT/OT Ongoing, Not Progressing    Description: Goals to be met by: 7/8/2022     Patient will increase functional independence with ADLs by performing:    UE Dressing with Minimal Assistance.  Grooming while EOB with Set-up Assistance.  Toileting from bedside commode with Minimal Assistance for hygiene and clothing management.   Sitting at edge of bed x5 minutes with Minimal Assistance.  Rolling to Right, Left with Minimal Assistance.   Supine to sit with Minimal Assistance.                     Time Tracking:     OT Date of Treatment: 06/30/22  OT Start Time: 1018  OT Stop Time: 1041  OT Total Time (min): 23 min    Billable Minutes:Therapeutic Exercise 23    OT/REJI: OT          6/30/2022

## 2022-06-30 NOTE — SUBJECTIVE & OBJECTIVE
Interval History: Patient seen and examined.    Review of Systems   Constitutional:  Positive for activity change and fatigue.   Respiratory:  Positive for cough. Negative for chest tightness and wheezing.    Cardiovascular:  Negative for chest pain, palpitations and leg swelling.   Gastrointestinal:  Negative for abdominal pain, constipation, diarrhea, nausea and vomiting.   Musculoskeletal:  Positive for arthralgias and gait problem.   Neurological:  Positive for weakness.   Psychiatric/Behavioral:  Positive for agitation. The patient is nervous/anxious.    Objective:     Vital Signs (Most Recent):  Temp: 98.8 °F (37.1 °C) (06/30/22 0747)  Pulse: 71 (06/30/22 0747)  Resp: 20 (06/30/22 0747)  BP: (!) 160/73 (06/30/22 0747)  SpO2: 100 % (06/30/22 0747)   Vital Signs (24h Range):  Temp:  [97.7 °F (36.5 °C)-99 °F (37.2 °C)] 98.8 °F (37.1 °C)  Pulse:  [50-71] 71  Resp:  [16-24] 20  SpO2:  [93 %-100 %] 100 %  BP: (122-160)/(55-94) 160/73     Weight: 62.1 kg (137 lb)  Body mass index is 23.52 kg/m².    Intake/Output Summary (Last 24 hours) at 6/30/2022 0854  Last data filed at 6/30/2022 0600  Gross per 24 hour   Intake 1300 ml   Output 1100 ml   Net 200 ml        Physical Exam  Vitals and nursing note reviewed.   HENT:      Head: Normocephalic and atraumatic.      Mouth/Throat:      Mouth: Mucous membranes are moist.      Pharynx: Oropharynx is clear.   Eyes:      General: No scleral icterus.     Extraocular Movements: Extraocular movements intact.      Pupils: Pupils are equal, round, and reactive to light.   Cardiovascular:      Rate and Rhythm: Normal rate. Rhythm irregular.      Pulses: Normal pulses.      Heart sounds: Normal heart sounds.   Pulmonary:      Effort: Pulmonary effort is normal.      Breath sounds: Normal breath sounds.   Abdominal:      General: Bowel sounds are normal. There is no distension.      Palpations: Abdomen is soft.      Tenderness: There is no abdominal tenderness. There is no guarding.    Musculoskeletal:         General: Tenderness present. Normal range of motion.      Cervical back: Normal range of motion. No rigidity.   Skin:     General: Skin is warm and dry.      Capillary Refill: Capillary refill takes less than 2 seconds.   Neurological:      General: No focal deficit present.      Mental Status: She is alert and oriented to person, place, and time.      Cranial Nerves: No cranial nerve deficit.      Sensory: No sensory deficit.      Motor: Weakness present.   Psychiatric:         Mood and Affect: Mood normal.         Behavior: Behavior normal.       Significant Labs: All pertinent labs within the past 24 hours have been reviewed.  Recent Lab Results         06/30/22  0545        Albumin 2.2       Alkaline Phosphatase 77       ALT 21       Anion Gap 2       AST 15       Baso # 0.06       Basophil % 0.3       BILIRUBIN TOTAL 0.9  Comment: For infants and newborns, interpretation of results should be based  on gestational age, weight and in agreement with clinical  observations.    Premature Infant recommended reference ranges:  Up to 24 hours.............<8.0 mg/dL  Up to 48 hours............<12.0 mg/dL  3-5 days..................<15.0 mg/dL  6-29 days.................<15.0 mg/dL    For patients on Eltrombopag therapy, use of Dimension [place] TBIL is   not   recommended.         BUN 33       Calcium 8.4       Chloride 99       CO2 35       Creatinine 1.2       Differential Method Automated       eGFR if African American 46.0       eGFR if non  39.9  Comment: Calculation used to obtain the estimated glomerular filtration  rate (eGFR) is the CKD-EPI equation.          Eos # 0.3       Eosinophil % 1.1       Glucose 89       Gran # (ANC) 16.4       Gran % 73.0       Hematocrit 33.5       Hemoglobin 10.4       Immature Grans (Abs) 0.62  Comment: Mild elevation in immature granulocytes is non specific and   can be seen in a variety of conditions including stress response,   acute  inflammation, trauma and pregnancy. Correlation with other   laboratory and clinical findings is essential.         Immature Granulocytes 2.8       Lymph # 2.2       Lymph % 9.9       Magnesium 1.5       MCH 28.4       MCHC 31.0       MCV 92       Mono # 2.9       Mono % 12.9       MPV 10.0       nRBC 0       Platelets 340       Potassium 4.6       PROTEIN TOTAL 5.8       RBC 3.66       RDW 13.2       Sodium 136       WBC 22.41               Significant Imaging: I have reviewed all pertinent imaging results/findings within the past 24 hours.

## 2022-06-30 NOTE — PLAN OF CARE
[id number]-ufmi was denied. Physician recommended that the family do an appeal, per IPR case management. Information was given to the patient's daughter, Sherin Gaitan.    Will continue to monitor.

## 2022-07-01 LAB
ALBUMIN SERPL BCP-MCNC: 2 G/DL (ref 3.5–5.2)
ALP SERPL-CCNC: 68 U/L (ref 55–135)
ALT SERPL W/O P-5'-P-CCNC: 19 U/L (ref 10–44)
ANION GAP SERPL CALC-SCNC: 2 MMOL/L (ref 8–16)
AST SERPL-CCNC: 15 U/L (ref 10–40)
BASOPHILS # BLD AUTO: 0.06 K/UL (ref 0–0.2)
BASOPHILS NFR BLD: 0.3 % (ref 0–1.9)
BILIRUB SERPL-MCNC: 0.7 MG/DL (ref 0.1–1)
BUN SERPL-MCNC: 30 MG/DL (ref 8–23)
CALCIUM SERPL-MCNC: 8.7 MG/DL (ref 8.7–10.5)
CHLORIDE SERPL-SCNC: 100 MMOL/L (ref 95–110)
CO2 SERPL-SCNC: 34 MMOL/L (ref 23–29)
CREAT SERPL-MCNC: 1.3 MG/DL (ref 0.5–1.4)
DIFFERENTIAL METHOD: ABNORMAL
EOSINOPHIL # BLD AUTO: 0 K/UL (ref 0–0.5)
EOSINOPHIL NFR BLD: 0 % (ref 0–8)
ERYTHROCYTE [DISTWIDTH] IN BLOOD BY AUTOMATED COUNT: 13.1 % (ref 11.5–14.5)
EST. GFR  (AFRICAN AMERICAN): 41.7 ML/MIN/1.73 M^2
EST. GFR  (NON AFRICAN AMERICAN): 36.2 ML/MIN/1.73 M^2
GLUCOSE SERPL-MCNC: 127 MG/DL (ref 70–110)
HCT VFR BLD AUTO: 31.4 % (ref 37–48.5)
HGB BLD-MCNC: 9.9 G/DL (ref 12–16)
IMM GRANULOCYTES # BLD AUTO: 0.43 K/UL (ref 0–0.04)
IMM GRANULOCYTES NFR BLD AUTO: 1.9 % (ref 0–0.5)
LYMPHOCYTES # BLD AUTO: 1.5 K/UL (ref 1–4.8)
LYMPHOCYTES NFR BLD: 6.4 % (ref 18–48)
MAGNESIUM SERPL-MCNC: 1.6 MG/DL (ref 1.6–2.6)
MCH RBC QN AUTO: 28.5 PG (ref 27–31)
MCHC RBC AUTO-ENTMCNC: 31.5 G/DL (ref 32–36)
MCV RBC AUTO: 91 FL (ref 82–98)
MONOCYTES # BLD AUTO: 1.7 K/UL (ref 0.3–1)
MONOCYTES NFR BLD: 7.6 % (ref 4–15)
NEUTROPHILS # BLD AUTO: 19 K/UL (ref 1.8–7.7)
NEUTROPHILS NFR BLD: 83.8 % (ref 38–73)
NRBC BLD-RTO: 0 /100 WBC
PLATELET # BLD AUTO: 310 K/UL (ref 150–450)
PMV BLD AUTO: 9.6 FL (ref 9.2–12.9)
POTASSIUM SERPL-SCNC: 4.4 MMOL/L (ref 3.5–5.1)
PROT SERPL-MCNC: 5.7 G/DL (ref 6–8.4)
RBC # BLD AUTO: 3.47 M/UL (ref 4–5.4)
SODIUM SERPL-SCNC: 136 MMOL/L (ref 136–145)
WBC # BLD AUTO: 22.7 K/UL (ref 3.9–12.7)

## 2022-07-01 PROCEDURE — 80053 COMPREHEN METABOLIC PANEL: CPT | Performed by: NURSE PRACTITIONER

## 2022-07-01 PROCEDURE — 25000003 PHARM REV CODE 250: Performed by: INTERNAL MEDICINE

## 2022-07-01 PROCEDURE — 27000221 HC OXYGEN, UP TO 24 HOURS

## 2022-07-01 PROCEDURE — 25000003 PHARM REV CODE 250: Performed by: NURSE PRACTITIONER

## 2022-07-01 PROCEDURE — 94761 N-INVAS EAR/PLS OXIMETRY MLT: CPT

## 2022-07-01 PROCEDURE — 27000207 HC ISOLATION

## 2022-07-01 PROCEDURE — 85025 COMPLETE CBC W/AUTO DIFF WBC: CPT | Performed by: NURSE PRACTITIONER

## 2022-07-01 PROCEDURE — 99900035 HC TECH TIME PER 15 MIN (STAT)

## 2022-07-01 PROCEDURE — 11000001 HC ACUTE MED/SURG PRIVATE ROOM

## 2022-07-01 PROCEDURE — 36415 COLL VENOUS BLD VENIPUNCTURE: CPT | Performed by: NURSE PRACTITIONER

## 2022-07-01 PROCEDURE — 83735 ASSAY OF MAGNESIUM: CPT | Performed by: NURSE PRACTITIONER

## 2022-07-01 PROCEDURE — 63600175 PHARM REV CODE 636 W HCPCS: Performed by: INTERNAL MEDICINE

## 2022-07-01 PROCEDURE — 97530 THERAPEUTIC ACTIVITIES: CPT

## 2022-07-01 PROCEDURE — 97530 THERAPEUTIC ACTIVITIES: CPT | Mod: CO

## 2022-07-01 PROCEDURE — 99900031 HC PATIENT EDUCATION (STAT)

## 2022-07-01 RX ADMIN — PREDNISONE 20 MG: 20 TABLET ORAL at 10:07

## 2022-07-01 RX ADMIN — AMIODARONE HYDROCHLORIDE 200 MG: 200 TABLET ORAL at 10:07

## 2022-07-01 RX ADMIN — FAMOTIDINE 20 MG: 20 TABLET ORAL at 10:07

## 2022-07-01 RX ADMIN — AMIODARONE HYDROCHLORIDE 200 MG: 200 TABLET ORAL at 08:07

## 2022-07-01 RX ADMIN — ALPRAZOLAM 0.5 MG: 0.5 TABLET ORAL at 08:07

## 2022-07-01 RX ADMIN — METOPROLOL TARTRATE 75 MG: 50 TABLET, FILM COATED ORAL at 08:07

## 2022-07-01 RX ADMIN — ATORVASTATIN CALCIUM 10 MG: 10 TABLET, FILM COATED ORAL at 08:07

## 2022-07-01 RX ADMIN — PHENOL: 1.5 LIQUID ORAL at 05:07

## 2022-07-01 RX ADMIN — NYSTATIN 500000 UNITS: 100000 SUSPENSION ORAL at 10:07

## 2022-07-01 RX ADMIN — TRAMADOL HYDROCHLORIDE 50 MG: 50 TABLET ORAL at 08:07

## 2022-07-01 RX ADMIN — MIRTAZAPINE 15 MG: 15 TABLET, FILM COATED ORAL at 08:07

## 2022-07-01 RX ADMIN — LIDOCAINE 1 PATCH: 50 PATCH TOPICAL at 10:07

## 2022-07-01 RX ADMIN — METOPROLOL TARTRATE 75 MG: 50 TABLET, FILM COATED ORAL at 10:07

## 2022-07-01 RX ADMIN — NYSTATIN 500000 UNITS: 100000 SUSPENSION ORAL at 08:07

## 2022-07-01 RX ADMIN — APIXABAN 2.5 MG: 2.5 TABLET, FILM COATED ORAL at 10:07

## 2022-07-01 RX ADMIN — APIXABAN 2.5 MG: 2.5 TABLET, FILM COATED ORAL at 08:07

## 2022-07-01 RX ADMIN — AMLODIPINE BESYLATE 5 MG: 5 TABLET ORAL at 10:07

## 2022-07-01 RX ADMIN — ASPIRIN 81 MG: 81 TABLET, COATED ORAL at 10:07

## 2022-07-01 RX ADMIN — ISOSORBIDE MONONITRATE 30 MG: 30 TABLET, EXTENDED RELEASE ORAL at 10:07

## 2022-07-01 RX ADMIN — LEVOFLOXACIN 500 MG: 500 TABLET, FILM COATED ORAL at 10:07

## 2022-07-01 NOTE — PT/OT/SLP PROGRESS
"Physical Therapy Treatment    Patient Name:  Codi Black   MRN:  [id number]    Recommendations:     Discharge Recommendations:  home health PT (24 hour supervision)   Discharge Equipment Recommendations:  (to be determined)   Barriers to discharge: None    Assessment:     Codi Black is a 89 y.o. female admitted with a medical diagnosis of Pneumonia due to infectious organism.  She presents with the following impairments/functional limitations:  weakness, impaired endurance, gait instability, impaired balance impairing her functional independence and decreasing QOL. Patient complains of nausea at the beginning of visit today. Patient is experiencing some L ankle pain upon standing and attempts to step.    Rehab Prognosis: Fair; patient would benefit from acute skilled PT services to address these deficits and reach maximum level of function.    Recent Surgery: * No surgery found *      Plan:     During this hospitalization, patient to be seen 6 x/week to address the identified rehab impairments via gait training, therapeutic activities, therapeutic exercises and progress toward the following goals:    · Plan of Care Expires:  07/08/22    Subjective     Chief Complaint: nausea, L ankle pain in weight baring  Patient/Family Comments/goals: "I'm stiff because I haven't moved since last night"  Pain/Comfort:  · Pain Rating 1:  (not rated)  · Location - Side 1: Left  · Location 1: ankle  · Pain Addressed 1: Cessation of Activity      Objective:     Communicated with RN prior to session.  Patient found HOB elevated with telemetry, peripheral IV, pulse ox (continuous), oxygen, PureWick upon PT entry to room.     General Precautions: Standard, fall, droplet   Orthopedic Precautions:N/A   Braces: N/A  Respiratory Status: Nasal cannula, flow 1 L/min     Functional Mobility:  · Bed Mobility:     · Rolling Right: minimum assistance  · Scooting: moderate assistance and to EOB  · Supine to Sit: moderate assistance  · Sit to " Supine: minimum assistance  · Transfers:     · Sit to Stand:  moderate assistance with rolling walker  · Gait: Patient able to take about 4 side steps using with RW with Min A to move walker and maintain balance.  · Balance: Patient demonstrates fair standing balance with CGA/Timothy and RW      AM-PAC 6 CLICK MOBILITY  Turning over in bed (including adjusting bedclothes, sheets and blankets)?: 3  Sitting down on and standing up from a chair with arms (e.g., wheelchair, bedside commode, etc.): 2  Moving from lying on back to sitting on the side of the bed?: 2  Moving to and from a bed to a chair (including a wheelchair)?: 3  Need to walk in hospital room?: 2  Climbing 3-5 steps with a railing?: 2  Basic Mobility Total Score: 14       Therapeutic Activities and Exercises:   Standing hip flexion 10x ea side    Patient left HOB elevated with all lines intact, call button in reach and  present..    GOALS:   Multidisciplinary Problems     Physical Therapy Goals        Problem: Physical Therapy    Goal Priority Disciplines Outcome Goal Variances Interventions   Physical Therapy Goal     PT, PT/OT Ongoing, Progressing     Description: Goals to be met by: 2022    Patient will increase functional independence with mobility by performin. Supine to/from sit with MInimal Assistance.  2. Rolling to Left and Right with Stand-by Assistance.  3. Sit to stand transfer with Contact Guard Assistance  4. Bed to chair transfer with Contact Guard Assistance using Rolling Walker  5. Gait  x 10 feet with Minimal Assistance using Rolling Walker.                        Time Tracking:     PT Received On: 22  PT Start Time: 1350     PT Stop Time: 1414  PT Total Time (min): 24 min     Billable Minutes: Therapeutic Activity 24 minutes    Treatment Type: Treatment  PT/PTA: PT     PTA Visit Number: 2     2022

## 2022-07-01 NOTE — PT/OT/SLP PROGRESS
Occupational Therapy   Treatment    Name: Codi Black  MRN: [id number]  Admitting Diagnosis:  Pneumonia due to infectious organism       Recommendations:     Discharge Recommendations: other (see comments) (24 hour care if home with home health)  Discharge Equipment Recommendations:  walker, rolling  Barriers to discharge:   (Further medical care required)    Assessment:     Codi Black is a 89 y.o. female with a medical diagnosis of Pneumonia due to infectious organism.  Performance deficits affecting function are weakness, impaired endurance, impaired self care skills, impaired functional mobilty, impaired balance, decreased safety awareness, pain, impaired cardiopulmonary response to activity.     Rehab Prognosis:  Fair; patient would benefit from acute skilled OT services to address these deficits and reach maximum level of function.       Plan:     Patient to be seen 6 x/week to address the above listed problems via self-care/home management, therapeutic activities, therapeutic exercises, neuromuscular re-education  · Plan of Care Expires: 07/08/22  · Plan of Care Reviewed with: patient, spouse    Subjective     Pain/Comfort:  ·  Pt reporting no pain at this time    Objective:     Communicated with: Nurse prior to session.  Patient found HOB elevated with telemetry, oxygen, PureWick upon OT entry to room.    General Precautions: Standard, fall, droplet, airborne   Orthopedic Precautions:N/A   Braces: N/A  Respiratory Status: Nasal cannula, flow 4 L/min    Treatment & Education:  Pt alert and reporting fatigue as she just finished working with PT. Pt educated on HEP program for BUE ex to perform as tolerated in bed. Pt v/u and able to demonstrate proper form. Pt requesting a recliner in room to sit up in for future use. Will follow up in AM with patient to transfer to recliner. Pt educated to utilize call light when assistance is needed. Pt v/u.    Patient left HOB elevated with all lines intact, call button  in reach, nurse notified and  presentEducation:      GOALS:   Multidisciplinary Problems     Occupational Therapy Goals        Problem: Occupational Therapy    Goal Priority Disciplines Outcome Interventions   Occupational Therapy Goal     OT, PT/OT Ongoing, Progressing    Description: Goals to be met by: 7/8/2022     Patient will increase functional independence with ADLs by performing:    UE Dressing with Minimal Assistance.  Grooming while EOB with Set-up Assistance.  Toileting from bedside commode with Minimal Assistance for hygiene and clothing management.   Sitting at edge of bed x5 minutes with Minimal Assistance.  Rolling to Right, Left with Minimal Assistance.   Supine to sit with Minimal Assistance.                     Time Tracking:     OT Date of Treatment: 07/01/22  OT Start Time: 1420  OT Stop Time: 1435  OT Total Time (min): 15 min    Billable Minutes:Therapeutic Activity 15    OT/REJI: REJI          7/1/2022

## 2022-07-01 NOTE — PLAN OF CARE
07/01/22 1008   Post-Acute Status   Post-Acute Authorization HME   Post-Acute Placement Status Referrals Sent   Discharge Plan   Discharge Plan A Home;Home Health   Discharge Plan B Home;Home Health     Referral for hospital bed submitted to [facility] as [facility] had no beds available.  CM was informed that they can deliver bed 24 - 48 hrs prior to discharge.

## 2022-07-01 NOTE — PLAN OF CARE
Plan of care reviewed with the patient and the patient's spouse. RHETT Samuels, infectious disease nurse, put in order for COVID screening to be completed on Sunday, July 3rd. If patient is negative, patient can be moved to a non COVID isolation room.

## 2022-07-01 NOTE — ASSESSMENT & PLAN NOTE
Still with residual pain and limited mobility, would like to try IPR, home Monday with hospital bed if appeal fails.

## 2022-07-01 NOTE — ASSESSMENT & PLAN NOTE
Blood cultures x2 positive for staphylococcus epidermidis, susceptibility pending. Vancomycin initiated on 6/19/22 6/24: Day 7 of IV abx therapy with Rocephin and Azithromycin; Day 5 of abx therapy with Vancomycin. Repeat blood cultures showing NGTD x3 days  6/25 patient on Rocephin azithromycin, completed seven day therapy, plan to DC, on day six for IV vanc for blood cultures with Staph epi  6/26 patient completed antibiotic therapy for pneumonia, on IV vanc for blood cultures, day seven    Has completed abx, wbc noted but on steroids, weaning steroids.

## 2022-07-01 NOTE — CARE UPDATE
07/01/22 0910   PRE-TX-O2   O2 Device (Oxygen Therapy) (S)  nasal cannula   Flow (L/min) (S)  1   Oxygen Concentration (%) 24   SpO2 (S)  96 %   Pulse Oximetry Type Continuous   Pulse 60

## 2022-07-01 NOTE — PROGRESS NOTES
[facility] Medicine  Progress Note    Patient Name: Codi Black  MRN: [id number]  Patient Class: IP- Inpatient   Admission Date: 6/18/2022  Length of Stay: 13 days  Attending Physician: Rafael Quan III, MD  Primary Care Provider: Rafael Quan III, MD        Subjective:     Principal Problem:Pneumonia due to infectious organism        HPI:  Pt is 89 y/o female with Hx of hypertension, hyperlipidemia, and arthritis, presented to [facility] ED yesterday with c/o generalized weakness. She reports having a productive cough. She denies chest pain. High-Sensitivity Troponin levels, although negative, trended up x3 sets - Cardiology consulted. This morning she reports continued fatigue and weakness; also c/o left hand pain; shortness of breath improved with rest and oxygen.      Overview/Hospital Course:  6/20 SD: Pt's spouse at bedside. Pt reports feeling better compared to initial arrival to the hospital. Blood cultures x2 positive for gram + cocci in clusters resembling Staph, awaiting sensitivity. Vancomycin added to antibiotic therapy. Appreciate Dr. Chapin's recommendations for NSTEMI and HTN. Dr. Chapin advises that pt will need angiogram after treatment of pneumonia; will determine need for inpatient vs outpatient as patient care progresses.  6/21 SD: Pt's spouse at bedside. Blood cultures x2 positive for staphylococcus epidermidis, susceptibility pending. Continue IV abx therapy and repeat blood cultures today. Encourage pt to participate with therapy.  6/22 SD: Continue IV abx, following repeat cultures. Encourage participation with therapy.  6/23 SD: Sitting up in bed, at breakfast with good appetite. Continue IV abx therapy, following repeat cultures. Encourage participation with therapy.  6/24 SD: Pt's spouse and daughter at bedside. Continue IV abx therapy, following cultures. Continue therapy efforts.  6/25 AA, weekend crosscover, patient being treated for pneumonia, on IV antibiotic,  blood cultures 2/2 bottles positive for Staph epidermidis, on IV vanc, PT OT on board  6/26 AA, weekend crosscover, antibiotics on board for pneumonia, on IV vancomycin, blood culture positive for Staph epidermidis, afebrile, PT/OT.  6/27/2022 FM:  Patient case has been reviewed and I am returning from vacation.  Patient was admitted with pneumonia and positive blood cultures and a decline in her physical condition.  Her acute medical issues seem to have stabilized but she is significantly declined in functional state and her daughter has moved here from out of town to health care for her.  Her daughter is unable to physically meet her needs.  The patient wants to return home and live with her .  May be a potential candidate for inpatient rehab if she is able to participate and putting good effort.  Needs better pain control for the arthralgia of the hip.  6/28/2022 FM:  Patient worked with therapy yesterday and the notes reviewed.  I do feel that she is a good candidate for inpatient rehab and her goal is to return home at a supervision to modified independent functional level.  I feel with better pain control and close supervision and medical management this is achievable.  Will submit to her insurance company for approval.  6/29/2022 FM:  Awaiting insurance company approval.  Patient is working with therapy.  Patient's pain is improved.  6/30/2022 FM:  Patient's caregivers have been diagnosed with COVID-19 pneumonia and are under able to assist with care at this time.  Patient's insurance company denied her admission to inpatient rehab despite her meeting the criteria in my opinion.  Appeal is in progress.  7/1/2022 FM:  Patient states she has become frustrated waiting and is frustrated with her insurance company.  Patient is requesting for the home to be set up for a possible discharge home if her appeal fails.  The patient's primary caregiver at home is currently COVID-19 infected and will be released  from quarantine on Monday.  Will hold the patient here until Monday and then discharged home with a hospital bed and her other DME equipment.  If the patient's appeal is overturned she can move up to rehab.      Interval History: Patient seen and examined.    Review of Systems   Constitutional:  Positive for activity change and fatigue.   Respiratory:  Positive for cough. Negative for chest tightness and wheezing.    Cardiovascular:  Negative for chest pain, palpitations and leg swelling.   Gastrointestinal:  Negative for abdominal pain, constipation, diarrhea, nausea and vomiting.   Musculoskeletal:  Positive for arthralgias and gait problem.   Neurological:  Positive for weakness.   Psychiatric/Behavioral:  Positive for agitation. The patient is nervous/anxious.    Objective:     Vital Signs (Most Recent):  Temp: 97.7 °F (36.5 °C) (07/01/22 0817)  Pulse: 60 (07/01/22 0817)  Resp: 20 (07/01/22 0817)  BP: (!) 143/76 (07/01/22 0817)  SpO2: 98 % (07/01/22 0817)   Vital Signs (24h Range):  Temp:  [97.3 °F (36.3 °C)-98.4 °F (36.9 °C)] 97.7 °F (36.5 °C)  Pulse:  [52-84] 60  Resp:  [16-20] 20  SpO2:  [86 %-100 %] 98 %  BP: (109-144)/(63-76) 143/76     Weight: 62.1 kg (137 lb)  Body mass index is 23.52 kg/m².    Intake/Output Summary (Last 24 hours) at 7/1/2022 0836  Last data filed at 7/1/2022 0600  Gross per 24 hour   Intake 1600 ml   Output 1200 ml   Net 400 ml        Physical Exam  Vitals and nursing note reviewed.   HENT:      Head: Normocephalic and atraumatic.      Mouth/Throat:      Mouth: Mucous membranes are moist.      Pharynx: Oropharynx is clear.   Eyes:      General: No scleral icterus.     Extraocular Movements: Extraocular movements intact.      Pupils: Pupils are equal, round, and reactive to light.   Cardiovascular:      Rate and Rhythm: Normal rate. Rhythm irregular.      Pulses: Normal pulses.      Heart sounds: Normal heart sounds.   Pulmonary:      Effort: Pulmonary effort is normal.      Breath  sounds: Normal breath sounds.   Abdominal:      General: Bowel sounds are normal. There is no distension.      Palpations: Abdomen is soft.      Tenderness: There is no abdominal tenderness. There is no guarding.   Musculoskeletal:         General: Tenderness present. Normal range of motion.      Cervical back: Normal range of motion. No rigidity.   Skin:     General: Skin is warm and dry.      Capillary Refill: Capillary refill takes less than 2 seconds.   Neurological:      General: No focal deficit present.      Mental Status: She is alert and oriented to person, place, and time.      Cranial Nerves: No cranial nerve deficit.      Sensory: No sensory deficit.      Motor: Weakness present.   Psychiatric:         Mood and Affect: Mood normal.         Behavior: Behavior normal.       Significant Labs: All pertinent labs within the past 24 hours have been reviewed.  Recent Lab Results         07/01/22  0636        Albumin 2.0       Alkaline Phosphatase 68       ALT 19       Anion Gap 2       AST 15       Baso # 0.06       Basophil % 0.3       BILIRUBIN TOTAL 0.7  Comment: For infants and newborns, interpretation of results should be based  on gestational age, weight and in agreement with clinical  observations.    Premature Infant recommended reference ranges:  Up to 24 hours.............<8.0 mg/dL  Up to 48 hours............<12.0 mg/dL  3-5 days..................<15.0 mg/dL  6-29 days.................<15.0 mg/dL    For patients on Eltrombopag therapy, use of Dimension [place] TBIL is   not   recommended.         BUN 30       Calcium 8.7       Chloride 100       CO2 34       Creatinine 1.3       Differential Method Automated       eGFR if  41.7       eGFR if non  36.2  Comment: Calculation used to obtain the estimated glomerular filtration  rate (eGFR) is the CKD-EPI equation.          Eos # 0.0       Eosinophil % 0.0       Glucose 127       Gran # (ANC) 19.0       Gran % 83.8        Hematocrit 31.4       Hemoglobin 9.9       Immature Grans (Abs) 0.43  Comment: Mild elevation in immature granulocytes is non specific and   can be seen in a variety of conditions including stress response,   acute inflammation, trauma and pregnancy. Correlation with other   laboratory and clinical findings is essential.         Immature Granulocytes 1.9       Lymph # 1.5       Lymph % 6.4       Magnesium 1.6       MCH 28.5       MCHC 31.5       MCV 91       Mono # 1.7       Mono % 7.6       MPV 9.6       nRBC 0       Platelets 310       Potassium 4.4       PROTEIN TOTAL 5.7       RBC 3.47       RDW 13.1       Sodium 136       WBC 22.70               Significant Imaging: I have reviewed all pertinent imaging results/findings within the past 24 hours.      Assessment/Plan:      * Pneumonia due to infectious organism  Blood cultures x2 positive for staphylococcus epidermidis, susceptibility pending. Vancomycin initiated on 6/19/22 6/24: Day 7 of IV abx therapy with Rocephin and Azithromycin; Day 5 of abx therapy with Vancomycin. Repeat blood cultures showing NGTD x3 days  6/25 patient on Rocephin azithromycin, completed seven day therapy, plan to DC, on day six for IV vanc for blood cultures with Staph epi  6/26 patient completed antibiotic therapy for pneumonia, on IV vanc for blood cultures, day seven    Has completed abx, wbc noted but on steroids, weaning steroids.      Bacteremia  Patient 2/2 bottles positive for Staph epidermidis, sensitive to Vanc, on day 6/7 6/26 day seven of seven for blood cultures with Staph epidermidis, continue PT OT effort  Completed 7 days of abx, s/s stable and improved, echo negative.  If appeal for IPR unsuccessful patient to go home Monday.    Weakness  Continue PT/OT efforts  Patient with a significant debility and change in her functional state.  Possibly a inpatient rehab candidate and return home but will discuss with therapy today as she refused yesterday and informed the  patient she cannot refuse therapy and must put in good effort.  See above notes.    NSTEMI (non-ST elevated myocardial infarction)  Dr. Chapin managing    DVT prophylaxis  On eliquis.    Hypertension  BP noted, continue to add/modify agents prn.        Arthritis  Pain to left hand. CRP and Sed Rate elevated. Treatment with steroids.  6/24: Pain improving    Try lidocaine patch.  Improved pain today.      Pelvic fracture  Still with residual pain and limited mobility, would like to try IPR, home Monday with hospital bed if appeal fails.        VTE Risk Mitigation (From admission, onward)         Ordered     apixaban tablet 2.5 mg  2 times daily         06/21/22 1856     IP VTE HIGH RISK PATIENT  Once         06/18/22 1525     Place sequential compression device  Until discontinued         06/18/22 1525                Discharge Planning   KIMANI:      Code Status: Full Code   Is the patient medically ready for discharge?:     Reason for patient still in hospital (select all that apply): Patient trending condition  Discharge Plan A: Rehab   Discharge Delays: None known at this time              Rafael Quan III, MD  Department of Hospital Medicine   [facility]

## 2022-07-01 NOTE — CARE UPDATE
07/01/22 0918   Home Oxygen Qualification   Room Air SpO2 At Rest (!) 86 %  (86-88)   Home O2 Eval Comments Spo2 96% at NC 1lpm post room air pulse ox check

## 2022-07-01 NOTE — ASSESSMENT & PLAN NOTE
Patient 2/2 bottles positive for Staph epidermidis, sensitive to Vanc, on day 6/7 6/26 day seven of seven for blood cultures with Staph epidermidis, continue PT OT effort  Completed 7 days of abx, s/s stable and improved, echo negative.  If appeal for IPR unsuccessful patient to go home Monday.

## 2022-07-01 NOTE — ASSESSMENT & PLAN NOTE
Continue PT/OT efforts  Patient with a significant debility and change in her functional state.  Possibly a inpatient rehab candidate and return home but will discuss with therapy today as she refused yesterday and informed the patient she cannot refuse therapy and must put in good effort.  See above notes.

## 2022-07-01 NOTE — SUBJECTIVE & OBJECTIVE
Interval History: Patient seen and examined.    Review of Systems   Constitutional:  Positive for activity change and fatigue.   Respiratory:  Positive for cough. Negative for chest tightness and wheezing.    Cardiovascular:  Negative for chest pain, palpitations and leg swelling.   Gastrointestinal:  Negative for abdominal pain, constipation, diarrhea, nausea and vomiting.   Musculoskeletal:  Positive for arthralgias and gait problem.   Neurological:  Positive for weakness.   Psychiatric/Behavioral:  Positive for agitation. The patient is nervous/anxious.    Objective:     Vital Signs (Most Recent):  Temp: 97.7 °F (36.5 °C) (07/01/22 0817)  Pulse: 60 (07/01/22 0817)  Resp: 20 (07/01/22 0817)  BP: (!) 143/76 (07/01/22 0817)  SpO2: 98 % (07/01/22 0817)   Vital Signs (24h Range):  Temp:  [97.3 °F (36.3 °C)-98.4 °F (36.9 °C)] 97.7 °F (36.5 °C)  Pulse:  [52-84] 60  Resp:  [16-20] 20  SpO2:  [86 %-100 %] 98 %  BP: (109-144)/(63-76) 143/76     Weight: 62.1 kg (137 lb)  Body mass index is 23.52 kg/m².    Intake/Output Summary (Last 24 hours) at 7/1/2022 0836  Last data filed at 7/1/2022 0600  Gross per 24 hour   Intake 1600 ml   Output 1200 ml   Net 400 ml        Physical Exam  Vitals and nursing note reviewed.   HENT:      Head: Normocephalic and atraumatic.      Mouth/Throat:      Mouth: Mucous membranes are moist.      Pharynx: Oropharynx is clear.   Eyes:      General: No scleral icterus.     Extraocular Movements: Extraocular movements intact.      Pupils: Pupils are equal, round, and reactive to light.   Cardiovascular:      Rate and Rhythm: Normal rate. Rhythm irregular.      Pulses: Normal pulses.      Heart sounds: Normal heart sounds.   Pulmonary:      Effort: Pulmonary effort is normal.      Breath sounds: Normal breath sounds.   Abdominal:      General: Bowel sounds are normal. There is no distension.      Palpations: Abdomen is soft.      Tenderness: There is no abdominal tenderness. There is no guarding.    Musculoskeletal:         General: Tenderness present. Normal range of motion.      Cervical back: Normal range of motion. No rigidity.   Skin:     General: Skin is warm and dry.      Capillary Refill: Capillary refill takes less than 2 seconds.   Neurological:      General: No focal deficit present.      Mental Status: She is alert and oriented to person, place, and time.      Cranial Nerves: No cranial nerve deficit.      Sensory: No sensory deficit.      Motor: Weakness present.   Psychiatric:         Mood and Affect: Mood normal.         Behavior: Behavior normal.       Significant Labs: All pertinent labs within the past 24 hours have been reviewed.  Recent Lab Results         07/01/22  0636        Albumin 2.0       Alkaline Phosphatase 68       ALT 19       Anion Gap 2       AST 15       Baso # 0.06       Basophil % 0.3       BILIRUBIN TOTAL 0.7  Comment: For infants and newborns, interpretation of results should be based  on gestational age, weight and in agreement with clinical  observations.    Premature Infant recommended reference ranges:  Up to 24 hours.............<8.0 mg/dL  Up to 48 hours............<12.0 mg/dL  3-5 days..................<15.0 mg/dL  6-29 days.................<15.0 mg/dL    For patients on Eltrombopag therapy, use of Dimension [place] TBIL is   not   recommended.         BUN 30       Calcium 8.7       Chloride 100       CO2 34       Creatinine 1.3       Differential Method Automated       eGFR if  41.7       eGFR if non  36.2  Comment: Calculation used to obtain the estimated glomerular filtration  rate (eGFR) is the CKD-EPI equation.          Eos # 0.0       Eosinophil % 0.0       Glucose 127       Gran # (ANC) 19.0       Gran % 83.8       Hematocrit 31.4       Hemoglobin 9.9       Immature Grans (Abs) 0.43  Comment: Mild elevation in immature granulocytes is non specific and   can be seen in a variety of conditions including stress response,   acute  inflammation, trauma and pregnancy. Correlation with other   laboratory and clinical findings is essential.         Immature Granulocytes 1.9       Lymph # 1.5       Lymph % 6.4       Magnesium 1.6       MCH 28.5       MCHC 31.5       MCV 91       Mono # 1.7       Mono % 7.6       MPV 9.6       nRBC 0       Platelets 310       Potassium 4.4       PROTEIN TOTAL 5.7       RBC 3.47       RDW 13.1       Sodium 136       WBC 22.70               Significant Imaging: I have reviewed all pertinent imaging results/findings within the past 24 hours.

## 2022-07-02 LAB
ALBUMIN SERPL BCP-MCNC: 2 G/DL (ref 3.5–5.2)
ALP SERPL-CCNC: 66 U/L (ref 55–135)
ALT SERPL W/O P-5'-P-CCNC: 24 U/L (ref 10–44)
ANION GAP SERPL CALC-SCNC: 2 MMOL/L (ref 8–16)
AST SERPL-CCNC: 17 U/L (ref 10–40)
BASOPHILS # BLD AUTO: 0.05 K/UL (ref 0–0.2)
BASOPHILS NFR BLD: 0.2 % (ref 0–1.9)
BILIRUB SERPL-MCNC: 0.5 MG/DL (ref 0.1–1)
BUN SERPL-MCNC: 36 MG/DL (ref 8–23)
CALCIUM SERPL-MCNC: 8.3 MG/DL (ref 8.7–10.5)
CHLORIDE SERPL-SCNC: 101 MMOL/L (ref 95–110)
CO2 SERPL-SCNC: 34 MMOL/L (ref 23–29)
CREAT SERPL-MCNC: 1.3 MG/DL (ref 0.5–1.4)
DIFFERENTIAL METHOD: ABNORMAL
EOSINOPHIL # BLD AUTO: 0 K/UL (ref 0–0.5)
EOSINOPHIL NFR BLD: 0 % (ref 0–8)
ERYTHROCYTE [DISTWIDTH] IN BLOOD BY AUTOMATED COUNT: 12.9 % (ref 11.5–14.5)
EST. GFR  (AFRICAN AMERICAN): 41.7 ML/MIN/1.73 M^2
EST. GFR  (NON AFRICAN AMERICAN): 36.2 ML/MIN/1.73 M^2
GLUCOSE SERPL-MCNC: 135 MG/DL (ref 70–110)
HCT VFR BLD AUTO: 29.3 % (ref 37–48.5)
HGB BLD-MCNC: 9.5 G/DL (ref 12–16)
IMM GRANULOCYTES # BLD AUTO: 0.38 K/UL (ref 0–0.04)
IMM GRANULOCYTES NFR BLD AUTO: 1.7 % (ref 0–0.5)
LYMPHOCYTES # BLD AUTO: 1.2 K/UL (ref 1–4.8)
LYMPHOCYTES NFR BLD: 5.2 % (ref 18–48)
MAGNESIUM SERPL-MCNC: 1.5 MG/DL (ref 1.6–2.6)
MCH RBC QN AUTO: 29.2 PG (ref 27–31)
MCHC RBC AUTO-ENTMCNC: 32.4 G/DL (ref 32–36)
MCV RBC AUTO: 90 FL (ref 82–98)
MONOCYTES # BLD AUTO: 1.3 K/UL (ref 0.3–1)
MONOCYTES NFR BLD: 5.7 % (ref 4–15)
NEUTROPHILS # BLD AUTO: 19.7 K/UL (ref 1.8–7.7)
NEUTROPHILS NFR BLD: 87.2 % (ref 38–73)
NRBC BLD-RTO: 0 /100 WBC
PLATELET # BLD AUTO: 317 K/UL (ref 150–450)
PMV BLD AUTO: 9.9 FL (ref 9.2–12.9)
POTASSIUM SERPL-SCNC: 4.4 MMOL/L (ref 3.5–5.1)
PROT SERPL-MCNC: 5.7 G/DL (ref 6–8.4)
RBC # BLD AUTO: 3.25 M/UL (ref 4–5.4)
SODIUM SERPL-SCNC: 137 MMOL/L (ref 136–145)
WBC # BLD AUTO: 22.61 K/UL (ref 3.9–12.7)

## 2022-07-02 PROCEDURE — 25000003 PHARM REV CODE 250: Performed by: STUDENT IN AN ORGANIZED HEALTH CARE EDUCATION/TRAINING PROGRAM

## 2022-07-02 PROCEDURE — 27000207 HC ISOLATION

## 2022-07-02 PROCEDURE — 11000001 HC ACUTE MED/SURG PRIVATE ROOM

## 2022-07-02 PROCEDURE — 25000003 PHARM REV CODE 250: Performed by: INTERNAL MEDICINE

## 2022-07-02 PROCEDURE — 99233 SBSQ HOSP IP/OBS HIGH 50: CPT | Mod: ,,, | Performed by: STUDENT IN AN ORGANIZED HEALTH CARE EDUCATION/TRAINING PROGRAM

## 2022-07-02 PROCEDURE — 27000221 HC OXYGEN, UP TO 24 HOURS

## 2022-07-02 PROCEDURE — 99233 PR SUBSEQUENT HOSPITAL CARE,LEVL III: ICD-10-PCS | Mod: ,,, | Performed by: STUDENT IN AN ORGANIZED HEALTH CARE EDUCATION/TRAINING PROGRAM

## 2022-07-02 PROCEDURE — 99900031 HC PATIENT EDUCATION (STAT)

## 2022-07-02 PROCEDURE — 97530 THERAPEUTIC ACTIVITIES: CPT | Mod: CQ

## 2022-07-02 PROCEDURE — 25000003 PHARM REV CODE 250: Performed by: NURSE PRACTITIONER

## 2022-07-02 PROCEDURE — 94799 UNLISTED PULMONARY SVC/PX: CPT

## 2022-07-02 PROCEDURE — 80053 COMPREHEN METABOLIC PANEL: CPT | Performed by: NURSE PRACTITIONER

## 2022-07-02 PROCEDURE — 85025 COMPLETE CBC W/AUTO DIFF WBC: CPT | Performed by: NURSE PRACTITIONER

## 2022-07-02 PROCEDURE — 97530 THERAPEUTIC ACTIVITIES: CPT | Mod: CO

## 2022-07-02 PROCEDURE — 63600175 PHARM REV CODE 636 W HCPCS: Performed by: INTERNAL MEDICINE

## 2022-07-02 PROCEDURE — 83735 ASSAY OF MAGNESIUM: CPT | Performed by: NURSE PRACTITIONER

## 2022-07-02 PROCEDURE — 99900035 HC TECH TIME PER 15 MIN (STAT)

## 2022-07-02 PROCEDURE — 94761 N-INVAS EAR/PLS OXIMETRY MLT: CPT

## 2022-07-02 PROCEDURE — 36415 COLL VENOUS BLD VENIPUNCTURE: CPT | Performed by: NURSE PRACTITIONER

## 2022-07-02 RX ORDER — LANOLIN ALCOHOL/MO/W.PET/CERES
400 CREAM (GRAM) TOPICAL DAILY
Status: COMPLETED | OUTPATIENT
Start: 2022-07-02 | End: 2022-07-05

## 2022-07-02 RX ADMIN — LIDOCAINE 1 PATCH: 50 PATCH TOPICAL at 08:07

## 2022-07-02 RX ADMIN — ASPIRIN 81 MG: 81 TABLET, COATED ORAL at 08:07

## 2022-07-02 RX ADMIN — NYSTATIN 500000 UNITS: 100000 SUSPENSION ORAL at 09:07

## 2022-07-02 RX ADMIN — FAMOTIDINE 20 MG: 20 TABLET ORAL at 08:07

## 2022-07-02 RX ADMIN — METOPROLOL TARTRATE 75 MG: 50 TABLET, FILM COATED ORAL at 09:07

## 2022-07-02 RX ADMIN — ATORVASTATIN CALCIUM 10 MG: 10 TABLET, FILM COATED ORAL at 09:07

## 2022-07-02 RX ADMIN — ALPRAZOLAM 0.5 MG: 0.5 TABLET ORAL at 09:07

## 2022-07-02 RX ADMIN — APIXABAN 2.5 MG: 2.5 TABLET, FILM COATED ORAL at 09:07

## 2022-07-02 RX ADMIN — Medication 400 MG: at 04:07

## 2022-07-02 RX ADMIN — APIXABAN 2.5 MG: 2.5 TABLET, FILM COATED ORAL at 08:07

## 2022-07-02 RX ADMIN — NYSTATIN 500000 UNITS: 100000 SUSPENSION ORAL at 01:07

## 2022-07-02 RX ADMIN — AMIODARONE HYDROCHLORIDE 200 MG: 200 TABLET ORAL at 09:07

## 2022-07-02 RX ADMIN — NYSTATIN 500000 UNITS: 100000 SUSPENSION ORAL at 08:07

## 2022-07-02 RX ADMIN — AMLODIPINE BESYLATE 5 MG: 5 TABLET ORAL at 08:07

## 2022-07-02 RX ADMIN — LEVOFLOXACIN 500 MG: 500 TABLET, FILM COATED ORAL at 08:07

## 2022-07-02 RX ADMIN — ISOSORBIDE MONONITRATE 30 MG: 30 TABLET, EXTENDED RELEASE ORAL at 08:07

## 2022-07-02 RX ADMIN — PREDNISONE 20 MG: 20 TABLET ORAL at 08:07

## 2022-07-02 RX ADMIN — TRAMADOL HYDROCHLORIDE 50 MG: 50 TABLET ORAL at 09:07

## 2022-07-02 RX ADMIN — AMIODARONE HYDROCHLORIDE 200 MG: 200 TABLET ORAL at 08:07

## 2022-07-02 RX ADMIN — MIRTAZAPINE 15 MG: 15 TABLET, FILM COATED ORAL at 09:07

## 2022-07-02 NOTE — ASSESSMENT & PLAN NOTE
Still with residual pain and limited mobility, would like to try IPR, home Monday with hospital bed if appeal fails.  - f/u CM

## 2022-07-02 NOTE — SUBJECTIVE & OBJECTIVE
Interval History: No acute events overnight. Complains of nasal cannula bothering her. Per patient improved appetite.    Review of Systems   Constitutional:  Negative for appetite change, chills and fatigue.   Respiratory:  Positive for cough (minimal with mild sore throat). Negative for chest tightness and wheezing.    Cardiovascular:  Negative for chest pain, palpitations and leg swelling.   Gastrointestinal:  Negative for abdominal pain, constipation, diarrhea, nausea and vomiting.   Musculoskeletal:  Positive for arthralgias and gait problem.   Neurological:  Positive for weakness. Negative for seizures, speech difficulty, light-headedness, numbness and headaches.   Psychiatric/Behavioral:  Positive for agitation. The patient is nervous/anxious.    Objective:     Vital Signs (Most Recent):  Temp: 97.7 °F (36.5 °C) (07/02/22 1145)  Pulse: 65 (07/02/22 1256)  Resp: 20 (07/02/22 1145)  BP: 120/61 (07/02/22 1145)  SpO2: 96 % (07/02/22 1145) Vital Signs (24h Range):  Temp:  [97.7 °F (36.5 °C)-98.4 °F (36.9 °C)] 97.7 °F (36.5 °C)  Pulse:  [54-68] 65  Resp:  [18-20] 20  SpO2:  [87 %-97 %] 96 %  BP: (120-146)/(61-80) 120/61     Weight: 62.1 kg (137 lb)  Body mass index is 23.52 kg/m².    Intake/Output Summary (Last 24 hours) at 7/2/2022 1407  Last data filed at 7/2/2022 0600  Gross per 24 hour   Intake 1860 ml   Output 1175 ml   Net 685 ml      Physical Exam  Vitals and nursing note reviewed.   Constitutional:       General: She is not in acute distress.     Appearance: Normal appearance. She is not ill-appearing or toxic-appearing.   HENT:      Head: Normocephalic and atraumatic.      Right Ear: External ear normal.      Left Ear: External ear normal.      Mouth/Throat:      Mouth: Mucous membranes are moist.      Pharynx: Oropharynx is clear. No oropharyngeal exudate or posterior oropharyngeal erythema.   Eyes:      General: No scleral icterus.     Extraocular Movements: Extraocular movements intact.      Pupils:  Pupils are equal, round, and reactive to light.   Cardiovascular:      Rate and Rhythm: Normal rate. Rhythm irregular.      Pulses: Normal pulses.      Heart sounds: Normal heart sounds.   Pulmonary:      Effort: Pulmonary effort is normal.      Breath sounds: Normal breath sounds.   Abdominal:      General: Bowel sounds are normal. There is no distension.      Palpations: Abdomen is soft.      Tenderness: There is no abdominal tenderness. There is no right CVA tenderness, left CVA tenderness or guarding.   Musculoskeletal:         General: No tenderness. Normal range of motion.      Cervical back: Normal range of motion and neck supple. No rigidity or tenderness.      Right lower leg: No edema.      Left lower leg: No edema.   Lymphadenopathy:      Cervical: No cervical adenopathy.   Skin:     General: Skin is warm and dry.      Capillary Refill: Capillary refill takes less than 2 seconds.      Findings: No erythema or rash.   Neurological:      General: No focal deficit present.      Mental Status: She is alert and oriented to person, place, and time.      Cranial Nerves: No cranial nerve deficit.      Sensory: No sensory deficit.      Motor: No weakness (baseline).   Psychiatric:         Mood and Affect: Mood normal.         Behavior: Behavior normal.         Thought Content: Thought content normal.     Significant Labs: All pertinent labs within the past 24 hours have been reviewed.  BMP:   Recent Labs   Lab 07/02/22  0335   *      K 4.4      CO2 34*   BUN 36*   CREATININE 1.3   CALCIUM 8.3*   MG 1.5*     CBC:   Recent Labs   Lab 07/01/22  0636 07/02/22  0335   WBC 22.70* 22.61*   HGB 9.9* 9.5*   HCT 31.4* 29.3*    317     CMP:   Recent Labs   Lab 07/01/22  0636 07/02/22  0335    137   K 4.4 4.4    101   CO2 34* 34*   * 135*   BUN 30* 36*   CREATININE 1.3 1.3   CALCIUM 8.7 8.3*   PROT 5.7* 5.7*   ALBUMIN 2.0* 2.0*   BILITOT 0.7 0.5   ALKPHOS 68 66   AST 15 17   ALT 19  24   ANIONGAP 2* 2*   EGFRNONAA 36.2* 36.2*     X-Ray Hip 1 View Right (with Pelvis when performed)  Narrative: EXAMINATION:  XR HIP WITH PELVIS WHEN PERFORMED, 1 VIEW RIGHT    CLINICAL HISTORY:  hip pain;    FINDINGS:  No fracture or dislocation.  Mild osteophytes.  Dense vascular calcifications.  Impression: Mild degenerative change.  No acute findings.    Electronically signed by: Deni Arnold MD  Date:    06/24/2022  Time:    06:04   Significant Imaging: I have reviewed all pertinent imaging results/findings within the past 24 hours.

## 2022-07-02 NOTE — PROGRESS NOTES
[facility] Medicine  Progress Note    Patient Name: Codi Black  MRN: [id number]  Patient Class: IP- Inpatient   Admission Date: 6/18/2022  Length of Stay: 14 days  Attending Physician: Rafael Quan III, MD  Primary Care Provider: Rafael Quan III, MD        Subjective:     Principal Problem:Pneumonia due to infectious organism    HPI:  Pt is 89 y/o female with Hx of hypertension, hyperlipidemia, and arthritis, presented to [facility] ED yesterday with c/o generalized weakness. She reports having a productive cough. She denies chest pain. High-Sensitivity Troponin levels, although negative, trended up x3 sets - Cardiology consulted. This morning she reports continued fatigue and weakness; also c/o left hand pain; shortness of breath improved with rest and oxygen.    Overview/Hospital Course:  6/20 SD: Pt's spouse at bedside. Pt reports feeling better compared to initial arrival to the hospital. Blood cultures x2 positive for gram + cocci in clusters resembling Staph, awaiting sensitivity. Vancomycin added to antibiotic therapy. Appreciate Dr. Chapin's recommendations for NSTEMI and HTN. Dr. Chapin advises that pt will need angiogram after treatment of pneumonia; will determine need for inpatient vs outpatient as patient care progresses.  6/21 SD: Pt's spouse at bedside. Blood cultures x2 positive for staphylococcus epidermidis, susceptibility pending. Continue IV abx therapy and repeat blood cultures today. Encourage pt to participate with therapy.  6/22 SD: Continue IV abx, following repeat cultures. Encourage participation with therapy.  6/23 SD: Sitting up in bed, at breakfast with good appetite. Continue IV abx therapy, following repeat cultures. Encourage participation with therapy.  6/24 SD: Pt's spouse and daughter at bedside. Continue IV abx therapy, following cultures. Continue therapy efforts.  6/25 AA, weekend crosscover, patient being treated for pneumonia, on IV antibiotic, blood  cultures 2/2 bottles positive for Staph epidermidis, on IV vanc, PT OT on board  6/26 AA, weekend crosscover, antibiotics on board for pneumonia, on IV vancomycin, blood culture positive for Staph epidermidis, afebrile, PT/OT.  6/27/2022 FM:  Patient case has been reviewed and I am returning from vacation.  Patient was admitted with pneumonia and positive blood cultures and a decline in her physical condition.  Her acute medical issues seem to have stabilized but she is significantly declined in functional state and her daughter has moved here from out of town to health care for her.  Her daughter is unable to physically meet her needs.  The patient wants to return home and live with her .  May be a potential candidate for inpatient rehab if she is able to participate and putting good effort.  Needs better pain control for the arthralgia of the hip.  6/28/2022 FM:  Patient worked with therapy yesterday and the notes reviewed.  I do feel that she is a good candidate for inpatient rehab and her goal is to return home at a supervision to modified independent functional level.  I feel with better pain control and close supervision and medical management this is achievable.  Will submit to her insurance company for approval.  6/29/2022 FM:  Awaiting insurance company approval.  Patient is working with therapy.  Patient's pain is improved.  6/30/2022 FM:  Patient's caregivers have been diagnosed with COVID-19 pneumonia and are under able to assist with care at this time.  Patient's insurance company denied her admission to inpatient rehab despite her meeting the criteria in my opinion.  Appeal is in progress.  7/1/2022 FM:  Patient states she has become frustrated waiting and is frustrated with her insurance company.  Patient is requesting for the home to be set up for a possible discharge home if her appeal fails.  The patient's primary caregiver at home is currently COVID-19 infected and will be released from  quarantine on Monday.  Will hold the patient here until Monday and then discharged home with a hospital bed and her other DME equipment.  If the patient's appeal is overturned she can move up to rehab.  7/2 [place] Sore throat improved with throat lozenges, back to baseline room air, provided encouragement to use incentive spirometer. Patient agreeable to discharge plan above.     Interval History: No acute events overnight. Complains of nasal cannula bothering her. Per patient improved appetite.    Review of Systems   Constitutional:  Negative for appetite change, chills and fatigue.   Respiratory:  Positive for cough (minimal with mild sore throat). Negative for chest tightness and wheezing.    Cardiovascular:  Negative for chest pain, palpitations and leg swelling.   Gastrointestinal:  Negative for abdominal pain, constipation, diarrhea, nausea and vomiting.   Musculoskeletal:  Positive for arthralgias and gait problem.   Neurological:  Positive for weakness. Negative for seizures, speech difficulty, light-headedness, numbness and headaches.   Psychiatric/Behavioral:  Positive for agitation. The patient is nervous/anxious.    Objective:     Vital Signs (Most Recent):  Temp: 97.7 °F (36.5 °C) (07/02/22 1145)  Pulse: 65 (07/02/22 1256)  Resp: 20 (07/02/22 1145)  BP: 120/61 (07/02/22 1145)  SpO2: 96 % (07/02/22 1145) Vital Signs (24h Range):  Temp:  [97.7 °F (36.5 °C)-98.4 °F (36.9 °C)] 97.7 °F (36.5 °C)  Pulse:  [54-68] 65  Resp:  [18-20] 20  SpO2:  [87 %-97 %] 96 %  BP: (120-146)/(61-80) 120/61     Weight: 62.1 kg (137 lb)  Body mass index is 23.52 kg/m².    Intake/Output Summary (Last 24 hours) at 7/2/2022 1407  Last data filed at 7/2/2022 0600  Gross per 24 hour   Intake 1860 ml   Output 1175 ml   Net 685 ml      Physical Exam  Vitals and nursing note reviewed.   Constitutional:       General: She is not in acute distress.     Appearance: Normal appearance. She is not ill-appearing or toxic-appearing.   HENT:       Head: Normocephalic and atraumatic.      Right Ear: External ear normal.      Left Ear: External ear normal.      Mouth/Throat:      Mouth: Mucous membranes are moist.      Pharynx: Oropharynx is clear. No oropharyngeal exudate or posterior oropharyngeal erythema.   Eyes:      General: No scleral icterus.     Extraocular Movements: Extraocular movements intact.      Pupils: Pupils are equal, round, and reactive to light.   Cardiovascular:      Rate and Rhythm: Normal rate. Rhythm irregular.      Pulses: Normal pulses.      Heart sounds: Normal heart sounds.   Pulmonary:      Effort: Pulmonary effort is normal.      Breath sounds: Normal breath sounds.   Abdominal:      General: Bowel sounds are normal. There is no distension.      Palpations: Abdomen is soft.      Tenderness: There is no abdominal tenderness. There is no right CVA tenderness, left CVA tenderness or guarding.   Musculoskeletal:         General: No tenderness. Normal range of motion.      Cervical back: Normal range of motion and neck supple. No rigidity or tenderness.      Right lower leg: No edema.      Left lower leg: No edema.   Lymphadenopathy:      Cervical: No cervical adenopathy.   Skin:     General: Skin is warm and dry.      Capillary Refill: Capillary refill takes less than 2 seconds.      Findings: No erythema or rash.   Neurological:      General: No focal deficit present.      Mental Status: She is alert and oriented to person, place, and time.      Cranial Nerves: No cranial nerve deficit.      Sensory: No sensory deficit.      Motor: No weakness (baseline).   Psychiatric:         Mood and Affect: Mood normal.         Behavior: Behavior normal.         Thought Content: Thought content normal.     Significant Labs: All pertinent labs within the past 24 hours have been reviewed.  BMP:   Recent Labs   Lab 07/02/22  0335   *      K 4.4      CO2 34*   BUN 36*   CREATININE 1.3   CALCIUM 8.3*   MG 1.5*     CBC:   Recent Labs    Lab 07/01/22  0636 07/02/22  0335   WBC 22.70* 22.61*   HGB 9.9* 9.5*   HCT 31.4* 29.3*    317     CMP:   Recent Labs   Lab 07/01/22  0636 07/02/22  0335    137   K 4.4 4.4    101   CO2 34* 34*   * 135*   BUN 30* 36*   CREATININE 1.3 1.3   CALCIUM 8.7 8.3*   PROT 5.7* 5.7*   ALBUMIN 2.0* 2.0*   BILITOT 0.7 0.5   ALKPHOS 68 66   AST 15 17   ALT 19 24   ANIONGAP 2* 2*   EGFRNONAA 36.2* 36.2*     X-Ray Hip 1 View Right (with Pelvis when performed)  Narrative: EXAMINATION:  XR HIP WITH PELVIS WHEN PERFORMED, 1 VIEW RIGHT    CLINICAL HISTORY:  hip pain;    FINDINGS:  No fracture or dislocation.  Mild osteophytes.  Dense vascular calcifications.  Impression: Mild degenerative change.  No acute findings.    Electronically signed by: Deni Arnold MD  Date:    06/24/2022  Time:    06:04   Significant Imaging: I have reviewed all pertinent imaging results/findings within the past 24 hours.      Assessment/Plan:      * Pneumonia due to infectious organism  Blood cultures x2 positive for staphylococcus epidermidis, susceptibility pending. Vancomycin initiated on 6/19/22 6/24: Day 7 of IV abx therapy with Rocephin and Azithromycin; Day 5 of abx therapy with Vancomycin. Repeat blood cultures showing NGTD x3 days  6/25 patient on Rocephin azithromycin, completed seven day therapy, plan to DC, on day six for IV vanc for blood cultures with Staph epi  6/26 patient completed antibiotic therapy for pneumonia, on IV vanc for blood cultures, day seven    Has completed abx, wbc noted but on steroids.  Prednisone 20 mg x3 days, continue with taper.       Bacteremia  Patient 2/2 bottles positive for Staph epidermidis, sensitive to Vanc, on day 6/7 6/26 day seven of seven for blood cultures with Staph epidermidis.  Completed 7 days of abx, s/s stable and improved, echo negative.  If appeal for IPR unsuccessful patient to go home Monday.    Weakness  Continue PT/OT efforts  Patient with a significant debility  and change in her functional state.  Possibly a inpatient rehab candidate and return home but will discuss with therapy today as she refused yesterday and informed the patient she cannot refuse therapy and must put in good effort.  7/2: See above notes, patient in recliner, ready to move back to bedside. Strength intact and equal in lower extremities bilaterally, no complaint of pain.       NSTEMI (non-ST elevated myocardial infarction)  Dr. Chapin managing    DVT prophylaxis  On eliquis.    Hypertension  BP Readings from Last 3 Encounters:   07/02/22 120/61   06/20/22 (!) 141/82   04/21/22 118/82   Normotensive.  BP noted, continue to add/modify agents prn.        Arthritis  Pain to left hand. CRP and Sed Rate elevated. Treatment with steroids.  6/24: Pain improving    Try lidocaine patch.  No complaints of pain.       Pelvic fracture  Still with residual pain and limited mobility, would like to try IPR, home Monday with hospital bed if appeal fails.  - f/u CM        VTE Risk Mitigation (From admission, onward)         Ordered     apixaban tablet 2.5 mg  2 times daily         06/21/22 1856     IP VTE HIGH RISK PATIENT  Once         06/18/22 1525     Place sequential compression device  Until discontinued         06/18/22 1525                Discharge Planning   KIMANI:      Code Status: Full Code   Is the patient medically ready for discharge?:     Reason for patient still in hospital (select all that apply): Pending disposition  Discharge Plan A: Home, Home Health   Discharge Delays: None known at this time    Keyshawn Rosas DO  Department of Hospital Medicine   [facility]

## 2022-07-02 NOTE — PT/OT/SLP PROGRESS
Occupational Therapy   Treatment    Name: Codi Black  MRN: [id number]  Admitting Diagnosis:  Pneumonia due to infectious organism       Recommendations:     Discharge Recommendations: other (see comments) (24 hour care if home with home health)  Discharge Equipment Recommendations:  walker, rolling  Barriers to discharge:   (Further medical care required)    Assessment:     Codi Black is a 89 y.o. female with a medical diagnosis of Pneumonia due to infectious organism.  Performance deficits affecting function are weakness, impaired endurance, impaired functional mobilty, impaired balance, impaired self care skills, decreased coordination, impaired cardiopulmonary response to activity, decreased safety awareness, pain.     Rehab Prognosis:  Fair; patient would benefit from acute skilled OT services to address these deficits and reach maximum level of function.       Plan:     Patient to be seen 6 x/week to address the above listed problems via self-care/home management, therapeutic activities, therapeutic exercises, neuromuscular re-education  · Plan of Care Expires: 07/08/22  · Plan of Care Reviewed with: patient    Subjective     Pain/Comfort:  · Pain Rating 1: 0/10    Objective:     Communicated with: Nurse, PTA prior to session.  Patient found supine with telemetry, PureWick, peripheral IV, pulse ox (continuous), oxygen upon OT entry to room.    General Precautions: Standard, fall, droplet, airborne   Orthopedic Precautions:N/A   Braces: N/A  Respiratory Status: Nasal cannula, flow   L/min     Occupational Performance:     Bed Mobility:    · Patient completed Rolling/Turning to Left with  minimum assistance  · Patient completed Rolling/Turning to Right with minimum assistance  · Patient completed Scooting/Bridging with minimum assistance  · Patient completed Supine to Sit with minimum assistance     Functional Mobility/Transfers:  · Patient completed Sit <> Stand Transfer with minimum assistance  with   standard walker   · Patient completed Bed <> Chair Transfer using Step Transfer technique with minimum assistance with standard walker  · Functional Mobility: not attempted this day due to SOB    Treatment & Education:  PT alert and agreeable to getting into recliner this day. PTA present to maximize pt participation in session. Pt transitioned from supine to sitting EOB as stated above. Pt then transferred from sitting EOB to sitting in recliner as stated above. Pt with decrease balance and safety awareness as compared to last session per GEIGER. Pt noted to be short of breath at end of transfer and requesting to sit up in recliner through lunch. Pt verbally cued for deep breathing techniques to improve shortness of breath. Pt left comfortable per pt report and with call light and phone within reach in recliner.    Patient left up in chair with all lines intact, call button in reach and nurse notifiedEducation:      GOALS:   Multidisciplinary Problems     Occupational Therapy Goals        Problem: Occupational Therapy    Goal Priority Disciplines Outcome Interventions   Occupational Therapy Goal     OT, PT/OT Ongoing, Progressing    Description: Goals to be met by: 7/8/2022     Patient will increase functional independence with ADLs by performing:    UE Dressing with Minimal Assistance.  Grooming while EOB with Set-up Assistance.  Toileting from bedside commode with Minimal Assistance for hygiene and clothing management.   Sitting at edge of bed x5 minutes with Minimal Assistance.  Rolling to Right, Left with Minimal Assistance.   Supine to sit with Minimal Assistance.                     Time Tracking:     OT Date of Treatment: 07/02/22  OT Start Time: 1018  OT Stop Time: 1041  OT Total Time (min): 23 min    Billable Minutes:Therapeutic Activity 23    OT/REJI: REJI          7/2/2022

## 2022-07-02 NOTE — ASSESSMENT & PLAN NOTE
Blood cultures x2 positive for staphylococcus epidermidis, susceptibility pending. Vancomycin initiated on 6/19/22 6/24: Day 7 of IV abx therapy with Rocephin and Azithromycin; Day 5 of abx therapy with Vancomycin. Repeat blood cultures showing NGTD x3 days  6/25 patient on Rocephin azithromycin, completed seven day therapy, plan to DC, on day six for IV vanc for blood cultures with Staph epi  6/26 patient completed antibiotic therapy for pneumonia, on IV vanc for blood cultures, day seven    Has completed abx, wbc noted but on steroids.  Prednisone 20 mg x3 days, continue with taper.

## 2022-07-02 NOTE — PT/OT/SLP PROGRESS
Physical Therapy Treatment    Patient Name:  Codi Black   MRN:  [id number]    Time Tracking:     PT Start Time: 1018     PT Stop Time: 1041  PT Total Time (min): 23 min     Billable Minutes: Therapeutic Activity 23  Stephy Peña, MERLYN  2022      Subjective     Patient/Family Comments/goals: Pt agreeable to sit up in recliner. No pain reported at this time.   Pain/Comfort: none      Objective:     General Precautions: Standard, fall, droplet   Orthopedic Precautions:N/A     Communicated with PT, FELY, RN prior to session. Patient found supine upon PT entry to room.     Functional Mobility:  ·    · Scooting: minimum assistance  · Supine to Sit: minimum assistance  ·    · Sit to Stand:  minimum assistance with standard walker  · Bed to Chair: minimum assistance with  standard walker  using  Step Transfer     Therapeutic Activities and Exercises:  Pt completed 5 functional steps w/ use of SW during bed>recliner chair transfer. She then became SOB. Provided education to pt regarding importance of NC and breathing technique from NC at 1L. She verbalized understanding.     Patient left up in chair with call button in reach.    GOALS:   Multidisciplinary Problems     Physical Therapy Goals        Problem: Physical Therapy    Goal Priority Disciplines Outcome Goal Variances Interventions   Physical Therapy Goal     PT, PT/OT Ongoing, Progressing     Description: Goals to be met by: 2022    Patient will increase functional independence with mobility by performin. Supine to/from sit with MInimal Assistance.  2. Rolling to Left and Right with Stand-by Assistance.  3. Sit to stand transfer with Contact Guard Assistance  4. Bed to chair transfer with Contact Guard Assistance using Rolling Walker  5. Gait  x 10 feet with Minimal Assistance using Rolling Walker.                        Assessment:     Codi Black is a 89 y.o. female admitted with a medical diagnosis of Pneumonia due to infectious organism. Pt  experienced SOB during minimal functional mobility today. Seated rest break for recovery.       Plan:     During this hospitalization, patient to be seen 6 x/week to address the identified rehab impairments gait training, therapeutic activities, therapeutic exercises and progress toward the goals.      Recommendations:     Cont to progress as tolerated.

## 2022-07-02 NOTE — ASSESSMENT & PLAN NOTE
BP Readings from Last 3 Encounters:   07/02/22 120/61   06/20/22 (!) 141/82   04/21/22 118/82   Normotensive.  BP noted, continue to add/modify agents prn.

## 2022-07-02 NOTE — ASSESSMENT & PLAN NOTE
Pain to left hand. CRP and Sed Rate elevated. Treatment with steroids.  6/24: Pain improving    Try lidocaine patch.  No complaints of pain.

## 2022-07-02 NOTE — ASSESSMENT & PLAN NOTE
Continue PT/OT efforts  Patient with a significant debility and change in her functional state.  Possibly a inpatient rehab candidate and return home but will discuss with therapy today as she refused yesterday and informed the patient she cannot refuse therapy and must put in good effort.  7/2: See above notes, patient in recliner, ready to move back to bedside. Strength intact and equal in lower extremities bilaterally, no complaint of pain.

## 2022-07-02 NOTE — ASSESSMENT & PLAN NOTE
Patient 2/2 bottles positive for Staph epidermidis, sensitive to Vanc, on day 6/7 6/26 day seven of seven for blood cultures with Staph epidermidis.  Completed 7 days of abx, s/s stable and improved, echo negative.  If appeal for IPR unsuccessful patient to go home Monday.

## 2022-07-03 LAB
ALBUMIN SERPL BCP-MCNC: 2.3 G/DL (ref 3.5–5.2)
ALP SERPL-CCNC: 76 U/L (ref 55–135)
ALT SERPL W/O P-5'-P-CCNC: 28 U/L (ref 10–44)
ANION GAP SERPL CALC-SCNC: 3 MMOL/L (ref 8–16)
AST SERPL-CCNC: 18 U/L (ref 10–40)
BASOPHILS # BLD AUTO: 0.07 K/UL (ref 0–0.2)
BASOPHILS NFR BLD: 0.3 % (ref 0–1.9)
BILIRUB SERPL-MCNC: 0.6 MG/DL (ref 0.1–1)
BUN SERPL-MCNC: 32 MG/DL (ref 8–23)
CALCIUM SERPL-MCNC: 9.1 MG/DL (ref 8.7–10.5)
CHLORIDE SERPL-SCNC: 101 MMOL/L (ref 95–110)
CO2 SERPL-SCNC: 35 MMOL/L (ref 23–29)
CREAT SERPL-MCNC: 1.2 MG/DL (ref 0.5–1.4)
DIFFERENTIAL METHOD: ABNORMAL
EOSINOPHIL # BLD AUTO: 0 K/UL (ref 0–0.5)
EOSINOPHIL NFR BLD: 0 % (ref 0–8)
ERYTHROCYTE [DISTWIDTH] IN BLOOD BY AUTOMATED COUNT: 13.1 % (ref 11.5–14.5)
EST. GFR  (AFRICAN AMERICAN): 46 ML/MIN/1.73 M^2
EST. GFR  (NON AFRICAN AMERICAN): 39.9 ML/MIN/1.73 M^2
GLUCOSE SERPL-MCNC: 104 MG/DL (ref 70–110)
HCT VFR BLD AUTO: 36 % (ref 37–48.5)
HGB BLD-MCNC: 11 G/DL (ref 12–16)
IMM GRANULOCYTES # BLD AUTO: 0.33 K/UL (ref 0–0.04)
IMM GRANULOCYTES NFR BLD AUTO: 1.4 % (ref 0–0.5)
LYMPHOCYTES # BLD AUTO: 1.9 K/UL (ref 1–4.8)
LYMPHOCYTES NFR BLD: 8.1 % (ref 18–48)
MAGNESIUM SERPL-MCNC: 1.8 MG/DL (ref 1.6–2.6)
MCH RBC QN AUTO: 27.9 PG (ref 27–31)
MCHC RBC AUTO-ENTMCNC: 30.6 G/DL (ref 32–36)
MCV RBC AUTO: 91 FL (ref 82–98)
MONOCYTES # BLD AUTO: 1.6 K/UL (ref 0.3–1)
MONOCYTES NFR BLD: 6.6 % (ref 4–15)
NEUTROPHILS # BLD AUTO: 19.6 K/UL (ref 1.8–7.7)
NEUTROPHILS NFR BLD: 83.6 % (ref 38–73)
NRBC BLD-RTO: 0 /100 WBC
PLATELET # BLD AUTO: 386 K/UL (ref 150–450)
PMV BLD AUTO: 9.5 FL (ref 9.2–12.9)
POTASSIUM SERPL-SCNC: 3.9 MMOL/L (ref 3.5–5.1)
PROT SERPL-MCNC: 6.6 G/DL (ref 6–8.4)
RBC # BLD AUTO: 3.94 M/UL (ref 4–5.4)
SARS-COV-2 RNA RESP QL NAA+PROBE: NOT DETECTED
SODIUM SERPL-SCNC: 139 MMOL/L (ref 136–145)
WBC # BLD AUTO: 23.44 K/UL (ref 3.9–12.7)

## 2022-07-03 PROCEDURE — 63600175 PHARM REV CODE 636 W HCPCS: Performed by: INTERNAL MEDICINE

## 2022-07-03 PROCEDURE — 27000221 HC OXYGEN, UP TO 24 HOURS

## 2022-07-03 PROCEDURE — 25000003 PHARM REV CODE 250: Performed by: INTERNAL MEDICINE

## 2022-07-03 PROCEDURE — 85025 COMPLETE CBC W/AUTO DIFF WBC: CPT | Performed by: NURSE PRACTITIONER

## 2022-07-03 PROCEDURE — 94799 UNLISTED PULMONARY SVC/PX: CPT

## 2022-07-03 PROCEDURE — 36415 COLL VENOUS BLD VENIPUNCTURE: CPT | Performed by: NURSE PRACTITIONER

## 2022-07-03 PROCEDURE — 25000003 PHARM REV CODE 250: Performed by: STUDENT IN AN ORGANIZED HEALTH CARE EDUCATION/TRAINING PROGRAM

## 2022-07-03 PROCEDURE — 25000003 PHARM REV CODE 250: Performed by: NURSE PRACTITIONER

## 2022-07-03 PROCEDURE — 99900031 HC PATIENT EDUCATION (STAT)

## 2022-07-03 PROCEDURE — 11000001 HC ACUTE MED/SURG PRIVATE ROOM

## 2022-07-03 PROCEDURE — U0002 COVID-19 LAB TEST NON-CDC: HCPCS | Performed by: INTERNAL MEDICINE

## 2022-07-03 PROCEDURE — 80053 COMPREHEN METABOLIC PANEL: CPT | Performed by: NURSE PRACTITIONER

## 2022-07-03 PROCEDURE — 94761 N-INVAS EAR/PLS OXIMETRY MLT: CPT

## 2022-07-03 PROCEDURE — 83735 ASSAY OF MAGNESIUM: CPT | Performed by: NURSE PRACTITIONER

## 2022-07-03 PROCEDURE — 99900035 HC TECH TIME PER 15 MIN (STAT)

## 2022-07-03 RX ADMIN — APIXABAN 2.5 MG: 2.5 TABLET, FILM COATED ORAL at 09:07

## 2022-07-03 RX ADMIN — MIRTAZAPINE 15 MG: 15 TABLET, FILM COATED ORAL at 09:07

## 2022-07-03 RX ADMIN — Medication 400 MG: at 10:07

## 2022-07-03 RX ADMIN — ASPIRIN 81 MG: 81 TABLET, COATED ORAL at 10:07

## 2022-07-03 RX ADMIN — ISOSORBIDE MONONITRATE 30 MG: 30 TABLET, EXTENDED RELEASE ORAL at 10:07

## 2022-07-03 RX ADMIN — AMIODARONE HYDROCHLORIDE 200 MG: 200 TABLET ORAL at 09:07

## 2022-07-03 RX ADMIN — APIXABAN 2.5 MG: 2.5 TABLET, FILM COATED ORAL at 10:07

## 2022-07-03 RX ADMIN — METOPROLOL TARTRATE 75 MG: 50 TABLET, FILM COATED ORAL at 09:07

## 2022-07-03 RX ADMIN — PREDNISONE 20 MG: 20 TABLET ORAL at 10:07

## 2022-07-03 RX ADMIN — NYSTATIN 500000 UNITS: 100000 SUSPENSION ORAL at 09:07

## 2022-07-03 RX ADMIN — ALPRAZOLAM 0.5 MG: 0.5 TABLET ORAL at 09:07

## 2022-07-03 RX ADMIN — TRAMADOL HYDROCHLORIDE 50 MG: 50 TABLET ORAL at 09:07

## 2022-07-03 RX ADMIN — AMLODIPINE BESYLATE 5 MG: 5 TABLET ORAL at 10:07

## 2022-07-03 RX ADMIN — ATORVASTATIN CALCIUM 10 MG: 10 TABLET, FILM COATED ORAL at 09:07

## 2022-07-03 RX ADMIN — NYSTATIN 500000 UNITS: 100000 SUSPENSION ORAL at 10:07

## 2022-07-03 RX ADMIN — METOPROLOL TARTRATE 75 MG: 50 TABLET, FILM COATED ORAL at 10:07

## 2022-07-03 RX ADMIN — FAMOTIDINE 20 MG: 20 TABLET ORAL at 10:07

## 2022-07-03 RX ADMIN — AMIODARONE HYDROCHLORIDE 200 MG: 200 TABLET ORAL at 10:07

## 2022-07-03 RX ADMIN — LIDOCAINE 1 PATCH: 50 PATCH TOPICAL at 10:07

## 2022-07-03 NOTE — PLAN OF CARE
07/03/22 0946   Medicare Message   Important Message from Medicare regarding Discharge Appeal Rights Given to patient/caregiver;Explained to patient/caregiver;Signed/date by patient/caregiver   Date IMM was signed 07/03/22   Time IMM was signed 0945

## 2022-07-04 LAB
ALBUMIN SERPL BCP-MCNC: 2 G/DL (ref 3.5–5.2)
ALP SERPL-CCNC: 68 U/L (ref 55–135)
ALT SERPL W/O P-5'-P-CCNC: 30 U/L (ref 10–44)
ANION GAP SERPL CALC-SCNC: 1 MMOL/L (ref 8–16)
AST SERPL-CCNC: 16 U/L (ref 10–40)
BASOPHILS # BLD AUTO: 0.05 K/UL (ref 0–0.2)
BASOPHILS NFR BLD: 0.3 % (ref 0–1.9)
BILIRUB SERPL-MCNC: 0.5 MG/DL (ref 0.1–1)
BUN SERPL-MCNC: 42 MG/DL (ref 8–23)
CALCIUM SERPL-MCNC: 8.6 MG/DL (ref 8.7–10.5)
CHLORIDE SERPL-SCNC: 104 MMOL/L (ref 95–110)
CO2 SERPL-SCNC: 35 MMOL/L (ref 23–29)
CREAT SERPL-MCNC: 1.3 MG/DL (ref 0.5–1.4)
DIFFERENTIAL METHOD: ABNORMAL
EOSINOPHIL # BLD AUTO: 0 K/UL (ref 0–0.5)
EOSINOPHIL NFR BLD: 0 % (ref 0–8)
ERYTHROCYTE [DISTWIDTH] IN BLOOD BY AUTOMATED COUNT: 13.1 % (ref 11.5–14.5)
EST. GFR  (AFRICAN AMERICAN): 41.7 ML/MIN/1.73 M^2
EST. GFR  (NON AFRICAN AMERICAN): 36.2 ML/MIN/1.73 M^2
GLUCOSE SERPL-MCNC: 116 MG/DL (ref 70–110)
HCT VFR BLD AUTO: 31.8 % (ref 37–48.5)
HGB BLD-MCNC: 10.1 G/DL (ref 12–16)
IMM GRANULOCYTES # BLD AUTO: 0.25 K/UL (ref 0–0.04)
IMM GRANULOCYTES NFR BLD AUTO: 1.4 % (ref 0–0.5)
LYMPHOCYTES # BLD AUTO: 1.3 K/UL (ref 1–4.8)
LYMPHOCYTES NFR BLD: 7.1 % (ref 18–48)
MAGNESIUM SERPL-MCNC: 1.7 MG/DL (ref 1.6–2.6)
MCH RBC QN AUTO: 28.5 PG (ref 27–31)
MCHC RBC AUTO-ENTMCNC: 31.8 G/DL (ref 32–36)
MCV RBC AUTO: 90 FL (ref 82–98)
MONOCYTES # BLD AUTO: 1 K/UL (ref 0.3–1)
MONOCYTES NFR BLD: 5.4 % (ref 4–15)
NEUTROPHILS # BLD AUTO: 15.8 K/UL (ref 1.8–7.7)
NEUTROPHILS NFR BLD: 85.8 % (ref 38–73)
NRBC BLD-RTO: 0 /100 WBC
PLATELET # BLD AUTO: 362 K/UL (ref 150–450)
PMV BLD AUTO: 10 FL (ref 9.2–12.9)
POTASSIUM SERPL-SCNC: 4.6 MMOL/L (ref 3.5–5.1)
PROT SERPL-MCNC: 5.6 G/DL (ref 6–8.4)
RBC # BLD AUTO: 3.54 M/UL (ref 4–5.4)
SODIUM SERPL-SCNC: 140 MMOL/L (ref 136–145)
WBC # BLD AUTO: 18.43 K/UL (ref 3.9–12.7)

## 2022-07-04 PROCEDURE — 63600175 PHARM REV CODE 636 W HCPCS: Performed by: INTERNAL MEDICINE

## 2022-07-04 PROCEDURE — 36415 COLL VENOUS BLD VENIPUNCTURE: CPT | Performed by: NURSE PRACTITIONER

## 2022-07-04 PROCEDURE — 99900031 HC PATIENT EDUCATION (STAT)

## 2022-07-04 PROCEDURE — 85025 COMPLETE CBC W/AUTO DIFF WBC: CPT | Performed by: NURSE PRACTITIONER

## 2022-07-04 PROCEDURE — 25000003 PHARM REV CODE 250: Performed by: NURSE PRACTITIONER

## 2022-07-04 PROCEDURE — 25000003 PHARM REV CODE 250: Performed by: STUDENT IN AN ORGANIZED HEALTH CARE EDUCATION/TRAINING PROGRAM

## 2022-07-04 PROCEDURE — 99900035 HC TECH TIME PER 15 MIN (STAT)

## 2022-07-04 PROCEDURE — 80053 COMPREHEN METABOLIC PANEL: CPT | Performed by: NURSE PRACTITIONER

## 2022-07-04 PROCEDURE — 94761 N-INVAS EAR/PLS OXIMETRY MLT: CPT

## 2022-07-04 PROCEDURE — 83735 ASSAY OF MAGNESIUM: CPT | Performed by: NURSE PRACTITIONER

## 2022-07-04 PROCEDURE — 94799 UNLISTED PULMONARY SVC/PX: CPT

## 2022-07-04 PROCEDURE — 25000003 PHARM REV CODE 250: Performed by: INTERNAL MEDICINE

## 2022-07-04 PROCEDURE — 11000001 HC ACUTE MED/SURG PRIVATE ROOM

## 2022-07-04 PROCEDURE — 27000221 HC OXYGEN, UP TO 24 HOURS

## 2022-07-04 RX ADMIN — AMLODIPINE BESYLATE 5 MG: 5 TABLET ORAL at 08:07

## 2022-07-04 RX ADMIN — TRAMADOL HYDROCHLORIDE 50 MG: 50 TABLET ORAL at 08:07

## 2022-07-04 RX ADMIN — AMIODARONE HYDROCHLORIDE 200 MG: 200 TABLET ORAL at 08:07

## 2022-07-04 RX ADMIN — Medication 400 MG: at 08:07

## 2022-07-04 RX ADMIN — MIRTAZAPINE 15 MG: 15 TABLET, FILM COATED ORAL at 08:07

## 2022-07-04 RX ADMIN — METOPROLOL TARTRATE 75 MG: 50 TABLET, FILM COATED ORAL at 08:07

## 2022-07-04 RX ADMIN — ATORVASTATIN CALCIUM 10 MG: 10 TABLET, FILM COATED ORAL at 08:07

## 2022-07-04 RX ADMIN — NYSTATIN 500000 UNITS: 100000 SUSPENSION ORAL at 08:07

## 2022-07-04 RX ADMIN — NYSTATIN 500000 UNITS: 100000 SUSPENSION ORAL at 12:07

## 2022-07-04 RX ADMIN — ALPRAZOLAM 0.5 MG: 0.5 TABLET ORAL at 08:07

## 2022-07-04 RX ADMIN — LIDOCAINE 1 PATCH: 50 PATCH TOPICAL at 08:07

## 2022-07-04 RX ADMIN — ASPIRIN 81 MG: 81 TABLET, COATED ORAL at 08:07

## 2022-07-04 RX ADMIN — PREDNISONE 20 MG: 20 TABLET ORAL at 08:07

## 2022-07-04 RX ADMIN — FAMOTIDINE 20 MG: 20 TABLET ORAL at 08:07

## 2022-07-04 RX ADMIN — APIXABAN 2.5 MG: 2.5 TABLET, FILM COATED ORAL at 08:07

## 2022-07-04 RX ADMIN — NYSTATIN 500000 UNITS: 100000 SUSPENSION ORAL at 04:07

## 2022-07-04 RX ADMIN — ISOSORBIDE MONONITRATE 30 MG: 30 TABLET, EXTENDED RELEASE ORAL at 08:07

## 2022-07-04 NOTE — RESPIRATORY THERAPY
07/04/22 0800   PRE-TX-O2   O2 Device (Oxygen Therapy) (S)  room air   SpO2 (S)  96 %   Pulse Oximetry Type Intermittent   $ Pulse Oximetry - Multiple Charge Pulse Oximetry - Multiple   Pulse 61   Resp 18   Positioning HOB elevated 45 degrees       Patient requested to take off nasal cannula. Patient placed on room air at this time. No distress noted. Patient denies shortness of breath. Toni DELANEY notified

## 2022-07-04 NOTE — PROGRESS NOTES
[facility] Medicine  Progress Note    Patient Name: Codi Black  MRN: [id number]  Patient Class: IP- Inpatient   Admission Date: 6/18/2022  Length of Stay: 15 days  Attending Physician: Rafael Quan III, MD  Primary Care Provider: Rafael Quan III, MD        Subjective:     Principal Problem:Pneumonia due to infectious organism        HPI:  Pt is 89 y/o female with Hx of hypertension, hyperlipidemia, and arthritis, presented to [facility] ED yesterday with c/o generalized weakness. She reports having a productive cough. She denies chest pain. High-Sensitivity Troponin levels, although negative, trended up x3 sets - Cardiology consulted. This morning she reports continued fatigue and weakness; also c/o left hand pain; shortness of breath improved with rest and oxygen.      Overview/Hospital Course:  6/20 SD: Pt's spouse at bedside. Pt reports feeling better compared to initial arrival to the hospital. Blood cultures x2 positive for gram + cocci in clusters resembling Staph, awaiting sensitivity. Vancomycin added to antibiotic therapy. Appreciate Dr. Chapin's recommendations for NSTEMI and HTN. Dr. Chapin advises that pt will need angiogram after treatment of pneumonia; will determine need for inpatient vs outpatient as patient care progresses.  6/21 SD: Pt's spouse at bedside. Blood cultures x2 positive for staphylococcus epidermidis, susceptibility pending. Continue IV abx therapy and repeat blood cultures today. Encourage pt to participate with therapy.  6/22 SD: Continue IV abx, following repeat cultures. Encourage participation with therapy.  6/23 SD: Sitting up in bed, at breakfast with good appetite. Continue IV abx therapy, following repeat cultures. Encourage participation with therapy.  6/24 SD: Pt's spouse and daughter at bedside. Continue IV abx therapy, following cultures. Continue therapy efforts.  6/25 AA, weekend crosscover, patient being treated for pneumonia, on IV antibiotic,  blood cultures 2/2 bottles positive for Staph epidermidis, on IV vanc, PT OT on board  6/26 AA, weekend crosscover, antibiotics on board for pneumonia, on IV vancomycin, blood culture positive for Staph epidermidis, afebrile, PT/OT.  6/27/2022 FM:  Patient case has been reviewed and I am returning from vacation.  Patient was admitted with pneumonia and positive blood cultures and a decline in her physical condition.  Her acute medical issues seem to have stabilized but she is significantly declined in functional state and her daughter has moved here from out of town to health care for her.  Her daughter is unable to physically meet her needs.  The patient wants to return home and live with her .  May be a potential candidate for inpatient rehab if she is able to participate and putting good effort.  Needs better pain control for the arthralgia of the hip.  6/28/2022 FM:  Patient worked with therapy yesterday and the notes reviewed.  I do feel that she is a good candidate for inpatient rehab and her goal is to return home at a supervision to modified independent functional level.  I feel with better pain control and close supervision and medical management this is achievable.  Will submit to her insurance company for approval.  6/29/2022 FM:  Awaiting insurance company approval.  Patient is working with therapy.  Patient's pain is improved.  6/30/2022 FM:  Patient's caregivers have been diagnosed with COVID-19 pneumonia and are under able to assist with care at this time.  Patient's insurance company denied her admission to inpatient rehab despite her meeting the criteria in my opinion.  Appeal is in progress.  7/1/2022 FM:  Patient states she has become frustrated waiting and is frustrated with her insurance company.  Patient is requesting for the home to be set up for a possible discharge home if her appeal fails.  The patient's primary caregiver at home is currently COVID-19 infected and will be released  from quarantine on Monday.  Will hold the patient here until Monday and then discharged home with a hospital bed and her other DME equipment.  If the patient's appeal is overturned she can move up to rehab.  7/2 [place] Sore throat improved with throat lozenges, back to baseline room air, provided encouragement to use incentive spirometer. Patient agreeable to discharge plan above.   7/3 Has not had a BM in a few days, requesting getting out of chair      Interval History: No acute events overnight. Complains of nasal cannula bothering her. Per patient improved appetite.    Review of Systems   Constitutional:  Negative for appetite change, chills and fatigue.   Respiratory:  Positive for cough (minimal with mild sore throat). Negative for chest tightness and wheezing.    Cardiovascular:  Negative for chest pain, palpitations and leg swelling.   Gastrointestinal:  Negative for abdominal pain, constipation, diarrhea, nausea and vomiting.   Musculoskeletal:  Positive for arthralgias and gait problem.   Neurological:  Positive for weakness. Negative for seizures, speech difficulty, light-headedness, numbness and headaches.   Psychiatric/Behavioral:  Positive for agitation. The patient is nervous/anxious.    Objective:     Vital Signs (Most Recent):  Temp: 98.2 °F (36.8 °C) (07/03/22 1916)  Pulse: 64 (07/1935)  Resp: 18 (07/03/22 2151)  BP: 136/65 (07/03/22 2151)  SpO2: 98 % (07/1935) Vital Signs (24h Range):  Temp:  [97.9 °F (36.6 °C)-98.7 °F (37.1 °C)] 98.2 °F (36.8 °C)  Pulse:  [58-74] 64  Resp:  [17-20] 18  SpO2:  [87 %-99 %] 98 %  BP: (136-159)/(65-78) 136/65     Weight: 62.1 kg (137 lb)  Body mass index is 23.52 kg/m².    Intake/Output Summary (Last 24 hours) at 7/3/2022 2300  Last data filed at 7/3/2022 1900  Gross per 24 hour   Intake 1980 ml   Output 1100 ml   Net 880 ml        Physical Exam  Vitals and nursing note reviewed.   Constitutional:       General: She is not in acute distress.     Appearance:  Normal appearance. She is not ill-appearing or toxic-appearing.   HENT:      Head: Normocephalic and atraumatic.      Right Ear: External ear normal.      Left Ear: External ear normal.      Mouth/Throat:      Mouth: Mucous membranes are moist.      Pharynx: Oropharynx is clear. No oropharyngeal exudate or posterior oropharyngeal erythema.   Eyes:      General: No scleral icterus.     Extraocular Movements: Extraocular movements intact.      Pupils: Pupils are equal, round, and reactive to light.   Cardiovascular:      Rate and Rhythm: Normal rate. Rhythm irregular.      Pulses: Normal pulses.      Heart sounds: Normal heart sounds.   Pulmonary:      Effort: Pulmonary effort is normal.      Breath sounds: Normal breath sounds.   Abdominal:      General: Bowel sounds are normal. There is no distension.      Palpations: Abdomen is soft.      Tenderness: There is no abdominal tenderness. There is no right CVA tenderness, left CVA tenderness or guarding.   Musculoskeletal:         General: No tenderness. Normal range of motion.      Cervical back: Normal range of motion and neck supple. No rigidity or tenderness.      Right lower leg: No edema.      Left lower leg: No edema.   Lymphadenopathy:      Cervical: No cervical adenopathy.   Skin:     General: Skin is warm and dry.      Capillary Refill: Capillary refill takes less than 2 seconds.      Findings: No erythema or rash.   Neurological:      General: No focal deficit present.      Mental Status: She is alert and oriented to person, place, and time.      Cranial Nerves: No cranial nerve deficit.      Sensory: No sensory deficit.      Motor: No weakness (baseline).   Psychiatric:         Mood and Affect: Mood normal.         Behavior: Behavior normal.         Thought Content: Thought content normal.     Significant Labs: All pertinent labs within the past 24 hours have been reviewed.  BMP:   Recent Labs   Lab 07/03/22  0851         K 3.9      CO2  35*   BUN 32*   CREATININE 1.2   CALCIUM 9.1   MG 1.8       CBC:   Recent Labs   Lab 07/02/22  0335 07/03/22  0851   WBC 22.61* 23.44*   HGB 9.5* 11.0*   HCT 29.3* 36.0*    386       CMP:   Recent Labs   Lab 07/02/22  0335 07/03/22  0851    139   K 4.4 3.9    101   CO2 34* 35*   * 104   BUN 36* 32*   CREATININE 1.3 1.2   CALCIUM 8.3* 9.1   PROT 5.7* 6.6   ALBUMIN 2.0* 2.3*   BILITOT 0.5 0.6   ALKPHOS 66 76   AST 17 18   ALT 24 28   ANIONGAP 2* 3*   EGFRNONAA 36.2* 39.9*       X-Ray Hip 1 View Right (with Pelvis when performed)  Narrative: EXAMINATION:  XR HIP WITH PELVIS WHEN PERFORMED, 1 VIEW RIGHT    CLINICAL HISTORY:  hip pain;    FINDINGS:  No fracture or dislocation.  Mild osteophytes.  Dense vascular calcifications.  Impression: Mild degenerative change.  No acute findings.    Electronically signed by: Deni Arnold MD  Date:    06/24/2022  Time:    06:04   Significant Imaging: I have reviewed all pertinent imaging results/findings within the past 24 hours.      Assessment/Plan:      * Pneumonia due to infectious organism  Blood cultures x2 positive for staphylococcus epidermidis, susceptibility pending. Vancomycin initiated on 6/19/22 6/24: Day 7 of IV abx therapy with Rocephin and Azithromycin; Day 5 of abx therapy with Vancomycin. Repeat blood cultures showing NGTD x3 days  6/25 patient on Rocephin azithromycin, completed seven day therapy, plan to DC, on day six for IV vanc for blood cultures with Staph epi  6/26 patient completed antibiotic therapy for pneumonia, on IV vanc for blood cultures, day seven    Has completed abx, wbc noted but on steroids.  Prednisone 20 mg x3 days, continue with taper.       Bacteremia  Patient 2/2 bottles positive for Staph epidermidis, sensitive to Vanc, on day 6/7 6/26 day seven of seven for blood cultures with Staph epidermidis.  Completed 7 days of abx, s/s stable and improved, echo negative.  If appeal for IPR unsuccessful patient to go home  Monday.    Weakness  Continue PT/OT efforts  Patient with a significant debility and change in her functional state.  Possibly a inpatient rehab candidate and return home but will discuss with therapy today as she refused yesterday and informed the patient she cannot refuse therapy and must put in good effort.  7/2: See above notes, patient in recliner, ready to move back to bedside. Strength intact and equal in lower extremities bilaterally, no complaint of pain.       NSTEMI (non-ST elevated myocardial infarction)  Dr. Chapin managing    DVT prophylaxis  On [facility].    Hypertension  BP Readings from Last 3 Encounters:   07/02/22 120/61   06/20/22 (!) 141/82   04/21/22 118/82   Normotensive.  BP noted, continue to add/modify agents prn.        Arthritis  Pain to left hand. CRP and Sed Rate elevated. Treatment with steroids.  6/24: Pain improving    Try lidocaine patch.  No complaints of pain.       Pelvic fracture  Still with residual pain and limited mobility, would like to try IPR, home Monday with hospital bed if appeal fails.  - f/u CM        VTE Risk Mitigation (From admission, onward)         Ordered     apixaban tablet 2.5 mg  2 times daily         06/21/22 1856     IP VTE HIGH RISK PATIENT  Once         06/18/22 1525     Place sequential compression device  Until discontinued         06/18/22 1525                Discharge Planning   KIMANI:      Code Status: Full Code   Is the patient medically ready for discharge?:     Reason for patient still in hospital (select all that apply): Treatment  Discharge Plan A: Home, Home Health   Discharge Delays: None known at this time              Maxi Henriquez DO  Department of Hospital Medicine   [facility] Surg

## 2022-07-04 NOTE — SUBJECTIVE & OBJECTIVE
Interval History: No acute events overnight. Complains of nasal cannula bothering her. Per patient improved appetite.    Review of Systems   Constitutional:  Negative for appetite change, chills and fatigue.   Respiratory:  Positive for cough (minimal with mild sore throat). Negative for chest tightness and wheezing.    Cardiovascular:  Negative for chest pain, palpitations and leg swelling.   Gastrointestinal:  Negative for abdominal pain, constipation, diarrhea, nausea and vomiting.   Musculoskeletal:  Positive for arthralgias and gait problem.   Neurological:  Positive for weakness. Negative for seizures, speech difficulty, light-headedness, numbness and headaches.   Psychiatric/Behavioral:  Positive for agitation. The patient is nervous/anxious.    Objective:     Vital Signs (Most Recent):  Temp: 98.2 °F (36.8 °C) (07/03/22 1916)  Pulse: 64 (07/1935)  Resp: 18 (07/03/22 2151)  BP: 136/65 (07/03/22 2151)  SpO2: 98 % (07/1935) Vital Signs (24h Range):  Temp:  [97.9 °F (36.6 °C)-98.7 °F (37.1 °C)] 98.2 °F (36.8 °C)  Pulse:  [58-74] 64  Resp:  [17-20] 18  SpO2:  [87 %-99 %] 98 %  BP: (136-159)/(65-78) 136/65     Weight: 62.1 kg (137 lb)  Body mass index is 23.52 kg/m².    Intake/Output Summary (Last 24 hours) at 7/3/2022 2300  Last data filed at 7/3/2022 1900  Gross per 24 hour   Intake 1980 ml   Output 1100 ml   Net 880 ml        Physical Exam  Vitals and nursing note reviewed.   Constitutional:       General: She is not in acute distress.     Appearance: Normal appearance. She is not ill-appearing or toxic-appearing.   HENT:      Head: Normocephalic and atraumatic.      Right Ear: External ear normal.      Left Ear: External ear normal.      Mouth/Throat:      Mouth: Mucous membranes are moist.      Pharynx: Oropharynx is clear. No oropharyngeal exudate or posterior oropharyngeal erythema.   Eyes:      General: No scleral icterus.     Extraocular Movements: Extraocular movements intact.      Pupils:  Pupils are equal, round, and reactive to light.   Cardiovascular:      Rate and Rhythm: Normal rate. Rhythm irregular.      Pulses: Normal pulses.      Heart sounds: Normal heart sounds.   Pulmonary:      Effort: Pulmonary effort is normal.      Breath sounds: Normal breath sounds.   Abdominal:      General: Bowel sounds are normal. There is no distension.      Palpations: Abdomen is soft.      Tenderness: There is no abdominal tenderness. There is no right CVA tenderness, left CVA tenderness or guarding.   Musculoskeletal:         General: No tenderness. Normal range of motion.      Cervical back: Normal range of motion and neck supple. No rigidity or tenderness.      Right lower leg: No edema.      Left lower leg: No edema.   Lymphadenopathy:      Cervical: No cervical adenopathy.   Skin:     General: Skin is warm and dry.      Capillary Refill: Capillary refill takes less than 2 seconds.      Findings: No erythema or rash.   Neurological:      General: No focal deficit present.      Mental Status: She is alert and oriented to person, place, and time.      Cranial Nerves: No cranial nerve deficit.      Sensory: No sensory deficit.      Motor: No weakness (baseline).   Psychiatric:         Mood and Affect: Mood normal.         Behavior: Behavior normal.         Thought Content: Thought content normal.     Significant Labs: All pertinent labs within the past 24 hours have been reviewed.  BMP:   Recent Labs   Lab 07/03/22  0851         K 3.9      CO2 35*   BUN 32*   CREATININE 1.2   CALCIUM 9.1   MG 1.8       CBC:   Recent Labs   Lab 07/02/22  0335 07/03/22  0851   WBC 22.61* 23.44*   HGB 9.5* 11.0*   HCT 29.3* 36.0*    386       CMP:   Recent Labs   Lab 07/02/22  0335 07/03/22  0851    139   K 4.4 3.9    101   CO2 34* 35*   * 104   BUN 36* 32*   CREATININE 1.3 1.2   CALCIUM 8.3* 9.1   PROT 5.7* 6.6   ALBUMIN 2.0* 2.3*   BILITOT 0.5 0.6   ALKPHOS 66 76   AST 17 18   ALT 24  28   ANIONGAP 2* 3*   EGFRNONAA 36.2* 39.9*       X-Ray Hip 1 View Right (with Pelvis when performed)  Narrative: EXAMINATION:  XR HIP WITH PELVIS WHEN PERFORMED, 1 VIEW RIGHT    CLINICAL HISTORY:  hip pain;    FINDINGS:  No fracture or dislocation.  Mild osteophytes.  Dense vascular calcifications.  Impression: Mild degenerative change.  No acute findings.    Electronically signed by: Deni Arnold MD  Date:    06/24/2022  Time:    06:04   Significant Imaging: I have reviewed all pertinent imaging results/findings within the past 24 hours.

## 2022-07-05 VITALS
HEART RATE: 62 BPM | HEIGHT: 64 IN | RESPIRATION RATE: 22 BRPM | BODY MASS INDEX: 23.39 KG/M2 | SYSTOLIC BLOOD PRESSURE: 131 MMHG | DIASTOLIC BLOOD PRESSURE: 60 MMHG | WEIGHT: 137 LBS | TEMPERATURE: 98 F | OXYGEN SATURATION: 92 %

## 2022-07-05 LAB
ALBUMIN SERPL BCP-MCNC: 2.1 G/DL (ref 3.5–5.2)
ALP SERPL-CCNC: 72 U/L (ref 55–135)
ALT SERPL W/O P-5'-P-CCNC: 27 U/L (ref 10–44)
ANION GAP SERPL CALC-SCNC: 2 MMOL/L (ref 8–16)
AST SERPL-CCNC: 15 U/L (ref 10–40)
BASOPHILS # BLD AUTO: 0.04 K/UL (ref 0–0.2)
BASOPHILS NFR BLD: 0.2 % (ref 0–1.9)
BILIRUB SERPL-MCNC: 0.4 MG/DL (ref 0.1–1)
BUN SERPL-MCNC: 50 MG/DL (ref 8–23)
CALCIUM SERPL-MCNC: 8.9 MG/DL (ref 8.7–10.5)
CHLORIDE SERPL-SCNC: 104 MMOL/L (ref 95–110)
CO2 SERPL-SCNC: 35 MMOL/L (ref 23–29)
CREAT SERPL-MCNC: 1.3 MG/DL (ref 0.5–1.4)
DIFFERENTIAL METHOD: ABNORMAL
EOSINOPHIL # BLD AUTO: 0 K/UL (ref 0–0.5)
EOSINOPHIL NFR BLD: 0 % (ref 0–8)
ERYTHROCYTE [DISTWIDTH] IN BLOOD BY AUTOMATED COUNT: 13.1 % (ref 11.5–14.5)
EST. GFR  (AFRICAN AMERICAN): 41.7 ML/MIN/1.73 M^2
EST. GFR  (NON AFRICAN AMERICAN): 36.2 ML/MIN/1.73 M^2
GLUCOSE SERPL-MCNC: 99 MG/DL (ref 70–110)
HCT VFR BLD AUTO: 32 % (ref 37–48.5)
HGB BLD-MCNC: 10.3 G/DL (ref 12–16)
IMM GRANULOCYTES # BLD AUTO: 0.26 K/UL (ref 0–0.04)
IMM GRANULOCYTES NFR BLD AUTO: 1.3 % (ref 0–0.5)
LYMPHOCYTES # BLD AUTO: 1.9 K/UL (ref 1–4.8)
LYMPHOCYTES NFR BLD: 9.4 % (ref 18–48)
MAGNESIUM SERPL-MCNC: 1.8 MG/DL (ref 1.6–2.6)
MCH RBC QN AUTO: 28.5 PG (ref 27–31)
MCHC RBC AUTO-ENTMCNC: 32.2 G/DL (ref 32–36)
MCV RBC AUTO: 89 FL (ref 82–98)
MONOCYTES # BLD AUTO: 1.5 K/UL (ref 0.3–1)
MONOCYTES NFR BLD: 7.1 % (ref 4–15)
NEUTROPHILS # BLD AUTO: 16.9 K/UL (ref 1.8–7.7)
NEUTROPHILS NFR BLD: 82 % (ref 38–73)
NRBC BLD-RTO: 0 /100 WBC
PLATELET # BLD AUTO: 327 K/UL (ref 150–450)
PMV BLD AUTO: 9.9 FL (ref 9.2–12.9)
POTASSIUM SERPL-SCNC: 4.6 MMOL/L (ref 3.5–5.1)
PROT SERPL-MCNC: 5.7 G/DL (ref 6–8.4)
RBC # BLD AUTO: 3.61 M/UL (ref 4–5.4)
SODIUM SERPL-SCNC: 141 MMOL/L (ref 136–145)
WBC # BLD AUTO: 20.62 K/UL (ref 3.9–12.7)

## 2022-07-05 PROCEDURE — 36415 COLL VENOUS BLD VENIPUNCTURE: CPT | Performed by: NURSE PRACTITIONER

## 2022-07-05 PROCEDURE — 94799 UNLISTED PULMONARY SVC/PX: CPT

## 2022-07-05 PROCEDURE — 85025 COMPLETE CBC W/AUTO DIFF WBC: CPT | Performed by: NURSE PRACTITIONER

## 2022-07-05 PROCEDURE — 25000003 PHARM REV CODE 250: Performed by: NURSE PRACTITIONER

## 2022-07-05 PROCEDURE — 80053 COMPREHEN METABOLIC PANEL: CPT | Performed by: NURSE PRACTITIONER

## 2022-07-05 PROCEDURE — 63600175 PHARM REV CODE 636 W HCPCS: Performed by: INTERNAL MEDICINE

## 2022-07-05 PROCEDURE — 27000221 HC OXYGEN, UP TO 24 HOURS

## 2022-07-05 PROCEDURE — 99900031 HC PATIENT EDUCATION (STAT)

## 2022-07-05 PROCEDURE — 25000003 PHARM REV CODE 250: Performed by: STUDENT IN AN ORGANIZED HEALTH CARE EDUCATION/TRAINING PROGRAM

## 2022-07-05 PROCEDURE — 25000003 PHARM REV CODE 250: Performed by: INTERNAL MEDICINE

## 2022-07-05 PROCEDURE — 83735 ASSAY OF MAGNESIUM: CPT | Performed by: NURSE PRACTITIONER

## 2022-07-05 PROCEDURE — 99900035 HC TECH TIME PER 15 MIN (STAT)

## 2022-07-05 PROCEDURE — 94761 N-INVAS EAR/PLS OXIMETRY MLT: CPT

## 2022-07-05 RX ORDER — AMIODARONE HYDROCHLORIDE 200 MG/1
200 TABLET ORAL 2 TIMES DAILY
Qty: 60 TABLET | Refills: 5 | Status: SHIPPED | OUTPATIENT
Start: 2022-07-05 | End: 2023-07-05

## 2022-07-05 RX ORDER — METOPROLOL TARTRATE 75 MG/1
50 TABLET, FILM COATED ORAL 2 TIMES DAILY
Qty: 60 TABLET | Refills: 5 | Status: SHIPPED | OUTPATIENT
Start: 2022-07-05 | End: 2022-08-04

## 2022-07-05 RX ORDER — ATORVASTATIN CALCIUM 10 MG/1
10 TABLET, FILM COATED ORAL NIGHTLY
Qty: 90 TABLET | Refills: 3 | Status: SHIPPED | OUTPATIENT
Start: 2022-07-05 | End: 2023-07-05

## 2022-07-05 RX ORDER — TRAMADOL HYDROCHLORIDE 50 MG/1
50 TABLET ORAL EVERY 6 HOURS PRN
Qty: 120 TABLET | Refills: 5 | Status: SHIPPED | OUTPATIENT
Start: 2022-07-05 | End: 2022-08-04

## 2022-07-05 RX ORDER — AMLODIPINE BESYLATE 5 MG/1
5 TABLET ORAL DAILY
Qty: 30 TABLET | Refills: 5 | Status: SHIPPED | OUTPATIENT
Start: 2022-07-06 | End: 2023-07-06

## 2022-07-05 RX ORDER — PREDNISONE 20 MG/1
TABLET ORAL
Qty: 11 TABLET | Refills: 0 | Status: SHIPPED | OUTPATIENT
Start: 2022-07-06 | End: 2022-07-20

## 2022-07-05 RX ORDER — ALPRAZOLAM 0.5 MG/1
0.5 TABLET ORAL 2 TIMES DAILY PRN
Qty: 60 TABLET | Refills: 1 | Status: SHIPPED | OUTPATIENT
Start: 2022-07-05 | End: 2022-08-04

## 2022-07-05 RX ORDER — LIDOCAINE 50 MG/G
1 PATCH TOPICAL DAILY
Refills: 0
Start: 2022-07-05

## 2022-07-05 RX ORDER — CYANOCOBALAMIN 1000 UG/ML
1000 INJECTION, SOLUTION INTRAMUSCULAR; SUBCUTANEOUS
Qty: 10 ML | Refills: 1 | Status: SHIPPED | OUTPATIENT
Start: 2022-07-05 | End: 2023-01-01

## 2022-07-05 RX ORDER — ASPIRIN 81 MG/1
81 TABLET ORAL DAILY
Refills: 0
Start: 2022-07-06 | End: 2023-07-06

## 2022-07-05 RX ORDER — ISOSORBIDE MONONITRATE 30 MG/1
30 TABLET, EXTENDED RELEASE ORAL DAILY
Qty: 30 TABLET | Refills: 11 | Status: SHIPPED | OUTPATIENT
Start: 2022-07-06 | End: 2023-07-06

## 2022-07-05 RX ORDER — FAMOTIDINE 20 MG/1
20 TABLET, FILM COATED ORAL DAILY
Qty: 30 TABLET | Refills: 11 | Status: SHIPPED | OUTPATIENT
Start: 2022-07-06 | End: 2023-07-06

## 2022-07-05 RX ORDER — ALPRAZOLAM 0.5 MG/1
0.5 TABLET ORAL 2 TIMES DAILY PRN
Qty: 60 TABLET | Refills: 1 | Status: SHIPPED | OUTPATIENT
Start: 2022-07-05 | End: 2022-07-05 | Stop reason: SDUPTHER

## 2022-07-05 RX ADMIN — ISOSORBIDE MONONITRATE 30 MG: 30 TABLET, EXTENDED RELEASE ORAL at 09:07

## 2022-07-05 RX ADMIN — PREDNISONE 20 MG: 20 TABLET ORAL at 09:07

## 2022-07-05 RX ADMIN — ACETAMINOPHEN 650 MG: 325 TABLET ORAL at 09:07

## 2022-07-05 RX ADMIN — ASPIRIN 81 MG: 81 TABLET, COATED ORAL at 09:07

## 2022-07-05 RX ADMIN — Medication 400 MG: at 09:07

## 2022-07-05 RX ADMIN — AMLODIPINE BESYLATE 5 MG: 5 TABLET ORAL at 09:07

## 2022-07-05 RX ADMIN — NYSTATIN 500000 UNITS: 100000 SUSPENSION ORAL at 02:07

## 2022-07-05 RX ADMIN — FAMOTIDINE 20 MG: 20 TABLET ORAL at 09:07

## 2022-07-05 RX ADMIN — NYSTATIN 500000 UNITS: 100000 SUSPENSION ORAL at 09:07

## 2022-07-05 RX ADMIN — APIXABAN 2.5 MG: 2.5 TABLET, FILM COATED ORAL at 09:07

## 2022-07-05 RX ADMIN — LIDOCAINE 1 PATCH: 50 PATCH TOPICAL at 09:07

## 2022-07-05 RX ADMIN — AMIODARONE HYDROCHLORIDE 200 MG: 200 TABLET ORAL at 09:07

## 2022-07-05 RX ADMIN — METOPROLOL TARTRATE 75 MG: 50 TABLET, FILM COATED ORAL at 09:07

## 2022-07-05 NOTE — PLAN OF CARE
[place] [facility]  Discharge Final Note    Primary Care Provider: Rafael Quan III, MD    Expected Discharge Date: 7/5/2022    Final Discharge Note (most recent)     Final Note - 07/05/22 1608        Final Note    Assessment Type Final Discharge Note     Anticipated Discharge Disposition Home-Health Care Svc        Post-Acute Status    HME Status Set-up Complete/Auth obtained     Discharge Delays Home Medical Equipment (Insurance, Delivery)                 Important Message from Medicare  Important Message from Medicare regarding Discharge Appeal Rights: Given to patient/caregiver, Explained to patient/caregiver, Signed/date by patient/caregiver     Date IMM was signed: 07/03/22  Time IMM was signed: 0945     Follow-up providers     Rafael Quan III, MD   Specialty: Internal Medicine   Relationship: PCP - General    [address] [place] [id number]   Phone: [phone]       Next Steps: Follow up in 2 week(s)    Instructions: Dr. Quan's office to call with appointment.          After-discharge care            Durable Medical Equipment     [facility]   Service: Durable Medical Equipment    [address] [place] [id number]   Phone: [phone]       [facility]   Service: Durable Medical Equipment    [address] [facility] [address]   Phone: [phone]                     Final note is completed. The patient will resume home health with [facility]. The patient is also established with [facility]. The patient and her daughter did not want SNF/nursing home placement. I was able to provide the patient and her family with additional resources to Home Instead, [facility], an [facility] brochure, and 211 flyer if needed.

## 2022-07-05 NOTE — PROGRESS NOTES
[facility] Medicine  Progress Note    Patient Name: Codi Black  MRN: [id number]  Patient Class: IP- Inpatient   Admission Date: 6/18/2022  Length of Stay: 16 days  Attending Physician: Rafael Quan III, MD  Primary Care Provider: Rafael Quan III, MD        Subjective:     Principal Problem:Pneumonia due to infectious organism        HPI:  Pt is 89 y/o female with Hx of hypertension, hyperlipidemia, and arthritis, presented to [facility] ED yesterday with c/o generalized weakness. She reports having a productive cough. She denies chest pain. High-Sensitivity Troponin levels, although negative, trended up x3 sets - Cardiology consulted. This morning she reports continued fatigue and weakness; also c/o left hand pain; shortness of breath improved with rest and oxygen.      Overview/Hospital Course:  6/20 SD: Pt's spouse at bedside. Pt reports feeling better compared to initial arrival to the hospital. Blood cultures x2 positive for gram + cocci in clusters resembling Staph, awaiting sensitivity. Vancomycin added to antibiotic therapy. Appreciate Dr. Chapin's recommendations for NSTEMI and HTN. Dr. Chapin advises that pt will need angiogram after treatment of pneumonia; will determine need for inpatient vs outpatient as patient care progresses.  6/21 SD: Pt's spouse at bedside. Blood cultures x2 positive for staphylococcus epidermidis, susceptibility pending. Continue IV abx therapy and repeat blood cultures today. Encourage pt to participate with therapy.  6/22 SD: Continue IV abx, following repeat cultures. Encourage participation with therapy.  6/23 SD: Sitting up in bed, at breakfast with good appetite. Continue IV abx therapy, following repeat cultures. Encourage participation with therapy.  6/24 SD: Pt's spouse and daughter at bedside. Continue IV abx therapy, following cultures. Continue therapy efforts.  6/25 AA, weekend crosscover, patient being treated for pneumonia, on IV antibiotic,  blood cultures 2/2 bottles positive for Staph epidermidis, on IV vanc, PT OT on board  6/26 AA, weekend crosscover, antibiotics on board for pneumonia, on IV vancomycin, blood culture positive for Staph epidermidis, afebrile, PT/OT.  6/27/2022 FM:  Patient case has been reviewed and I am returning from vacation.  Patient was admitted with pneumonia and positive blood cultures and a decline in her physical condition.  Her acute medical issues seem to have stabilized but she is significantly declined in functional state and her daughter has moved here from out of town to health care for her.  Her daughter is unable to physically meet her needs.  The patient wants to return home and live with her .  May be a potential candidate for inpatient rehab if she is able to participate and putting good effort.  Needs better pain control for the arthralgia of the hip.  6/28/2022 FM:  Patient worked with therapy yesterday and the notes reviewed.  I do feel that she is a good candidate for inpatient rehab and her goal is to return home at a supervision to modified independent functional level.  I feel with better pain control and close supervision and medical management this is achievable.  Will submit to her insurance company for approval.  6/29/2022 FM:  Awaiting insurance company approval.  Patient is working with therapy.  Patient's pain is improved.  6/30/2022 FM:  Patient's caregivers have been diagnosed with COVID-19 pneumonia and are under able to assist with care at this time.  Patient's insurance company denied her admission to inpatient rehab despite her meeting the criteria in my opinion.  Appeal is in progress.  7/1/2022 FM:  Patient states she has become frustrated waiting and is frustrated with her insurance company.  Patient is requesting for the home to be set up for a possible discharge home if her appeal fails.  The patient's primary caregiver at home is currently COVID-19 infected and will be released  from quarantine on Monday.  Will hold the patient here until Monday and then discharged home with a hospital bed and her other DME equipment.  If the patient's appeal is overturned she can move up to rehab.  7/2 KY Sore throat improved with throat lozenges, back to baseline room air, provided encouragement to use incentive spirometer. Patient agreeable to discharge plan above.   7/3 CG Has not had a BM in a few days, requesting getting out of chair  7/4 CG: Out of isolation. Wanting to do more PT , ready to go home.       Interval History: No acute events overnight. Complains of nasal cannula bothering her. Per patient improved appetite.    Review of Systems   Constitutional:  Negative for appetite change, chills and fatigue.   Respiratory:  Positive for cough (minimal with mild sore throat). Negative for chest tightness and wheezing.    Cardiovascular:  Negative for chest pain, palpitations and leg swelling.   Gastrointestinal:  Negative for abdominal pain, constipation, diarrhea, nausea and vomiting.   Musculoskeletal:  Positive for arthralgias and gait problem.   Neurological:  Positive for weakness. Negative for seizures, speech difficulty, light-headedness, numbness and headaches.   Psychiatric/Behavioral:  Positive for agitation. The patient is nervous/anxious.    Objective:     Vital Signs (Most Recent):  Temp: 97.9 °F (36.6 °C) (07/04/22 1904)  Pulse: 70 (07/04/22 2040)  Resp: 20 (07/04/22 2040)  BP: (!) 146/65 (07/04/22 2041)  SpO2: (!) 93 % (07/04/22 2040) Vital Signs (24h Range):  Temp:  [97.5 °F (36.4 °C)-98.1 °F (36.7 °C)] 97.9 °F (36.6 °C)  Pulse:  [50-79] 70  Resp:  [16-20] 20  SpO2:  [90 %-99 %] 93 %  BP: (137-151)/(63-73) 146/65     Weight: 62.1 kg (137 lb)  Body mass index is 23.52 kg/m².    Intake/Output Summary (Last 24 hours) at 7/4/2022 5876  Last data filed at 7/4/2022 1800  Gross per 24 hour   Intake 2280 ml   Output 1000 ml   Net 1280 ml        Physical Exam  Vitals and nursing note  reviewed.   Constitutional:       General: She is not in acute distress.     Appearance: Normal appearance. She is not ill-appearing or toxic-appearing.   HENT:      Head: Normocephalic and atraumatic.      Right Ear: External ear normal.      Left Ear: External ear normal.      Mouth/Throat:      Mouth: Mucous membranes are moist.      Pharynx: Oropharynx is clear. No oropharyngeal exudate or posterior oropharyngeal erythema.   Eyes:      General: No scleral icterus.     Extraocular Movements: Extraocular movements intact.      Pupils: Pupils are equal, round, and reactive to light.   Cardiovascular:      Rate and Rhythm: Normal rate. Rhythm irregular.      Pulses: Normal pulses.      Heart sounds: Normal heart sounds.   Pulmonary:      Effort: Pulmonary effort is normal.      Breath sounds: Normal breath sounds.   Abdominal:      General: Bowel sounds are normal. There is no distension.      Palpations: Abdomen is soft.      Tenderness: There is no abdominal tenderness. There is no right CVA tenderness, left CVA tenderness or guarding.   Musculoskeletal:         General: No tenderness. Normal range of motion.      Cervical back: Normal range of motion and neck supple. No rigidity or tenderness.      Right lower leg: No edema.      Left lower leg: No edema.   Lymphadenopathy:      Cervical: No cervical adenopathy.   Skin:     General: Skin is warm and dry.      Capillary Refill: Capillary refill takes less than 2 seconds.      Findings: No erythema or rash.   Neurological:      General: No focal deficit present.      Mental Status: She is alert and oriented to person, place, and time.      Cranial Nerves: No cranial nerve deficit.      Sensory: No sensory deficit.      Motor: No weakness (baseline).   Psychiatric:         Mood and Affect: Mood normal.         Behavior: Behavior normal.         Thought Content: Thought content normal.     Significant Labs: All pertinent labs within the past 24 hours have been  reviewed.  BMP:   Recent Labs   Lab 07/04/22  0530   *      K 4.6      CO2 35*   BUN 42*   CREATININE 1.3   CALCIUM 8.6*   MG 1.7       CBC:   Recent Labs   Lab 07/03/22  0851 07/04/22  0530   WBC 23.44* 18.43*   HGB 11.0* 10.1*   HCT 36.0* 31.8*    362       CMP:   Recent Labs   Lab 07/03/22  0851 07/04/22  0530    140   K 3.9 4.6    104   CO2 35* 35*    116*   BUN 32* 42*   CREATININE 1.2 1.3   CALCIUM 9.1 8.6*   PROT 6.6 5.6*   ALBUMIN 2.3* 2.0*   BILITOT 0.6 0.5   ALKPHOS 76 68   AST 18 16   ALT 28 30   ANIONGAP 3* 1*   EGFRNONAA 39.9* 36.2*       X-Ray Hip 1 View Right (with Pelvis when performed)  Narrative: EXAMINATION:  XR HIP WITH PELVIS WHEN PERFORMED, 1 VIEW RIGHT    CLINICAL HISTORY:  hip pain;    FINDINGS:  No fracture or dislocation.  Mild osteophytes.  Dense vascular calcifications.  Impression: Mild degenerative change.  No acute findings.    Electronically signed by: Deni Arnold MD  Date:    06/24/2022  Time:    06:04   Significant Imaging: I have reviewed all pertinent imaging results/findings within the past 24 hours.      Assessment/Plan:      * Pneumonia due to infectious organism  Blood cultures x2 positive for staphylococcus epidermidis, susceptibility pending. Vancomycin initiated on 6/19/22 6/24: Day 7 of IV abx therapy with Rocephin and Azithromycin; Day 5 of abx therapy with Vancomycin. Repeat blood cultures showing NGTD x3 days  6/25 patient on Rocephin azithromycin, completed seven day therapy, plan to DC, on day six for IV vanc for blood cultures with Staph epi  6/26 patient completed antibiotic therapy for pneumonia, on IV vanc for blood cultures, day seven    Has completed abx, wbc noted but on steroids.  Prednisone 20 mg x3 days, continue with taper.       Bacteremia  Patient 2/2 bottles positive for Staph epidermidis, sensitive to Vanc, on day 6/7 6/26 day seven of seven for blood cultures with Staph epidermidis.  Completed 7 days of  abx, s/s stable and improved, echo negative.  If appeal for IPR unsuccessful patient to go home Monday.    Weakness  Continue PT/OT efforts  Patient with a significant debility and change in her functional state.  Possibly a inpatient rehab candidate and return home but will discuss with therapy today as she refused yesterday and informed the patient she cannot refuse therapy and must put in good effort.  7/2: See above notes, patient in recliner, ready to move back to bedside. Strength intact and equal in lower extremities bilaterally, no complaint of pain.       NSTEMI (non-ST elevated myocardial infarction)  Dr. Chapin managing    DVT prophylaxis  On eliquis.    Hypertension  BP Readings from Last 3 Encounters:   07/02/22 120/61   06/20/22 (!) 141/82   04/21/22 118/82   Normotensive.  BP noted, continue to add/modify agents prn.        Arthritis  Pain to left hand. CRP and Sed Rate elevated. Treatment with steroids.  6/24: Pain improving    Try lidocaine patch.  No complaints of pain.       Pelvic fracture  Still with residual pain and limited mobility, would like to try IPR, home Monday with hospital bed if appeal fails.  - f/u CM        VTE Risk Mitigation (From admission, onward)         Ordered     apixaban tablet 2.5 mg  2 times daily         06/21/22 1856     IP VTE HIGH RISK PATIENT  Once         06/18/22 1525     Place sequential compression device  Until discontinued         06/18/22 1525                Discharge Planning   KIMANI:      Code Status: Full Code   Is the patient medically ready for discharge?:     Reason for patient still in hospital (select all that apply): Consult recommendations  Discharge Plan A: Home, Home Health   Discharge Delays: None known at this time              Maxi Henriquez DO  Department of Hospital Medicine   [facility]

## 2022-07-05 NOTE — PLAN OF CARE
07/05/22 1439   Post-Acute Status   Post-Acute Authorization HME   HME Status (!) Pending Delivery       Patient needing home oxygen. Referral sent to [facility] via [facility]. Spoke to Sarah at [facility] who says they will be able to deliver today. Sarah has to review the chart before delivery. Will notify primary nurse, Toni.     Addendum: [id number]  Spoke to Sarah with [facility]. Awaiting additional documentation from Dr. Quan. Dr. Quan aware and will be documenting soon.

## 2022-07-05 NOTE — SUBJECTIVE & OBJECTIVE
Interval History: No acute events overnight. Complains of nasal cannula bothering her. Per patient improved appetite.    Review of Systems   Constitutional:  Negative for appetite change, chills and fatigue.   Respiratory:  Positive for cough (minimal with mild sore throat). Negative for chest tightness and wheezing.    Cardiovascular:  Negative for chest pain, palpitations and leg swelling.   Gastrointestinal:  Negative for abdominal pain, constipation, diarrhea, nausea and vomiting.   Musculoskeletal:  Positive for arthralgias and gait problem.   Neurological:  Positive for weakness. Negative for seizures, speech difficulty, light-headedness, numbness and headaches.   Psychiatric/Behavioral:  Positive for agitation. The patient is nervous/anxious.    Objective:     Vital Signs (Most Recent):  Temp: 97.9 °F (36.6 °C) (07/04/22 1904)  Pulse: 70 (07/04/22 2040)  Resp: 20 (07/04/22 2040)  BP: (!) 146/65 (07/04/22 2041)  SpO2: (!) 93 % (07/04/22 2040) Vital Signs (24h Range):  Temp:  [97.5 °F (36.4 °C)-98.1 °F (36.7 °C)] 97.9 °F (36.6 °C)  Pulse:  [50-79] 70  Resp:  [16-20] 20  SpO2:  [90 %-99 %] 93 %  BP: (137-151)/(63-73) 146/65     Weight: 62.1 kg (137 lb)  Body mass index is 23.52 kg/m².    Intake/Output Summary (Last 24 hours) at 7/4/2022 3459  Last data filed at 7/4/2022 1800  Gross per 24 hour   Intake 2280 ml   Output 1000 ml   Net 1280 ml        Physical Exam  Vitals and nursing note reviewed.   Constitutional:       General: She is not in acute distress.     Appearance: Normal appearance. She is not ill-appearing or toxic-appearing.   HENT:      Head: Normocephalic and atraumatic.      Right Ear: External ear normal.      Left Ear: External ear normal.      Mouth/Throat:      Mouth: Mucous membranes are moist.      Pharynx: Oropharynx is clear. No oropharyngeal exudate or posterior oropharyngeal erythema.   Eyes:      General: No scleral icterus.     Extraocular Movements: Extraocular movements intact.       Pupils: Pupils are equal, round, and reactive to light.   Cardiovascular:      Rate and Rhythm: Normal rate. Rhythm irregular.      Pulses: Normal pulses.      Heart sounds: Normal heart sounds.   Pulmonary:      Effort: Pulmonary effort is normal.      Breath sounds: Normal breath sounds.   Abdominal:      General: Bowel sounds are normal. There is no distension.      Palpations: Abdomen is soft.      Tenderness: There is no abdominal tenderness. There is no right CVA tenderness, left CVA tenderness or guarding.   Musculoskeletal:         General: No tenderness. Normal range of motion.      Cervical back: Normal range of motion and neck supple. No rigidity or tenderness.      Right lower leg: No edema.      Left lower leg: No edema.   Lymphadenopathy:      Cervical: No cervical adenopathy.   Skin:     General: Skin is warm and dry.      Capillary Refill: Capillary refill takes less than 2 seconds.      Findings: No erythema or rash.   Neurological:      General: No focal deficit present.      Mental Status: She is alert and oriented to person, place, and time.      Cranial Nerves: No cranial nerve deficit.      Sensory: No sensory deficit.      Motor: No weakness (baseline).   Psychiatric:         Mood and Affect: Mood normal.         Behavior: Behavior normal.         Thought Content: Thought content normal.     Significant Labs: All pertinent labs within the past 24 hours have been reviewed.  BMP:   Recent Labs   Lab 07/04/22  0530   *      K 4.6      CO2 35*   BUN 42*   CREATININE 1.3   CALCIUM 8.6*   MG 1.7       CBC:   Recent Labs   Lab 07/03/22  0851 07/04/22  0530   WBC 23.44* 18.43*   HGB 11.0* 10.1*   HCT 36.0* 31.8*    362       CMP:   Recent Labs   Lab 07/03/22  0851 07/04/22  0530    140   K 3.9 4.6    104   CO2 35* 35*    116*   BUN 32* 42*   CREATININE 1.2 1.3   CALCIUM 9.1 8.6*   PROT 6.6 5.6*   ALBUMIN 2.3* 2.0*   BILITOT 0.6 0.5   ALKPHOS 76 68   AST 18  16   ALT 28 30   ANIONGAP 3* 1*   EGFRNONAA 39.9* 36.2*       X-Ray Hip 1 View Right (with Pelvis when performed)  Narrative: EXAMINATION:  XR HIP WITH PELVIS WHEN PERFORMED, 1 VIEW RIGHT    CLINICAL HISTORY:  hip pain;    FINDINGS:  No fracture or dislocation.  Mild osteophytes.  Dense vascular calcifications.  Impression: Mild degenerative change.  No acute findings.    Electronically signed by: Deni Arnold MD  Date:    06/24/2022  Time:    06:04   Significant Imaging: I have reviewed all pertinent imaging results/findings within the past 24 hours.

## 2022-07-05 NOTE — PLAN OF CARE
07/05/22 0908   Post-Acute Status   Post-Acute Authorization HME   Post-Acute Placement Status Set-up Complete/Auth obtained       Spoke to Judi Pickett who says the hospital bed will be delivered to the patient's home today. The bed will have a regular mattress until the speciality mattress is approved by the patient's insurance.

## 2022-07-05 NOTE — PLAN OF CARE
07/05/22 0809   Post-Acute Status   Post-Acute Authorization HME   Post-Acute Placement Status   (Attempted to call [facility] to check on the status of the hosptial bed. [place] is closed until 9am.)

## 2022-07-05 NOTE — RESPIRATORY THERAPY
07/05/22 1335   Home Oxygen Qualification   $ Home O2 Qualification Tech time 15 minutes   Room Air SpO2 At Rest 91 %   Room Air SpO2 During Ambulation (!) 86 %   SpO2 During Ambulation on O2 93 %   Heart Rate on O2 63 bpm   Ambulation O2 LPM 1 LPM   SpO2 Post Ambulation 93 %   Post Ambulation Heart Rate 62 bpm   Post Ambulation O2 LPM 1 LPM       Patient remains on 1lpm nasal cannula post Home Oxygen Evaluation. Toni RN and Karla RN with Case Management notified    Event Note

## 2022-07-05 NOTE — NURSING
Pt and daughter given d/c instructions and verbalized understanding. D/c from facility per w/c to private vehicle.

## 2022-07-06 PROBLEM — I50.32 CHF (CONGESTIVE HEART FAILURE), NYHA CLASS II, CHRONIC, DIASTOLIC: Status: ACTIVE | Noted: 2022-07-06

## 2022-07-06 NOTE — DISCHARGE SUMMARY
[facility] Medicine  Discharge Summary      Patient Name: Codi Black  MRN: [id number]  Patient Class: IP- Inpatient  Admission Date: 6/18/2022  Hospital Length of Stay: 17 days  Discharge Date and Time: 7/5/2022  3:52 PM  Attending Physician: No att. providers found   Discharging Provider: Rafael Quan III, MD  Primary Care Provider: Rafael Quan III, MD      HPI:   Pt is 89 y/o female with Hx of hypertension, hyperlipidemia, and arthritis, presented to [facility] ED yesterday with c/o generalized weakness. She reports having a productive cough. She denies chest pain. High-Sensitivity Troponin levels, although negative, trended up x3 sets - Cardiology consulted. This morning she reports continued fatigue and weakness; also c/o left hand pain; shortness of breath improved with rest and oxygen.      * No surgery found *      Hospital Course:   6/20 SD: Pt's spouse at bedside. Pt reports feeling better compared to initial arrival to the hospital. Blood cultures x2 positive for gram + cocci in clusters resembling Staph, awaiting sensitivity. Vancomycin added to antibiotic therapy. Appreciate Dr. Chapin's recommendations for NSTEMI and HTN. Dr. Chapin advises that pt will need angiogram after treatment of pneumonia; will determine need for inpatient vs outpatient as patient care progresses.  6/21 SD: Pt's spouse at bedside. Blood cultures x2 positive for staphylococcus epidermidis, susceptibility pending. Continue IV abx therapy and repeat blood cultures today. Encourage pt to participate with therapy.  6/22 SD: Continue IV abx, following repeat cultures. Encourage participation with therapy.  6/23 SD: Sitting up in bed, at breakfast with good appetite. Continue IV abx therapy, following repeat cultures. Encourage participation with therapy.  6/24 SD: Pt's spouse and daughter at bedside. Continue IV abx therapy, following cultures. Continue therapy efforts.  6/25 AA, weekend crosscover, patient being  treated for pneumonia, on IV antibiotic, blood cultures 2/2 bottles positive for Staph epidermidis, on IV vanc, PT OT on board  6/26 AA, weekend crosscover, antibiotics on board for pneumonia, on IV vancomycin, blood culture positive for Staph epidermidis, afebrile, PT/OT.  6/27/2022 FM:  Patient case has been reviewed and I am returning from vacation.  Patient was admitted with pneumonia and positive blood cultures and a decline in her physical condition.  Her acute medical issues seem to have stabilized but she is significantly declined in functional state and her daughter has moved here from out of town to health care for her.  Her daughter is unable to physically meet her needs.  The patient wants to return home and live with her .  May be a potential candidate for inpatient rehab if she is able to participate and putting good effort.  Needs better pain control for the arthralgia of the hip.  6/28/2022 FM:  Patient worked with therapy yesterday and the notes reviewed.  I do feel that she is a good candidate for inpatient rehab and her goal is to return home at a supervision to modified independent functional level.  I feel with better pain control and close supervision and medical management this is achievable.  Will submit to her insurance company for approval.  6/29/2022 FM:  Awaiting insurance company approval.  Patient is working with therapy.  Patient's pain is improved.  6/30/2022 FM:  Patient's caregivers have been diagnosed with COVID-19 pneumonia and are under able to assist with care at this time.  Patient's insurance company denied her admission to inpatient rehab despite her meeting the criteria in my opinion.  Appeal is in progress.  7/1/2022 FM:  Patient states she has become frustrated waiting and is frustrated with her insurance company.  Patient is requesting for the home to be set up for a possible discharge home if her appeal fails.  The patient's primary caregiver at home is currently  COVID-19 infected and will be released from quarantine on Monday.  Will hold the patient here until Monday and then discharged home with a hospital bed and her other DME equipment.  If the patient's appeal is overturned she can move up to rehab.  7/2 KY Sore throat improved with throat lozenges, back to baseline room air, provided encouragement to use incentive spirometer. Patient agreeable to discharge plan above.   7/3 CG Has not had a BM in a few days, requesting getting out of chair  7/4 CG: Out of isolation. Wanting to do more PT , ready to go home.   Discharge Note:  Patient was admitted to our acute care facility and found to have a pulmonary infiltrate and we felt this was likely community-acquired pneumonia.  Patient has a history of pelvic fracture and has had increasing amounts of pain and discomfort and despite rehabilitative care she has had difficulty returning back to her prior quality of life.  Ultimately the patient improved during this admission but she had a significant setback in her strength mobility function and safety and we had attempted to get the patient into an inpatient rehab facility.  The patient's insurance company denied her request and ultimately this was an appeal.  The patient became frustrated and ultimately wants to return home with family.  Her symptoms have improved she is needing intermittent oxygen suspect she has a component of diastolic heart dysfunction and heart failure and several days of her admission were also recently prolonged because of a caregiver getting COVID-19 infection.       Goals of Care Treatment Preferences:  Code Status: Full Code      Consults:   Consults (From admission, onward)        Status Ordering Provider     Inpatient consult to Social Work/Case Management  Once        Provider:  (Not yet assigned)    Completed MASSIEL BOWEN III          * Pneumonia due to infectious organism  Blood cultures x2 positive for staphylococcus epidermidis,  susceptibility pending. Vancomycin initiated on 6/19/22 6/24: Day 7 of IV abx therapy with Rocephin and Azithromycin; Day 5 of abx therapy with Vancomycin. Repeat blood cultures showing NGTD x3 days  6/25 patient on Rocephin azithromycin, completed seven day therapy, plan to DC, on day six for IV vanc for blood cultures with Staph epi  6/26 patient completed antibiotic therapy for pneumonia, on IV vanc for blood cultures, day seven    Has completed abx, wbc noted but on steroids.  Prednisone 20 mg x3 days, continue with taper.       CHF (congestive heart failure), NYHA class II, chronic, diastolic  Home O2 PRN, meets criteria.      Bacteremia  Patient 2/2 bottles positive for Staph epidermidis, sensitive to Vanc, on day 6/7 6/26 day seven of seven for blood cultures with Staph epidermidis.  Completed 7 days of abx, s/s stable and improved, echo negative.  If appeal for IPR unsuccessful patient to go home Monday.    Weakness  Continue PT/OT efforts  Patient with a significant debility and change in her functional state.  Possibly a inpatient rehab candidate and return home but will discuss with therapy today as she refused yesterday and informed the patient she cannot refuse therapy and must put in good effort.  7/2: See above notes, patient in recliner, ready to move back to bedside. Strength intact and equal in lower extremities bilaterally, no complaint of pain.       NSTEMI (non-ST elevated myocardial infarction)  Dr. Chapin managing    DVT prophylaxis  On eliquis.    Hypertension  BP Readings from Last 3 Encounters:   07/05/22 131/60   06/20/22 (!) 141/82   04/21/22 118/82   Normotensive.  BP noted, continue to add/modify agents prn.        Arthritis  Pain to left hand. CRP and Sed Rate elevated. Treatment with steroids.  6/24: Pain improving    Try lidocaine patch.  No complaints of pain.       Pelvic fracture  Still with residual pain and limited mobility, would like to try IPR, home Monday with hospital bed  "if appeal fails.  - f/u CM        Final Active Diagnoses:    Diagnosis Date Noted POA    PRINCIPAL PROBLEM:  Pneumonia due to infectious organism [J18.9] 06/19/2022 Yes    CHF (congestive heart failure), NYHA class II, chronic, diastolic [I50.32] 07/06/2022 Yes    Bacteremia [R78.81] 06/25/2022 Yes    Weakness [R53.1] 06/24/2022 Yes    NSTEMI (non-ST elevated myocardial infarction) [I21.4] 06/20/2022 Yes    Hypertension [I10] 06/19/2022 Yes    DVT prophylaxis [Z29.9] 06/19/2022 Not Applicable    Arthritis [M19.90]  Yes    Pelvic fracture [S32.9XXA] 03/02/2022 Yes      Problems Resolved During this Admission:       Discharged Condition: fair    [place] [facility] [place]:   Contact information for follow-up providers     Rafael Quan III, MD Follow up in 2 week(s).    Specialty: Internal Medicine  Why: Dr. Quan's office to call with appointment.  Contact information:  [address] [place] [address] [phone]                   Contact information for after-discharge care     Durable Medical Equipment     [facility] .    Service: Durable Medical Equipment  Contact information:  [address] [place] [id number]  [phone]           [facility] .    Service: Durable Medical Equipment  Contact information:  [address] [facility] [address] [phone]                           Patient Instructions:      HOSPITAL BED FOR HOME USE     Order Specific Question Answer Comments   Type: Semi-electric    Length of need (1-99 months): 99    Does patient have medical equipment at home? shower chair    Does patient have medical equipment at home? rollator    Does patient have medical equipment at home? walker, rolling    Does patient have medical equipment at home? wheelchair    Does patient have medical equipment at home? grab bar    Height: 5' 4" (1.626 m)    Weight: 62.1 kg (137 lb)    Accessories: Low air loss mattress    Please check all that apply: Patient requires " "positioning of the body in ways not feasible in an ordinary bed due to a medical condition which is expected to last at least one month.    Please check all that apply: Patient requires, for the alleviation of pain, positioning of the body in ways not feasible in an ordinary bed.    Please check all that apply: Patient requires the head of bed to be elevated more than 30 degrees most of the time due to congestive heart failure, chronic pulmonary disease, or aspiration.  Pillows and wedges have been considered and ruled out.      OXYGEN FOR HOME USE     Order Specific Question Answer Comments   Liter Flow 1    Duration Continuous    Qualifying Test Performed at: Activity    Oxygen saturation at rest 91    Oxygen saturation with activity 86    Oxygen saturation with activity on oxygen 93    Portable mode: pulse dose acceptable    Mode: Conserving device    Route nasal cannula    Device: home concentrator with portable tanks    Length of need (in months): 99 mos    Patient condition with qualifying saturation CHF    Height: 5' 4" (1.626 m)    Weight: 62.1 kg (137 lb)    Alternative treatment measures have been tried or considered and deemed clinically ineffective. Yes      Diet Cardiac     Activity as tolerated       Significant Diagnostic Studies: Noted.    Pending Diagnostic Studies:     None         Medications:  Reconciled Home Medications:      Medication List      START taking these medications    amiodarone 200 MG Tab  Commonly known as: PACERONE  Take 1 tablet (200 mg total) by mouth 2 (two) times daily.     amLODIPine 5 MG tablet  Commonly known as: NORVASC  Take 1 tablet (5 mg total) by mouth once daily.     apixaban 2.5 mg Tab  Commonly known as: ELIQUIS  Take 1 tablet (2.5 mg total) by mouth 2 (two) times daily.     aspirin 81 MG EC tablet  Commonly known as: ECOTRIN  Take 1 tablet (81 mg total) by mouth once daily.     atorvastatin 10 MG tablet  Commonly known as: LIPITOR  Take 1 tablet (10 mg total) by " mouth every evening.     famotidine 20 MG tablet  Commonly known as: PEPCID  Take 1 tablet (20 mg total) by mouth once daily.     isosorbide mononitrate 30 MG 24 hr tablet  Commonly known as: IMDUR  Take 1 tablet (30 mg total) by mouth once daily.     LIDOcaine 5 %  Commonly known as: LIDODERM  Place 1 patch onto the skin once daily. Remove & Discard patch within 12 hours or as directed by MD     metoprolol tartrate 75 mg Tab  Take 50 mg by mouth 2 (two) times daily.     predniSONE 20 MG tablet  Commonly known as: DELTASONE  Take 1 tablet (20 mg total) by mouth once daily for 7 days, THEN 0.5 tablets (10 mg total) once daily for 7 days.  Start taking on: July 6, 2022     traMADoL 50 mg tablet  Commonly known as: ULTRAM  Take 1 tablet (50 mg total) by mouth every 6 (six) hours as needed for Pain.        CHANGE how you take these medications    ALPRAZolam 0.5 MG tablet  Commonly known as: XANAX  Take 1 tablet (0.5 mg total) by mouth 2 (two) times daily as needed for Anxiety.  What changed:   · how much to take  · when to take this  · reasons to take this     cyanocobalamin 1,000 mcg/mL injection  Inject 1 mL (1,000 mcg total) into the muscle every 30 days.  What changed:   · how much to take  · how to take this  · when to take this        CONTINUE taking these medications    acetaminophen 325 MG tablet  Commonly known as: TYLENOL  Take 2 tablets (650 mg total) by mouth every 8 (eight) hours as needed.     ezetimibe 10 mg tablet  Commonly known as: ZETIA  Take 10 mg by mouth once daily.     furosemide 20 MG tablet  Commonly known as: LASIX  Take 1 tablet (20 mg total) by mouth once daily.     mirtazapine 15 MG tablet  Commonly known as: REMERON  Take 1 tablet (15 mg total) by mouth every evening.        STOP taking these medications    hydroCHLOROthiazide 25 MG tablet  Commonly known as: HYDRODIURIL        ASK your doctor about these medications    carvediloL 12.5 MG tablet  Commonly known as: COREG  Take 1 tablet  (12.5 mg total) by mouth 2 (two) times daily.            Indwelling Lines/Drains at time of discharge:   Lines/Drains/Airways     None                 Time spent on the discharge of patient: 45 minutes         Rafael Quan III, MD  Department of Hospital Medicine  [facility]

## 2022-07-06 NOTE — ASSESSMENT & PLAN NOTE
BP Readings from Last 3 Encounters:   07/05/22 131/60   06/20/22 (!) 141/82   04/21/22 118/82   Normotensive.  BP noted, continue to add/modify agents prn.

## 2022-07-07 ENCOUNTER — PATIENT OUTREACH (OUTPATIENT)
Dept: ADMINISTRATIVE | Facility: CLINIC | Age: 87
End: 2022-07-07
Payer: MEDICARE

## 2022-07-07 NOTE — PROGRESS NOTES
C3 nurse attempted to contact Codi Black for a TCC post hospital discharge follow up call. No answer. The patient does not have a scheduled HOSFU appointment, and the pt does not have an [facility] PCP.

## 2022-07-07 NOTE — PATIENT INSTRUCTIONS
Dede teaching reviewed with Codi Black's daughter, Wolfgang . She verbalized understanding.    Education was provided based on the patient's discharge diagnosis using the attached [facility] patient education as a reference.

## 2022-07-13 ENCOUNTER — HOSPITAL ENCOUNTER (EMERGENCY)
Facility: HOSPITAL | Age: 87
Discharge: HOME | End: 2022-07-13
Attending: EMERGENCY MEDICINE
Payer: MEDICARE

## 2022-07-13 VITALS
HEIGHT: 64 IN | DIASTOLIC BLOOD PRESSURE: 59 MMHG | OXYGEN SATURATION: 98 % | TEMPERATURE: 98 F | BODY MASS INDEX: 23.56 KG/M2 | WEIGHT: 138 LBS | HEART RATE: 46 BPM | SYSTOLIC BLOOD PRESSURE: 127 MMHG | RESPIRATION RATE: 18 BRPM

## 2022-07-13 DIAGNOSIS — R11.0 NAUSEA: ICD-10-CM

## 2022-07-13 DIAGNOSIS — R00.1 SINUS BRADYCARDIA: Primary | ICD-10-CM

## 2022-07-13 LAB
ALBUMIN SERPL BCP-MCNC: 2.6 G/DL (ref 3.5–5.2)
ALP SERPL-CCNC: 77 U/L (ref 55–135)
ALT SERPL W/O P-5'-P-CCNC: 29 U/L (ref 10–44)
ANION GAP SERPL CALC-SCNC: 5 MMOL/L (ref 8–16)
AST SERPL-CCNC: 21 U/L (ref 10–40)
BASOPHILS # BLD AUTO: 0.02 K/UL (ref 0–0.2)
BASOPHILS NFR BLD: 0.1 % (ref 0–1.9)
BILIRUB SERPL-MCNC: 0.6 MG/DL (ref 0.1–1)
BUN SERPL-MCNC: 51 MG/DL (ref 8–23)
CALCIUM SERPL-MCNC: 8.9 MG/DL (ref 8.7–10.5)
CHLORIDE SERPL-SCNC: 100 MMOL/L (ref 95–110)
CO2 SERPL-SCNC: 38 MMOL/L (ref 23–29)
CREAT SERPL-MCNC: 1.6 MG/DL (ref 0.5–1.4)
DIFFERENTIAL METHOD: ABNORMAL
EOSINOPHIL # BLD AUTO: 0 K/UL (ref 0–0.5)
EOSINOPHIL NFR BLD: 0.2 % (ref 0–8)
ERYTHROCYTE [DISTWIDTH] IN BLOOD BY AUTOMATED COUNT: 14.9 % (ref 11.5–14.5)
EST. GFR  (AFRICAN AMERICAN): 32.5 ML/MIN/1.73 M^2
EST. GFR  (NON AFRICAN AMERICAN): 28.2 ML/MIN/1.73 M^2
GLUCOSE SERPL-MCNC: 92 MG/DL (ref 70–110)
HCT VFR BLD AUTO: 30 % (ref 37–48.5)
HGB BLD-MCNC: 9.3 G/DL (ref 12–16)
IMM GRANULOCYTES # BLD AUTO: 0.12 K/UL (ref 0–0.04)
IMM GRANULOCYTES NFR BLD AUTO: 0.7 % (ref 0–0.5)
LIPASE SERPL-CCNC: 97 U/L (ref 23–300)
LYMPHOCYTES # BLD AUTO: 1 K/UL (ref 1–4.8)
LYMPHOCYTES NFR BLD: 6 % (ref 18–48)
MCH RBC QN AUTO: 29 PG (ref 27–31)
MCHC RBC AUTO-ENTMCNC: 31 G/DL (ref 32–36)
MCV RBC AUTO: 94 FL (ref 82–98)
MONOCYTES # BLD AUTO: 1.1 K/UL (ref 0.3–1)
MONOCYTES NFR BLD: 6.2 % (ref 4–15)
NEUTROPHILS # BLD AUTO: 14.7 K/UL (ref 1.8–7.7)
NEUTROPHILS NFR BLD: 86.8 % (ref 38–73)
NRBC BLD-RTO: 0 /100 WBC
PLATELET # BLD AUTO: 260 K/UL (ref 150–450)
PMV BLD AUTO: 10 FL (ref 9.2–12.9)
POTASSIUM SERPL-SCNC: 4 MMOL/L (ref 3.5–5.1)
PROT SERPL-MCNC: 6.3 G/DL (ref 6–8.4)
RBC # BLD AUTO: 3.21 M/UL (ref 4–5.4)
SODIUM SERPL-SCNC: 143 MMOL/L (ref 136–145)
TROPONIN I SERPL DL<=0.01 NG/ML-MCNC: 18.1 PG/ML (ref 0–60)
WBC # BLD AUTO: 16.91 K/UL (ref 3.9–12.7)

## 2022-07-13 PROCEDURE — 80053 COMPREHEN METABOLIC PANEL: CPT | Performed by: EMERGENCY MEDICINE

## 2022-07-13 PROCEDURE — 63600175 PHARM REV CODE 636 W HCPCS: Performed by: EMERGENCY MEDICINE

## 2022-07-13 PROCEDURE — 96361 HYDRATE IV INFUSION ADD-ON: CPT

## 2022-07-13 PROCEDURE — 96374 THER/PROPH/DIAG INJ IV PUSH: CPT

## 2022-07-13 PROCEDURE — 83690 ASSAY OF LIPASE: CPT | Performed by: EMERGENCY MEDICINE

## 2022-07-13 PROCEDURE — 93005 ELECTROCARDIOGRAM TRACING: CPT

## 2022-07-13 PROCEDURE — 93010 ELECTROCARDIOGRAM REPORT: CPT | Mod: ,,, | Performed by: INTERNAL MEDICINE

## 2022-07-13 PROCEDURE — 85025 COMPLETE CBC W/AUTO DIFF WBC: CPT | Performed by: EMERGENCY MEDICINE

## 2022-07-13 PROCEDURE — 36415 COLL VENOUS BLD VENIPUNCTURE: CPT | Performed by: EMERGENCY MEDICINE

## 2022-07-13 PROCEDURE — 99284 EMERGENCY DEPT VISIT MOD MDM: CPT | Mod: 25

## 2022-07-13 PROCEDURE — 93010 EKG 12-LEAD: ICD-10-PCS | Mod: ,,, | Performed by: INTERNAL MEDICINE

## 2022-07-13 PROCEDURE — 84484 ASSAY OF TROPONIN QUANT: CPT | Performed by: EMERGENCY MEDICINE

## 2022-07-13 PROCEDURE — 25000003 PHARM REV CODE 250: Performed by: EMERGENCY MEDICINE

## 2022-07-13 RX ORDER — ONDANSETRON 4 MG/1
4 TABLET, FILM COATED ORAL EVERY 6 HOURS PRN
Qty: 12 TABLET | Refills: 0 | Status: SHIPPED | OUTPATIENT
Start: 2022-07-13 | End: 2022-07-25 | Stop reason: SDUPTHER

## 2022-07-13 RX ORDER — ONDANSETRON 2 MG/ML
4 INJECTION INTRAMUSCULAR; INTRAVENOUS
Status: COMPLETED | OUTPATIENT
Start: 2022-07-13 | End: 2022-07-13

## 2022-07-13 RX ORDER — SUCRALFATE 1 G/10ML
1 SUSPENSION ORAL
Status: COMPLETED | OUTPATIENT
Start: 2022-07-13 | End: 2022-07-13

## 2022-07-13 RX ADMIN — ONDANSETRON 4 MG: 2 INJECTION INTRAMUSCULAR; INTRAVENOUS at 11:07

## 2022-07-13 RX ADMIN — SUCRALFATE 1 G: 1 SUSPENSION ORAL at 11:07

## 2022-07-13 RX ADMIN — SODIUM CHLORIDE 500 ML: 0.9 INJECTION, SOLUTION INTRAVENOUS at 12:07

## 2022-07-13 NOTE — ED PROVIDER NOTES
"Encounter Date: 7/13/2022       History     Chief Complaint   Patient presents with    Nausea     Recently discharged from  for NSTEMI, sepsis. Complains of nausea, onset yesterday.denies other symptoms. [facility] home health advised her to come in. 93% on 2L NC home O2     89 yo female with history of recent NSTEMI here via POV with complaint of 2 days of nausea withotu vomiting. No diarrhea. No fever. Gradual onset. Poor appetite, but tolerating PO and ate a biscuit with her coffee this morning "like I always do." Started on lopressor 2 days ago, patient was meant to discontinue coreg, but she has continued to take both. No known sick contacts. No CP or SOB.         Review of patient's allergies indicates:  No Known Allergies  Past Medical History:   Diagnosis Date    Arthritis     Coronary artery disease     Encounter for blood transfusion     High cholesterol     Hypertension     NSTEMI (non-ST elevated myocardial infarction)      Past Surgical History:   Procedure Laterality Date    HYSTERECTOMY      KNEE ARTHROSCOPY Right      Family History   Problem Relation Age of Onset    Stroke Mother     Heart disease Father     Cancer Sister     Cancer Brother      Social History     Tobacco Use    Smoking status: Never Smoker    Smokeless tobacco: Never Used   Substance Use Topics    Alcohol use: Never    Drug use: Never     Review of Systems   Constitutional: Positive for fatigue. Negative for fever.   HENT: Negative.    Respiratory: Negative.    Cardiovascular: Negative.    Gastrointestinal: Positive for nausea. Negative for abdominal pain and vomiting.   All other systems reviewed and are negative.      Physical Exam     Initial Vitals [07/13/22 1103]   BP Pulse Resp Temp SpO2   93/64 (!) 52 16 97.6 °F (36.4 °C) (!) 93 %      MAP       --         Physical Exam    Nursing note and vitals reviewed.  Constitutional: She is not diaphoretic. No distress.   HENT:   Head: Normocephalic and atraumatic. "   Mouth/Throat: No oropharyngeal exudate.   Eyes: Conjunctivae and EOM are normal. Pupils are equal, round, and reactive to light.   Neck: Neck supple.   Normal range of motion.  Cardiovascular: Regular rhythm.   bradycardia   Pulmonary/Chest: Breath sounds normal. No respiratory distress. She has no wheezes. She has no rales.   Abdominal: Abdomen is soft. Bowel sounds are normal. She exhibits no distension. There is no abdominal tenderness. There is no rebound.   Musculoskeletal:         General: No tenderness or edema. Normal range of motion.      Cervical back: Normal range of motion and neck supple.     Neurological: She is alert and oriented to person, place, and time. GCS score is 15. GCS eye subscore is 4. GCS verbal subscore is 5. GCS motor subscore is 6.   Skin: Skin is warm and dry. Capillary refill takes less than 2 seconds. No rash noted.         ED Course   Procedures  Labs Reviewed   CBC W/ AUTO DIFFERENTIAL - Abnormal; Notable for the following components:       Result Value    WBC 16.91 (*)     RBC 3.21 (*)     Hemoglobin 9.3 (*)     Hematocrit 30.0 (*)     MCHC 31.0 (*)     RDW 14.9 (*)     Immature Granulocytes 0.7 (*)     Gran # (ANC) 14.7 (*)     Immature Grans (Abs) 0.12 (*)     Mono # 1.1 (*)     Gran % 86.8 (*)     Lymph % 6.0 (*)     All other components within normal limits   COMPREHENSIVE METABOLIC PANEL - Abnormal; Notable for the following components:    CO2 38 (*)     BUN 51 (*)     Creatinine 1.6 (*)     Albumin 2.6 (*)     Anion Gap 5 (*)     eGFR if  32.5 (*)     eGFR if non  28.2 (*)     All other components within normal limits   LIPASE   TROPONIN I HIGH SENSITIVITY     EKG Readings: (Independently Interpreted)   Initial Reading: No STEMI. Rhythm: Sinus Bradycardia. Heart Rate: 47. Ectopy: No Ectopy. Clinical Impression: Sinus Bradycardia     ECG Results          EKG 12-lead (Final result)  Result time 07/13/22 14:56:38    Final result by Interface,  Lab In [facility] (07/13/22 14:56:38)                 Narrative:    Test Reason : R11.0,    Vent. Rate : 047 BPM     Atrial Rate : 047 BPM     P-R Int : 166 ms          QRS Dur : 088 ms      QT Int : 492 ms       P-R-T Axes : -10 006 015 degrees     QTc Int : 435 ms    Sinus bradycardia  Otherwise normal ECG  When compared with ECG of 19-JUN-2022 07:08,  Premature ventricular complexes are no longer Present  Vent. rate has decreased BY  32 BPM  Confirmed by MAO VALENTINE MD ([id number]) on 7/13/2022 2:56:29 PM    Referred By: KIERRA   SELF           Confirmed By:MAO VALENTINE MD                            Imaging Results    None          Medications   ondansetron injection 4 mg (4 mg Intravenous Given 7/13/22 1157)   sucralfate 100 mg/mL suspension 1 g (1 g Oral Given 7/13/22 1158)   sodium chloride 0.9% bolus 500 mL (0 mLs Intravenous Stopped 7/13/22 1350)     Medical Decision Making:   Clinical Tests:   Lab Tests: Ordered and Reviewed  Medical Tests: Ordered and Reviewed  ED Management:  Feels better after IVF, zofran. Discussed with Dr Quan, discontinue carvedilol, continue other meds. Can f/u outpatient. Discussed plan with patient and daughter, all parties agree with plan.                       Clinical Impression:   Final diagnoses:  [R11.0] Nausea  [R00.1] Sinus bradycardia (Primary)          ED Disposition Condition    Discharge Stable        ED Prescriptions     Medication Sig Dispense Start Date End Date Auth. Provider    ondansetron (ZOFRAN) 4 MG tablet Take 1 tablet (4 mg total) by mouth every 6 (six) hours as needed. 12 tablet 7/13/2022  Mikey Haji MD        Follow-up Information     Follow up With Specialties Details Why Contact Info    Rafael Quan III, MD Internal Medicine Schedule an appointment as soon as possible for a visit   [address] [place] [id number]  [phone]             Mikey Haji MD  07/13/22 6114

## 2022-07-30 ENCOUNTER — HOSPITAL ENCOUNTER (EMERGENCY)
Facility: HOSPITAL | Age: 87
Discharge: HOME | End: 2022-07-30
Attending: EMERGENCY MEDICINE
Payer: MEDICARE

## 2022-07-30 VITALS
RESPIRATION RATE: 18 BRPM | HEART RATE: 54 BPM | TEMPERATURE: 99 F | WEIGHT: 138.81 LBS | DIASTOLIC BLOOD PRESSURE: 59 MMHG | BODY MASS INDEX: 23.7 KG/M2 | SYSTOLIC BLOOD PRESSURE: 126 MMHG | OXYGEN SATURATION: 99 % | HEIGHT: 64 IN

## 2022-07-30 DIAGNOSIS — E86.0 DEHYDRATION: Primary | ICD-10-CM

## 2022-07-30 DIAGNOSIS — R53.1 WEAKNESS: ICD-10-CM

## 2022-07-30 LAB
ALBUMIN SERPL BCP-MCNC: 2.4 G/DL (ref 3.5–5.2)
ALP SERPL-CCNC: 84 U/L (ref 55–135)
ALT SERPL W/O P-5'-P-CCNC: 26 U/L (ref 10–44)
ANION GAP SERPL CALC-SCNC: 5 MMOL/L (ref 8–16)
AST SERPL-CCNC: 20 U/L (ref 10–40)
BASOPHILS # BLD AUTO: 0.07 K/UL (ref 0–0.2)
BASOPHILS NFR BLD: 0.5 % (ref 0–1.9)
BILIRUB SERPL-MCNC: 0.8 MG/DL (ref 0.1–1)
BILIRUB UR QL STRIP: NEGATIVE
BUN SERPL-MCNC: 39 MG/DL (ref 8–23)
CALCIUM SERPL-MCNC: 9.4 MG/DL (ref 8.7–10.5)
CHLORIDE SERPL-SCNC: 97 MMOL/L (ref 95–110)
CLARITY UR: CLEAR
CO2 SERPL-SCNC: 38 MMOL/L (ref 23–29)
COLOR UR: YELLOW
CREAT SERPL-MCNC: 2 MG/DL (ref 0.5–1.4)
DIFFERENTIAL METHOD: ABNORMAL
EOSINOPHIL # BLD AUTO: 0 K/UL (ref 0–0.5)
EOSINOPHIL NFR BLD: 0.3 % (ref 0–8)
ERYTHROCYTE [DISTWIDTH] IN BLOOD BY AUTOMATED COUNT: 15.4 % (ref 11.5–14.5)
EST. GFR  (AFRICAN AMERICAN): 24.8 ML/MIN/1.73 M^2
EST. GFR  (NON AFRICAN AMERICAN): 21.5 ML/MIN/1.73 M^2
GLUCOSE SERPL-MCNC: 141 MG/DL (ref 70–110)
GLUCOSE UR QL STRIP: NEGATIVE
HCT VFR BLD AUTO: 35.9 % (ref 37–48.5)
HGB BLD-MCNC: 10.9 G/DL (ref 12–16)
HGB UR QL STRIP: NEGATIVE
IMM GRANULOCYTES # BLD AUTO: 0.21 K/UL (ref 0–0.04)
IMM GRANULOCYTES NFR BLD AUTO: 1.4 % (ref 0–0.5)
KETONES UR QL STRIP: NEGATIVE
LEUKOCYTE ESTERASE UR QL STRIP: NEGATIVE
LYMPHOCYTES # BLD AUTO: 1 K/UL (ref 1–4.8)
LYMPHOCYTES NFR BLD: 6.6 % (ref 18–48)
MCH RBC QN AUTO: 29 PG (ref 27–31)
MCHC RBC AUTO-ENTMCNC: 30.4 G/DL (ref 32–36)
MCV RBC AUTO: 96 FL (ref 82–98)
MONOCYTES # BLD AUTO: 0.6 K/UL (ref 0.3–1)
MONOCYTES NFR BLD: 4.1 % (ref 4–15)
NEUTROPHILS # BLD AUTO: 13.3 K/UL (ref 1.8–7.7)
NEUTROPHILS NFR BLD: 87.1 % (ref 38–73)
NITRITE UR QL STRIP: NEGATIVE
NRBC BLD-RTO: 0 /100 WBC
PH UR STRIP: 6 [PH] (ref 5–8)
PLATELET # BLD AUTO: 287 K/UL (ref 150–450)
PMV BLD AUTO: 9.2 FL (ref 9.2–12.9)
POTASSIUM SERPL-SCNC: 3.6 MMOL/L (ref 3.5–5.1)
PROT SERPL-MCNC: 6.9 G/DL (ref 6–8.4)
PROT UR QL STRIP: ABNORMAL
RBC # BLD AUTO: 3.76 M/UL (ref 4–5.4)
SODIUM SERPL-SCNC: 140 MMOL/L (ref 136–145)
SP GR UR STRIP: 1.01 (ref 1–1.03)
URN SPEC COLLECT METH UR: ABNORMAL
UROBILINOGEN UR STRIP-ACNC: 1 EU/DL
WBC # BLD AUTO: 15.21 K/UL (ref 3.9–12.7)

## 2022-07-30 PROCEDURE — 96361 HYDRATE IV INFUSION ADD-ON: CPT

## 2022-07-30 PROCEDURE — 96360 HYDRATION IV INFUSION INIT: CPT | Mod: 25

## 2022-07-30 PROCEDURE — 36415 COLL VENOUS BLD VENIPUNCTURE: CPT | Performed by: EMERGENCY MEDICINE

## 2022-07-30 PROCEDURE — 25000003 PHARM REV CODE 250: Performed by: EMERGENCY MEDICINE

## 2022-07-30 PROCEDURE — 80053 COMPREHEN METABOLIC PANEL: CPT | Performed by: EMERGENCY MEDICINE

## 2022-07-30 PROCEDURE — 85025 COMPLETE CBC W/AUTO DIFF WBC: CPT | Performed by: EMERGENCY MEDICINE

## 2022-07-30 PROCEDURE — 81003 URINALYSIS AUTO W/O SCOPE: CPT | Performed by: EMERGENCY MEDICINE

## 2022-07-30 PROCEDURE — 99284 EMERGENCY DEPT VISIT MOD MDM: CPT | Mod: 25

## 2022-07-30 RX ADMIN — SODIUM CHLORIDE 250 ML: 0.9 INJECTION, SOLUTION INTRAVENOUS at 03:07

## 2022-07-30 RX ADMIN — SODIUM CHLORIDE 500 ML: 0.9 INJECTION, SOLUTION INTRAVENOUS at 11:07

## 2022-07-30 NOTE — ED PROVIDER NOTES
Encounter Date: 7/30/2022       History     Chief Complaint   Patient presents with    Fatigue     Pt reports generalized weakness, chills and nausea. Pt discharged 4 weeks ago with covid.      89 yo female with history as below, about 4 weeks removed from hospital discharge after bout of COVID-19 is here via EMS with report of generalized weakness, nausea and chills. No fever. Gradual onset a few days. No vomiting. No diarrhea. PO intake normal. History per EMS. Patient without complaint, reports family wanted me to get checked out.         Review of patient's allergies indicates:  No Known Allergies  Past Medical History:   Diagnosis Date    Arthritis     Coronary artery disease     Encounter for blood transfusion     High cholesterol     Hypertension     NSTEMI (non-ST elevated myocardial infarction)      Past Surgical History:   Procedure Laterality Date    HYSTERECTOMY      KNEE ARTHROSCOPY Right      Family History   Problem Relation Age of Onset    Stroke Mother     Heart disease Father     Cancer Sister     Cancer Brother      Social History     Tobacco Use    Smoking status: Never Smoker    Smokeless tobacco: Never Used   Substance Use Topics    Alcohol use: Never    Drug use: Never     Review of Systems   Constitutional: Positive for chills and fatigue. Negative for fever.   HENT: Negative.    Respiratory: Negative.    Cardiovascular: Negative.    Gastrointestinal: Negative.    All other systems reviewed and are negative.      Physical Exam     Initial Vitals   BP Pulse Resp Temp SpO2   07/30/22 1014 07/30/22 1011 07/30/22 1011 07/30/22 1014 07/30/22 1014   (!) 129/93 80 20 99.6 °F (37.6 °C) 96 %      MAP       --                Physical Exam    Nursing note and vitals reviewed.  Constitutional: She appears well-developed and well-nourished. She is not diaphoretic. No distress.   HENT:   Head: Normocephalic and atraumatic.   Eyes: EOM are normal. Pupils are equal, round, and reactive to  light.   Neck: Neck supple. No JVD present.   Normal range of motion.  Cardiovascular: Normal rate, regular rhythm and intact distal pulses.   Pulmonary/Chest: No stridor. No respiratory distress. She has no wheezes. She has rhonchi. She has no rales.   Abdominal: Abdomen is soft. Bowel sounds are normal. She exhibits no distension. There is no abdominal tenderness. There is no rebound.   Musculoskeletal:         General: No tenderness or edema. Normal range of motion.      Cervical back: Normal range of motion and neck supple.     Neurological: She is alert and oriented to person, place, and time. GCS score is 15. GCS eye subscore is 4. GCS verbal subscore is 5. GCS motor subscore is 6.   Skin: Skin is warm and dry. Capillary refill takes less than 2 seconds. No rash noted.         ED Course   Procedures  Labs Reviewed   CBC W/ AUTO DIFFERENTIAL - Abnormal; Notable for the following components:       Result Value    WBC 15.21 (*)     RBC 3.76 (*)     Hemoglobin 10.9 (*)     Hematocrit 35.9 (*)     MCHC 30.4 (*)     RDW 15.4 (*)     Immature Granulocytes 1.4 (*)     Gran # (ANC) 13.3 (*)     Immature Grans (Abs) 0.21 (*)     Gran % 87.1 (*)     Lymph % 6.6 (*)     All other components within normal limits   COMPREHENSIVE METABOLIC PANEL - Abnormal; Notable for the following components:    CO2 38 (*)     Glucose 141 (*)     BUN 39 (*)     Creatinine 2.0 (*)     Albumin 2.4 (*)     Anion Gap 5 (*)     eGFR if  24.8 (*)     eGFR if non  21.5 (*)     All other components within normal limits   URINALYSIS, REFLEX TO URINE CULTURE - Abnormal; Notable for the following components:    Protein, UA Trace (*)     All other components within normal limits    Narrative:     Preferred Collection Type->Urine, Clean Catch  Specimen Source->Urine          Imaging Results          X-Ray Chest AP Portable (Final result)  Result time 07/30/22 11:56:20    Final result by Sienna Baca MD (07/30/22  11:56:20)                 Impression:      No acute findings    As above      Electronically signed by: Sienna Baca MD  Date:    07/30/2022  Time:    11:56             Narrative:    EXAMINATION:  XR CHEST AP PORTABLE    CLINICAL HISTORY:  Weakness    COMPARISON:  CT 04/14/2022, radiograph 06/18/2022    FINDINGS:  No appreciable change in vague nodular lung opacities, see prior exams.    No acute infiltrates or signs of CHF.  Chronic elevation of right hemidiaphragm                                 Medications   sodium chloride 0.9% bolus 250 mL (250 mLs Intravenous New Bag 7/30/22 1518)   sodium chloride 0.9% bolus 500 mL (0 mLs Intravenous Stopped 7/30/22 1230)     Medical Decision Making:   Clinical Tests:   Lab Tests: Ordered and Reviewed  Radiological Study: Ordered and Reviewed                      Clinical Impression:   Final diagnoses:  [R53.1] Weakness  [E86.0] Dehydration (Primary)          ED Disposition Condition    Discharge Stable        ED Prescriptions     None        Follow-up Information     Follow up With Specialties Details Why Contact Info    Rafael Quan III, MD Internal Medicine Schedule an appointment as soon as possible for a visit   [address] [place] [id number]  [phone]             Mikey Haji MD  07/30/22 1526

## 2022-08-18 ENCOUNTER — HOSPITAL ENCOUNTER (EMERGENCY)
Facility: HOSPITAL | Age: 87
Discharge: HOME | End: 2022-08-18
Attending: STUDENT IN AN ORGANIZED HEALTH CARE EDUCATION/TRAINING PROGRAM
Payer: MEDICARE

## 2022-08-18 VITALS
DIASTOLIC BLOOD PRESSURE: 71 MMHG | SYSTOLIC BLOOD PRESSURE: 155 MMHG | RESPIRATION RATE: 18 BRPM | TEMPERATURE: 99 F | WEIGHT: 138 LBS | HEART RATE: 57 BPM | BODY MASS INDEX: 23.69 KG/M2 | OXYGEN SATURATION: 97 %

## 2022-08-18 DIAGNOSIS — I50.9 CHF (CONGESTIVE HEART FAILURE), NYHA CLASS I: ICD-10-CM

## 2022-08-18 DIAGNOSIS — R60.9 SWELLING: Primary | ICD-10-CM

## 2022-08-18 LAB
ALBUMIN SERPL BCP-MCNC: 2.6 G/DL (ref 3.5–5.2)
ALP SERPL-CCNC: 97 U/L (ref 55–135)
ALT SERPL W/O P-5'-P-CCNC: 18 U/L (ref 10–44)
ANION GAP SERPL CALC-SCNC: 5 MMOL/L (ref 8–16)
AST SERPL-CCNC: 18 U/L (ref 10–40)
BASOPHILS # BLD AUTO: 0.07 K/UL (ref 0–0.2)
BASOPHILS NFR BLD: 0.5 % (ref 0–1.9)
BILIRUB SERPL-MCNC: 0.4 MG/DL (ref 0.1–1)
BUN SERPL-MCNC: 19 MG/DL (ref 10–30)
CALCIUM SERPL-MCNC: 9 MG/DL (ref 8.7–10.5)
CHLORIDE SERPL-SCNC: 98 MMOL/L (ref 95–110)
CO2 SERPL-SCNC: 36 MMOL/L (ref 23–29)
CREAT SERPL-MCNC: 1.7 MG/DL (ref 0.5–1.4)
DIFFERENTIAL METHOD: ABNORMAL
EOSINOPHIL # BLD AUTO: 0.3 K/UL (ref 0–0.5)
EOSINOPHIL NFR BLD: 2 % (ref 0–8)
ERYTHROCYTE [DISTWIDTH] IN BLOOD BY AUTOMATED COUNT: 15.1 % (ref 11.5–14.5)
EST. GFR  (NO RACE VARIABLE): 28.1 ML/MIN/1.73 M^2
GLUCOSE SERPL-MCNC: 127 MG/DL (ref 70–110)
HCT VFR BLD AUTO: 32.6 % (ref 37–48.5)
HGB BLD-MCNC: 10 G/DL (ref 12–16)
IMM GRANULOCYTES # BLD AUTO: 0.13 K/UL (ref 0–0.04)
IMM GRANULOCYTES NFR BLD AUTO: 1 % (ref 0–0.5)
LYMPHOCYTES # BLD AUTO: 3.2 K/UL (ref 1–4.8)
LYMPHOCYTES NFR BLD: 24.8 % (ref 18–48)
MCH RBC QN AUTO: 29.2 PG (ref 27–31)
MCHC RBC AUTO-ENTMCNC: 30.7 G/DL (ref 32–36)
MCV RBC AUTO: 95 FL (ref 82–98)
MONOCYTES # BLD AUTO: 1.5 K/UL (ref 0.3–1)
MONOCYTES NFR BLD: 11.5 % (ref 4–15)
NEUTROPHILS # BLD AUTO: 7.7 K/UL (ref 1.8–7.7)
NEUTROPHILS NFR BLD: 60.2 % (ref 38–73)
NRBC BLD-RTO: 0 /100 WBC
NT-PROBNP SERPL-MCNC: 1035 PG/ML (ref 5–1800)
PLATELET # BLD AUTO: 329 K/UL (ref 150–450)
PMV BLD AUTO: 8.9 FL (ref 9.2–12.9)
POTASSIUM SERPL-SCNC: 3.8 MMOL/L (ref 3.5–5.1)
PROT SERPL-MCNC: 6.9 G/DL (ref 6–8.4)
RBC # BLD AUTO: 3.43 M/UL (ref 4–5.4)
SODIUM SERPL-SCNC: 139 MMOL/L (ref 136–145)
TROPONIN I SERPL DL<=0.01 NG/ML-MCNC: 18.4 PG/ML (ref 0–60)
WBC # BLD AUTO: 12.77 K/UL (ref 3.9–12.7)

## 2022-08-18 PROCEDURE — 93010 ELECTROCARDIOGRAM REPORT: CPT | Mod: ,,, | Performed by: INTERNAL MEDICINE

## 2022-08-18 PROCEDURE — 84484 ASSAY OF TROPONIN QUANT: CPT | Performed by: STUDENT IN AN ORGANIZED HEALTH CARE EDUCATION/TRAINING PROGRAM

## 2022-08-18 PROCEDURE — 99285 EMERGENCY DEPT VISIT HI MDM: CPT | Mod: 25

## 2022-08-18 PROCEDURE — 93010 EKG 12-LEAD: ICD-10-PCS | Mod: ,,, | Performed by: INTERNAL MEDICINE

## 2022-08-18 PROCEDURE — 93005 ELECTROCARDIOGRAM TRACING: CPT

## 2022-08-18 PROCEDURE — 36415 COLL VENOUS BLD VENIPUNCTURE: CPT | Performed by: STUDENT IN AN ORGANIZED HEALTH CARE EDUCATION/TRAINING PROGRAM

## 2022-08-18 PROCEDURE — 80053 COMPREHEN METABOLIC PANEL: CPT | Performed by: STUDENT IN AN ORGANIZED HEALTH CARE EDUCATION/TRAINING PROGRAM

## 2022-08-18 PROCEDURE — 83880 ASSAY OF NATRIURETIC PEPTIDE: CPT | Performed by: STUDENT IN AN ORGANIZED HEALTH CARE EDUCATION/TRAINING PROGRAM

## 2022-08-18 PROCEDURE — 85025 COMPLETE CBC W/AUTO DIFF WBC: CPT | Performed by: STUDENT IN AN ORGANIZED HEALTH CARE EDUCATION/TRAINING PROGRAM

## 2022-08-18 NOTE — ED PROVIDER NOTES
Encounter Date: 8/18/2022       History     Chief Complaint   Patient presents with    Facial Swelling     Pts daughter reports pt is retaining fluid in face and legs. Was told by Dr. Quan office to come in     89-year-old female with history NSTEMI, congestive heart failure stage II on 2 L nasal, hypertension on Lasix 20 mg presents with swelling to lower extremity and face that has progressively gotten worse.  Patient denies any shortness of breath, chest pain, leg pain, fever        Review of patient's allergies indicates:  No Known Allergies  Past Medical History:   Diagnosis Date    Arthritis     Coronary artery disease     Encounter for blood transfusion     High cholesterol     Hypertension     NSTEMI (non-ST elevated myocardial infarction)      Past Surgical History:   Procedure Laterality Date    HYSTERECTOMY      KNEE ARTHROSCOPY Right      Family History   Problem Relation Age of Onset    Stroke Mother     Heart disease Father     Cancer Sister     Cancer Brother      Social History     Tobacco Use    Smoking status: Never Smoker    Smokeless tobacco: Never Used   Substance Use Topics    Alcohol use: Never    Drug use: Never     Review of Systems   Constitutional: Negative.    HENT: Positive for facial swelling.    Respiratory: Negative.    Cardiovascular: Negative.    Gastrointestinal: Negative.    Genitourinary: Negative.    Musculoskeletal: Positive for joint swelling.   Skin: Negative.    Neurological: Negative.    Psychiatric/Behavioral: Negative.    All other systems reviewed and are negative.      Physical Exam     Initial Vitals [08/18/22 1506]   BP Pulse Resp Temp SpO2   (!) 141/69 (!) 57 18 98.5 °F (36.9 °C) (!) 92 %      MAP       --         Physical Exam    Nursing note and vitals reviewed.  Constitutional: Vital signs are normal. She appears well-developed and well-nourished.   HENT:   Head: Normocephalic and atraumatic.   Eyes: Conjunctivae and lids are normal.   Neck:  Trachea normal. Neck supple.   Cardiovascular: Normal rate, regular rhythm, normal heart sounds, intact distal pulses and normal pulses.   No murmur heard.  Pulmonary/Chest: No respiratory distress. She has wheezes.   Expiratory wheezing at bases but moving air well bilaterally.  Speaking in full sentence   Abdominal: Abdomen is soft. Bowel sounds are normal.   Musculoskeletal:         General: Normal range of motion.      Cervical back: Neck supple.     Neurological: She is alert and oriented to person, place, and time. She has normal strength. No cranial nerve deficit or sensory deficit.   Skin: Skin is warm. Capillary refill takes less than 2 seconds.   Psychiatric: She has a normal mood and affect. Her speech is normal. Thought content normal.         ED Course   Procedures  Labs Reviewed   CBC W/ AUTO DIFFERENTIAL - Abnormal; Notable for the following components:       Result Value    WBC 12.77 (*)     RBC 3.43 (*)     Hemoglobin 10.0 (*)     Hematocrit 32.6 (*)     MCHC 30.7 (*)     RDW 15.1 (*)     MPV 8.9 (*)     Immature Granulocytes 1.0 (*)     Immature Grans (Abs) 0.13 (*)     Mono # 1.5 (*)     All other components within normal limits   COMPREHENSIVE METABOLIC PANEL - Abnormal; Notable for the following components:    CO2 36 (*)     Glucose 127 (*)     Creatinine 1.7 (*)     Albumin 2.6 (*)     Anion Gap 5 (*)     eGFR 28.1 (*)     All other components within normal limits   TROPONIN I HIGH SENSITIVITY   NT-PRO NATRIURETIC PEPTIDE     EKG Readings: (Independently Interpreted)   Initial Reading: No STEMI. Rhythm: Sinus Bradycardia. Axis: Normal.       Imaging Results          X-Ray Chest 1 View (Final result)  Result time 08/18/22 15:58:11    Final result by Sienna Baca MD (08/18/22 15:58:11)                 Impression:      No acute findings detected      Electronically signed by: Sienna Baca MD  Date:    08/18/2022  Time:    15:58             Narrative:    EXAMINATION:  XR CHEST 1  VIEW    CLINICAL HISTORY:  Edema, unspecified    COMPARISON:  07/30/2022    FINDINGS:  Mild scoliosis, patient rotation and shallow inspiration limit the study.  Chronic elevation of right hemidiaphragm.  No acute pulmonary infiltrates, signs of CHF or appreciable change from recent studies.                                 Medications - No data to display              ED Course as of 08/18/22 1617   Thu Aug 18, 2022   1614 Labs and imaging noted.  Advised patient to increase Lasix to twice a day.  According to patient, has had similar episode in the past and was told by cardiologist to increase Lasix to b.i.d..  Patient was instructed to go to daily once symptoms resolve.  Will advised patient to increase Lasix to twice daily and follow-up with cardiologist for better management of Chf [HD]      ED Course User Index  [HD] Benjamin Phillips MD             Clinical Impression:   Final diagnoses:  [R60.9] Swelling (Primary)  [I50.9] CHF (congestive heart failure), NYHA class I          ED Disposition Condition    Discharge Stable        ED Prescriptions     None        Follow-up Information     Follow up With Specialties Details Why Contact Info    Ravi Chapin MD Cardiology In 2 days  [address]  [address]  [facility] [address]  [phone]             Benjamin Phillips MD  08/18/22 1616       Benjamin Phillips MD  08/18/22 1616

## 2022-08-19 ENCOUNTER — HOSPITAL ENCOUNTER (OUTPATIENT)
Facility: HOSPITAL | Age: 87
Discharge: HOME HEALTH | End: 2022-08-22
Attending: STUDENT IN AN ORGANIZED HEALTH CARE EDUCATION/TRAINING PROGRAM | Admitting: EMERGENCY MEDICINE
Payer: MEDICARE

## 2022-08-19 DIAGNOSIS — N30.01 ACUTE CYSTITIS WITH HEMATURIA: Primary | ICD-10-CM

## 2022-08-19 DIAGNOSIS — D72.829 LEUKOCYTOSIS, UNSPECIFIED TYPE: ICD-10-CM

## 2022-08-19 DIAGNOSIS — I95.9 HYPOTENSION, UNSPECIFIED HYPOTENSION TYPE: ICD-10-CM

## 2022-08-19 LAB
ALBUMIN SERPL BCP-MCNC: 2.3 G/DL (ref 3.5–5.2)
ALP SERPL-CCNC: 87 U/L (ref 55–135)
ALT SERPL W/O P-5'-P-CCNC: 16 U/L (ref 10–44)
ANION GAP SERPL CALC-SCNC: 2 MMOL/L (ref 8–16)
AST SERPL-CCNC: 16 U/L (ref 10–40)
BACTERIA #/AREA URNS HPF: ABNORMAL /HPF
BASOPHILS # BLD AUTO: 0.07 K/UL (ref 0–0.2)
BASOPHILS NFR BLD: 0.5 % (ref 0–1.9)
BILIRUB SERPL-MCNC: 0.5 MG/DL (ref 0.1–1)
BILIRUB UR QL STRIP: NEGATIVE
BUN SERPL-MCNC: 20 MG/DL (ref 10–30)
CALCIUM SERPL-MCNC: 8.4 MG/DL (ref 8.7–10.5)
CHLORIDE SERPL-SCNC: 99 MMOL/L (ref 95–110)
CLARITY UR: ABNORMAL
CO2 SERPL-SCNC: 39 MMOL/L (ref 23–29)
COLOR UR: YELLOW
CREAT SERPL-MCNC: 1.8 MG/DL (ref 0.5–1.4)
CTP QC/QA: YES
DIFFERENTIAL METHOD: ABNORMAL
EOSINOPHIL # BLD AUTO: 0.3 K/UL (ref 0–0.5)
EOSINOPHIL NFR BLD: 1.6 % (ref 0–8)
ERYTHROCYTE [DISTWIDTH] IN BLOOD BY AUTOMATED COUNT: 15 % (ref 11.5–14.5)
EST. GFR  (NO RACE VARIABLE): 26.3 ML/MIN/1.73 M^2
GLUCOSE SERPL-MCNC: 114 MG/DL (ref 70–110)
GLUCOSE UR QL STRIP: NEGATIVE
HCT VFR BLD AUTO: 30.8 % (ref 37–48.5)
HGB BLD-MCNC: 9.5 G/DL (ref 12–16)
HGB UR QL STRIP: ABNORMAL
HYALINE CASTS #/AREA URNS LPF: 0 /LPF
IMM GRANULOCYTES # BLD AUTO: 0.13 K/UL (ref 0–0.04)
IMM GRANULOCYTES NFR BLD AUTO: 0.8 % (ref 0–0.5)
KETONES UR QL STRIP: NEGATIVE
LEUKOCYTE ESTERASE UR QL STRIP: ABNORMAL
LYMPHOCYTES # BLD AUTO: 3.4 K/UL (ref 1–4.8)
LYMPHOCYTES NFR BLD: 21.7 % (ref 18–48)
MAGNESIUM SERPL-MCNC: 1.5 MG/DL (ref 1.6–2.6)
MCH RBC QN AUTO: 29 PG (ref 27–31)
MCHC RBC AUTO-ENTMCNC: 30.8 G/DL (ref 32–36)
MCV RBC AUTO: 94 FL (ref 82–98)
MICROSCOPIC COMMENT: ABNORMAL
MONOCYTES # BLD AUTO: 1.5 K/UL (ref 0.3–1)
MONOCYTES NFR BLD: 9.8 % (ref 4–15)
NEUTROPHILS # BLD AUTO: 10.2 K/UL (ref 1.8–7.7)
NEUTROPHILS NFR BLD: 65.6 % (ref 38–73)
NITRITE UR QL STRIP: NEGATIVE
NRBC BLD-RTO: 0 /100 WBC
NT-PROBNP SERPL-MCNC: 1369 PG/ML (ref 5–1800)
PH UR STRIP: 7 [PH] (ref 5–8)
PLATELET # BLD AUTO: 332 K/UL (ref 150–450)
PMV BLD AUTO: 9.2 FL (ref 9.2–12.9)
POTASSIUM SERPL-SCNC: 3.7 MMOL/L (ref 3.5–5.1)
PROT SERPL-MCNC: 6.2 G/DL (ref 6–8.4)
PROT UR QL STRIP: NEGATIVE
RBC # BLD AUTO: 3.28 M/UL (ref 4–5.4)
RBC #/AREA URNS HPF: 27 /HPF (ref 0–4)
SARS-COV-2 RDRP RESP QL NAA+PROBE: NEGATIVE
SODIUM SERPL-SCNC: 140 MMOL/L (ref 136–145)
SP GR UR STRIP: 1.01 (ref 1–1.03)
SQUAMOUS #/AREA URNS HPF: 1 /HPF
TROPONIN I SERPL DL<=0.01 NG/ML-MCNC: 19.8 PG/ML (ref 0–60)
TSH SERPL DL<=0.005 MIU/L-ACNC: 2.87 UIU/ML (ref 0.4–4)
URN SPEC COLLECT METH UR: ABNORMAL
UROBILINOGEN UR STRIP-ACNC: 1 EU/DL
WBC # BLD AUTO: 15.47 K/UL (ref 3.9–12.7)
WBC #/AREA URNS HPF: >100 /HPF (ref 0–5)
YEAST URNS QL MICRO: ABNORMAL

## 2022-08-19 PROCEDURE — 84484 ASSAY OF TROPONIN QUANT: CPT | Performed by: STUDENT IN AN ORGANIZED HEALTH CARE EDUCATION/TRAINING PROGRAM

## 2022-08-19 PROCEDURE — 93005 ELECTROCARDIOGRAM TRACING: CPT

## 2022-08-19 PROCEDURE — 80053 COMPREHEN METABOLIC PANEL: CPT | Performed by: STUDENT IN AN ORGANIZED HEALTH CARE EDUCATION/TRAINING PROGRAM

## 2022-08-19 PROCEDURE — 99285 EMERGENCY DEPT VISIT HI MDM: CPT | Mod: 25

## 2022-08-19 PROCEDURE — 87088 URINE BACTERIA CULTURE: CPT | Performed by: STUDENT IN AN ORGANIZED HEALTH CARE EDUCATION/TRAINING PROGRAM

## 2022-08-19 PROCEDURE — 96365 THER/PROPH/DIAG IV INF INIT: CPT

## 2022-08-19 PROCEDURE — 87186 SC STD MICRODIL/AGAR DIL: CPT | Performed by: STUDENT IN AN ORGANIZED HEALTH CARE EDUCATION/TRAINING PROGRAM

## 2022-08-19 PROCEDURE — 63600175 PHARM REV CODE 636 W HCPCS: Performed by: STUDENT IN AN ORGANIZED HEALTH CARE EDUCATION/TRAINING PROGRAM

## 2022-08-19 PROCEDURE — 81000 URINALYSIS NONAUTO W/SCOPE: CPT | Performed by: STUDENT IN AN ORGANIZED HEALTH CARE EDUCATION/TRAINING PROGRAM

## 2022-08-19 PROCEDURE — 36415 COLL VENOUS BLD VENIPUNCTURE: CPT | Performed by: STUDENT IN AN ORGANIZED HEALTH CARE EDUCATION/TRAINING PROGRAM

## 2022-08-19 PROCEDURE — 94761 N-INVAS EAR/PLS OXIMETRY MLT: CPT

## 2022-08-19 PROCEDURE — 84443 ASSAY THYROID STIM HORMONE: CPT | Performed by: STUDENT IN AN ORGANIZED HEALTH CARE EDUCATION/TRAINING PROGRAM

## 2022-08-19 PROCEDURE — 83880 ASSAY OF NATRIURETIC PEPTIDE: CPT | Performed by: STUDENT IN AN ORGANIZED HEALTH CARE EDUCATION/TRAINING PROGRAM

## 2022-08-19 PROCEDURE — 99900035 HC TECH TIME PER 15 MIN (STAT)

## 2022-08-19 PROCEDURE — 85025 COMPLETE CBC W/AUTO DIFF WBC: CPT | Performed by: STUDENT IN AN ORGANIZED HEALTH CARE EDUCATION/TRAINING PROGRAM

## 2022-08-19 PROCEDURE — 99900031 HC PATIENT EDUCATION (STAT)

## 2022-08-19 PROCEDURE — 87077 CULTURE AEROBIC IDENTIFY: CPT | Performed by: STUDENT IN AN ORGANIZED HEALTH CARE EDUCATION/TRAINING PROGRAM

## 2022-08-19 PROCEDURE — 87086 URINE CULTURE/COLONY COUNT: CPT | Performed by: STUDENT IN AN ORGANIZED HEALTH CARE EDUCATION/TRAINING PROGRAM

## 2022-08-19 PROCEDURE — 27000221 HC OXYGEN, UP TO 24 HOURS

## 2022-08-19 PROCEDURE — 83735 ASSAY OF MAGNESIUM: CPT | Performed by: STUDENT IN AN ORGANIZED HEALTH CARE EDUCATION/TRAINING PROGRAM

## 2022-08-19 PROCEDURE — P9612 CATHETERIZE FOR URINE SPEC: HCPCS

## 2022-08-19 PROCEDURE — 93010 ELECTROCARDIOGRAM REPORT: CPT | Mod: ,,, | Performed by: INTERNAL MEDICINE

## 2022-08-19 PROCEDURE — 93010 EKG 12-LEAD: ICD-10-PCS | Mod: ,,, | Performed by: INTERNAL MEDICINE

## 2022-08-19 PROCEDURE — G0378 HOSPITAL OBSERVATION PER HR: HCPCS | Mod: OBSCO

## 2022-08-19 PROCEDURE — U0002 COVID-19 LAB TEST NON-CDC: HCPCS | Performed by: STUDENT IN AN ORGANIZED HEALTH CARE EDUCATION/TRAINING PROGRAM

## 2022-08-19 PROCEDURE — G0378 HOSPITAL OBSERVATION PER HR: HCPCS

## 2022-08-19 RX ORDER — TALC
6 POWDER (GRAM) TOPICAL NIGHTLY PRN
Status: DISCONTINUED | OUTPATIENT
Start: 2022-08-19 | End: 2022-08-22 | Stop reason: HOSPADM

## 2022-08-19 RX ORDER — ACETAMINOPHEN 325 MG/1
650 TABLET ORAL EVERY 8 HOURS PRN
Status: DISCONTINUED | OUTPATIENT
Start: 2022-08-19 | End: 2022-08-22 | Stop reason: HOSPADM

## 2022-08-19 RX ORDER — SODIUM CHLORIDE 0.9 % (FLUSH) 0.9 %
10 SYRINGE (ML) INJECTION
Status: DISCONTINUED | OUTPATIENT
Start: 2022-08-19 | End: 2022-08-22 | Stop reason: HOSPADM

## 2022-08-19 RX ORDER — ONDANSETRON 2 MG/ML
4 INJECTION INTRAMUSCULAR; INTRAVENOUS EVERY 8 HOURS PRN
Status: DISCONTINUED | OUTPATIENT
Start: 2022-08-19 | End: 2022-08-22 | Stop reason: HOSPADM

## 2022-08-19 RX ORDER — SODIUM CHLORIDE 9 MG/ML
1000 INJECTION, SOLUTION INTRAVENOUS
Status: ACTIVE | OUTPATIENT
Start: 2022-08-19 | End: 2022-08-20

## 2022-08-19 RX ADMIN — CEFTRIAXONE 1 G: 1 INJECTION, SOLUTION INTRAVENOUS at 04:08

## 2022-08-19 NOTE — CARE UPDATE
08/19/22 1750   PRE-TX-O2   O2 Device (Oxygen Therapy) nasal cannula   $ Is the patient on Low Flow Oxygen? Yes   Flow (L/min) 2   Oxygen Concentration (%) 28   SpO2 (S)  96 %   Pulse Oximetry Type Intermittent   $ Pulse Oximetry - Multiple Charge Pulse Oximetry - Multiple   Probe Placed On (Pulse Ox) (S)  Right:;ear   Pulse (!) 52   Resp 18   Read on patient flowsheet that SATs were 86%. Rt went to check patient and got 96% on patient RT ear, patient does not perfuse well in fingers so ears can be assessed for SpO2. Alexa Rocha RN notified.

## 2022-08-19 NOTE — ED PROVIDER NOTES
"Encounter Date: 8/19/2022       History     Chief Complaint   Patient presents with    Hypotension     Seen by  nurse this am and was told to come to ER for low BP and low heart rate. C/o "light headache"     89-year-old female with history NSTEMI, congestive heart failure stage II on 2 L nasal, hypertension presents to ER for low blood pressure and heart rate.  Patient says that she feels lightheaded but denies any chest pain, shortness of breath, vomiting, fever, cough.  Patient was seen here yesterday for swelling in the lower extremity and face so instructed to try to increase Lasix to 40 mg.        Review of patient's allergies indicates:  No Known Allergies  Past Medical History:   Diagnosis Date    Arthritis     Coronary artery disease     Encounter for blood transfusion     High cholesterol     Hypertension     NSTEMI (non-ST elevated myocardial infarction)      Past Surgical History:   Procedure Laterality Date    HYSTERECTOMY      KNEE ARTHROSCOPY Right      Family History   Problem Relation Age of Onset    Stroke Mother     Heart disease Father     Cancer Sister     Cancer Brother      Social History     Tobacco Use    Smoking status: Never Smoker    Smokeless tobacco: Never Used   Substance Use Topics    Alcohol use: Never    Drug use: Never     Review of Systems   Constitutional: Positive for fatigue.   HENT: Negative.    Respiratory: Negative.    Cardiovascular: Negative.    Gastrointestinal: Negative.    Genitourinary: Negative.    Musculoskeletal: Positive for joint swelling.   Skin: Negative.    Neurological: Positive for weakness.   Psychiatric/Behavioral: Negative.    All other systems reviewed and are negative.      Physical Exam     Initial Vitals [08/19/22 1303]   BP Pulse Resp Temp SpO2   (!) 108/55 (!) 54 18 99 °F (37.2 °C) (!) 90 %      MAP       --         Physical Exam    Nursing note and vitals reviewed.  Constitutional: Vital signs are normal. She appears " well-developed and well-nourished.   HENT:   Head: Normocephalic and atraumatic.   Eyes: Conjunctivae and lids are normal.   Neck: Trachea normal. Neck supple.   Cardiovascular: Regular rhythm, normal heart sounds, intact distal pulses and normal pulses.   No murmur heard.  Pulmonary/Chest: Breath sounds normal. No respiratory distress.   Abdominal: Abdomen is soft. Bowel sounds are normal.   Musculoskeletal:         General: Edema present. Normal range of motion.      Cervical back: Neck supple.     Neurological: She is alert and oriented to person, place, and time. She has normal strength. No cranial nerve deficit or sensory deficit.   Skin: Skin is warm. Capillary refill takes less than 2 seconds.   Psychiatric: She has a normal mood and affect. Her speech is normal. Thought content normal.         ED Course   Procedures  Labs Reviewed   CBC W/ AUTO DIFFERENTIAL - Abnormal; Notable for the following components:       Result Value    WBC 15.47 (*)     RBC 3.28 (*)     Hemoglobin 9.5 (*)     Hematocrit 30.8 (*)     MCHC 30.8 (*)     RDW 15.0 (*)     Immature Granulocytes 0.8 (*)     Gran # (ANC) 10.2 (*)     Immature Grans (Abs) 0.13 (*)     Mono # 1.5 (*)     All other components within normal limits   COMPREHENSIVE METABOLIC PANEL - Abnormal; Notable for the following components:    CO2 39 (*)     Glucose 114 (*)     Creatinine 1.8 (*)     Calcium 8.4 (*)     Albumin 2.3 (*)     Anion Gap 2 (*)     eGFR 26.3 (*)     All other components within normal limits   URINALYSIS, REFLEX TO URINE CULTURE - Abnormal; Notable for the following components:    Appearance, UA Cloudy (*)     Occult Blood UA 2+ (*)     Leukocytes, UA 3+ (*)     All other components within normal limits    Narrative:     Preferred Collection Type->Urine, Clean Catch  Specimen Source->Urine   MAGNESIUM - Abnormal; Notable for the following components:    Magnesium 1.5 (*)     All other components within normal limits   URINALYSIS MICROSCOPIC -  Abnormal; Notable for the following components:    RBC, UA 27 (*)     WBC, UA >100 (*)     Bacteria Moderate (*)     Yeast, UA Rare (*)     All other components within normal limits    Narrative:     Preferred Collection Type->Urine, Clean Catch  Specimen Source->Urine   CULTURE, URINE   TROPONIN I HIGH SENSITIVITY   NT-PRO NATRIURETIC PEPTIDE   TSH   SARS-COV-2 RDRP GENE     EKG Readings: (Independently Interpreted)   Initial Reading: No STEMI.   Sinus bradycardia.  Nonspecific ST abnormality.  Compared to prior       Imaging Results          X-Ray Chest 1 View (Final result)  Result time 08/19/22 14:31:24    Final result by Prince Mcgregor MD (08/19/22 14:31:24)                 Impression:      No evidence of an acute pulmonary process or significant interval change.      Electronically signed by: Prince Mcgregor MD  Date:    08/19/2022  Time:    14:31             Narrative:    EXAMINATION:  XR CHEST 1 VIEW    CLINICAL HISTORY:  Hypotension    COMPARISON:  08/18/2022    FINDINGS:  Cardiac silhouette is unchanged.  Similar linear opacities at the lung bases may represent subsegmental atelectasis or scarring.  No new filtrate or pleural effusion.                                 Medications - No data to display  Medical Decision Making:   Initial Assessment:   89-year-old female with history NSTEMI, congestive heart failure stage II on 2 L nasal, hypertension presents to ER for low blood pressure and heart rate.  Blood pressure within normal limits.  Saturating 94% on 2 L which is baseline for patient.  Will get screening labs.  Clinical Tests:   Lab Tests: Ordered and Reviewed  The following lab test(s) were unremarkable: CBC, CMP, Troponin and BNP  Radiological Study: Ordered and Reviewed  Medical Tests: Ordered and Reviewed             ED Course as of 08/19/22 1504   Fri Aug 19, 2022   1459 Labs and imaging noted.  Will admit patient for antibiotics and hydrate [HD]      ED Course User Index  [HD] Benjamin Phillips MD              Clinical Impression:   Final diagnoses:  [N30.01] Acute cystitis with hematuria (Primary)  [I95.9] Hypotension, unspecified hypotension type  [D72.829] Leukocytosis, unspecified type          ED Disposition Condition    Observation               Benjamin Phillips MD  08/19/22 0883

## 2022-08-20 PROBLEM — D72.829 LEUKOCYTOSIS: Status: ACTIVE | Noted: 2022-08-20

## 2022-08-20 PROBLEM — N30.01 ACUTE CYSTITIS WITH HEMATURIA: Status: ACTIVE | Noted: 2022-08-20

## 2022-08-20 PROBLEM — I95.9 HYPOTENSION: Status: ACTIVE | Noted: 2022-08-20

## 2022-08-20 LAB
ALBUMIN SERPL BCP-MCNC: 2.4 G/DL (ref 3.5–5.2)
ALP SERPL-CCNC: 87 U/L (ref 55–135)
ALT SERPL W/O P-5'-P-CCNC: 15 U/L (ref 10–44)
ANION GAP SERPL CALC-SCNC: 5 MMOL/L (ref 8–16)
AST SERPL-CCNC: 16 U/L (ref 10–40)
BASOPHILS # BLD AUTO: 0.08 K/UL (ref 0–0.2)
BASOPHILS NFR BLD: 0.7 % (ref 0–1.9)
BILIRUB SERPL-MCNC: 0.5 MG/DL (ref 0.1–1)
BUN SERPL-MCNC: 19 MG/DL (ref 10–30)
CALCIUM SERPL-MCNC: 8.8 MG/DL (ref 8.7–10.5)
CHLORIDE SERPL-SCNC: 99 MMOL/L (ref 95–110)
CO2 SERPL-SCNC: 37 MMOL/L (ref 23–29)
CREAT SERPL-MCNC: 1.6 MG/DL (ref 0.5–1.4)
DIFFERENTIAL METHOD: ABNORMAL
EOSINOPHIL # BLD AUTO: 0.4 K/UL (ref 0–0.5)
EOSINOPHIL NFR BLD: 3.5 % (ref 0–8)
ERYTHROCYTE [DISTWIDTH] IN BLOOD BY AUTOMATED COUNT: 14.9 % (ref 11.5–14.5)
EST. GFR  (NO RACE VARIABLE): 30.3 ML/MIN/1.73 M^2
GLUCOSE SERPL-MCNC: 93 MG/DL (ref 70–110)
HCT VFR BLD AUTO: 33.1 % (ref 37–48.5)
HGB BLD-MCNC: 10.2 G/DL (ref 12–16)
IMM GRANULOCYTES # BLD AUTO: 0.1 K/UL (ref 0–0.04)
IMM GRANULOCYTES NFR BLD AUTO: 0.9 % (ref 0–0.5)
LYMPHOCYTES # BLD AUTO: 2.4 K/UL (ref 1–4.8)
LYMPHOCYTES NFR BLD: 21.2 % (ref 18–48)
MCH RBC QN AUTO: 28.9 PG (ref 27–31)
MCHC RBC AUTO-ENTMCNC: 30.8 G/DL (ref 32–36)
MCV RBC AUTO: 94 FL (ref 82–98)
MONOCYTES # BLD AUTO: 1.4 K/UL (ref 0.3–1)
MONOCYTES NFR BLD: 12.3 % (ref 4–15)
NEUTROPHILS # BLD AUTO: 6.9 K/UL (ref 1.8–7.7)
NEUTROPHILS NFR BLD: 61.4 % (ref 38–73)
NRBC BLD-RTO: 0 /100 WBC
PLATELET # BLD AUTO: 331 K/UL (ref 150–450)
PMV BLD AUTO: 9.4 FL (ref 9.2–12.9)
POTASSIUM SERPL-SCNC: 3.7 MMOL/L (ref 3.5–5.1)
PROT SERPL-MCNC: 6.4 G/DL (ref 6–8.4)
RBC # BLD AUTO: 3.53 M/UL (ref 4–5.4)
SODIUM SERPL-SCNC: 141 MMOL/L (ref 136–145)
WBC # BLD AUTO: 11.2 K/UL (ref 3.9–12.7)

## 2022-08-20 PROCEDURE — 96366 THER/PROPH/DIAG IV INF ADDON: CPT

## 2022-08-20 PROCEDURE — G0378 HOSPITAL OBSERVATION PER HR: HCPCS

## 2022-08-20 PROCEDURE — 27000221 HC OXYGEN, UP TO 24 HOURS

## 2022-08-20 PROCEDURE — 25000003 PHARM REV CODE 250: Performed by: INTERNAL MEDICINE

## 2022-08-20 PROCEDURE — 99900035 HC TECH TIME PER 15 MIN (STAT)

## 2022-08-20 PROCEDURE — 85025 COMPLETE CBC W/AUTO DIFF WBC: CPT | Performed by: STUDENT IN AN ORGANIZED HEALTH CARE EDUCATION/TRAINING PROGRAM

## 2022-08-20 PROCEDURE — 63600175 PHARM REV CODE 636 W HCPCS: Performed by: INTERNAL MEDICINE

## 2022-08-20 PROCEDURE — 80053 COMPREHEN METABOLIC PANEL: CPT | Performed by: STUDENT IN AN ORGANIZED HEALTH CARE EDUCATION/TRAINING PROGRAM

## 2022-08-20 PROCEDURE — 99900031 HC PATIENT EDUCATION (STAT)

## 2022-08-20 PROCEDURE — 94761 N-INVAS EAR/PLS OXIMETRY MLT: CPT

## 2022-08-20 PROCEDURE — 36415 COLL VENOUS BLD VENIPUNCTURE: CPT | Performed by: STUDENT IN AN ORGANIZED HEALTH CARE EDUCATION/TRAINING PROGRAM

## 2022-08-20 RX ORDER — EZETIMIBE 10 MG/1
10 TABLET ORAL DAILY
Status: DISCONTINUED | OUTPATIENT
Start: 2022-08-20 | End: 2022-08-22 | Stop reason: HOSPADM

## 2022-08-20 RX ORDER — ATORVASTATIN CALCIUM 10 MG/1
10 TABLET, FILM COATED ORAL NIGHTLY
Status: DISCONTINUED | OUTPATIENT
Start: 2022-08-20 | End: 2022-08-22 | Stop reason: HOSPADM

## 2022-08-20 RX ORDER — AMIODARONE HYDROCHLORIDE 200 MG/1
200 TABLET ORAL 2 TIMES DAILY
Status: DISCONTINUED | OUTPATIENT
Start: 2022-08-20 | End: 2022-08-22 | Stop reason: HOSPADM

## 2022-08-20 RX ORDER — FAMOTIDINE 20 MG/1
20 TABLET, FILM COATED ORAL DAILY
Status: DISCONTINUED | OUTPATIENT
Start: 2022-08-20 | End: 2022-08-22 | Stop reason: HOSPADM

## 2022-08-20 RX ORDER — ASPIRIN 81 MG/1
81 TABLET ORAL DAILY
Status: DISCONTINUED | OUTPATIENT
Start: 2022-08-20 | End: 2022-08-22 | Stop reason: HOSPADM

## 2022-08-20 RX ADMIN — EZETIMIBE 10 MG: 10 TABLET ORAL at 02:08

## 2022-08-20 RX ADMIN — ASPIRIN 81 MG: 81 TABLET, COATED ORAL at 02:08

## 2022-08-20 RX ADMIN — FAMOTIDINE 20 MG: 20 TABLET ORAL at 02:08

## 2022-08-20 RX ADMIN — AMIODARONE HYDROCHLORIDE 200 MG: 200 TABLET ORAL at 09:08

## 2022-08-20 RX ADMIN — CEFTRIAXONE 1 G: 1 INJECTION, SOLUTION INTRAVENOUS at 04:08

## 2022-08-20 RX ADMIN — ATORVASTATIN CALCIUM 10 MG: 10 TABLET, FILM COATED ORAL at 09:08

## 2022-08-20 NOTE — ASSESSMENT & PLAN NOTE
Patient with UTI on UA, white count initially elevated, afebrile, started on IV Rocephin urine culture pending, leukocytosis resolved, follow-up with culture report, PT/OT on board

## 2022-08-20 NOTE — HPI
89-year-old female with history NSTEMI, congestive heart failure stage II on 2 L nasal, hypertension presents to ER for low blood pressure and heart rate.  Patient says that she feels lightheaded but denies any chest pain, shortness of breath, vomiting, fever, cough.  Patient was seen here yesterday for swelling in the lower extremity and face so instructed to try to increase Lasix to 40 mg.  Of of

## 2022-08-20 NOTE — SUBJECTIVE & OBJECTIVE
Past Medical History:   Diagnosis Date    Arthritis     Coronary artery disease     Encounter for blood transfusion     High cholesterol     Hypertension     NSTEMI (non-ST elevated myocardial infarction)        Past Surgical History:   Procedure Laterality Date    HYSTERECTOMY      KNEE ARTHROSCOPY Right        Review of patient's allergies indicates:  No Known Allergies    No current facility-administered medications on file prior to encounter.     Current Outpatient Medications on File Prior to Encounter   Medication Sig    acetaminophen (TYLENOL) 325 MG tablet Take 2 tablets (650 mg total) by mouth every 8 (eight) hours as needed.    amiodarone (PACERONE) 200 MG Tab Take 1 tablet (200 mg total) by mouth 2 (two) times daily.    amLODIPine (NORVASC) 5 MG tablet Take 1 tablet (5 mg total) by mouth once daily.    aspirin (ECOTRIN) 81 MG EC tablet Take 1 tablet (81 mg total) by mouth once daily.    atorvastatin (LIPITOR) 10 MG tablet Take 1 tablet (10 mg total) by mouth every evening.    clotrimazole (MYCELEX) 10 mg steven Take 1 tablet (10 mg total) by mouth 5 (five) times daily.    cyanocobalamin 1,000 mcg/mL injection Inject 1 mL (1,000 mcg total) into the muscle every 30 days.    ezetimibe (ZETIA) 10 mg tablet Take 1 tablet (10 mg total) by mouth once daily.    famotidine (PEPCID) 20 MG tablet Take 1 tablet (20 mg total) by mouth once daily.    furosemide (LASIX) 20 MG tablet Take 1 tablet (20 mg total) by mouth 2 (two) times daily as needed (edmea).    isosorbide mononitrate (IMDUR) 30 MG 24 hr tablet Take 1 tablet (30 mg total) by mouth once daily.    LIDOcaine (LIDODERM) 5 % Place 1 patch onto the skin once daily. Remove & Discard patch within 12 hours or as directed by MD    mirtazapine (REMERON) 15 MG tablet Take 1 tablet (15 mg total) by mouth every evening.    ondansetron (ZOFRAN) 4 MG tablet Take 1 tablet (4 mg total) by mouth every 6 (six) hours as needed for Nausea.    promethazine (PHENERGAN) 25 MG  tablet Take 1 tablet (25 mg total) by mouth every 6 (six) hours as needed for Nausea.    ALPRAZolam (XANAX) 0.5 MG tablet Take 1 tablet (0.5 mg total) by mouth 2 (two) times daily as needed for Anxiety.    [DISCONTINUED] carvediloL (COREG) 12.5 MG tablet Take 1 tablet (12.5 mg total) by mouth 2 (two) times daily.    [DISCONTINUED] hydroCHLOROthiazide (HYDRODIURIL) 25 MG tablet Take 50 mg by mouth once daily.    [DISCONTINUED] metoprolol tartrate (LOPRESSOR) 50 MG tablet      Family History       Problem Relation (Age of Onset)    Cancer Sister, Brother    Heart disease Father    Stroke Mother          Tobacco Use    Smoking status: Never Smoker    Smokeless tobacco: Never Used   Substance and Sexual Activity    Alcohol use: Never    Drug use: Never    Sexual activity: Not on file     Review of Systems   Constitutional:  Positive for activity change and fatigue.   Respiratory:  Positive for cough. Negative for chest tightness and wheezing.    Cardiovascular:  Negative for chest pain, palpitations and leg swelling.   Gastrointestinal:  Negative for abdominal pain, constipation, diarrhea, nausea and vomiting.   Musculoskeletal:  Positive for arthralgias, back pain and gait problem.   Neurological:  Positive for weakness.   Objective:     Vital Signs (Most Recent):  Temp: 97.9 °F (36.6 °C) (08/20/22 1111)  Pulse: 67 (08/20/22 1111)  Resp: 20 (08/20/22 1111)  BP: 137/65 (08/20/22 1111)  SpO2: (!) 92 % (08/20/22 1111)   Vital Signs (24h Range):  Temp:  [97.7 °F (36.5 °C)-98.3 °F (36.8 °C)] 97.9 °F (36.6 °C)  Pulse:  [48-67] 67  Resp:  [18-22] 20  SpO2:  [86 %-99 %] 92 %  BP: (111-160)/(52-78) 137/65     Weight: 62.6 kg (138 lb)  Body mass index is 23.69 kg/m².    Physical Exam  Vitals and nursing note reviewed.   HENT:      Head: Normocephalic and atraumatic.      Mouth/Throat:      Mouth: Mucous membranes are moist.      Pharynx: Oropharynx is clear.   Eyes:      General: No scleral icterus.     Extraocular Movements:  Extraocular movements intact.      Pupils: Pupils are equal, round, and reactive to light.   Cardiovascular:      Rate and Rhythm: Normal rate and regular rhythm.      Pulses: Normal pulses.      Heart sounds: Normal heart sounds.   Pulmonary:      Effort: Pulmonary effort is normal.      Breath sounds: Normal breath sounds.   Abdominal:      General: Bowel sounds are normal. There is no distension.      Palpations: Abdomen is soft.      Tenderness: There is no abdominal tenderness. There is no guarding.   Musculoskeletal:         General: Normal range of motion.      Cervical back: Normal range of motion. No rigidity.   Skin:     General: Skin is warm and dry.      Capillary Refill: Capillary refill takes less than 2 seconds.   Neurological:      General: No focal deficit present.      Mental Status: She is alert and oriented to person, place, and time.      Motor: Weakness present.   Psychiatric:         Mood and Affect: Mood normal.         Behavior: Behavior normal.         CRANIAL NERVES     CN III, IV, VI   Pupils are equal, round, and reactive to light.     Significant Labs: All pertinent labs within the past 24 hours have been reviewed.  Recent Lab Results         08/20/22  0553   08/19/22  1523   08/19/22  1402        Albumin 2.4           Alkaline Phosphatase 87           ALT 15           Anion Gap 5           Appearance, UA     Cloudy       AST 16           Bacteria, UA     Moderate       Baso # 0.08           Basophil % 0.7           Bilirubin (UA)     Negative       BILIRUBIN TOTAL 0.5  Comment: For infants and newborns, interpretation of results should be based  on gestational age, weight and in agreement with clinical  observations.    Premature Infant recommended reference ranges:  Up to 24 hours.............<8.0 mg/dL  Up to 48 hours............<12.0 mg/dL  3-5 days..................<15.0 mg/dL  6-29 days.................<15.0 mg/dL    For patients on Eltrombopag therapy, use of Dimension [place] TBIL  is   not   recommended.             BUN 19           Calcium 8.8           Chloride 99           CO2 37           Color, UA     Yellow       Creatinine 1.6           Differential Method Automated           eGFR 30.3           Eos # 0.4           Eosinophil % 3.5           Glucose 93           Glucose, UA     Negative       Gran # (ANC) 6.9           Gran % 61.4           Hematocrit 33.1           Hemoglobin 10.2           Hyaline Casts, UA     0       Immature Grans (Abs) 0.10  Comment: Mild elevation in immature granulocytes is non specific and   can be seen in a variety of conditions including stress response,   acute inflammation, trauma and pregnancy. Correlation with other   laboratory and clinical findings is essential.             Immature Granulocytes 0.9           Ketones, UA     Negative       Leukocytes, UA     3+       Lymph # 2.4           Lymph % 21.2           MCH 28.9           MCHC 30.8           MCV 94           Microscopic Comment     SEE COMMENT  Comment: Other formed elements not mentioned in the report are not   present in the microscopic examination.          Mono # 1.4           Mono % 12.3           MPV 9.4           NITRITE UA     Negative       nRBC 0           Occult Blood UA     2+       pH, UA     7.0       Platelets 331           Potassium 3.7           PROTEIN TOTAL 6.4           Protein, UA     Negative  Comment: Recommend a 24 hour urine protein or a urine   protein/creatinine ratio if globulin induced proteinuria is  clinically suspected.          Acceptable   Yes         RBC 3.53           RBC, UA     27       RDW 14.9           SARS-CoV-2 RNA, Amplification, Qual   Negative         Sodium 141           Specific [place], UA     1.010       Specimen UA     Urine, Catheterized       Squam Epithel, UA     1       UROBILINOGEN UA     1.0       WBC, UA     >100       WBC 11.20           Yeast, UA     Rare               Significant Imaging: I have reviewed all pertinent  imaging results/findings within the past 24 hours.

## 2022-08-20 NOTE — PLAN OF CARE
[facility]  Initial Discharge Assessment       Primary Care Provider: Rafael Quan III, MD    Admission Diagnosis: Acute cystitis with hematuria [N30.01]  Hypotension, unspecified hypotension type [I95.9]  Leukocytosis, unspecified type [D72.829]    Admission Date: 8/19/2022  Expected Discharge Date:     Discharge Barriers Identified: None    Payor: [employer] MANAGED MEDICARE / Plan: [employer] MEDICARE PPO / Product Type: Medicare Advantage /     Extended Emergency Contact Information  Primary Emergency Contact: Major Terrazas  Mobile Phone: [phone]  Relation: Spouse  Secondary Emergency Contact: Ofelia Sethi  Mobile Phone: [phone]  Relation: Relative    Discharge Plan A: Home with family, Home Health  Discharge Plan B: Home with family      [facility] - [place], [place] - [address].  [address]  [facility] [address]  Phone: [phone] Fax: [phone]      Initial Assessment (most recent)     Adult Discharge Assessment - 08/20/22 1035        Discharge Assessment    Assessment Type Discharge Planning Assessment     Confirmed/corrected address, phone number and insurance Yes     Source of Information patient     Does patient/caregiver understand observation status Yes     Communicated KIMANI with patient/caregiver No     Lives With spouse;other (see comments)   daughter    Facility Arrived From: home     Do you expect to return to your current living situation? Yes     Do you have help at home or someone to help you manage your care at home? Yes   Pam Ramirez [phone]    Prior to hospitilization cognitive status: Alert/Oriented     Current cognitive status: Alert/Oriented     Walking or Climbing Stairs Difficulty ambulation difficulty, dependent;transferring difficulty, dependent     Dressing/Bathing Difficulty dressing difficulty, dependent     Home Accessibility stairs to enter home   ramp for wheelchair is present    Equipment Currently Used at Home  oxygen;walker, rolling;bedside commode;bath bench;wheelchair               Patient awake, alert, oriented.   at bedside.  She states she and her  live in their own home, and their daughter lives with them and takes care of them.  States she is unable to walk, and daughter assists with transfers and ADLS.  Patient is mostly dependent.  She feeds herself, but needs help with  dressing, bathing.  She uses [facility], daughter assists with medications.  She has oxygen at home, wheelchair, walker, shower chair and BSC.  She states she is with a home health service and would like to continue this service when returning home, but she does not remember the name of the home health agency.  She confirms contact information for her daughter,Fermin Robertson [phone].  Ofelia rosa as listed above is a niece.   Confirmed per hospital record patientis client of [facility].

## 2022-08-20 NOTE — PLAN OF CARE
08/20/22 1128   RADER Message   Medicare Outpatient and Observation Notification regarding financial responsibility Given to patient/caregiver;Explained to patient/caregiver;Signed/date by patient/caregiver   Date RADER was signed 08/20/22   Time RADER was signed 1115   Patient is awake, alert oriented.  Explained observation status.  Patient informed of observation status, signs MOON.  Copy given to patient.

## 2022-08-20 NOTE — H&P
"[facility] Medicine  History & Physical    Patient Name: Codi Black  MRN: [id number]  Patient Class: OP- Observation  Admission Date: 8/19/2022  Attending Physician: Rafael Quan III, MD   Primary Care Provider: Rafael Quan III, MD         Patient information was obtained from patient and ER records.     Subjective:     Principal Problem:Acute cystitis with hematuria    Chief Complaint:   Chief Complaint   Patient presents with    Hypotension     Seen by HH nurse this am and was told to come to ER for low BP and low heart rate. 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  Patient says that she feels lightheaded but denies any chest pain, shortness of breath, vomiting, fever, cough.  Patient was seen here yesterday for swelling in the lower extremity and face so instructed to try to increase Lasix to 40 mg.  Of of      Past Medical History:   Diagnosis Date    Arthritis     Coronary artery disease     Encounter for blood transfusion     High cholesterol     Hypertension     NSTEMI (non-ST elevated myocardial infarction)        Past Surgical History:   Procedure Laterality Date    HYSTERECTOMY      KNEE ARTHROSCOPY Right        Review of patient's allergies indicates:  No Known Allergies    No current facility-administered medications on file prior to encounter.     Current Outpatient Medications on File Prior to Encounter   Medication Sig    acetaminophen (TYLENOL) 325 MG tablet Take 2 tablets (650 mg total) by mouth every 8 (eight) hours as needed.    amiodarone (PACERONE) 200 MG Tab Take 1 tablet (200 mg total) by mouth 2 (two) times daily.    amLODIPine (NORVASC) 5 MG tablet Take 1 tablet (5 mg total) by mouth once daily.    aspirin (ECOTRIN) 81 MG EC tablet Take 1 tablet (81 mg total) by mouth once daily.    atorvastatin (LIPITOR) " 10 MG tablet Take 1 tablet (10 mg total) by mouth every evening.    clotrimazole (MYCELEX) 10 mg steven Take 1 tablet (10 mg total) by mouth 5 (five) times daily.    cyanocobalamin 1,000 mcg/mL injection Inject 1 mL (1,000 mcg total) into the muscle every 30 days.    ezetimibe (ZETIA) 10 mg tablet Take 1 tablet (10 mg total) by mouth once daily.    famotidine (PEPCID) 20 MG tablet Take 1 tablet (20 mg total) by mouth once daily.    furosemide (LASIX) 20 MG tablet Take 1 tablet (20 mg total) by mouth 2 (two) times daily as needed (edmea).    isosorbide mononitrate (IMDUR) 30 MG 24 hr tablet Take 1 tablet (30 mg total) by mouth once daily.    LIDOcaine (LIDODERM) 5 % Place 1 patch onto the skin once daily. Remove & Discard patch within 12 hours or as directed by MD    mirtazapine (REMERON) 15 MG tablet Take 1 tablet (15 mg total) by mouth every evening.    ondansetron (ZOFRAN) 4 MG tablet Take 1 tablet (4 mg total) by mouth every 6 (six) hours as needed for Nausea.    promethazine (PHENERGAN) 25 MG tablet Take 1 tablet (25 mg total) by mouth every 6 (six) hours as needed for Nausea.    ALPRAZolam (XANAX) 0.5 MG tablet Take 1 tablet (0.5 mg total) by mouth 2 (two) times daily as needed for Anxiety.    [DISCONTINUED] carvediloL (COREG) 12.5 MG tablet Take 1 tablet (12.5 mg total) by mouth 2 (two) times daily.    [DISCONTINUED] hydroCHLOROthiazide (HYDRODIURIL) 25 MG tablet Take 50 mg by mouth once daily.    [DISCONTINUED] metoprolol tartrate (LOPRESSOR) 50 MG tablet      Family History       Problem Relation (Age of Onset)    Cancer Sister, Brother    Heart disease Father    Stroke Mother          Tobacco Use    Smoking status: Never Smoker    Smokeless tobacco: Never Used   Substance and Sexual Activity    Alcohol use: Never    Drug use: Never    Sexual activity: Not on file     Review of Systems   Constitutional:  Positive for activity change and fatigue.   Respiratory:  Positive for cough. Negative  for chest tightness and wheezing.    Cardiovascular:  Negative for chest pain, palpitations and leg swelling.   Gastrointestinal:  Negative for abdominal pain, constipation, diarrhea, nausea and vomiting.   Musculoskeletal:  Positive for arthralgias, back pain and gait problem.   Neurological:  Positive for weakness.   Objective:     Vital Signs (Most Recent):  Temp: 97.9 °F (36.6 °C) (08/20/22 1111)  Pulse: 67 (08/20/22 1111)  Resp: 20 (08/20/22 1111)  BP: 137/65 (08/20/22 1111)  SpO2: (!) 92 % (08/20/22 1111)   Vital Signs (24h Range):  Temp:  [97.7 °F (36.5 °C)-98.3 °F (36.8 °C)] 97.9 °F (36.6 °C)  Pulse:  [48-67] 67  Resp:  [18-22] 20  SpO2:  [86 %-99 %] 92 %  BP: (111-160)/(52-78) 137/65     Weight: 62.6 kg (138 lb)  Body mass index is 23.69 kg/m².    Physical Exam  Vitals and nursing note reviewed.   HENT:      Head: Normocephalic and atraumatic.      Mouth/Throat:      Mouth: Mucous membranes are moist.      Pharynx: Oropharynx is clear.   Eyes:      General: No scleral icterus.     Extraocular Movements: Extraocular movements intact.      Pupils: Pupils are equal, round, and reactive to light.   Cardiovascular:      Rate and Rhythm: Normal rate and regular rhythm.      Pulses: Normal pulses.      Heart sounds: Normal heart sounds.   Pulmonary:      Effort: Pulmonary effort is normal.      Breath sounds: Normal breath sounds.   Abdominal:      General: Bowel sounds are normal. There is no distension.      Palpations: Abdomen is soft.      Tenderness: There is no abdominal tenderness. There is no guarding.   Musculoskeletal:         General: Normal range of motion.      Cervical back: Normal range of motion. No rigidity.   Skin:     General: Skin is warm and dry.      Capillary Refill: Capillary refill takes less than 2 seconds.   Neurological:      General: No focal deficit present.      Mental Status: She is alert and oriented to person, place, and time.      Motor: Weakness present.   Psychiatric:          Mood and Affect: Mood normal.         Behavior: Behavior normal.         CRANIAL NERVES     CN III, IV, VI   Pupils are equal, round, and reactive to light.     Significant Labs: All pertinent labs within the past 24 hours have been reviewed.  Recent Lab Results         08/20/22  0553   08/19/22  1523   08/19/22  1402        Albumin 2.4           Alkaline Phosphatase 87           ALT 15           Anion Gap 5           Appearance, UA     Cloudy       AST 16           Bacteria, UA     Moderate       Baso # 0.08           Basophil % 0.7           Bilirubin (UA)     Negative       BILIRUBIN TOTAL 0.5  Comment: For infants and newborns, interpretation of results should be based  on gestational age, weight and in agreement with clinical  observations.    Premature Infant recommended reference ranges:  Up to 24 hours.............<8.0 mg/dL  Up to 48 hours............<12.0 mg/dL  3-5 days..................<15.0 mg/dL  6-29 days.................<15.0 mg/dL    For patients on Eltrombopag therapy, use of Dimension [place] TBIL is   not   recommended.             BUN 19           Calcium 8.8           Chloride 99           CO2 37           Color, UA     Yellow       Creatinine 1.6           Differential Method Automated           eGFR 30.3           Eos # 0.4           Eosinophil % 3.5           Glucose 93           Glucose, UA     Negative       Gran # (ANC) 6.9           Gran % 61.4           Hematocrit 33.1           Hemoglobin 10.2           Hyaline Casts, UA     0       Immature Grans (Abs) 0.10  Comment: Mild elevation in immature granulocytes is non specific and   can be seen in a variety of conditions including stress response,   acute inflammation, trauma and pregnancy. Correlation with other   laboratory and clinical findings is essential.             Immature Granulocytes 0.9           Ketones, UA     Negative       Leukocytes, UA     3+       Lymph # 2.4           Lymph % 21.2           MCH 28.9           MCHC 30.8            MCV 94           Microscopic Comment     SEE COMMENT  Comment: Other formed elements not mentioned in the report are not   present in the microscopic examination.          Mono # 1.4           Mono % 12.3           MPV 9.4           NITRITE UA     Negative       nRBC 0           Occult Blood UA     2+       pH, UA     7.0       Platelets 331           Potassium 3.7           PROTEIN TOTAL 6.4           Protein, UA     Negative  Comment: Recommend a 24 hour urine protein or a urine   protein/creatinine ratio if globulin induced proteinuria is  clinically suspected.          Acceptable   Yes         RBC 3.53           RBC, UA     27       RDW 14.9           SARS-CoV-2 RNA, Amplification, Qual   Negative         Sodium 141           Specific [place], UA     1.010       Specimen UA     Urine, Catheterized       Squam Epithel, UA     1       UROBILINOGEN UA     1.0       WBC, UA     >100       WBC 11.20           Yeast, UA     Rare               Significant Imaging: I have reviewed all pertinent imaging results/findings within the past 24 hours.    Assessment/Plan:     * Acute cystitis with hematuria  Patient with UTI on UA, white count initially elevated, afebrile, started on IV Rocephin urine culture pending, leukocytosis resolved, follow-up with culture report, PT/OT on board      Hypotension  resolved      Leukocytosis  Improved      Hypertension  Monitor pressure. Resume home meds. Adjust PRN      VTE Risk Mitigation (From admission, onward)         Ordered     IP VTE HIGH RISK PATIENT  Once         08/19/22 1731     Place sequential compression device  Until discontinued         08/19/22 1731                   Rell Alston MD  Department of Hospital Medicine   [facility]

## 2022-08-21 LAB
ALBUMIN SERPL BCP-MCNC: 2.3 G/DL (ref 3.5–5.2)
ALP SERPL-CCNC: 89 U/L (ref 55–135)
ALT SERPL W/O P-5'-P-CCNC: 15 U/L (ref 10–44)
ANION GAP SERPL CALC-SCNC: 5 MMOL/L (ref 8–16)
AST SERPL-CCNC: 15 U/L (ref 10–40)
BASOPHILS # BLD AUTO: 0.06 K/UL (ref 0–0.2)
BASOPHILS NFR BLD: 0.4 % (ref 0–1.9)
BILIRUB SERPL-MCNC: 0.4 MG/DL (ref 0.1–1)
BUN SERPL-MCNC: 15 MG/DL (ref 10–30)
CALCIUM SERPL-MCNC: 9 MG/DL (ref 8.7–10.5)
CHLORIDE SERPL-SCNC: 100 MMOL/L (ref 95–110)
CO2 SERPL-SCNC: 32 MMOL/L (ref 23–29)
CREAT SERPL-MCNC: 1.3 MG/DL (ref 0.5–1.4)
DIFFERENTIAL METHOD: ABNORMAL
EOSINOPHIL # BLD AUTO: 0.1 K/UL (ref 0–0.5)
EOSINOPHIL NFR BLD: 1 % (ref 0–8)
ERYTHROCYTE [DISTWIDTH] IN BLOOD BY AUTOMATED COUNT: 15.3 % (ref 11.5–14.5)
EST. GFR  (NO RACE VARIABLE): 38.8 ML/MIN/1.73 M^2
GLUCOSE SERPL-MCNC: 138 MG/DL (ref 70–110)
HCT VFR BLD AUTO: 32.4 % (ref 37–48.5)
HGB BLD-MCNC: 10.2 G/DL (ref 12–16)
IMM GRANULOCYTES # BLD AUTO: 0.09 K/UL (ref 0–0.04)
IMM GRANULOCYTES NFR BLD AUTO: 0.6 % (ref 0–0.5)
LYMPHOCYTES # BLD AUTO: 2.8 K/UL (ref 1–4.8)
LYMPHOCYTES NFR BLD: 19.2 % (ref 18–48)
MAGNESIUM SERPL-MCNC: 1.5 MG/DL (ref 1.6–2.6)
MCH RBC QN AUTO: 29.1 PG (ref 27–31)
MCHC RBC AUTO-ENTMCNC: 31.5 G/DL (ref 32–36)
MCV RBC AUTO: 92 FL (ref 82–98)
MONOCYTES # BLD AUTO: 1.4 K/UL (ref 0.3–1)
MONOCYTES NFR BLD: 9.8 % (ref 4–15)
NEUTROPHILS # BLD AUTO: 10.1 K/UL (ref 1.8–7.7)
NEUTROPHILS NFR BLD: 69 % (ref 38–73)
NRBC BLD-RTO: 0 /100 WBC
PHOSPHATE SERPL-MCNC: 2.2 MG/DL (ref 2.7–4.5)
PLATELET # BLD AUTO: 313 K/UL (ref 150–450)
PMV BLD AUTO: 9.2 FL (ref 9.2–12.9)
POTASSIUM SERPL-SCNC: 3.7 MMOL/L (ref 3.5–5.1)
PROT SERPL-MCNC: 6.4 G/DL (ref 6–8.4)
RBC # BLD AUTO: 3.51 M/UL (ref 4–5.4)
SODIUM SERPL-SCNC: 137 MMOL/L (ref 136–145)
WBC # BLD AUTO: 14.69 K/UL (ref 3.9–12.7)

## 2022-08-21 PROCEDURE — 94761 N-INVAS EAR/PLS OXIMETRY MLT: CPT

## 2022-08-21 PROCEDURE — 85025 COMPLETE CBC W/AUTO DIFF WBC: CPT | Performed by: INTERNAL MEDICINE

## 2022-08-21 PROCEDURE — 83735 ASSAY OF MAGNESIUM: CPT | Performed by: INTERNAL MEDICINE

## 2022-08-21 PROCEDURE — G0378 HOSPITAL OBSERVATION PER HR: HCPCS

## 2022-08-21 PROCEDURE — 27000221 HC OXYGEN, UP TO 24 HOURS

## 2022-08-21 PROCEDURE — 25000003 PHARM REV CODE 250: Performed by: INTERNAL MEDICINE

## 2022-08-21 PROCEDURE — 80053 COMPREHEN METABOLIC PANEL: CPT | Performed by: INTERNAL MEDICINE

## 2022-08-21 PROCEDURE — 36415 COLL VENOUS BLD VENIPUNCTURE: CPT | Performed by: INTERNAL MEDICINE

## 2022-08-21 PROCEDURE — 99900035 HC TECH TIME PER 15 MIN (STAT)

## 2022-08-21 PROCEDURE — 99900031 HC PATIENT EDUCATION (STAT)

## 2022-08-21 PROCEDURE — 63600175 PHARM REV CODE 636 W HCPCS: Performed by: INTERNAL MEDICINE

## 2022-08-21 PROCEDURE — 84100 ASSAY OF PHOSPHORUS: CPT | Performed by: INTERNAL MEDICINE

## 2022-08-21 PROCEDURE — 96365 THER/PROPH/DIAG IV INF INIT: CPT | Mod: 59

## 2022-08-21 RX ADMIN — AMIODARONE HYDROCHLORIDE 200 MG: 200 TABLET ORAL at 09:08

## 2022-08-21 RX ADMIN — EZETIMIBE 10 MG: 10 TABLET ORAL at 08:08

## 2022-08-21 RX ADMIN — CEFTRIAXONE 1 G: 1 INJECTION, SOLUTION INTRAVENOUS at 03:08

## 2022-08-21 RX ADMIN — ASPIRIN 81 MG: 81 TABLET, COATED ORAL at 08:08

## 2022-08-21 RX ADMIN — ATORVASTATIN CALCIUM 10 MG: 10 TABLET, FILM COATED ORAL at 09:08

## 2022-08-21 RX ADMIN — FAMOTIDINE 20 MG: 20 TABLET ORAL at 08:08

## 2022-08-21 RX ADMIN — AMIODARONE HYDROCHLORIDE 200 MG: 200 TABLET ORAL at 08:08

## 2022-08-21 NOTE — SUBJECTIVE & OBJECTIVE
Interval History: Patient seen and examined.    Review of Systems   Constitutional:  Positive for activity change and fatigue.   Respiratory:  Positive for cough. Negative for chest tightness and wheezing.    Cardiovascular:  Negative for chest pain, palpitations and leg swelling.   Gastrointestinal:  Negative for abdominal pain, constipation, diarrhea, nausea and vomiting.   Musculoskeletal:  Positive for arthralgias, back pain and gait problem.   Neurological:  Positive for weakness.   Objective:     Vital Signs (Most Recent):  Temp: 98.5 °F (36.9 °C) (08/21/22 0745)  Pulse: 88 (08/21/22 0812)  Resp: 18 (08/21/22 0812)  BP: (!) 163/72 (08/21/22 0745)  SpO2: (!) 93 % (08/21/22 0812)   Vital Signs (24h Range):  Temp:  [97.7 °F (36.5 °C)-98.5 °F (36.9 °C)] 98.5 °F (36.9 °C)  Pulse:  [67-90] 88  Resp:  [18-20] 18  SpO2:  [89 %-93 %] 93 %  BP: (137-166)/(65-76) 163/72     Weight: 62.6 kg (138 lb)  Body mass index is 23.69 kg/m².    Intake/Output Summary (Last 24 hours) at 8/21/2022 1008  Last data filed at 8/21/2022 0500  Gross per 24 hour   Intake 1250 ml   Output 1200 ml   Net 50 ml      Physical Exam  Vitals and nursing note reviewed.   HENT:      Head: Normocephalic and atraumatic.      Mouth/Throat:      Mouth: Mucous membranes are moist.      Pharynx: Oropharynx is clear.   Eyes:      General: No scleral icterus.     Extraocular Movements: Extraocular movements intact.      Pupils: Pupils are equal, round, and reactive to light.   Cardiovascular:      Rate and Rhythm: Normal rate and regular rhythm.      Pulses: Normal pulses.      Heart sounds: Normal heart sounds.   Pulmonary:      Effort: Pulmonary effort is normal.      Breath sounds: Normal breath sounds.   Abdominal:      General: Bowel sounds are normal. There is no distension.      Palpations: Abdomen is soft.      Tenderness: There is no abdominal tenderness. There is no guarding.   Musculoskeletal:         General: Normal range of motion.      Cervical  back: Normal range of motion. No rigidity.   Skin:     General: Skin is warm and dry.      Capillary Refill: Capillary refill takes less than 2 seconds.   Neurological:      General: No focal deficit present.      Mental Status: She is alert and oriented to person, place, and time.      Motor: Weakness present.   Psychiatric:         Mood and Affect: Mood normal.         Behavior: Behavior normal.       Significant Labs: All pertinent labs within the past 24 hours have been reviewed.  Urine Culture:   Recent Labs   Lab 08/19/22  1402   LABURIN GRAM NEGATIVE KATERIN  > 100,000 cfu/ml  Identification and susceptibility pending  *     Recent Lab Results       None            Significant Imaging: I have reviewed all pertinent imaging results/findings within the past 24 hours.

## 2022-08-21 NOTE — ASSESSMENT & PLAN NOTE
Patient with UTI on UA, white count initially elevated, afebrile, started on IV Rocephin urine culture pending, leukocytosis resolved, follow-up with culture report, PT/OT on board  -urine culture Gram-positive o[place], sensitivities pending, continue Rocephin, day two

## 2022-08-21 NOTE — PROGRESS NOTES
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  Patient says that she feels lightheaded but denies any chest pain, shortness of breath, vomiting, fever, cough.  Patient was seen here yesterday for swelling in the lower extremity and face so instructed to try to increase Lasix to 40 mg.  Of of      Overview/Hospital Course:  8/21 AA, weekend crosscover, patient admitted with UTI, urine culture in progress, Gram-negative ousmane, on Rocephin therapy, lab pending this morning, afebrile, PT OT, discharge planning      Interval History: Patient seen and examined.    Review of Systems   Constitutional:  Positive for activity change and fatigue.   Respiratory:  Positive for cough. Negative for chest tightness and wheezing.    Cardiovascular:  Negative for chest pain, palpitations and leg swelling.   Gastrointestinal:  Negative for abdominal pain, constipation, diarrhea, nausea and vomiting.   Musculoskeletal:  Positive for arthralgias, back pain and gait problem.   Neurological:  Positive for weakness.   Objective:     Vital Signs (Most Recent):  Temp: 98.5 °F (36.9 °C) (08/21/22 0745)  Pulse: 88 (08/21/22 0812)  Resp: 18 (08/21/22 0812)  BP: (!) 163/72 (08/21/22 0745)  SpO2: (!) 93 % (08/21/22 0812)   Vital Signs (24h Range):  Temp:  [97.7 °F (36.5 °C)-98.5 °F (36.9 °C)] 98.5 °F (36.9 °C)  Pulse:  [67-90] 88  Resp:  [18-20] 18  SpO2:  [89 %-93 %] 93 %  BP: (137-166)/(65-76) 163/72     Weight: 62.6 kg (138 lb)  Body mass index is 23.69 kg/m².    Intake/Output  Summary (Last 24 hours) at 8/21/2022 1008  Last data filed at 8/21/2022 0500  Gross per 24 hour   Intake 1250 ml   Output 1200 ml   Net 50 ml      Physical Exam  Vitals and nursing note reviewed.   HENT:      Head: Normocephalic and atraumatic.      Mouth/Throat:      Mouth: Mucous membranes are moist.      Pharynx: Oropharynx is clear.   Eyes:      General: No scleral icterus.     Extraocular Movements: Extraocular movements intact.      Pupils: Pupils are equal, round, and reactive to light.   Cardiovascular:      Rate and Rhythm: Normal rate and regular rhythm.      Pulses: Normal pulses.      Heart sounds: Normal heart sounds.   Pulmonary:      Effort: Pulmonary effort is normal.      Breath sounds: Normal breath sounds.   Abdominal:      General: Bowel sounds are normal. There is no distension.      Palpations: Abdomen is soft.      Tenderness: There is no abdominal tenderness. There is no guarding.   Musculoskeletal:         General: Normal range of motion.      Cervical back: Normal range of motion. No rigidity.   Skin:     General: Skin is warm and dry.      Capillary Refill: Capillary refill takes less than 2 seconds.   Neurological:      General: No focal deficit present.      Mental Status: She is alert and oriented to person, place, and time.      Motor: Weakness present.   Psychiatric:         Mood and Affect: Mood normal.         Behavior: Behavior normal.       Significant Labs: All pertinent labs within the past 24 hours have been reviewed.  Urine Culture:   Recent Labs   Lab 08/19/22  1402   LABURIN GRAM NEGATIVE KATERIN  > 100,000 cfu/ml  Identification and susceptibility pending  *     Recent Lab Results       None            Significant Imaging: I have reviewed all pertinent imaging results/findings within the past 24 hours.      Assessment/Plan:      * Acute cystitis with hematuria  Patient with UTI on UA, white count initially elevated, afebrile, started on IV Rocephin urine culture pending,  leukocytosis resolved, follow-up with culture report, PT/OT on board  -urine culture Gram-positive ousmane, sensitivities pending, continue Rocephin, day two    Hypotension  resolved      Leukocytosis  Improved      Hypertension  Monitor pressure. Resume home meds. Adjust PRN      VTE Risk Mitigation (From admission, onward)         Ordered     IP VTE HIGH RISK PATIENT  Once         08/19/22 1731     Place sequential compression device  Until discontinued         08/19/22 1731                Discharge Planning   KIMANI:      Code Status: Full Code   Is the patient medically ready for discharge?:     Reason for patient still in hospital (select all that apply): Treatment  Discharge Plan A: Home with family, Home Health                  Rell Alston MD  Department of Hospital Medicine   [facility]

## 2022-08-21 NOTE — NURSING
"No acute events overnight. Pt was upset at the beginning of the shift stating she did not receive her night time medications on 08/19. Explained to pt she didn't have any medications scheduled that is why she didn't receive any. Pt also states "I'm not sure if I should take this cholesterol medication because the DrJefferson Brought me three pills earlier today and one of them was my cholesterol medication." Explained to pt there was no documentation stating the  Administered PO medication to her personally. Pt then shrugged her shoulders and said I'm not sure maybe you are getting me confused with someone else. Nurse doubled checked documentation and reassured pt she did not receive cholesterol medication earlier today. Pt v/u.   "

## 2022-08-21 NOTE — PLAN OF CARE
Problem: Adult Inpatient Plan of Care  Goal: Plan of Care Review  Outcome: Ongoing, Progressing  Goal: Patient-Specific Goal (Individualized)  Outcome: Ongoing, Progressing  Goal: Absence of Hospital-Acquired Illness or Injury  Outcome: Ongoing, Progressing  Goal: Optimal Comfort and Wellbeing  Outcome: Ongoing, Progressing  Goal: Readiness for Transition of Care  Outcome: Ongoing, Progressing     Problem: Fluid and Electrolyte Imbalance (Acute Kidney Injury/Impairment)  Goal: Fluid and Electrolyte Balance  Outcome: Ongoing, Progressing     Problem: Oral Intake Inadequate (Acute Kidney Injury/Impairment)  Goal: Optimal Nutrition Intake  Outcome: Ongoing, Progressing     Problem: Renal Function Impairment (Acute Kidney Injury/Impairment)  Goal: Effective Renal Function  Outcome: Ongoing, Progressing     Problem: Fluid Imbalance (Pneumonia)  Goal: Fluid Balance  Outcome: Ongoing, Progressing     Problem: Infection (Pneumonia)  Goal: Resolution of Infection Signs and Symptoms  Outcome: Ongoing, Progressing     Problem: Respiratory Compromise (Pneumonia)  Goal: Effective Oxygenation and Ventilation  Outcome: Ongoing, Progressing     Problem: Skin Injury Risk Increased  Goal: Skin Health and Integrity  Outcome: Ongoing, Progressing     Problem: Fall Injury Risk  Goal: Absence of Fall and Fall-Related Injury  Outcome: Ongoing, Progressing

## 2022-08-22 VITALS
WEIGHT: 138 LBS | BODY MASS INDEX: 23.56 KG/M2 | DIASTOLIC BLOOD PRESSURE: 70 MMHG | OXYGEN SATURATION: 91 % | HEART RATE: 94 BPM | HEIGHT: 64 IN | SYSTOLIC BLOOD PRESSURE: 155 MMHG | TEMPERATURE: 98 F | RESPIRATION RATE: 20 BRPM

## 2022-08-22 LAB
ALBUMIN SERPL BCP-MCNC: 2.4 G/DL (ref 3.5–5.2)
ALP SERPL-CCNC: 89 U/L (ref 55–135)
ALT SERPL W/O P-5'-P-CCNC: 16 U/L (ref 10–44)
ANION GAP SERPL CALC-SCNC: 4 MMOL/L (ref 8–16)
AST SERPL-CCNC: 17 U/L (ref 10–40)
BACTERIA UR CULT: ABNORMAL
BASOPHILS # BLD AUTO: 0.05 K/UL (ref 0–0.2)
BASOPHILS NFR BLD: 0.4 % (ref 0–1.9)
BILIRUB SERPL-MCNC: 0.5 MG/DL (ref 0.1–1)
BUN SERPL-MCNC: 16 MG/DL (ref 10–30)
CALCIUM SERPL-MCNC: 9.2 MG/DL (ref 8.7–10.5)
CHLORIDE SERPL-SCNC: 101 MMOL/L (ref 95–110)
CO2 SERPL-SCNC: 34 MMOL/L (ref 23–29)
CREAT SERPL-MCNC: 1.4 MG/DL (ref 0.5–1.4)
DIFFERENTIAL METHOD: ABNORMAL
EOSINOPHIL # BLD AUTO: 0.2 K/UL (ref 0–0.5)
EOSINOPHIL NFR BLD: 1.8 % (ref 0–8)
ERYTHROCYTE [DISTWIDTH] IN BLOOD BY AUTOMATED COUNT: 15.5 % (ref 11.5–14.5)
EST. GFR  (NO RACE VARIABLE): 35.5 ML/MIN/1.73 M^2
GLUCOSE SERPL-MCNC: 105 MG/DL (ref 70–110)
HCT VFR BLD AUTO: 32.8 % (ref 37–48.5)
HGB BLD-MCNC: 10 G/DL (ref 12–16)
IMM GRANULOCYTES # BLD AUTO: 0.09 K/UL (ref 0–0.04)
IMM GRANULOCYTES NFR BLD AUTO: 0.7 % (ref 0–0.5)
LYMPHOCYTES # BLD AUTO: 2.4 K/UL (ref 1–4.8)
LYMPHOCYTES NFR BLD: 18.8 % (ref 18–48)
MAGNESIUM SERPL-MCNC: 1.7 MG/DL (ref 1.6–2.6)
MCH RBC QN AUTO: 28.6 PG (ref 27–31)
MCHC RBC AUTO-ENTMCNC: 30.5 G/DL (ref 32–36)
MCV RBC AUTO: 94 FL (ref 82–98)
MONOCYTES # BLD AUTO: 1.6 K/UL (ref 0.3–1)
MONOCYTES NFR BLD: 12.6 % (ref 4–15)
NEUTROPHILS # BLD AUTO: 8.5 K/UL (ref 1.8–7.7)
NEUTROPHILS NFR BLD: 65.7 % (ref 38–73)
NRBC BLD-RTO: 0 /100 WBC
PHOSPHATE SERPL-MCNC: 2.8 MG/DL (ref 2.7–4.5)
PLATELET # BLD AUTO: 316 K/UL (ref 150–450)
PMV BLD AUTO: 9.4 FL (ref 9.2–12.9)
POTASSIUM SERPL-SCNC: 4.4 MMOL/L (ref 3.5–5.1)
PROT SERPL-MCNC: 6.5 G/DL (ref 6–8.4)
RBC # BLD AUTO: 3.5 M/UL (ref 4–5.4)
SODIUM SERPL-SCNC: 139 MMOL/L (ref 136–145)
WBC # BLD AUTO: 12.96 K/UL (ref 3.9–12.7)

## 2022-08-22 PROCEDURE — 99900031 HC PATIENT EDUCATION (STAT)

## 2022-08-22 PROCEDURE — 97161 PT EVAL LOW COMPLEX 20 MIN: CPT

## 2022-08-22 PROCEDURE — 27000221 HC OXYGEN, UP TO 24 HOURS

## 2022-08-22 PROCEDURE — 84100 ASSAY OF PHOSPHORUS: CPT | Performed by: INTERNAL MEDICINE

## 2022-08-22 PROCEDURE — 25000003 PHARM REV CODE 250: Performed by: INTERNAL MEDICINE

## 2022-08-22 PROCEDURE — 99900035 HC TECH TIME PER 15 MIN (STAT)

## 2022-08-22 PROCEDURE — 96375 TX/PRO/DX INJ NEW DRUG ADDON: CPT

## 2022-08-22 PROCEDURE — 83735 ASSAY OF MAGNESIUM: CPT | Performed by: INTERNAL MEDICINE

## 2022-08-22 PROCEDURE — 85025 COMPLETE CBC W/AUTO DIFF WBC: CPT | Performed by: INTERNAL MEDICINE

## 2022-08-22 PROCEDURE — 96366 THER/PROPH/DIAG IV INF ADDON: CPT

## 2022-08-22 PROCEDURE — 80053 COMPREHEN METABOLIC PANEL: CPT | Performed by: INTERNAL MEDICINE

## 2022-08-22 PROCEDURE — 63600175 PHARM REV CODE 636 W HCPCS: Performed by: INTERNAL MEDICINE

## 2022-08-22 PROCEDURE — 36415 COLL VENOUS BLD VENIPUNCTURE: CPT | Performed by: INTERNAL MEDICINE

## 2022-08-22 PROCEDURE — 94761 N-INVAS EAR/PLS OXIMETRY MLT: CPT

## 2022-08-22 PROCEDURE — G0378 HOSPITAL OBSERVATION PER HR: HCPCS

## 2022-08-22 RX ORDER — CIPROFLOXACIN 500 MG/1
500 TABLET ORAL 2 TIMES DAILY
Qty: 14 TABLET | Refills: 0 | Status: SHIPPED | OUTPATIENT
Start: 2022-08-22 | End: 2022-08-29

## 2022-08-22 RX ADMIN — EZETIMIBE 10 MG: 10 TABLET ORAL at 08:08

## 2022-08-22 RX ADMIN — AMIODARONE HYDROCHLORIDE 200 MG: 200 TABLET ORAL at 08:08

## 2022-08-22 RX ADMIN — FAMOTIDINE 20 MG: 20 TABLET ORAL at 08:08

## 2022-08-22 RX ADMIN — ASPIRIN 81 MG: 81 TABLET, COATED ORAL at 08:08

## 2022-08-22 RX ADMIN — ACETAMINOPHEN 650 MG: 325 TABLET ORAL at 01:08

## 2022-08-22 RX ADMIN — ONDANSETRON 4 MG: 2 INJECTION INTRAMUSCULAR; INTRAVENOUS at 04:08

## 2022-08-22 RX ADMIN — CEFTRIAXONE 1 G: 1 INJECTION, SOLUTION INTRAVENOUS at 10:08

## 2022-08-22 NOTE — PT/OT/SLP EVAL
Physical Therapy  Evaluation    Cdoi Black   MRN: [id number]   Admitting Diagnosis: Acute cystitis with hematuria                          Billable Minutes:  Evaluation 15 minutes    Diagnosis: Acute cystitis with hematuria      Past Medical History:   Diagnosis Date    Arthritis     Coronary artery disease     Encounter for blood transfusion     High cholesterol     Hypertension     NSTEMI (non-ST elevated myocardial infarction)       Past Surgical History:   Procedure Laterality Date    HYSTERECTOMY      KNEE ARTHROSCOPY Right        Referring physician: Kadeem  Date referred to PT: 8/22/22    General Precautions: Standard,    Orthopedic Precautions:     Braces:              Patient History:     DME owned (not currently used): bedside commode, shower chair, wheelchair, hospital bed and O2    Previous Level of Function:   pt lives at home with her . She reports that she had an MI 6-8 weeks ago and went through IPR but was never able to improve ability to walk. She is receiving HHPT but states they only provide exercises for her LE's but are not working on her standing.    Subjective:  Communicated with nurse prior to session.    Pain: 0/10    Chief Complaint: weakness  Patient goals: improve ability to stand,transfer and walk           Objective:         Cognitive Exam:  Oriented to: Person, Place, Time and Situation    Follows Commands/attention: Follows multistep  commands  Communication: clear/fluent  Safety awareness/insight to disability: intact    Physical Exam:  Postural examination/scapula alignment:    -       Rounded shoulders  -       Forward head    Skin integrity: Visible skin intact  Edema: None noted      Sensation:   Intact light touch B LE's      Lower Extremity Range of Motion:  Right Lower Extremity: WFL  Left Lower Extremity: 3+/5    Lower Extremity Strength:  Right Lower Extremity: WFL  Left Lower Extremity: 3+/5         Functional Mobility:  Bed Mobility:   -supine to/from sit  for trunk lifting and B LE assist  -scooting in sitting to edge of bed mod to max assist    Transfers:   -sit to stand from bed mod assist using RW; strong post lean    Gait:    -pt initiated steps using RW though very weak and unsafe with heavy post lean, pt able to take ~3 steps forward but required significant assist to safely back to bed to sit; mod assist forward/max assist backward.      Balance:   Static Sit: FAIR-: Maintains without assist but inconsistent   Dynamic Sit: FAIR: Cannot move trunk without losing balance  Static Stand: POOR: Needs MODERATE assist to maintain  Dynamic stand: 0: N/A-mod to max    Patient left HOB elevated with all lines intact and call button in reach.    Assessment:   Codi Black is a 89 y.o. female with a medical diagnosis of Acute cystitis with hematuria and presents with decreased strength in B LE's and trunk, decreased postural control in sitting/standing, decreased bed mobility and transfer/gait ability. Will benefit from PT to address those deficits to improve activity mame and safety with mobility.    Rehab identified problem list/impairments:      Rehab potential is fair.    Activity tolerance: Fair    Discharge recommendations:   cont HHPT    Barriers to discharge:      Equipment recommendations:       GOALS:   Multidisciplinary Problems     Physical Therapy Goals        Problem: Physical Therapy    Goal Priority Disciplines Outcome Goal Variances Interventions   Physical Therapy Goal     PT, PT/OT      Description: Goals to be met by: 22    Patient will increase functional independence with mobility by performin. Supine to sit with Minimal Assistance  2. Sit to supine with MInimal Assistance  3. Sit to stand transfer with Minimal Assistance  4. Bed to chair transfer with Minimal Assistance using Rolling Walker  5. Gait  x 25 feet with Minimal Assistance using Rolling Walker.                      PLAN:    Patient to be seen  5-6 days/week to address the  above listed problems via  strengthening, sitting and standing balance, progressive mobilization as mame.  Plan of Care expires:  9/5/22  Plan of Care reviewed with:  patient          08/22/2022

## 2022-08-22 NOTE — NURSING
No acute events overnight, pt c/o nausea this am around 04:30 PRN medication administered, moderate relief obtained. Pt denies needs at this time will continue to monitor.

## 2022-09-20 NOTE — DISCHARGE SUMMARY
[facility] Medicine  Discharge Summary      Patient Name: Codi Black  MRN: [id number]  Patient Class: OP- Observation  Admission Date: 8/19/2022  Hospital Length of Stay: 0 days  Discharge Date and Time: 8/22/2022  3:26 PM  Attending Physician: No att. providers found   Discharging Provider: Rafael Quan III, MD  Primary Care Provider: Rafael Quan III, MD      HPI:   89-year-old female with history NSTEMI, congestive heart failure stage II on 2 L nasal, hypertension presents to ER for low blood pressure and heart rate.  Patient says that she feels lightheaded but denies any chest pain, shortness of breath, vomiting, fever, cough.  Patient was seen here yesterday for swelling in the lower extremity and face so instructed to try to increase Lasix to 40 mg.  Of of      * No surgery found *      Hospital Course:   8/21 AA, weekend crosscover, patient admitted with UTI, urine culture in progress, Gram-negative ousmane, on Rocephin therapy, lab pending this morning, afebrile, PT OT, discharge planning    Discharge Note:  Patient was admitted to our acute care facility after recent hospitalization.  She has been declining over the last year.  Patient was found have a urinary tract infection and improved much more rapidly than expected.  She is returning home with family she has a daughter that has moved in with her and is taking excellent care of her and assisting in her ADLs.       Goals of Care Treatment Preferences:  Code Status: Full Code      Consults:   Consults (From admission, onward)        Status Ordering Provider     IP consult to case management  Once        Provider:  (Not yet assigned)    Completed MANAS BUSTAMANTE          * Acute cystitis with hematuria  Patient with UTI on UA, white count initially elevated, afebrile, started on IV Rocephin urine culture pending, leukocytosis resolved, follow-up with culture report, PT/OT on board  -urine culture Gram-positive ousmane, sensitivities  pending, continue Rocephin, day two    Hypotension  resolved      Leukocytosis  Improved      Hypertension  Monitor pressure. Resume home meds. Adjust PRN      Final Active Diagnoses:    Diagnosis Date Noted POA    PRINCIPAL PROBLEM:  Acute cystitis with hematuria [N30.01] 08/20/2022 Yes    Leukocytosis [D72.829] 08/20/2022 Yes    Hypotension [I95.9] 08/20/2022 Yes    Hypertension [I10] 06/19/2022 Yes      Problems Resolved During this Admission:       Discharged Condition: fair    Disposition: Home-Health Care c    Follow Up:   Contact information for after-discharge care     Home Medical Care     [facility] .    Service: Home Health Services  Contact information:  [address]  [facility] [id number] [phone]                           Patient Instructions:      Diet Adult Regular     Activity as tolerated       Significant Diagnostic Studies: Labs: CMP No results for input(s): NA, K, CL, CO2, GLU, BUN, CREATININE, CALCIUM, PROT, ALBUMIN, BILITOT, ALKPHOS, AST, ALT, ANIONGAP, ESTGFRAFRICA, EGFRNONAA in the last 48 hours. and CBC No results for input(s): WBC, HGB, HCT, PLT in the last 48 hours.    Pending Diagnostic Studies:     None         Medications:  Reconciled Home Medications:      Medication List      CONTINUE taking these medications    acetaminophen 325 MG tablet  Commonly known as: TYLENOL  Take 2 tablets (650 mg total) by mouth every 8 (eight) hours as needed.     ALPRAZolam 0.5 MG tablet  Commonly known as: XANAX  Take 1 tablet (0.5 mg total) by mouth 2 (two) times daily as needed for Anxiety.     amiodarone 200 MG Tab  Commonly known as: PACERONE  Take 1 tablet (200 mg total) by mouth 2 (two) times daily.     amLODIPine 5 MG tablet  Commonly known as: NORVASC  Take 1 tablet (5 mg total) by mouth once daily.     aspirin 81 MG EC tablet  Commonly known as: ECOTRIN  Take 1 tablet (81 mg total) by mouth once daily.     atorvastatin 10 MG tablet  Commonly  known as: LIPITOR  Take 1 tablet (10 mg total) by mouth every evening.     cyanocobalamin 1,000 mcg/mL injection  Inject 1 mL (1,000 mcg total) into the muscle every 30 days.     ezetimibe 10 mg tablet  Commonly known as: ZETIA  Take 1 tablet (10 mg total) by mouth once daily.     famotidine 20 MG tablet  Commonly known as: PEPCID  Take 1 tablet (20 mg total) by mouth once daily.     furosemide 20 MG tablet  Commonly known as: LASIX  Take 1 tablet (20 mg total) by mouth 2 (two) times daily as needed (edmea).     isosorbide mononitrate 30 MG 24 hr tablet  Commonly known as: IMDUR  Take 1 tablet (30 mg total) by mouth once daily.     LIDOcaine 5 %  Commonly known as: LIDODERM  Place 1 patch onto the skin once daily. Remove & Discard patch within 12 hours or as directed by MD     mirtazapine 15 MG tablet  Commonly known as: REMERON  Take 1 tablet (15 mg total) by mouth every evening.     promethazine 25 MG tablet  Commonly known as: PHENERGAN  Take 1 tablet (25 mg total) by mouth every 6 (six) hours as needed for Nausea.        ASK your doctor about these medications    carvediloL 12.5 MG tablet  Commonly known as: COREG  Take 1 tablet (12.5 mg total) by mouth 2 (two) times daily.     ciprofloxacin HCl 500 MG tablet  Commonly known as: CIPRO  Take 1 tablet (500 mg total) by mouth 2 (two) times a day. for 7 days  Ask about: Should I take this medication?     clotrimazole 10 mg steven  Commonly known as: MYCELEX  Take 1 tablet (10 mg total) by mouth 5 (five) times daily.  Ask about: Should I take this medication?     hydroCHLOROthiazide 25 MG tablet  Commonly known as: HYDRODIURIL  Take 50 mg by mouth once daily.     ondansetron 4 MG tablet  Commonly known as: ZOFRAN  Take 1 tablet (4 mg total) by mouth every 6 (six) hours as needed for Nausea.  Ask about: Should I take this medication?            Indwelling Lines/Drains at time of discharge:   Lines/Drains/Airways     None                 Time spent on the discharge of  patient: 45 minutes         Rafael Quan III, MD  Department of Hospital Medicine  [facility]

## 2022-11-21 PROBLEM — N39.0 UTI (URINARY TRACT INFECTION): Status: RESOLVED | Noted: 2022-03-02 | Resolved: 2022-11-21
